# Patient Record
Sex: MALE | Race: WHITE | Employment: UNEMPLOYED | URBAN - METROPOLITAN AREA
[De-identification: names, ages, dates, MRNs, and addresses within clinical notes are randomized per-mention and may not be internally consistent; named-entity substitution may affect disease eponyms.]

---

## 2023-05-31 ENCOUNTER — TELEPHONE (OUTPATIENT)
Dept: INTERNAL MEDICINE CLINIC | Facility: CLINIC | Age: 83
End: 2023-05-31

## 2023-05-31 ENCOUNTER — OFFICE VISIT (OUTPATIENT)
Dept: INTERNAL MEDICINE CLINIC | Facility: CLINIC | Age: 83
End: 2023-05-31

## 2023-05-31 VITALS
DIASTOLIC BLOOD PRESSURE: 78 MMHG | WEIGHT: 203 LBS | HEIGHT: 74 IN | SYSTOLIC BLOOD PRESSURE: 132 MMHG | BODY MASS INDEX: 26.05 KG/M2 | HEART RATE: 80 BPM | OXYGEN SATURATION: 96 %

## 2023-05-31 DIAGNOSIS — J31.0 CHRONIC RHINITIS: ICD-10-CM

## 2023-05-31 DIAGNOSIS — I10 BENIGN ESSENTIAL HTN: ICD-10-CM

## 2023-05-31 DIAGNOSIS — G89.29 CHRONIC LOW BACK PAIN, UNSPECIFIED BACK PAIN LATERALITY, UNSPECIFIED WHETHER SCIATICA PRESENT: ICD-10-CM

## 2023-05-31 DIAGNOSIS — E78.5 HYPERLIPIDEMIA, UNSPECIFIED HYPERLIPIDEMIA TYPE: ICD-10-CM

## 2023-05-31 DIAGNOSIS — C44.90 SKIN CANCER: ICD-10-CM

## 2023-05-31 DIAGNOSIS — I71.40 ABDOMINAL AORTIC ANEURYSM (AAA) WITHOUT RUPTURE, UNSPECIFIED PART (HCC): ICD-10-CM

## 2023-05-31 DIAGNOSIS — H40.9 GLAUCOMA, UNSPECIFIED GLAUCOMA TYPE, UNSPECIFIED LATERALITY: ICD-10-CM

## 2023-05-31 DIAGNOSIS — M81.0 OSTEOPOROSIS, UNSPECIFIED OSTEOPOROSIS TYPE, UNSPECIFIED PATHOLOGICAL FRACTURE PRESENCE: ICD-10-CM

## 2023-05-31 DIAGNOSIS — Z00.00 PHYSICAL EXAM: Primary | ICD-10-CM

## 2023-05-31 DIAGNOSIS — M19.90 OSTEOARTHRITIS, UNSPECIFIED OSTEOARTHRITIS TYPE, UNSPECIFIED SITE: ICD-10-CM

## 2023-05-31 DIAGNOSIS — E11.9 TYPE 2 DIABETES MELLITUS WITHOUT COMPLICATION, WITHOUT LONG-TERM CURRENT USE OF INSULIN (HCC): ICD-10-CM

## 2023-05-31 DIAGNOSIS — M54.50 CHRONIC LOW BACK PAIN, UNSPECIFIED BACK PAIN LATERALITY, UNSPECIFIED WHETHER SCIATICA PRESENT: ICD-10-CM

## 2023-05-31 PROBLEM — I71.9 AORTA ANEURYSM (HCC): Status: ACTIVE | Noted: 2023-05-31

## 2023-05-31 PROBLEM — I71.9 AORTA ANEURYSM: Status: ACTIVE | Noted: 2023-05-31

## 2023-05-31 RX ORDER — ATENOLOL 25 MG/1
25 TABLET ORAL DAILY
COMMUNITY

## 2023-05-31 RX ORDER — AMLODIPINE BESYLATE 10 MG/1
10 TABLET ORAL DAILY
COMMUNITY

## 2023-05-31 RX ORDER — PIOGLITAZONEHYDROCHLORIDE 45 MG/1
45 TABLET ORAL DAILY
COMMUNITY

## 2023-05-31 RX ORDER — POTASSIUM CHLORIDE 750 MG/1
10 TABLET, FILM COATED, EXTENDED RELEASE ORAL DAILY
COMMUNITY

## 2023-05-31 RX ORDER — BRIMONIDINE TARTRATE 1.5 MG/ML
SOLUTION/ DROPS OPHTHALMIC
COMMUNITY

## 2023-05-31 RX ORDER — LOSARTAN POTASSIUM 100 MG/1
100 TABLET ORAL DAILY
COMMUNITY

## 2023-05-31 RX ORDER — NICOTINE POLACRILEX 4 MG/1
20 GUM, CHEWING ORAL DAILY
COMMUNITY

## 2023-05-31 RX ORDER — ALENDRONATE SODIUM 70 MG/1
TABLET ORAL
COMMUNITY

## 2023-05-31 NOTE — TELEPHONE ENCOUNTER
LMTCB - Please encourage patient again (asked him this at todays visit) to please bring pill bottles to next OV. Still missing strength / dose for the following. Upon call back please obtain. Also obtain a good local pharmacy. Metformin  Glipizide  Atorvastatin  Bromonidine   Alendronate  Timolol Maleate Eye Drops   Calcium  Vitamin C  Centrum Silver Multivitamin     Side note, PHI signed and faxed to previous PCP in [place], Dr. Rony Perez at [place], [place] and Shaun Mercado MD, Dr. Austin Garcia.  [facility] for [facility] (Lumbar Pain) in [place], [place]

## 2023-05-31 NOTE — TELEPHONE ENCOUNTER
Patient called  Please update chart with the following medications that he is taking     Omeprazole 20 mg - 1 daily    Potassium chloride ER 10 meq - 1 daily    Piogzitazine (sp?) 45 mg - 1 daily    Atenolol 25 mg - 1 daily    Losartan 100 mg - 1 daily    Apilopine (sp?) 10 mg - 1 daily     Any questions pt can be reached at     [phone]

## 2023-06-01 ENCOUNTER — LAB ENCOUNTER (OUTPATIENT)
Dept: LAB | Age: 83
End: 2023-06-01
Attending: INTERNAL MEDICINE
Payer: MEDICARE

## 2023-06-01 DIAGNOSIS — E11.9 TYPE 2 DIABETES MELLITUS WITHOUT COMPLICATION, WITHOUT LONG-TERM CURRENT USE OF INSULIN (HCC): ICD-10-CM

## 2023-06-01 DIAGNOSIS — Z00.00 PHYSICAL EXAM: ICD-10-CM

## 2023-06-01 LAB
ALBUMIN SERPL-MCNC: 3.7 G/DL (ref 3.4–5)
ALBUMIN/GLOB SERPL: 1.2 {RATIO} (ref 1–2)
ALP LIVER SERPL-CCNC: 72 U/L
ALT SERPL-CCNC: 20 U/L
ANION GAP SERPL CALC-SCNC: 7 MMOL/L (ref 0–18)
AST SERPL-CCNC: 16 U/L (ref 15–37)
BASOPHILS # BLD AUTO: 0.05 X10(3) UL (ref 0–0.2)
BASOPHILS NFR BLD AUTO: 0.7 %
BILIRUB SERPL-MCNC: 0.4 MG/DL (ref 0.1–2)
BUN BLD-MCNC: 30 MG/DL (ref 7–18)
BUN/CREAT SERPL: 24.8 (ref 10–20)
CALCIUM BLD-MCNC: 9.2 MG/DL (ref 8.5–10.1)
CHLORIDE SERPL-SCNC: 110 MMOL/L (ref 98–112)
CHOLEST SERPL-MCNC: 122 MG/DL (ref ?–200)
CO2 SERPL-SCNC: 24 MMOL/L (ref 21–32)
COMPLEXED PSA SERPL-MCNC: 0.69 NG/ML (ref ?–4)
CREAT BLD-MCNC: 1.21 MG/DL
CREAT UR-SCNC: 194 MG/DL
DEPRECATED RDW RBC AUTO: 43.7 FL (ref 35.1–46.3)
EOSINOPHIL # BLD AUTO: 0.32 X10(3) UL (ref 0–0.7)
EOSINOPHIL NFR BLD AUTO: 4.7 %
ERYTHROCYTE [DISTWIDTH] IN BLOOD BY AUTOMATED COUNT: 12.9 % (ref 11–15)
EST. AVERAGE GLUCOSE BLD GHB EST-MCNC: 174 MG/DL (ref 68–126)
FASTING PATIENT LIPID ANSWER: YES
FASTING STATUS PATIENT QL REPORTED: YES
GFR SERPLBLD BASED ON 1.73 SQ M-ARVRAT: 59 ML/MIN/1.73M2 (ref 60–?)
GLOBULIN PLAS-MCNC: 3.1 G/DL (ref 2.8–4.4)
GLUCOSE BLD-MCNC: 150 MG/DL (ref 70–99)
HBA1C MFR BLD: 7.7 % (ref ?–5.7)
HCT VFR BLD AUTO: 42.2 %
HDLC SERPL-MCNC: 41 MG/DL (ref 40–59)
HGB BLD-MCNC: 13.9 G/DL
IMM GRANULOCYTES # BLD AUTO: 0.02 X10(3) UL (ref 0–1)
IMM GRANULOCYTES NFR BLD: 0.3 %
LDLC SERPL CALC-MCNC: 59 MG/DL (ref ?–100)
LYMPHOCYTES # BLD AUTO: 1.69 X10(3) UL (ref 1–4)
LYMPHOCYTES NFR BLD AUTO: 24.7 %
MCH RBC QN AUTO: 30.4 PG (ref 26–34)
MCHC RBC AUTO-ENTMCNC: 32.9 G/DL (ref 31–37)
MCV RBC AUTO: 92.3 FL
MICROALBUMIN UR-MCNC: 8.79 MG/DL
MICROALBUMIN/CREAT 24H UR-RTO: 45.3 UG/MG (ref ?–30)
MONOCYTES # BLD AUTO: 0.51 X10(3) UL (ref 0.1–1)
MONOCYTES NFR BLD AUTO: 7.4 %
NEUTROPHILS # BLD AUTO: 4.26 X10 (3) UL (ref 1.5–7.7)
NEUTROPHILS # BLD AUTO: 4.26 X10(3) UL (ref 1.5–7.7)
NEUTROPHILS NFR BLD AUTO: 62.2 %
NONHDLC SERPL-MCNC: 81 MG/DL (ref ?–130)
OSMOLALITY SERPL CALC.SUM OF ELEC: 301 MOSM/KG (ref 275–295)
PLATELET # BLD AUTO: 204 10(3)UL (ref 150–450)
POTASSIUM SERPL-SCNC: 4.1 MMOL/L (ref 3.5–5.1)
PROT SERPL-MCNC: 6.8 G/DL (ref 6.4–8.2)
RBC # BLD AUTO: 4.57 X10(6)UL
SODIUM SERPL-SCNC: 141 MMOL/L (ref 136–145)
TRIGL SERPL-MCNC: 124 MG/DL (ref 30–149)
TSI SER-ACNC: 2.68 MIU/ML (ref 0.36–3.74)
VLDLC SERPL CALC-MCNC: 18 MG/DL (ref 0–30)
WBC # BLD AUTO: 6.9 X10(3) UL (ref 4–11)

## 2023-06-01 PROCEDURE — 84443 ASSAY THYROID STIM HORMONE: CPT

## 2023-06-01 PROCEDURE — 83036 HEMOGLOBIN GLYCOSYLATED A1C: CPT

## 2023-06-01 PROCEDURE — 82570 ASSAY OF URINE CREATININE: CPT

## 2023-06-01 PROCEDURE — 82043 UR ALBUMIN QUANTITATIVE: CPT

## 2023-06-01 PROCEDURE — 80061 LIPID PANEL: CPT

## 2023-06-01 PROCEDURE — 85025 COMPLETE CBC W/AUTO DIFF WBC: CPT

## 2023-06-01 PROCEDURE — 36415 COLL VENOUS BLD VENIPUNCTURE: CPT

## 2023-06-01 PROCEDURE — 80053 COMPREHEN METABOLIC PANEL: CPT

## 2023-06-14 NOTE — TELEPHONE ENCOUNTER
Called patient who states he does not need refills , has adan 6/16 with DR. CHERI Melo RN instructed luna fitzgerald bring all medication bottles with him   Here he would be using [facility] on [address]

## 2023-06-16 ENCOUNTER — OFFICE VISIT (OUTPATIENT)
Dept: INTERNAL MEDICINE CLINIC | Facility: CLINIC | Age: 83
End: 2023-06-16

## 2023-06-16 VITALS
TEMPERATURE: 98 F | HEART RATE: 70 BPM | BODY MASS INDEX: 26 KG/M2 | OXYGEN SATURATION: 96 % | HEIGHT: 74 IN | SYSTOLIC BLOOD PRESSURE: 130 MMHG | DIASTOLIC BLOOD PRESSURE: 78 MMHG

## 2023-06-16 DIAGNOSIS — E11.9 TYPE 2 DIABETES MELLITUS WITHOUT COMPLICATION, WITHOUT LONG-TERM CURRENT USE OF INSULIN (HCC): ICD-10-CM

## 2023-06-16 DIAGNOSIS — H61.23 BILATERAL IMPACTED CERUMEN: Primary | ICD-10-CM

## 2023-06-16 DIAGNOSIS — I10 BENIGN ESSENTIAL HTN: ICD-10-CM

## 2023-06-16 PROCEDURE — 3078F DIAST BP <80 MM HG: CPT | Performed by: INTERNAL MEDICINE

## 2023-06-16 PROCEDURE — 99213 OFFICE O/P EST LOW 20 MIN: CPT | Performed by: INTERNAL MEDICINE

## 2023-06-16 PROCEDURE — 69210 REMOVE IMPACTED EAR WAX UNI: CPT | Performed by: INTERNAL MEDICINE

## 2023-06-16 PROCEDURE — 3075F SYST BP GE 130 - 139MM HG: CPT | Performed by: INTERNAL MEDICINE

## 2023-06-16 PROCEDURE — 1125F AMNT PAIN NOTED PAIN PRSNT: CPT | Performed by: INTERNAL MEDICINE

## 2023-06-16 PROCEDURE — 1159F MED LIST DOCD IN RCRD: CPT | Performed by: INTERNAL MEDICINE

## 2023-06-16 RX ORDER — ASPIRIN 81 MG/1
81 TABLET ORAL DAILY
COMMUNITY

## 2023-06-21 ENCOUNTER — TELEPHONE (OUTPATIENT)
Dept: PHYSICAL THERAPY | Facility: HOSPITAL | Age: 83
End: 2023-06-21

## 2023-06-22 ENCOUNTER — OFFICE VISIT (OUTPATIENT)
Dept: PHYSICAL THERAPY | Age: 83
End: 2023-06-22
Attending: INTERNAL MEDICINE
Payer: MEDICARE

## 2023-06-22 DIAGNOSIS — G89.29 CHRONIC LOW BACK PAIN, UNSPECIFIED BACK PAIN LATERALITY, UNSPECIFIED WHETHER SCIATICA PRESENT: ICD-10-CM

## 2023-06-22 DIAGNOSIS — M54.50 CHRONIC LOW BACK PAIN, UNSPECIFIED BACK PAIN LATERALITY, UNSPECIFIED WHETHER SCIATICA PRESENT: ICD-10-CM

## 2023-06-22 PROCEDURE — 97162 PT EVAL MOD COMPLEX 30 MIN: CPT | Performed by: PHYSICAL THERAPIST

## 2023-06-22 PROCEDURE — 97110 THERAPEUTIC EXERCISES: CPT | Performed by: PHYSICAL THERAPIST

## 2023-06-28 ENCOUNTER — OFFICE VISIT (OUTPATIENT)
Dept: PHYSICAL THERAPY | Age: 83
End: 2023-06-28
Attending: INTERNAL MEDICINE
Payer: MEDICARE

## 2023-06-28 PROCEDURE — 97110 THERAPEUTIC EXERCISES: CPT | Performed by: PHYSICAL THERAPIST

## 2023-06-30 ENCOUNTER — OFFICE VISIT (OUTPATIENT)
Dept: PHYSICAL THERAPY | Age: 83
End: 2023-06-30
Attending: INTERNAL MEDICINE
Payer: MEDICARE

## 2023-06-30 PROCEDURE — 97110 THERAPEUTIC EXERCISES: CPT

## 2023-06-30 PROCEDURE — 97112 NEUROMUSCULAR REEDUCATION: CPT

## 2023-07-03 ENCOUNTER — OFFICE VISIT (OUTPATIENT)
Dept: PHYSICAL THERAPY | Age: 83
End: 2023-07-03
Attending: INTERNAL MEDICINE
Payer: MEDICARE

## 2023-07-03 PROCEDURE — 97530 THERAPEUTIC ACTIVITIES: CPT | Performed by: PHYSICAL THERAPIST

## 2023-07-03 PROCEDURE — 97110 THERAPEUTIC EXERCISES: CPT | Performed by: PHYSICAL THERAPIST

## 2023-07-06 ENCOUNTER — OFFICE VISIT (OUTPATIENT)
Dept: PHYSICAL THERAPY | Age: 83
End: 2023-07-06
Attending: INTERNAL MEDICINE
Payer: MEDICARE

## 2023-07-06 PROCEDURE — 97110 THERAPEUTIC EXERCISES: CPT | Performed by: PHYSICAL THERAPIST

## 2023-07-06 PROCEDURE — 97112 NEUROMUSCULAR REEDUCATION: CPT | Performed by: PHYSICAL THERAPIST

## 2023-07-06 NOTE — PROGRESS NOTES
Diagnosis:   Chronic low back pain, unspecified back pain laterality, unspecified whether sciatica present (M54.50,G89.29)   Referring Provider: Emili Boyd  Date of Evaluation:    6/22/2023    Precautions:  HTN, DM2, FALL RISK Next MD visit:   none scheduled  Date of Surgery: n/a   Insurance Primary/Secondary: [employer]ICARE / N/A     # Auth Visits: 8 sessions auth           Subjective: LB and balance are better. Limited quad cane use; use as needed for curbs, just in case I lose my balance. Don't get out of breath like I used to. Neck is better today. Plan to use collapseable SPC when go to [facility] in October for family birthday party. Pain: 2-3/10    Objective:   Gait: flat foot contact with ground, fair use of R quad cane, fair dynamic balance, wide based of support  Posture: seated; forward slouched  Palpation: Pt denies c/o lumbar/gluteal musculature  Pt c/o discomfort with lumbar, SIJ P/A. Assessment:   Pt requiring less rest breaks during PT session. Pt with fairly good balance during interventions but does require min - mod UE A at times. Pt continues to have signif restriction B hip ER ROM; continue to address during PT sessions and with HEP. Will assess gait/potentially consider gait training using collapsible SPC, as pt reports plans to use this when travels in October and due to fair use/form using quad cane. Pt reported 1 brief episode of SOB during on exertion during session, recovered quickly with rest, denied further c/o. Goals:   (to be met in 10 visits)   1. Patient to report independence with PT HEP to facilitate self management and to maintain progress made in PT.  2. Patient to have TUG score below 12 sec in order to reduce fall risk. 3. Patient to have L/R hip MMT grossly 4/5 to facilitate transfers, stair negotiation. 4. Patient to walk at least 5 min using appropriate assistive device without fall or loss of balance.    5. Patient to have at least 30 deg active lumbar ext to facilitate walking, overhead ADL. 6. Patient to report knowledge of proper bodymechanics in order to reduce risk for LBP    Plan: Continue PT 2 x weekly for 10 visits. Progress hip ER/abd ROM, hip ext/abd strengthening, lumbar ext/hip flexor length, balance and gait interventions. Assess pt safety with SPC. Date: 6/28/2023  TX#: 2/8-10 (insurance auth 8 sessions) Date: 6/30/23                 TX#: 3/8-10 Date:   7/3/2023              TX#: 4/8-10 (insurance auth 8 sessions) Date:  7/6/2023               TX#: 5/8 Date: Tx#: 6/   There Ex (gait belt donned for WB interventions, PT SBA/contact guard): Hep review/modification; see below. Verbal cuing for UE A as needed, not to overuse UE  Supine BKFO 5 x 10 sec each ( HEP)  Supine LTR 5 x 5 sec each   Supine DKC with LE supported on ball x 5 reps   Bridge with LE on blue - pt c/o LBP; gluteal sets x 10 reps - pt osman well  Sit - stand from high- mid plinth x 15 reps (progressively lower height   Standing hip flexor stretch using staircase 2 x 20 sec each   Lateral steps at bar 3 x ~ 10 feet   4 and 6 inch lateral step up/over/down x 5 reps, UE A  4 and 6 inch step up over down x 5 reps each L/R LE, UE A Ther ex:  LTR 10x  SKTC on SB 10x  Bridging with SB iso 10x  Hamstring stretch belt 6 x 10 cts  R/L SLR 1.5 lbs 10x  Lunge  on second step R/L 10x  Step ups R/L 10x    NM Zane  High knee march on aeromat 20x  Rhomberg on aeromat with rotation 10x  Sit<>stand high mat 10x     Please see daily note for details.   There Ex (gait belt donned for WB interventions, PT SBA/contact guard):  Supine LTR 5 x 5 sec each   Supine L/R hip flexor stretch x 40 sec each  Supine L/R across body piriformis x 20 sec   Supine L/R figure 4 stretch x 20 sec each  Supine DKC (knees wide) with LE supported on ball x 5 reps   Bridge with LE on blue ball x 12 reps   Sit - stand chair height plinth x 15 reps  6 inch lateral step up/over/down x 5 reps, UE A  6 and 8 inch step up over down x 5 reps each L/R LE, UE A    Manual:   Attempted hip abd ROM, pt muscle guarding, stopped due to ineffective       NM Re-Ed (gait belt donned, PT SBA/contact guard)  Gait using R quad cane - verbal/visual cuing for heel strike - toe off  There Activities: (gait belt donned, PT SBA/contact guard): Alternate L/R anterior step onto AIirEx x 5 reps each, marching x 5 reps - at bar NM Re-ed (gait belt donned, PT SBA/contact guard):   Obstacle course: 1/2 foam roller, AirEx, cones, quad cane and bar as needed  Lateral steps over cones at bar 2 x ~ 16 feet  at bar    There Activities:   Advised pt to consider shoes with arch support for R foot overpronation, c/o R ankle pain in WB; replace shoes/consider shoes laces (pt reports has had same shoes for 6 years); pt reports to see podiatrist end of 7/2023      Consideration for neck c/o: B shoulder ER x 10 reps, seated thoracic spine ext/pec stretch 5 x 5 sec each; MHP;posture; pillow support for sleeping     HEP: seated hip ER/Abd ROM; With UE support: marching, hip ext; side steps at countertop or standing hip abd. Pt reports completing supine hip flexor stretch.    Charges: 2TE (32 min); 1 NM Re-ed (8 min) Total Timed Treatment: 40 min  Total Treatment Time: 45 min

## 2023-07-10 ENCOUNTER — OFFICE VISIT (OUTPATIENT)
Dept: PHYSICAL THERAPY | Age: 83
End: 2023-07-10
Attending: INTERNAL MEDICINE
Payer: MEDICARE

## 2023-07-10 PROCEDURE — 97112 NEUROMUSCULAR REEDUCATION: CPT | Performed by: PHYSICAL THERAPIST

## 2023-07-10 PROCEDURE — 97110 THERAPEUTIC EXERCISES: CPT | Performed by: PHYSICAL THERAPIST

## 2023-07-10 NOTE — PROGRESS NOTES
Diagnosis:   Chronic low back pain, unspecified back pain laterality, unspecified whether sciatica present (M54.50,G89.29)   Referring Provider: Regina Delaney  Date of Evaluation:    6/22/2023    Precautions:  HTN, DM2, FALL RISK Next MD visit:   none scheduled  Date of Surgery: n/a   Insurance Primary/Secondary: [employer]ICARE / N/A     # Auth Visits: 8 sessions auth           Subjective: LB and balance are better. Not as short of breath as I used to be. Brought SPC with me today. C/o headaches in the mornings x 3-4 years, deny consulting physician. I take Excedrin, and they go away. Pain: 2-3/10    Objective:   Resting vitals: BP R UE brachial artery: 122/69 mmHg, 100% O2 sat, HR 68 bpm    Gait: flat foot contact with ground, fair use of R wide base SPC, fair dynamic balance, wide based of support  Posture: seated; forward slouched    Assessment:   Advised PCP consultation re: c/o headaches every morning x 3-4 years. Pt reported feeling a little light headed/whoosy following supine interventions. Vitals stable. May be related to BP in transition from supine to seated. Pt reported c/o resolved, was able to proceed through PT session without c/o. Pt challenged with steps and balance exercises today. Gait training using wide based SPC. Pt tolerated well, continued fair use/coordination of assistive device. Goals:   (to be met in 10 visits)   1. Patient to report independence with PT HEP to facilitate self management and to maintain progress made in PT.  2. Patient to have TUG score below 12 sec in order to reduce fall risk. 3. Patient to have L/R hip MMT grossly 4/5 to facilitate transfers, stair negotiation. 4. Patient to walk at least 5 min using appropriate assistive device without fall or loss of balance. 5. Patient to have at least 30 deg active lumbar ext to facilitate walking, overhead ADL.    6. Patient to report knowledge of proper bodymechanics in order to reduce risk for LBP    Plan: Continue PT 2 x weekly for 10 visits. Progress hip ER/abd ROM, hip ext/abd strengthening, lumbar ext/hip flexor length, balance and gait interventions. Pt to consult PCP re: c/o headaches every morning. Date: 6/30/23                 TX#: 3/8-10 Date:   7/3/2023              TX#: 4/8-10 (insurance auth 8 sessions) Date:  7/6/2023               TX#: 5/8 Date: 7/10/2023  Tx#: 6/8   Ther ex:  LTR 10x  SKTC on SB 10x  Bridging with SB iso 10x  Hamstring stretch belt 6 x 10 cts  R/L SLR 1.5 lbs 10x  Lunge  on second step R/L 10x  Step ups R/L 10x    NM Zane  High knee march on aeromat 20x  Rhomberg on aeromat with rotation 10x  Sit<>stand high mat 10x     Please see daily note for details. There Ex (gait belt donned for WB interventions, PT SBA/contact guard):  Supine LTR 5 x 5 sec each   Supine L/R hip flexor stretch x 40 sec each  Supine L/R across body piriformis x 20 sec   Supine L/R figure 4 stretch x 20 sec each  Supine DKC (knees wide) with LE supported on ball x 5 reps   Bridge with LE on blue ball x 12 reps   Sit - stand chair height plinth x 15 reps  6 inch lateral step up/over/down x 5 reps, UE A  6 and 8 inch step up over down x 5 reps each L/R LE, UE A There Ex (gait belt donned for WB interventions, PT SBA/contact guard):  Supine LTR 5 x 5 sec each - pt c/o mild LBP, stopped  Supine L/R across body piriformis 2 x 20 sec   Supine L/R figure 4 stretch 2 x 20 sec each  Supine DKC (knees wide) with LE supported on ball x 5 reps   Bridge with LE on blue ball - pt c/o LBP, stopped  Lumbar TA stabilization x 10 reps - verbal/tactile cuing  Sit - stand chair height plinth x ~15 reps  Standing B heel/toe raises x 15 reps   6 inch lateral step up/over/down x 5 reps, UE A  6 inch step up over down x 5 reps each L/R LE, min UE A - pt challenged with  6 inch step today.   Standing L/R hip flexor/gastroc stretch x 30 sec  L/R SLS with contralateral hip flex/abd/ext x 5 reps each at bar             There Activities: (gait belt donned, PT SBA/contact guard): Alternate L/R anterior step onto AIirEx x 5 reps each, marching x 5 reps - at bar NM Re-ed (gait belt donned, PT SBA/contact guard):   Obstacle course: 1/2 foam roller, AirEx, cones, quad cane and bar as needed  Lateral steps over cones at bar 2 x ~ 16 feet  at bar NM Re-ed (gait belt donned, PT SBA/contact guard):   Gait training using wide based SPC, adjusted to proper height, verbal/visual cuing for [facility] use/coordination with LE. Pt ambulated independently, no PT contact guard. Lateral steps over cones at bar 2 x ~ 16 feet  at bar  AirEx: marching at bar 5 x 5 sec each      Consideration for neck c/o: B shoulder ER x 10 reps, seated thoracic spine ext/pec stretch 5 x 5 sec each; MHP;posture; pillow support for sleeping     HEP: seated hip ER/Abd ROM; With UE support: marching, hip ext; side steps at countertop or standing hip abd. Pt reports completing supine hip flexor stretch.    Charges: 2TE (30 min); 1 NM Re-ed (10 min) Total Timed Treatment: 40 min  Total Treatment Time: 45 min

## 2023-07-20 ENCOUNTER — OFFICE VISIT (OUTPATIENT)
Dept: PHYSICAL THERAPY | Age: 83
End: 2023-07-20
Attending: INTERNAL MEDICINE
Payer: MEDICARE

## 2023-07-20 PROCEDURE — 97110 THERAPEUTIC EXERCISES: CPT | Performed by: PHYSICAL THERAPIST

## 2023-07-20 PROCEDURE — 97530 THERAPEUTIC ACTIVITIES: CPT | Performed by: PHYSICAL THERAPIST

## 2023-07-20 NOTE — PROGRESS NOTES
Diagnosis:   Chronic low back pain, unspecified back pain laterality, unspecified whether sciatica present (M54.50,G89.29)   Referring Provider: Katey Buitrago Date of Evaluation:    6/22/2023    Precautions:  HTN, DM2, FALL RISK Next MD visit:   none scheduled  Date of Surgery: n/a   Insurance Primary/Secondary: [employer] MEDICARE / N/A     # Auth Visits: 8 sessions auth           Subjective:  Balance has been okay. Deny any loss of balance or falls. My LE ache when I go up/downstairs. The toughest one is getting into/out of home - there is a step, and I have to hold open the door. Exercised in the pool the other day. Still have some LBP, but it is not terrible. Increased ease with sit - stand. C/o daily LBP, deny pattern. C/o neck discomfort. Dr. Katey Buitrago is retiring in November. Next session can be my last PT session. Pain: 2-3/10    Objective:   Pt able to negotiate 6 inch step using L/R LE with UE A  Gross restriction cervical spine AROM  Palpation: increased tone/c/o B upper traps/suboccipitals    Assessment:   Patient reporting improved strength with transfers, less concerned re: balance since initiating PT. Pt c/o neck pain today; see There Activities below for considerations. Pt with cervical spine AROM restrictions, muscular c/o; pt to consult PCP if c/o persist.  Continue to work on stairs, LE balance, strengthening for pt's c/o LBP, balance deficits. Pt challenged but tolerated session well. Goals:   (to be met in 10 visits)   1. Patient to report independence with PT HEP to facilitate self management and to maintain progress made in PT.  2. Patient to have TUG score below 12 sec in order to reduce fall risk. 3. Patient to have L/R hip MMT grossly 4/5 to facilitate transfers, stair negotiation. 4. Patient to walk at least 5 min using appropriate assistive device without fall or loss of balance. 5. Patient to have at least 30 deg active lumbar ext to facilitate walking, overhead ADL.    6. Patient to report knowledge of proper bodymechanics in order to reduce risk for LBP    Plan: Continue PT 2 x weekly for 10 visits. Consider discharge next session due to pt preference, independence with HEP. Pt to consult PCP re: c/o headaches every morning, neck pain. Date:  7/6/2023               TX#: 5/8 Date: 7/10/2023  Tx#: 6/8 Date: 7/20/2023  Tx #: 7/8   There Ex (gait belt donned for WB interventions, PT SBA/contact guard):  Supine LTR 5 x 5 sec each   Supine L/R hip flexor stretch x 40 sec each  Supine L/R across body piriformis x 20 sec   Supine L/R figure 4 stretch x 20 sec each  Supine DKC (knees wide) with LE supported on ball x 5 reps   Bridge with LE on blue ball x 12 reps   Sit - stand chair height plinth x 15 reps  6 inch lateral step up/over/down x 5 reps, UE A  6 and 8 inch step up over down x 5 reps each L/R LE, UE A There Ex (gait belt donned for WB interventions, PT SBA/contact guard):  Supine LTR 5 x 5 sec each - pt c/o mild LBP, stopped  Supine L/R across body piriformis 2 x 20 sec   Supine L/R figure 4 stretch 2 x 20 sec each  Supine DKC (knees wide) with LE supported on ball x 5 reps   Bridge with LE on blue ball - pt c/o LBP, stopped  Lumbar TA stabilization x 10 reps - verbal/tactile cuing  Sit - stand chair height plinth x ~15 reps  Standing B heel/toe raises x 15 reps   6 inch lateral step up/over/down x 5 reps, UE A  6 inch step up over down x 5 reps each L/R LE, min UE A - pt challenged with  6 inch step today.   Standing L/R hip flexor/gastroc stretch x 30 sec  L/R SLS with contralateral hip flex/abd/ext x 5 reps each at bar  There Ex (gait belt donned for WB interventions, PT SBA/contact guard):  Seated thoracic spine ext/pec stretch  Supine figure 4 stretch x 30 sec each  Bridge on blue ball x 10 reps  Supine DKC (knees wide) with LE supported on ball 5 x 5 sec each  Lumbar TA stabilization x 10 reps - verbal/tactile cuing  Sit - stand mod height plinth x 5 reps, chair height plinth x 5 reps - pt c/o knee c/o/LB discomfort, stopped  6 inch lateral step up/over/down x 8 reps, UE A  6 inch step up over down x 5 reps each L/R LE, min UE A   Standing L/R hip flexor/gastroc stretch using staircase 2 x 20 sec each   L/R SLS with contralateral hip flex/abd/ext x 5 reps each at bar             NM Re-ed (gait belt donned, PT SBA/contact guard):   Obstacle course: 1/2 foam roller, AirEx, cones, quad cane and bar as needed  Lateral steps over cones at bar 2 x ~ 16 feet  at bar NM Re-ed (gait belt donned, PT SBA/contact guard):   Gait training using wide based SPC, adjusted to proper height, verbal/visual cuing for [facility] use/coordination with LE. Pt ambulated independently, no PT contact guard. Lateral steps over cones at bar 2 x ~ 16 feet  at bar  AirEx: marching at bar 5 x 5 sec each NM Re-ed (gait belt donned, PT SBA/contact guard):   AirEx: marching at bar 5 x 5 sec each     There Activities:   Considerations for neck c/o: MHP 10-15 min; cervical spine AROM flex/ext, rotation, sidebend   STM B upper traps, suboccipitals   HEP: seated hip ER/Abd ROM; With UE support: marching, hip ext; side steps at countertop or standing hip abd. Pt reports completing supine hip flexor stretch.    Charges: 2TE (30 min); 1 TA (10 min)Total Timed Treatment: 40 min  Total Treatment Time: 45 min Bactrim Counseling:  I discussed with the patient the risks of sulfa antibiotics including but not limited to GI upset, allergic reaction, drug rash, diarrhea, dizziness, photosensitivity, and yeast infections.  Rarely, more serious reactions can occur including but not limited to aplastic anemia, agranulocytosis, methemoglobinemia, blood dyscrasias, liver or kidney failure, lung infiltrates or desquamative/blistering drug rashes.

## 2023-07-24 ENCOUNTER — OFFICE VISIT (OUTPATIENT)
Dept: PODIATRY CLINIC | Facility: CLINIC | Age: 83
End: 2023-07-24

## 2023-07-24 DIAGNOSIS — M79.671 BILATERAL FOOT PAIN: ICD-10-CM

## 2023-07-24 DIAGNOSIS — M79.672 BILATERAL FOOT PAIN: ICD-10-CM

## 2023-07-24 DIAGNOSIS — B35.1 ONYCHOMYCOSIS: ICD-10-CM

## 2023-07-24 DIAGNOSIS — I73.9 PERIPHERAL VASCULAR DISEASE (HCC): ICD-10-CM

## 2023-07-24 DIAGNOSIS — E11.42 TYPE 2 DIABETES MELLITUS WITH POLYNEUROPATHY (HCC): Primary | ICD-10-CM

## 2023-07-24 DIAGNOSIS — R26.81 GAIT INSTABILITY: ICD-10-CM

## 2023-07-24 NOTE — PROGRESS NOTES
[facility]  Progress Note    Robbie Drummond is a 78year old male. Patient presents with:  Diabetic Foot Care: [facility] CTR : Bilateral  foot pain- R - 1/10 pain - L worse: pain on top of foot . Rates pain 2/10. HA1C: 7.7 on 06/01         HPI:     This is a pleasant male with type 2 DM, PMH of aorta aneurysm, glaucoma. He does have PMH of tobacco use x 30 years but is no longer smoking. He does use a walker/cane for ambulation. He does have some tingling to his feet. He presents to clinic today for diabetic foot care. He does complains of bilateral foot pain worse at night. Allergies: Patient has no known allergies. Current Outpatient Medications   Medication Sig Dispense Refill    aspirin 81 MG Oral Tab EC Take 1 tablet (81 mg total) by mouth daily. glipiZIDE 10 MG Oral Tab Take 2 tablets (20 mg total) by mouth every evening. Omeprazole 20 MG Oral Tab EC Take 20 mg by mouth daily. Potassium Chloride ER 10 MEQ Oral Tab CR Take 1 tablet (10 mEq total) by mouth daily. pioglitazone 45 MG Oral Tab Take 1 tablet (45 mg total) by mouth daily. atenolol 25 MG Oral Tab Take 1 tablet (25 mg total) by mouth daily. losartan 100 MG Oral Tab Take 1 tablet (100 mg total) by mouth daily. amLODIPine 10 MG Oral Tab Take 1 tablet (10 mg total) by mouth daily. Multiple Vitamins-Minerals (CENTRUM SILVER ADULT 50+ OR) Take by mouth daily. Ascorbic Acid (VITAMIN C OR) Take by mouth. TIMOLOL MALEATE OP Apply to eye.      brimonidine 0.15 % Ophthalmic Solution Apply to eye. ATORVASTATIN CALCIUM OR Take 40 mg by mouth daily. METFORMIN HCL OR Take 1,000 mg by mouth in the morning and 1,000 mg before bedtime. GLIPIZIDE OR Take 10 mg by mouth every morning. alendronate 70 MG Oral Tab Take 1 tablet (70 mg total) by mouth once a week.         Past Medical History:   Diagnosis Date    Aorta aneurysm ([address]     Benign essential HTN     Chronic rhinitis DM (diabetes mellitus), type 2 ([facility] [address]     Hyperlipidemia     Skin cancer       Past Surgical History:   Procedure Laterality Date    OTHER ACCESSORY      T and A    OTHER ACCESSORY      appendectomy    OTHER ACCESSORY      cholycystectomy    OTHER ACCESSORY      bilateral cataract    OTHER ACCESSORY      facial basal cell cancer      No family history on file. Social History    Socioeconomic History      Marital status:     Tobacco Use      Smoking status: Former        Types: Cigarettes      Smokeless tobacco: Never    Vaping Use      Vaping Use: Never used    Substance and Sexual Activity      Alcohol use: Yes        Comment: occasional      Drug use: Not Currently          REVIEW OF SYSTEMS:   Denies nausea, fever, chills  No calf pain  No other muscle or joint aches  Denies chest pain or shortness of breath. EXAM:   There were no vitals taken for this visit. Constitutional:   Patient in no apparent distress. Well kept. Of normal body habitus. Alert and oriented to person, place, and time. Vascular Examination:  DP pulse is NP  PT pulse is NP  Capillary refill is adequate  Integumentary Examination:   The patient's nails appear incurvated, thickened, elongated, dystrophic, discolored with subungual debris 1-5 right, 1-5  left nails. Digital hair growth is absent  Skin is of diminished texture and decreased turgor. Neurological Examination:  Monofilament (10-g) sensation is 4/5 to right and 3/5 to left. Sharp/dull is present to right and is present to left. Parasthesias present  Musculoskeletal Examination:  Muscle Strength is 5/5.       LABS & IMAGING:     Lab Results   Component Value Date     (H) 06/01/2023    BUN 30 (H) 06/01/2023    CREATSERUM 1.21 06/01/2023    BUNCREA 24.8 (H) 06/01/2023    ANIONGAP 7 06/01/2023    CA 9.2 06/01/2023     06/01/2023    K 4.1 06/01/2023     06/01/2023    CO2 24.0 06/01/2023    OSMOCALC 301 (H) 06/01/2023        Lab Results   Component Value Date     (H) 06/01/2023    A1C 7.7 (H) 06/01/2023        No results found. ASSESSMENT AND PLAN:   Diagnoses and all orders for this visit:    Type 2 diabetes mellitus with polyneuropathy (HCC)    Bilateral foot pain    Peripheral vascular disease ([facility] [address]    Onychomycosis    Gait instability        Plan:     Diabetic education and instructions have been provided. We reviewed and discussed the following:    -risk categories related to pts with diabetes and foot or lower extremity complications per ADA. -adherence to medication regimen and close monitoring or blood sugar control.   -daily monitoring/inspection of feet and shoes.   -proper management of diet and weight   -regular follow up with PCP and specialty providers as recommended   -Lower extremity complications related to DM were reviewed and stressed prevention. Evaluated the patient. Discussed treatment options with the patient. Discussed with patient proper care and hygiene for their feet. Patient tolerated procedures well, without incident. Instrumentation used includes nail nippers and electric  where appropriate. Procedure: (77344 Debridement of toenails 6-10) Surgically debrided and mechanically reduced 6 or more toenails. Hemmorhage occurred none. Nails that were debrided appeared dystrophic and caused the patient pain in shoe gear. Nails 5 Left & 5 Right. Due to NP pedal pulses and bilateral foot pain ordered b/l LE arterial US    Prescribed DM shoes with inserts    RTC 10 week for [facility] and will review arterial US results. No follow-ups on file.     Jorge A Narayanan DPM  7/24/2023

## 2023-07-27 ENCOUNTER — OFFICE VISIT (OUTPATIENT)
Dept: PHYSICAL THERAPY | Age: 83
End: 2023-07-27
Attending: INTERNAL MEDICINE
Payer: MEDICARE

## 2023-07-27 PROCEDURE — 97110 THERAPEUTIC EXERCISES: CPT | Performed by: PHYSICAL THERAPIST

## 2023-07-27 NOTE — PROGRESS NOTES
Leann[place]avares  Pt has attended 8 visits in Physical Therapy. Diagnosis:   Chronic low back pain, unspecified back pain laterality, unspecified whether sciatica present (M54.50,G89.29)   Referring Provider: Miguel Russell Date of Evaluation:    2023    Precautions:  HTN, DM2, FALL RISK Next MD visit:   none scheduled  Date of Surgery: n/a   Insurance Primary/Secondary: [facility] MEDICARE / N/A     # Auth Visits: 8 sessions auth           Subjective:  Since started PT, have gotten stronger, increased ease/less LBP with sit - stand, less shortness of breath. LBP better but still have back pain. Using SPC less, use walker on the street/sidewalk. Able to walk ~ 1 block using my walker which takes ~ 3-4 min. Denies any loss of balance or falls but have tripped. Today can be my last PT session. Had DPM: concern re: blood flow in LE - to have a test, ordered diabetic shoes, cut toenails. Pain: 1-2/10    Objective:     Palpation: + c/o central sacrum    Lumbar Spine AROM: (* denotes performed with pain)  Flexion: WFL  Extension: 20 deg   Sidebending: R WFL ; L hand to mid thigh*mild LBP  Rotation: R/L 70 deg* mild LBPL    Strength: (* denotes performed with pain)  LE   Hip flexion (L2): R 4/5; L 4/5  Hip abduction: R 4/5; L 4-/5  Hip Extension: R/L each ~ 2-3/5 - tested in standing   Hip ER: R 4/5; L 4/5  Knee Flexion: R 5/5; L 5/5   Knee extension (L3): R 5/5; L 5/5   DF (L4): R 5/5; L 5/5     Flexibility:   LE   Hip Flexor: R/L restriction       Gait: pt ambulates on level ground with intermittent R quad cane use; fair dynamic stability, fair pelvis/knee control, reduced ankle DF at heel strike  Sit - stand: pt able to complete without UE A, without c/o  TU.27 sec - pt carries quad cane; 14.42 sec without quad cane      Assessment:   Patient with both subjective and objective progress since initiating PT.  However, c/o LBP, balance deficits, LE weakness persist.  Pt has declined attending PT after today's session. HEP has been reviewed with patient. Goals:   (to be met in 10 visits)   1. Patient to report independence with PT HEP to facilitate self management and to maintain progress made in PT. - met  2. Patient to have TUG score below 12 sec in order to reduce fall risk. - not met  3. Patient to have L/R hip MMT grossly 4/5 to facilitate transfers, stair negotiation. - progress  4. Patient to walk at least 5 min using appropriate assistive device without fall or loss of balance. - progress  5. Patient to have at least 30 deg active lumbar ext to facilitate walking, overhead ADL. - not met  6. Patient to report knowledge of proper bodymechanics in order to reduce risk for LBP- met    Plan: Discharge patient from PT to HEP due to pt preference, independence with PT HEP. Pt to f/u with physician re: any unresolved LBP, balance concerns.    Date:  7/6/2023               TX#: 5/8 Date: 7/10/2023  Tx#: 6/8 Date: 7/20/2023  Tx #: 7/8 Date: 7/27/2023  Tx #: 8/8   There Ex (gait belt donned for WB interventions, PT SBA/contact guard):  Supine LTR 5 x 5 sec each   Supine L/R hip flexor stretch x 40 sec each  Supine L/R across body piriformis x 20 sec   Supine L/R figure 4 stretch x 20 sec each  Supine DKC (knees wide) with LE supported on ball x 5 reps   Bridge with LE on blue ball x 12 reps   Sit - stand chair height plinth x 15 reps  6 inch lateral step up/over/down x 5 reps, UE A  6 and 8 inch step up over down x 5 reps each L/R LE, UE A There Ex (gait belt donned for WB interventions, PT SBA/contact guard):  Supine LTR 5 x 5 sec each - pt c/o mild LBP, stopped  Supine L/R across body piriformis 2 x 20 sec   Supine L/R figure 4 stretch 2 x 20 sec each  Supine DKC (knees wide) with LE supported on ball x 5 reps   Bridge with LE on blue ball - pt c/o LBP, stopped  Lumbar TA stabilization x 10 reps - verbal/tactile cuing  Sit - stand chair height plinth x ~15 reps  Standing B heel/toe raises x 15 reps   6 inch lateral step up/over/down x 5 reps, UE A  6 inch step up over down x 5 reps each L/R LE, min UE A - pt challenged with  6 inch step today. Standing L/R hip flexor/gastroc stretch x 30 sec  L/R SLS with contralateral hip flex/abd/ext x 5 reps each at bar  There Ex (gait belt donned for WB interventions, PT SBA/contact guard):  Seated thoracic spine ext/pec stretch  Supine figure 4 stretch x 30 sec each  Bridge on blue ball x 10 reps  Supine DKC (knees wide) with LE supported on ball 5 x 5 sec each  Lumbar TA stabilization x 10 reps - verbal/tactile cuing  Sit - stand mod height plinth x 5 reps, chair height plinth x 5 reps - pt c/o knee c/o/LB discomfort, stopped  6 inch lateral step up/over/down x 8 reps, UE A  6 inch step up over down x 5 reps each L/R LE, min UE A   Standing L/R hip flexor/gastroc stretch using staircase 2 x 20 sec each   L/R SLS with contralateral hip flex/abd/ext x 5 reps each at bar      There Ex (gait belt donned for WB interventions, PT SBA/contact guard):  HEP review/instruction/practice/handout; see below. Standing L/R hip flexor/gastoc stretch 2 x 20 sec each  Sit - stand x 5 reps  Mini squats with UE support x 8 reps    Standing heel toe raises x 10 reps  PT demonstration of supine LTR 3 x 5 sec each         NM Re-ed (gait belt donned, PT SBA/contact guard):   Obstacle course: 1/2 foam roller, AirEx, cones, quad cane and bar as needed  Lateral steps over cones at bar 2 x ~ 16 feet  at bar NM Re-ed (gait belt donned, PT SBA/contact guard):   Gait training using wide based SPC, adjusted to proper height, verbal/visual cuing for [facility] use/coordination with LE. Pt ambulated independently, no PT contact guard.   Lateral steps over cones at bar 2 x ~ 16 feet  at bar  AirEx: marching at bar 5 x 5 sec each NM Re-ed (gait belt donned, PT SBA/contact guard):   AirEx: marching at bar 5 x 5 sec each      There Activities:   Considerations for neck c/o: MHP 10-15 min; cervical spine AROM flex/ext, rotation, sidebend   STM B upper traps, suboccipitals There Activities:   Reviewed considerations for neck c/o: MHP 10-15 min; cervical spine AROM flex/ext, rotation, sidebend    HEP: supine BKFO; With UE support: marching, hip ext; side steps at countertop or standing hip abd, standing heel/toe raises. Standing L/R hip flexor stretch, LTR, mini squat  Charges: 3 TE (38 min),  0 TA (2 min) Total Timed Treatment: 40 min  Total Treatment Time: 45 min  FOTO: -  DANIELLE not completed    Plan: Discharge patient from PT to HEP due to pt preference, independence with PT HEP. Pt to f/u with physician re: any unresolved LBP, balance concerns. Patient/Family/Caregiver was advised of these findings, precautions, and treatment options and has agreed to actively participate in planning and for this course of care. Thank you for your referral. If you have any questions, please contact me at Dept: [phone]. Sincerely,  Electronically signed by therapist: Luly Steinberg PT     Physician's certification required:  Yes  Please co-sign or sign and return this letter via fax as soon as possible to [phone]. I certify the need for these services furnished under this plan of treatment and while under my care.     X___________________________________________________ Date____________________    Certification From: 9/99/4760  To:10/25/2023

## 2023-07-31 ENCOUNTER — HOSPITAL ENCOUNTER (OUTPATIENT)
Dept: ULTRASOUND IMAGING | Facility: HOSPITAL | Age: 83
Discharge: HOME | End: 2023-07-31
Attending: PODIATRIST
Payer: MEDICARE

## 2023-07-31 ENCOUNTER — TELEPHONE (OUTPATIENT)
Dept: INTERNAL MEDICINE CLINIC | Facility: CLINIC | Age: 83
End: 2023-07-31

## 2023-07-31 DIAGNOSIS — M79.672 BILATERAL FOOT PAIN: ICD-10-CM

## 2023-07-31 DIAGNOSIS — M79.671 BILATERAL FOOT PAIN: ICD-10-CM

## 2023-07-31 DIAGNOSIS — E11.42 TYPE 2 DIABETES MELLITUS WITH POLYNEUROPATHY (HCC): ICD-10-CM

## 2023-07-31 DIAGNOSIS — I73.9 PERIPHERAL VASCULAR DISEASE (HCC): ICD-10-CM

## 2023-07-31 PROCEDURE — 93923 UPR/LXTR ART STDY 3+ LVLS: CPT | Performed by: PODIATRIST

## 2023-07-31 NOTE — TELEPHONE ENCOUNTER
Patient called to get refill of Atenolol 25mg. He is using [address] on [employer] Patient only has 1 pill left.

## 2023-08-02 ENCOUNTER — TELEPHONE (OUTPATIENT)
Dept: INTERNAL MEDICINE CLINIC | Facility: CLINIC | Age: 83
End: 2023-08-02

## 2023-08-02 NOTE — TELEPHONE ENCOUNTER
[facility] faxed over a request for information for the diabetic footwear    Paperwork placed in [employer]

## 2023-08-08 ENCOUNTER — TELEPHONE (OUTPATIENT)
Dept: PODIATRY CLINIC | Facility: CLINIC | Age: 83
End: 2023-08-08

## 2023-08-08 ENCOUNTER — TELEPHONE (OUTPATIENT)
Dept: INTERNAL MEDICINE CLINIC | Facility: CLINIC | Age: 83
End: 2023-08-08

## 2023-08-08 NOTE — TELEPHONE ENCOUNTER
Please call patient and inform him that his arterial US results did show peripheral arterial disease. Please make him f/u appt in 3-4 weeks to further discuss the US results and treatment options. OK to double book.

## 2023-08-08 NOTE — TELEPHONE ENCOUNTER
Called pt and verified name and . Informed him of results and Dr Ying Guzman recommendations.  Appt made on  at [facility] at 915am

## 2023-08-08 NOTE — TELEPHONE ENCOUNTER
Patient is calling to request a refill Pioglitazone    Patient is asking if should still be taking the medication, he doesn't know what the medication is for.     Please let patient know if he should continue taking it  Phone [phone]

## 2023-08-09 RX ORDER — PIOGLITAZONEHYDROCHLORIDE 45 MG/1
45 TABLET ORAL DAILY
Qty: 90 TABLET | Refills: 3 | Status: SHIPPED | OUTPATIENT
Start: 2023-08-09

## 2023-08-09 NOTE — TELEPHONE ENCOUNTER
To Dr. Judith Vega---    Pt calling for refill. RN did note that pt did provide proper dosage and strength for office records. Pended.

## 2023-08-16 NOTE — TELEPHONE ENCOUNTER
Deni Cain calling back  She never received the office notes from his most current visit with the diagnosis of Diabetes, and it needs to include an electronic or hand written physician signature. Please fax info to [phone].

## 2023-08-23 ENCOUNTER — TELEPHONE (OUTPATIENT)
Dept: INTERNAL MEDICINE CLINIC | Facility: CLINIC | Age: 83
End: 2023-08-23

## 2023-08-24 RX ORDER — POTASSIUM CHLORIDE 750 MG/1
10 TABLET, FILM COATED, EXTENDED RELEASE ORAL DAILY
Qty: 90 TABLET | Refills: 3 | Status: SHIPPED | OUTPATIENT
Start: 2023-08-24

## 2023-08-24 NOTE — TELEPHONE ENCOUNTER
To Dr Porfirio Weintsein to please advise on first refill from our office for potassium----new patient-established care 5/31/23

## 2023-08-28 ENCOUNTER — OFFICE VISIT (OUTPATIENT)
Dept: PODIATRY CLINIC | Facility: CLINIC | Age: 83
End: 2023-08-28

## 2023-08-28 DIAGNOSIS — E11.42 TYPE 2 DIABETES MELLITUS WITH POLYNEUROPATHY (HCC): Primary | ICD-10-CM

## 2023-08-28 DIAGNOSIS — M79.672 BILATERAL FOOT PAIN: ICD-10-CM

## 2023-08-28 DIAGNOSIS — R26.81 GAIT INSTABILITY: ICD-10-CM

## 2023-08-28 DIAGNOSIS — M79.671 BILATERAL FOOT PAIN: ICD-10-CM

## 2023-08-28 DIAGNOSIS — I73.9 PERIPHERAL VASCULAR DISEASE (HCC): ICD-10-CM

## 2023-08-28 NOTE — PROGRESS NOTES
[address] Podiatry  Progress Note    Julianna Danielson is a 78year old male. Patient presents with: Follow - Up: Patient states he is here for test results- rates pain 8/10- painful when sleeping. HPI:     This is a pleasant male with type 2 DM, PMH of aorta aneurysm, glaucoma. He does have PMH of tobacco use x 30 years but is no longer smoking. He does use a walker/cane for ambulation. He does have some tingling to his feet. He presents to clinic today to discuss his arterial US results. Allergies: Patient has no known allergies. Current Outpatient Medications   Medication Sig Dispense Refill    Potassium Chloride ER 10 MEQ Oral Tab CR Take 1 tablet (10 mEq total) by mouth daily. 90 tablet 3    pioglitazone 45 MG Oral Tab Take 1 tablet (45 mg total) by mouth daily. 90 tablet 3    atenolol 25 MG Oral Tab Take 1 tablet (25 mg total) by mouth daily. 90 tablet 0    aspirin 81 MG Oral Tab EC Take 1 tablet (81 mg total) by mouth daily. glipiZIDE 10 MG Oral Tab Take 2 tablets (20 mg total) by mouth every evening. Omeprazole 20 MG Oral Tab EC Take 20 mg by mouth daily. losartan 100 MG Oral Tab Take 1 tablet (100 mg total) by mouth daily. amLODIPine 10 MG Oral Tab Take 1 tablet (10 mg total) by mouth daily. Multiple Vitamins-Minerals (CENTRUM SILVER ADULT 50+ OR) Take by mouth daily. Ascorbic Acid (VITAMIN C OR) Take by mouth. TIMOLOL MALEATE OP Apply to eye.      brimonidine 0.15 % Ophthalmic Solution Apply to eye. ATORVASTATIN CALCIUM OR Take 40 mg by mouth daily. METFORMIN HCL OR Take 1,000 mg by mouth in the morning and 1,000 mg before bedtime. GLIPIZIDE OR Take 10 mg by mouth every morning. alendronate 70 MG Oral Tab Take 1 tablet (70 mg total) by mouth once a week.         Past Medical History:   Diagnosis Date    Aorta aneurysm ([facility] [address]     Benign essential HTN     Chronic rhinitis     DM (diabetes mellitus), type 2 ([facility] [address] Hyperlipidemia     Skin cancer       Past Surgical History:   Procedure Laterality Date    OTHER ACCESSORY      T and A    OTHER ACCESSORY      appendectomy    OTHER ACCESSORY      cholycystectomy    OTHER ACCESSORY      bilateral cataract    OTHER ACCESSORY      facial basal cell cancer      History reviewed. No pertinent family history. Social History    Socioeconomic History      Marital status:     Tobacco Use      Smoking status: Former        Types: Cigarettes      Smokeless tobacco: Never    Vaping Use      Vaping Use: Never used    Substance and Sexual Activity      Alcohol use: Yes        Comment: occasional      Drug use: Not Currently          REVIEW OF SYSTEMS:   Denies nausea, fever, chills  No calf pain  No other muscle or joint aches  Denies chest pain or shortness of breath. EXAM:   There were no vitals taken for this visit. Constitutional:   Patient in no apparent distress. Well kept. Of normal body habitus. Alert and oriented to person, place, and time. Vascular Examination:  DP pulse is NP  PT pulse is NP  Capillary refill is adequate  Integumentary Examination:   The patient's nails appear incurvated, thickened, elongated, dystrophic, discolored with subungual debris 1-5 right, 1-5  left nails. Digital hair growth is absent  Skin is of diminished texture and decreased turgor. Neurological Examination:  Monofilament (10-g) sensation is 4/5 to right and 3/5 to left. Sharp/dull is present to right and is present to left. Parasthesias present  Musculoskeletal Examination:  Muscle Strength is 5/5.       LABS & IMAGING:     Lab Results   Component Value Date     (H) 06/01/2023    BUN 30 (H) 06/01/2023    CREATSERUM 1.21 06/01/2023    BUNCREA 24.8 (H) 06/01/2023    ANIONGAP 7 06/01/2023    CA 9.2 06/01/2023     06/01/2023    K 4.1 06/01/2023     06/01/2023    CO2 24.0 06/01/2023    OSMOCALC 301 (H) 06/01/2023        Lab Results   Component Value Date     (H) 06/01/2023    A1C 7.7 (H) 06/01/2023        No results found. 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 We reviewed and discussed the following:    -risk categories related to pts with diabetes and foot or lower extremity complications per ADA. -adherence to medication regimen and close monitoring or blood sugar control.   -daily monitoring/inspection of feet and shoes.   -proper management of diet and weight   -regular follow up with PCP and specialty providers as recommended   -Lower extremity complications related to DM were reviewed and stressed prevention. B/L LE arterial US: 7/31/23  CONCLUSION:   1. Markedly limited resting arterial Doppler study, due to diffusely elevated pressures, likely artifactual due to calcified and noncompressible vessels in this elderly diabetic patient. 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 Referred pt to Dr. Poncho Saxena for further arterial disease work up    Prescribed DM shoes with inserts he has appt on October 11    RTC October 2nd for [facility] and will see if he has seen Dr. Poncho Saxena    No follow-ups on file.     Baudilio Fuentes DPM  8/28/23

## 2023-08-29 ENCOUNTER — TELEPHONE (OUTPATIENT)
Dept: INTERNAL MEDICINE CLINIC | Facility: CLINIC | Age: 83
End: 2023-08-29

## 2023-08-29 RX ORDER — AMLODIPINE BESYLATE 10 MG/1
10 TABLET ORAL DAILY
Qty: 90 TABLET | Refills: 3 | Status: SHIPPED | OUTPATIENT
Start: 2023-08-29

## 2023-09-05 ENCOUNTER — TELEPHONE (OUTPATIENT)
Dept: INTERNAL MEDICINE CLINIC | Facility: CLINIC | Age: 83
End: 2023-09-05

## 2023-09-06 RX ORDER — NICOTINE POLACRILEX 4 MG/1
20 GUM, CHEWING ORAL DAILY
Qty: 90 TABLET | Refills: 3 | Status: SHIPPED | OUTPATIENT
Start: 2023-09-06

## 2023-09-06 NOTE — TELEPHONE ENCOUNTER
Called and spoke with patient. Relayed MD's message. Patient verbalized understanding. Patient states he received a letter from [employer], the medical group he was going to when he lived in [place], that he is due for his annual screening. Patient states he gets CT scans for his abdominal aortic aneurysm. He is going to bring the letter to his appointment with MD on 9/19.     FYI to Dr. Robert Dave--

## 2023-09-06 NOTE — TELEPHONE ENCOUNTER
To Dr. Ilene Ernandez---    Pended as new pt from May. Not prescribed by you prior. No  info available. Pt inquiring if he should still be taking or not?

## 2023-09-06 NOTE — TELEPHONE ENCOUNTER
Pt. Called following up on refill request for Omeprazole. He is out of meds. He is questioning if he still needs to continue taking it.

## 2023-09-19 ENCOUNTER — OFFICE VISIT (OUTPATIENT)
Dept: INTERNAL MEDICINE CLINIC | Facility: CLINIC | Age: 83
End: 2023-09-19

## 2023-09-19 VITALS
HEART RATE: 68 BPM | BODY MASS INDEX: 26.52 KG/M2 | WEIGHT: 206.63 LBS | DIASTOLIC BLOOD PRESSURE: 46 MMHG | TEMPERATURE: 98 F | HEIGHT: 74 IN | SYSTOLIC BLOOD PRESSURE: 90 MMHG

## 2023-09-19 DIAGNOSIS — E11.9 TYPE 2 DIABETES MELLITUS WITHOUT COMPLICATION, WITHOUT LONG-TERM CURRENT USE OF INSULIN (HCC): ICD-10-CM

## 2023-09-19 DIAGNOSIS — E78.5 HYPERLIPIDEMIA, UNSPECIFIED HYPERLIPIDEMIA TYPE: ICD-10-CM

## 2023-09-19 DIAGNOSIS — M81.0 OSTEOPOROSIS, UNSPECIFIED OSTEOPOROSIS TYPE, UNSPECIFIED PATHOLOGICAL FRACTURE PRESENCE: ICD-10-CM

## 2023-09-19 DIAGNOSIS — I73.9 PERIPHERAL VASCULAR DISEASE (HCC): ICD-10-CM

## 2023-09-19 DIAGNOSIS — I71.40 ABDOMINAL AORTIC ANEURYSM (AAA) WITHOUT RUPTURE, UNSPECIFIED PART (HCC): ICD-10-CM

## 2023-09-19 DIAGNOSIS — J31.0 CHRONIC RHINITIS: ICD-10-CM

## 2023-09-19 DIAGNOSIS — I10 BENIGN ESSENTIAL HTN: Primary | ICD-10-CM

## 2023-09-19 LAB
CARTRIDGE LOT#: ABNORMAL NUMERIC
HEMOGLOBIN A1C: 7.6 % (ref 4.3–5.6)

## 2023-09-19 PROCEDURE — 3078F DIAST BP <80 MM HG: CPT | Performed by: INTERNAL MEDICINE

## 2023-09-19 PROCEDURE — 99213 OFFICE O/P EST LOW 20 MIN: CPT | Performed by: INTERNAL MEDICINE

## 2023-09-19 PROCEDURE — 3074F SYST BP LT 130 MM HG: CPT | Performed by: INTERNAL MEDICINE

## 2023-09-19 PROCEDURE — 1159F MED LIST DOCD IN RCRD: CPT | Performed by: INTERNAL MEDICINE

## 2023-09-19 PROCEDURE — 83036 HEMOGLOBIN GLYCOSYLATED A1C: CPT | Performed by: INTERNAL MEDICINE

## 2023-09-19 PROCEDURE — 1125F AMNT PAIN NOTED PAIN PRSNT: CPT | Performed by: INTERNAL MEDICINE

## 2023-09-19 PROCEDURE — 3008F BODY MASS INDEX DOCD: CPT | Performed by: INTERNAL MEDICINE

## 2023-09-21 ENCOUNTER — HOSPITAL ENCOUNTER (OUTPATIENT)
Dept: CT IMAGING | Facility: HOSPITAL | Age: 83
Discharge: HOME | End: 2023-09-21
Attending: RADIOLOGY
Payer: MEDICARE

## 2023-09-21 DIAGNOSIS — I73.9 PAD (PERIPHERAL ARTERY DISEASE) (HCC): ICD-10-CM

## 2023-09-21 LAB
CREAT BLD-MCNC: 1.6 MG/DL
EGFRCR SERPLBLD CKD-EPI 2021: 42 ML/MIN/1.73M2 (ref 60–?)

## 2023-09-21 PROCEDURE — 75635 CT ANGIO ABDOMINAL ARTERIES: CPT | Performed by: RADIOLOGY

## 2023-09-21 PROCEDURE — 82565 ASSAY OF CREATININE: CPT

## 2023-09-26 ENCOUNTER — TELEPHONE (OUTPATIENT)
Dept: INTERNAL MEDICINE CLINIC | Facility: CLINIC | Age: 83
End: 2023-09-26

## 2023-09-26 NOTE — TELEPHONE ENCOUNTER
Pt called, needs refill for Metformin 500 mg  Pt takes 2 tablets - 2 X day   Running low on medication   Please send to [facility] in [address] CKD (chronic kidney disease), stage IV

## 2023-09-26 NOTE — TELEPHONE ENCOUNTER
Please advise on refills - in your lab note you wanted to know how much patient is taking thanks - to [facility]

## 2023-09-27 NOTE — TELEPHONE ENCOUNTER
Patient is calling to check on the status of the refill    Patient takes Metformin 500 mg   2 tab before breakfast and 2 tab before dinner

## 2023-10-02 ENCOUNTER — OFFICE VISIT (OUTPATIENT)
Dept: PODIATRY CLINIC | Facility: CLINIC | Age: 83
End: 2023-10-02

## 2023-10-02 DIAGNOSIS — M79.672 BILATERAL FOOT PAIN: ICD-10-CM

## 2023-10-02 DIAGNOSIS — M79.671 BILATERAL FOOT PAIN: ICD-10-CM

## 2023-10-02 DIAGNOSIS — E11.42 TYPE 2 DIABETES MELLITUS WITH POLYNEUROPATHY (HCC): Primary | ICD-10-CM

## 2023-10-02 DIAGNOSIS — R26.81 GAIT INSTABILITY: ICD-10-CM

## 2023-10-02 DIAGNOSIS — I73.9 PERIPHERAL VASCULAR DISEASE (HCC): ICD-10-CM

## 2023-10-02 DIAGNOSIS — B35.1 ONYCHOMYCOSIS: ICD-10-CM

## 2023-10-02 PROCEDURE — 1159F MED LIST DOCD IN RCRD: CPT | Performed by: PODIATRIST

## 2023-10-02 PROCEDURE — 99213 OFFICE O/P EST LOW 20 MIN: CPT | Performed by: PODIATRIST

## 2023-10-02 NOTE — PROGRESS NOTES
[facility] Podiatry  Progress Note    Irene Martino is a 78year old male. Patient presents with:  Diabetic Foot Care: 2 mo [facility] - Pt states some pain in both feet. Pt states he went to see     FBS: not taken this AM        HPI:     This is a pleasant male with type 2 DM, PMH of aorta aneurysm, glaucoma. He does have PMH of tobacco use x 30 years but is no longer smoking. He does use a walker/cane for ambulation. He does have some tingling to his feet. He presents to clinic today for diabetic foot care. Allergies: Patient has no known allergies. Current Outpatient Medications   Medication Sig Dispense Refill    metFORMIN HCl 1000 MG Oral Tab Take 1 tablet (1,000 mg total) by mouth in the morning and 1 tablet (1,000 mg total) before bedtime. 180 tablet 3    Omeprazole 20 MG Oral Tab EC Take 20 mg by mouth daily. 90 tablet 3    amLODIPine 10 MG Oral Tab Take 1 tablet (10 mg total) by mouth daily. 90 tablet 3    Potassium Chloride ER 10 MEQ Oral Tab CR Take 1 tablet (10 mEq total) by mouth daily. 90 tablet 3    pioglitazone 45 MG Oral Tab Take 1 tablet (45 mg total) by mouth daily. 90 tablet 3    atenolol 25 MG Oral Tab Take 1 tablet (25 mg total) by mouth daily. 90 tablet 0    aspirin 81 MG Oral Tab EC Take 1 tablet (81 mg total) by mouth daily. glipiZIDE 10 MG Oral Tab Take 2 tablets (20 mg total) by mouth every evening. losartan 100 MG Oral Tab Take 1 tablet (100 mg total) by mouth daily. Multiple Vitamins-Minerals (CENTRUM SILVER ADULT 50+ OR) Take by mouth daily. Ascorbic Acid (VITAMIN C OR) Take by mouth. TIMOLOL MALEATE OP Apply to eye.      brimonidine 0.15 % Ophthalmic Solution Apply to eye. ATORVASTATIN CALCIUM OR Take 40 mg by mouth daily. GLIPIZIDE OR Take 5 mg by mouth. 10mg in AM and 20mg in PM      alendronate 70 MG Oral Tab Take 1 tablet (70 mg total) by mouth once a week.         Past Medical History:   Diagnosis Date    Aorta aneurysm ([facility] [address]     Benign essential HTN     Chronic rhinitis     DM (diabetes mellitus), type 2 (HCC)     Hyperlipidemia     PVD (peripheral vascular disease) ([facility] [address]     Skin cancer       Past Surgical History:   Procedure Laterality Date    OTHER ACCESSORY      T and A    OTHER ACCESSORY      appendectomy    OTHER ACCESSORY      cholycystectomy    OTHER ACCESSORY      bilateral cataract    OTHER ACCESSORY      facial basal cell cancer      No family history on file. Social History    Socioeconomic History      Marital status:     Tobacco Use      Smoking status: Former        Types: Cigarettes      Smokeless tobacco: Never    Vaping Use      Vaping Use: Never used    Substance and Sexual Activity      Alcohol use: Yes        Comment: occasional      Drug use: Not Currently          REVIEW OF SYSTEMS:   Denies nausea, fever, chills  No calf pain  No other muscle or joint aches  Denies chest pain or shortness of breath. EXAM:   There were no vitals taken for this visit. Constitutional:   Patient in no apparent distress. Well kept. Of normal body habitus. Alert and oriented to person, place, and time. Vascular Examination:  DP pulse is NP  PT pulse is NP  Capillary refill is adequate  Integumentary Examination:   The patient's nails appear incurvated, thickened, elongated, dystrophic, discolored with subungual debris 1-5 right, 1-5  left nails. Digital hair growth is absent  Skin is of diminished texture and decreased turgor. Neurological Examination:  Monofilament (10-g) sensation is 4/5 to right and 3/5 to left. Sharp/dull is present to right and is present to left. Parasthesias present  Musculoskeletal Examination:  Muscle Strength is 5/5.       LABS & IMAGING:     Lab Results   Component Value Date     (H) 06/01/2023    BUN 30 (H) 06/01/2023    CREATSERUM 1.21 06/01/2023    BUNCREA 24.8 (H) 06/01/2023    ANIONGAP 7 06/01/2023    CA 9.2 06/01/2023     06/01/2023    K 4.1 06/01/2023     06/01/2023    CO2 24.0 06/01/2023    OSMOCALC 301 (H) 06/01/2023        Lab Results   Component Value Date     (H) 06/01/2023    A1C 7.6 (A) 09/19/2023        No results found. 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 We reviewed and discussed the following:    -risk categories related to pts with diabetes and foot or lower extremity complications per ADA. -adherence to medication regimen and close monitoring or blood sugar control.   -daily monitoring/inspection of feet and shoes.   -proper management of diet and weight   -regular follow up with PCP and specialty providers as recommended   -Lower extremity complications related to DM were reviewed and stressed prevention. Evaluated the patient. Discussed treatment options with the patient. Discussed with patient proper care and hygiene for their feet. Patient tolerated procedures well, without incident. Instrumentation used includes nail nippers and electric  where appropriate. Procedure: (81677 Debridement of toenails 6-10) Surgically debrided and mechanically reduced 6 or more toenails. Hemmorhage occurred none. Nails that were debrided appeared dystrophic and caused the patient pain in shoe gear. Nails 5 Left & 5 Right. B/L LE arterial US: 7/31/23  CONCLUSION:   1. Markedly limited resting arterial Doppler study, due to diffusely elevated pressures, likely artifactual due to calcified and noncompressible vessels in this elderly diabetic patient. 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        Referred pt to Dr. Cosme Mccann who referred pt to vascular surgery Dr. Taylor Mediate due to PAD and AAA with Duly and will be seeing him on 11/3. Dr. Reilly Douglas is anticipating a hybrid surgical/endovascular repair. Prescribed DM shoes with inserts he has appt on October 11    RTC 4 months for [facility]    No follow-ups on file.     Angela Cowan DPM  10/2/23

## 2023-10-17 ENCOUNTER — OFFICE VISIT (OUTPATIENT)
Dept: OPHTHALMOLOGY | Facility: CLINIC | Age: 83
End: 2023-10-17

## 2023-10-17 DIAGNOSIS — E11.9 TYPE 2 DIABETES MELLITUS WITHOUT RETINOPATHY (HCC): ICD-10-CM

## 2023-10-17 DIAGNOSIS — H35.369 DRUSEN: ICD-10-CM

## 2023-10-17 DIAGNOSIS — H43.393 VITREOUS FLOATERS OF BOTH EYES: ICD-10-CM

## 2023-10-17 DIAGNOSIS — H40.1131 PRIMARY OPEN ANGLE GLAUCOMA OF BOTH EYES, MILD STAGE: Primary | ICD-10-CM

## 2023-10-17 DIAGNOSIS — Z96.1 PSEUDOPHAKIA OF BOTH EYES: ICD-10-CM

## 2023-10-17 PROCEDURE — 1159F MED LIST DOCD IN RCRD: CPT | Performed by: OPHTHALMOLOGY

## 2023-10-17 PROCEDURE — 92015 DETERMINE REFRACTIVE STATE: CPT | Performed by: OPHTHALMOLOGY

## 2023-10-17 PROCEDURE — 92004 COMPRE OPH EXAM NEW PT 1/>: CPT | Performed by: OPHTHALMOLOGY

## 2023-10-17 PROCEDURE — 1126F AMNT PAIN NOTED NONE PRSNT: CPT | Performed by: OPHTHALMOLOGY

## 2023-10-17 PROCEDURE — 1160F RVW MEDS BY RX/DR IN RCRD: CPT | Performed by: OPHTHALMOLOGY

## 2023-10-17 PROCEDURE — 92250 FUNDUS PHOTOGRAPHY W/I&R: CPT | Performed by: OPHTHALMOLOGY

## 2023-10-17 RX ORDER — ATENOLOL 25 MG/1
25 TABLET ORAL DAILY
Qty: 90 TABLET | Refills: 3 | Status: SHIPPED | OUTPATIENT
Start: 2023-10-17

## 2023-10-17 RX ORDER — BRIMONIDINE TARTRATE 1.5 MG/ML
1 SOLUTION/ DROPS OPHTHALMIC 2 TIMES DAILY
Qty: 3 EACH | Refills: 3 | Status: SHIPPED | OUTPATIENT
Start: 2023-10-17

## 2023-10-17 RX ORDER — TIMOLOL MALEATE 5 MG/ML
1 SOLUTION/ DROPS OPHTHALMIC 2 TIMES DAILY
Qty: 15 ML | Refills: 3 | Status: SHIPPED | OUTPATIENT
Start: 2023-10-17

## 2023-10-17 NOTE — PROGRESS NOTES
Leatha Cardoza is a 78year old male. HPI:     HPI    NP. Pt in today for a diabetic eye exam and glaucoma evaluation. Pt's last eye exam was about 7 months ago in [place]. Pt was under the care of Dr. Lauren Ramos in [place] and is taking Brimonidine and Timolol BID in both eyes and states he was taking a drop at bedtime but doesn't recall the name of it. Pt states he has been taking the drops for almost 10 years and mentioned he had cataract surgery in both eyes by Dr. Filemon Dove in 2014. Pt denies any family history of glaucoma, trauma to the head or eyes or use steroids. Pt has been a diabetic for 20 years       Pt's diabetes is currently controlled by pills   Pt checks BS once in a while    Pt's last blood sugar was 157 about a week ago   Last HA1C was 7.6 on 9/19/23  Endocrinologist: none     Consult: per Dr. Brissa Dempsey   Last edited by Tomás Nicolas OT on 10/17/2023  3:40 PM.        Patient History:  Past Medical History:   Diagnosis Date    Aorta aneurysm ([address]     Benign essential HTN     Chronic rhinitis     Diabetes ([address]     DM (diabetes mellitus), type 2 ([address]     Hyperlipidemia     PVD (peripheral vascular disease) ([address]     Skin cancer        Surgical History: Leatha Cardoza has a past surgical history that includes other accessory (T and A); other accessory (appendectomy); other accessory (cholycystectomy); other accessory (bilateral cataract); other accessory (facial basal cell cancer); Cataract extraction w/  intraocular lens implant (Left, 04/07/2014) (Done by Dr. Lauren Ramos in [place]); and Cataract extraction w/  intraocular lens implant (Right, 2014) (Done by Dr. Lauren Ramos in [place]). Family History   Problem Relation Age of Onset    Macular degeneration Brother     Glaucoma Neg     Diabetes Neg        Social History:   Social History     Socioeconomic History    Marital status:     Tobacco Use    Smoking status: Former     Types: Cigarettes    Smokeless tobacco: Never   Vaping Use    Vaping Use: Never used   Substance and Sexual Activity    Alcohol use: Yes     Comment: occasional    Drug use: Not Currently       Medications:  Current Outpatient Medications   Medication Sig Dispense Refill    timolol 0.5 % Ophthalmic Solution Place 1 drop into both eyes 2 (two) times daily. 15 mL 3    brimonidine 0.15 % Ophthalmic Solution Place 1 drop into both eyes in the morning and 1 drop before bedtime. 3 each 3    atenolol 25 MG Oral Tab Take 1 tablet (25 mg total) by mouth daily. 90 tablet 3    metFORMIN HCl 1000 MG Oral Tab Take 1 tablet (1,000 mg total) by mouth in the morning and 1 tablet (1,000 mg total) before bedtime. 180 tablet 3    Omeprazole 20 MG Oral Tab EC Take 20 mg by mouth daily. 90 tablet 3    amLODIPine 10 MG Oral Tab Take 1 tablet (10 mg total) by mouth daily. 90 tablet 3    Potassium Chloride ER 10 MEQ Oral Tab CR Take 1 tablet (10 mEq total) by mouth daily. 90 tablet 3    pioglitazone 45 MG Oral Tab Take 1 tablet (45 mg total) by mouth daily. 90 tablet 3    aspirin 81 MG Oral Tab EC Take 1 tablet (81 mg total) by mouth daily. glipiZIDE 10 MG Oral Tab Take 2 tablets (20 mg total) by mouth every evening. losartan 100 MG Oral Tab Take 1 tablet (100 mg total) by mouth daily. Multiple Vitamins-Minerals (CENTRUM SILVER ADULT 50+ OR) Take by mouth daily. Ascorbic Acid (VITAMIN C OR) Take by mouth. ATORVASTATIN CALCIUM OR Take 40 mg by mouth daily. alendronate 70 MG Oral Tab Take 1 tablet (70 mg total) by mouth once a week.          Allergies:    Lisinopril              ANGIOEDEMA, OTHER (SEE COMMENTS)    Comment:Lip swelling    ROS:     ROS    Positive for: Endocrine, Eyes  Negative for: Constitutional, Gastrointestinal, Neurological, Skin, Genitourinary, Musculoskeletal, HENT, Cardiovascular, Respiratory, Psychiatric, Allergic/Imm, Heme/Lymph  Last edited by Vivi Armando OT on 10/17/2023  3:16 PM. PHYSICAL EXAM:     Base Eye Exam       Visual Acuity (Snellen - Linear)         Right Left    Dist cc 20/50 -2 20/40 +2    Dist ph cc 20/40 -1 NI    Near cc 20/30 20/25      Correction: Glasses              Tonometry (Applanation, 3:44 PM)         Right Left    Pressure 17 16              Pupils         Pupils    Right PERRL    Left PERRL              Visual Fields         Left Right     Full Full              Extraocular Movement         Right Left     Full, Ortho Full, Ortho              Dilation       Both eyes: 1.0% Mydriacyl and 2.5% Kermit Synephrine @ 3:45 PM                  Slit Lamp and Fundus Exam       Slit Lamp Exam         Right Left    Lids/Lashes Dermatochalasis, Meibomian gland dysfunction Dermatochalasis, Meibomian gland dysfunction    Conjunctiva/Sclera Nasal pinguecula Nasal pinguecula    Cornea no krukenberg's spindle no krukenberg's spindle    Anterior Chamber Deep and quiet Deep and quiet    Iris No transillumination defects No transillumination defects    Lens PC IOL with YAG PC IOL with YAG    Vitreous Vitreous floaters Vitreous floaters              Fundus Exam         Right Left    Disc Sloping margin, Temporal crescent Sloping margin, Temporal crescent    C/D Ratio 0.9 0.9    Macula few soft drusen-no BDR few soft drusen-no BDR    Vessels Normal Normal    Periphery Normal Normal                  Refraction       Wearing Rx         Sphere Cylinder Axis Add    Right -1.75 +2.25 005 +2.75    Left -2.00 +2.50 165 +2.75      Age: 6m    Type: Progressive bifocal              Manifest Refraction (Auto)         Sphere Cylinder [place] Dist [place] Add [place]    Right -1.00 +1.00 175       Left -1.50 +2.75 155                 Manifest Refraction #2         Sphere Cylinder [place] Dist [place] Add [place]    Right -1.75 +2.00 005 20/40- +3.00 20/20-    Left -1.75 +2.50 165 20/40+ +3.00 20/20              Final Rx         Sphere Cylinder [place] Dist [place] Add [place]    Right -1.75 +2.00 005 20/40- +3.00 20/20- Left -1.75 +2.50 165 20/40+ +3.00 20/20      Type: Progressive bifocal                     ASSESSMENT/PLAN:     Diagnoses and Plan:     Primary open angle glaucoma of both eyes, mild stage  IOP is stable  Continue Alphagan OU BID and Timoptic 0.5% OU BID as directed  Fundus photos taken today  Will see patient for next available visual field, OCT and pachymetry with no MD, then 4 month pressure check    Type 2 diabetes mellitus without retinopathy ([address]  Diabetes type II: no background of retinopathy, no signs of neovascularization noted. Discussed ocular and systemic benefits of blood sugar control. Diagnosis and treatment discussed in detail with patient. Pseudophakia of both eyes  No treatment    Drusen  No treatment    Vitreous floaters of both eyes   There is no evidence of retinal pathology. All signs and symptoms of retinal detachment/tears explained in detail. Patient instructed to call the office if they experience increase in floaters, increase in flashes of light, loss of vision or curtain or veil effect. Orders Placed This Encounter      Fundus Photos - OU - Both Eyes      Freeman Visual Field - OU - Both Eyes      OCT, Optic Nerve - OU - Both Eyes      Meds This Visit:  Requested Prescriptions     Signed Prescriptions Disp Refills    timolol 0.5 % Ophthalmic Solution 15 mL 3     Sig: Place 1 drop into both eyes 2 (two) times daily. brimonidine 0.15 % Ophthalmic Solution 3 each 3     Sig: Place 1 drop into both eyes in the morning and 1 drop before bedtime. Follow up instructions:  No follow-ups on file.     10/17/2023  Scribed by: Tasia Gardner MD

## 2023-10-17 NOTE — ASSESSMENT & PLAN NOTE
IOP is stable  Continue Alphagan OU BID and Timoptic 0.5% OU BID as directed  Fundus photos taken today  Will see patient for next available visual field, OCT and pachymetry with no MD, then 4 month pressure check

## 2023-10-17 NOTE — PATIENT INSTRUCTIONS
Primary open angle glaucoma of both eyes, mild stage  IOP is stable  Continue Alphagan OU BID and Timoptic 0.5% OU BID as directed  Fundus photos taken today  Will see patient for next available visual field, OCT and pachymetry with no MD, then 4 month pressure check    Type 2 diabetes mellitus without retinopathy ([facility]  Diabetes type II: no background of retinopathy, no signs of neovascularization noted. Discussed ocular and systemic benefits of blood sugar control. Diagnosis and treatment discussed in detail with patient. Pseudophakia of both eyes  No treatment    Drusen  No treatment    Vitreous floaters of both eyes   There is no evidence of retinal pathology. All signs and symptoms of retinal detachment/tears explained in detail. Patient instructed to call the office if they experience increase in floaters, increase in flashes of light, loss of vision or curtain or veil effect.

## 2023-10-28 ENCOUNTER — NURSE ONLY (OUTPATIENT)
Dept: OPHTHALMOLOGY | Facility: CLINIC | Age: 83
End: 2023-10-28

## 2023-10-28 DIAGNOSIS — H40.1131 PRIMARY OPEN ANGLE GLAUCOMA OF BOTH EYES, MILD STAGE: ICD-10-CM

## 2023-10-28 PROCEDURE — 1159F MED LIST DOCD IN RCRD: CPT | Performed by: OPHTHALMOLOGY

## 2023-10-28 PROCEDURE — 92133 CPTRZD OPH DX IMG PST SGM ON: CPT | Performed by: OPHTHALMOLOGY

## 2023-10-28 PROCEDURE — 92083 EXTENDED VISUAL FIELD XM: CPT | Performed by: OPHTHALMOLOGY

## 2023-10-28 PROCEDURE — 76514 ECHO EXAM OF EYE THICKNESS: CPT | Performed by: OPHTHALMOLOGY

## 2023-10-28 PROCEDURE — 1160F RVW MEDS BY RX/DR IN RCRD: CPT | Performed by: OPHTHALMOLOGY

## 2023-10-29 NOTE — ASSESSMENT & PLAN NOTE
Normal visual field, right eye. Abnormal visual field, left eye. Abnormal OCT, both eyes. Continue Brimonidine 0.15%, 1 drop, both eyes, twice a day. Continue Timolol 0.5%, 1 drop, both eyes, twice a day.

## 2023-10-29 NOTE — PROGRESS NOTES
Julianna Danielson is a 78year old male. HPI:     HPI    Patient is here for a glaucoma work up with HVF, OCT and dwight Schneider M.D. Last edited by Eh Hansen on 10/28/2023 11:29 AM.        Patient History:  Past Medical History:   Diagnosis Date    Aorta aneurysm ([facility]     Benign essential HTN     Chronic rhinitis     Diabetes ([facility]     DM (diabetes mellitus), type 2 ([facility]     Hyperlipidemia     PVD (peripheral vascular disease) ([facility]     Skin cancer        Surgical History: Julianna Danielson has a past surgical history that includes other accessory (T and A); other accessory (appendectomy); other accessory (cholycystectomy); other accessory (bilateral cataract); other accessory (facial basal cell cancer); Cataract extraction w/  intraocular lens implant (Left, 04/07/2014) (Done by Dr. Sada Barfield in [place]); and Cataract extraction w/  intraocular lens implant (Right, 2014) (Done by Dr. Sada Barfield in [place]). Family History   Problem Relation Age of Onset    Macular degeneration Brother     Glaucoma Neg     Diabetes Neg        Social History:   Social History     Socioeconomic History    Marital status:    Tobacco Use    Smoking status: Former     Types: Cigarettes    Smokeless tobacco: Never   Vaping Use    Vaping Use: Never used   Substance and Sexual Activity    Alcohol use: Yes     Comment: occasional    Drug use: Not Currently       Medications:  Current Outpatient Medications   Medication Sig Dispense Refill    atenolol 25 MG Oral Tab Take 1 tablet (25 mg total) by mouth daily. 90 tablet 3    timolol 0.5 % Ophthalmic Solution Place 1 drop into both eyes 2 (two) times daily. 15 mL 3    brimonidine 0.15 % Ophthalmic Solution Place 1 drop into both eyes in the morning and 1 drop before bedtime. 3 each 3    metFORMIN HCl 1000 MG Oral Tab Take 1 tablet (1,000 mg total) by mouth in the morning and 1 tablet (1,000 mg total) before bedtime.  180 tablet 3    Omeprazole 20 MG Oral Tab EC Take 20 mg by mouth daily. 90 tablet 3    amLODIPine 10 MG Oral Tab Take 1 tablet (10 mg total) by mouth daily. 90 tablet 3    Potassium Chloride ER 10 MEQ Oral Tab CR Take 1 tablet (10 mEq total) by mouth daily. 90 tablet 3    pioglitazone 45 MG Oral Tab Take 1 tablet (45 mg total) by mouth daily. 90 tablet 3    aspirin 81 MG Oral Tab EC Take 1 tablet (81 mg total) by mouth daily. glipiZIDE 10 MG Oral Tab Take 2 tablets (20 mg total) by mouth every evening. losartan 100 MG Oral Tab Take 1 tablet (100 mg total) by mouth daily. Multiple Vitamins-Minerals (CENTRUM SILVER ADULT 50+ OR) Take by mouth daily. Ascorbic Acid (VITAMIN C OR) Take by mouth. ATORVASTATIN CALCIUM OR Take 40 mg by mouth daily. alendronate 70 MG Oral Tab Take 1 tablet (70 mg total) by mouth once a week. Allergies:    Lisinopril              ANGIOEDEMA, OTHER (SEE COMMENTS)    Comment:Lip swelling    ROS:       PHYSICAL EXAM:     Base Eye Exam       Pachymetry (10/28/2023)         Right Left    Thickness 554/-1 542/0                     ASSESSMENT/PLAN:     Diagnoses and Plan:     Primary open angle glaucoma of both eyes, mild stage  Normal visual field, right eye. Abnormal visual field, left eye. Abnormal OCT, both eyes. Continue Brimonidine 0.15%, 1 drop, both eyes, twice a day. Continue Timolol 0.5%, 1 drop, both eyes, twice a day. No orders of the defined types were placed in this encounter. Meds This Visit:  Requested Prescriptions      No prescriptions requested or ordered in this encounter        Follow up instructions:  Return 3/7/2024 at 1:00 PM for IOP check.     10/28/2023  Scribed by: Ly Abarca MD

## 2023-10-30 ENCOUNTER — TELEPHONE (OUTPATIENT)
Dept: INTERNAL MEDICINE CLINIC | Facility: CLINIC | Age: 83
End: 2023-10-30

## 2023-10-30 ENCOUNTER — TELEPHONE (OUTPATIENT)
Dept: OPHTHALMOLOGY | Facility: CLINIC | Age: 83
End: 2023-10-30

## 2023-10-30 RX ORDER — PIOGLITAZONEHYDROCHLORIDE 45 MG/1
45 TABLET ORAL DAILY
Qty: 90 TABLET | Refills: 3 | Status: CANCELLED | OUTPATIENT
Start: 2023-10-30

## 2023-10-31 NOTE — TELEPHONE ENCOUNTER
Spoke to patient gave him results from his VF and OCT. Advised patient to continue  Brimonidine 0.15% in both eyes 2 times a day and timolol 0.5% in both eyes 2 times a day.

## 2023-11-01 ENCOUNTER — TELEPHONE (OUTPATIENT)
Dept: INTERNAL MEDICINE CLINIC | Facility: CLINIC | Age: 83
End: 2023-11-01

## 2023-11-01 NOTE — TELEPHONE ENCOUNTER
Spoke to Fadi (ok per HIPAA) and relayed MD message and instructions. He verbalized understanding and agrees with plan.

## 2023-11-01 NOTE — TELEPHONE ENCOUNTER
Patient brother China Velazco calling for appointment today with Dr. Katey Buitrago. Patient was told by audiologist that he may use Q-tip with mineral oil in his ears to help with hearing aids. Patient this morning poured mineral oil in both ears and is now unable to hear.      Best call back number China Velazco [phone]

## 2023-11-01 NOTE — TELEPHONE ENCOUNTER
To  - losartan is not an active med and has not been prescribed by you      Last reading 9/19/23  10:19 AM 6/16/23  7:42 AM 5/31/23  2:24 PM   BP 90/46

## 2023-11-01 NOTE — TELEPHONE ENCOUNTER
Patient should never use Q-tips in the ears. Would wait 1 to 2 days to allow mineral oil to dissolve and drain--no specific treatment needed.

## 2023-11-02 NOTE — TELEPHONE ENCOUNTER
Patient calling stating ears are still clogged. He has tried putting water in ears to help drain the mineral oil. Put a few drops of mineral oil in both ears today. I have instructed patient not to use any mineral oil until he hears back from our office.     Best call back number [phone]

## 2023-11-02 NOTE — TELEPHONE ENCOUNTER
Spoke to patient on phone who reports his hearing has improved. RN advised multiple times to NOT put anything in his ears: q tips, water, mineral oil, ect.  RN repeated and patient verbalized understanding

## 2023-11-02 NOTE — TELEPHONE ENCOUNTER
Patient calling checking on status of refill. Losartan was prescribed from a physician in [place]. Patient is also stating if Dr. Glen Araya doesn't feel he needs the Losartan, he is fine with that. He does not know why he was prescribed this medication. Patient also asking for refill Pioglitazone.    Patient is completely out of medication

## 2023-11-03 ENCOUNTER — TELEPHONE (OUTPATIENT)
Dept: INTERNAL MEDICINE CLINIC | Facility: CLINIC | Age: 83
End: 2023-11-03

## 2023-11-03 RX ORDER — PIOGLITAZONEHYDROCHLORIDE 45 MG/1
45 TABLET ORAL DAILY
Qty: 90 TABLET | Refills: 3 | Status: SHIPPED | OUTPATIENT
Start: 2023-11-03

## 2023-11-03 RX ORDER — LOSARTAN POTASSIUM 100 MG/1
100 TABLET ORAL DAILY
Qty: 90 TABLET | Refills: 3 | Status: SHIPPED | OUTPATIENT
Start: 2023-11-03

## 2023-11-06 RX ORDER — ALENDRONATE SODIUM 70 MG/1
70 TABLET ORAL WEEKLY
Qty: 13 TABLET | Refills: 3 | Status: SHIPPED | OUTPATIENT
Start: 2023-11-06

## 2023-11-06 RX ORDER — ATORVASTATIN CALCIUM 40 MG/1
40 TABLET, FILM COATED ORAL DAILY
Qty: 90 TABLET | Refills: 3 | Status: SHIPPED | OUTPATIENT
Start: 2023-11-06

## 2023-11-20 ENCOUNTER — LAB ENCOUNTER (OUTPATIENT)
Dept: LAB | Age: 83
End: 2023-11-20
Attending: INTERNAL MEDICINE
Payer: MEDICARE

## 2023-11-20 ENCOUNTER — OFFICE VISIT (OUTPATIENT)
Dept: INTERNAL MEDICINE CLINIC | Facility: CLINIC | Age: 83
End: 2023-11-20

## 2023-11-20 VITALS
HEIGHT: 74 IN | HEART RATE: 78 BPM | DIASTOLIC BLOOD PRESSURE: 78 MMHG | OXYGEN SATURATION: 97 % | RESPIRATION RATE: 97 BRPM | SYSTOLIC BLOOD PRESSURE: 122 MMHG | TEMPERATURE: 99 F | BODY MASS INDEX: 25.54 KG/M2 | WEIGHT: 199 LBS

## 2023-11-20 DIAGNOSIS — I73.9 PERIPHERAL VASCULAR DISEASE (HCC): Primary | ICD-10-CM

## 2023-11-20 DIAGNOSIS — H61.23 BILATERAL IMPACTED CERUMEN: ICD-10-CM

## 2023-11-20 DIAGNOSIS — E11.9 TYPE 2 DIABETES MELLITUS WITHOUT COMPLICATION, WITHOUT LONG-TERM CURRENT USE OF INSULIN (HCC): ICD-10-CM

## 2023-11-20 DIAGNOSIS — N18.30 STAGE 3 CHRONIC KIDNEY DISEASE, UNSPECIFIED WHETHER STAGE 3A OR 3B CKD (HCC): Chronic | ICD-10-CM

## 2023-11-20 DIAGNOSIS — E78.5 HYPERLIPIDEMIA, UNSPECIFIED HYPERLIPIDEMIA TYPE: ICD-10-CM

## 2023-11-20 DIAGNOSIS — I10 BENIGN ESSENTIAL HTN: ICD-10-CM

## 2023-11-20 LAB
ALBUMIN SERPL-MCNC: 4.6 G/DL (ref 3.2–4.8)
ALBUMIN/GLOB SERPL: 1.8 {RATIO} (ref 1–2)
ALP LIVER SERPL-CCNC: 98 U/L
ALT SERPL-CCNC: 12 U/L
ANION GAP SERPL CALC-SCNC: 8 MMOL/L (ref 0–18)
AST SERPL-CCNC: 14 U/L (ref ?–34)
BASOPHILS # BLD AUTO: 0.04 X10(3) UL (ref 0–0.2)
BASOPHILS NFR BLD AUTO: 0.5 %
BILIRUB SERPL-MCNC: 0.5 MG/DL (ref 0.2–1.1)
BUN BLD-MCNC: 26 MG/DL (ref 9–23)
BUN/CREAT SERPL: 19.7 (ref 10–20)
CALCIUM BLD-MCNC: 9.7 MG/DL (ref 8.7–10.4)
CHLORIDE SERPL-SCNC: 109 MMOL/L (ref 98–112)
CHOLEST SERPL-MCNC: 138 MG/DL (ref ?–200)
CO2 SERPL-SCNC: 26 MMOL/L (ref 21–32)
CREAT BLD-MCNC: 1.32 MG/DL
DEPRECATED RDW RBC AUTO: 43.6 FL (ref 35.1–46.3)
EGFRCR SERPLBLD CKD-EPI 2021: 53 ML/MIN/1.73M2 (ref 60–?)
EOSINOPHIL # BLD AUTO: 0.21 X10(3) UL (ref 0–0.7)
EOSINOPHIL NFR BLD AUTO: 2.5 %
ERYTHROCYTE [DISTWIDTH] IN BLOOD BY AUTOMATED COUNT: 12.8 % (ref 11–15)
EST. AVERAGE GLUCOSE BLD GHB EST-MCNC: 186 MG/DL (ref 68–126)
FASTING PATIENT LIPID ANSWER: NO
FASTING STATUS PATIENT QL REPORTED: NO
GLOBULIN PLAS-MCNC: 2.6 G/DL (ref 2.8–4.4)
GLUCOSE BLD-MCNC: 182 MG/DL (ref 70–99)
HBA1C MFR BLD: 8.1 % (ref ?–5.7)
HCT VFR BLD AUTO: 42.7 %
HDLC SERPL-MCNC: 41 MG/DL (ref 40–59)
HGB BLD-MCNC: 14.2 G/DL
IMM GRANULOCYTES # BLD AUTO: 0.02 X10(3) UL (ref 0–1)
IMM GRANULOCYTES NFR BLD: 0.2 %
LDLC SERPL CALC-MCNC: 76 MG/DL (ref ?–100)
LYMPHOCYTES # BLD AUTO: 1.7 X10(3) UL (ref 1–4)
LYMPHOCYTES NFR BLD AUTO: 20.4 %
MCH RBC QN AUTO: 31 PG (ref 26–34)
MCHC RBC AUTO-ENTMCNC: 33.3 G/DL (ref 31–37)
MCV RBC AUTO: 93.2 FL
MONOCYTES # BLD AUTO: 0.64 X10(3) UL (ref 0.1–1)
MONOCYTES NFR BLD AUTO: 7.7 %
NEUTROPHILS # BLD AUTO: 5.71 X10 (3) UL (ref 1.5–7.7)
NEUTROPHILS # BLD AUTO: 5.71 X10(3) UL (ref 1.5–7.7)
NEUTROPHILS NFR BLD AUTO: 68.7 %
NONHDLC SERPL-MCNC: 97 MG/DL (ref ?–130)
OSMOLALITY SERPL CALC.SUM OF ELEC: 305 MOSM/KG (ref 275–295)
PLATELET # BLD AUTO: 234 10(3)UL (ref 150–450)
POTASSIUM SERPL-SCNC: 4.2 MMOL/L (ref 3.5–5.1)
PROT SERPL-MCNC: 7.2 G/DL (ref 5.7–8.2)
RBC # BLD AUTO: 4.58 X10(6)UL
SODIUM SERPL-SCNC: 143 MMOL/L (ref 136–145)
TRIGL SERPL-MCNC: 115 MG/DL (ref 30–149)
VLDLC SERPL CALC-MCNC: 18 MG/DL (ref 0–30)
WBC # BLD AUTO: 8.3 X10(3) UL (ref 4–11)

## 2023-11-20 PROCEDURE — 1159F MED LIST DOCD IN RCRD: CPT | Performed by: INTERNAL MEDICINE

## 2023-11-20 PROCEDURE — 3074F SYST BP LT 130 MM HG: CPT | Performed by: INTERNAL MEDICINE

## 2023-11-20 PROCEDURE — 80053 COMPREHEN METABOLIC PANEL: CPT

## 2023-11-20 PROCEDURE — 85025 COMPLETE CBC W/AUTO DIFF WBC: CPT

## 2023-11-20 PROCEDURE — 3008F BODY MASS INDEX DOCD: CPT | Performed by: INTERNAL MEDICINE

## 2023-11-20 PROCEDURE — 80061 LIPID PANEL: CPT

## 2023-11-20 PROCEDURE — 1126F AMNT PAIN NOTED NONE PRSNT: CPT | Performed by: INTERNAL MEDICINE

## 2023-11-20 PROCEDURE — 3078F DIAST BP <80 MM HG: CPT | Performed by: INTERNAL MEDICINE

## 2023-11-20 PROCEDURE — 99214 OFFICE O/P EST MOD 30 MIN: CPT | Performed by: INTERNAL MEDICINE

## 2023-11-20 PROCEDURE — 36415 COLL VENOUS BLD VENIPUNCTURE: CPT

## 2023-11-20 PROCEDURE — 83036 HEMOGLOBIN GLYCOSYLATED A1C: CPT

## 2023-11-20 RX ORDER — GLIPIZIDE 10 MG/1
20 TABLET ORAL EVERY EVENING
Qty: 90 TABLET | Refills: 3 | Status: SHIPPED | OUTPATIENT
Start: 2023-11-20

## 2023-11-20 RX ORDER — GLIPIZIDE 10 MG/1
20 TABLET ORAL EVERY EVENING
Qty: 90 TABLET | Refills: 3 | Status: SHIPPED | OUTPATIENT
Start: 2023-11-20 | End: 2023-11-20

## 2023-11-22 ENCOUNTER — TELEPHONE (OUTPATIENT)
Dept: INTERNAL MEDICINE CLINIC | Facility: CLINIC | Age: 83
End: 2023-11-22

## 2023-11-22 DIAGNOSIS — E11.65 UNCONTROLLED TYPE 2 DIABETES MELLITUS WITH HYPERGLYCEMIA (HCC): Primary | ICD-10-CM

## 2023-11-22 RX ORDER — GLIPIZIDE 10 MG/1
10 TABLET ORAL DAILY
Qty: 90 TABLET | Refills: 3 | Status: SHIPPED | OUTPATIENT
Start: 2023-11-22

## 2023-11-22 NOTE — TELEPHONE ENCOUNTER
Please clarify - called patient and relayed DR. ALONZO message - number for Diabetic center given to patient - med module shows Glipizede 10 mg take 2 tablets 20 mg every evening   Patient has 5mg tablets - he is taking 2 tablets 10 mg in am and 4 tablets 20 mg before dinner - sometimes he forgets pm medications - to DR. ALONZO

## 2023-11-22 NOTE — TELEPHONE ENCOUNTER
Glipizide can be a dangerous drug and sometimes can cause hypoglycemia. Normally patient should not be taking this medication in the evening. I would suggest that patient take glipizide 10 mg in the morning and eliminate evening dose. This can be reevaluated after patient meets with diabetic nurse specialist  Hopefully, the addition of Jon Coffman will improve blood sugar control so that we can potentially eliminate all of glipizide in the future.

## 2023-11-22 NOTE — TELEPHONE ENCOUNTER
----- Message from Sushant Regalado MD sent at 11/22/2023  9:35 AM CST -----  Lab results reviewed. Hemoglobin A1c has increased up to 8.1--therefore diabetes is not under optimal control as goal A1c is less than 7. Patient is already on metformin 1000 mg twice daily, pioglitazone 45 mg daily and glipizide 10 mg daily. Patient does not check Accu-Cheks and there is no way we can properly manage his diabetes without monitoring Accu-Cheks. We will need to add additional agent--I have sent off prescription for Jardiance. However I also recommend that we have patient see diabetic nurse specialist so that he can be properly instructed with dietary measures and also be reinstructed in how to manage his Accu-Cheks. Mariam Leung Referral has been placed. Kidney function shows increase in creatinine which may be secondary to the diabetes and will progressively worsen if diabetes is not under better control. Cholesterol is 138 with LDL 76--this is at goal LDL of less than 100. CBC shows normal hemoglobin and normal white cell count.

## 2023-11-22 NOTE — TELEPHONE ENCOUNTER
Called patient and relayed DR. ALONZO message - verbalized understanding - RX for glipizide 10 mg once in the morning sent to pharmacy

## 2023-11-27 ENCOUNTER — TELEPHONE (OUTPATIENT)
Dept: INTERNAL MEDICINE CLINIC | Facility: CLINIC | Age: 83
End: 2023-11-27

## 2023-11-27 RX ORDER — BLOOD-GLUCOSE METER
EACH MISCELLANEOUS
Qty: 100 STRIP | Refills: 3 | Status: SHIPPED | OUTPATIENT
Start: 2023-11-27

## 2023-11-27 RX ORDER — LANCETS 33 GAUGE
EACH MISCELLANEOUS
Qty: 100 EACH | Refills: 3 | Status: SHIPPED | OUTPATIENT
Start: 2023-11-27

## 2023-11-27 NOTE — TELEPHONE ENCOUNTER
Please call patient  He would like to review diabetic medications and dosage information and timing  Specifically Jardiance, Glipizide and Metformin  Tasked to [employer]

## 2023-11-27 NOTE — TELEPHONE ENCOUNTER
Spoke to pt -- confirmed meds verbally with pt as on profile;  pt is monitoring his BS now;  does request strips /lancets;  orders sent

## 2023-11-29 ENCOUNTER — HOSPITAL ENCOUNTER (OUTPATIENT)
Dept: ENDOCRINOLOGY | Facility: HOSPITAL | Age: 83
Discharge: HOME | End: 2023-11-29
Attending: INTERNAL MEDICINE
Payer: MEDICARE

## 2023-11-29 ENCOUNTER — TELEPHONE (OUTPATIENT)
Dept: ENDOCRINOLOGY | Facility: HOSPITAL | Age: 83
End: 2023-11-29

## 2023-11-29 VITALS — WEIGHT: 205.88 LBS | BODY MASS INDEX: 26 KG/M2

## 2023-11-29 DIAGNOSIS — E11.9 TYPE 2 DIABETES MELLITUS WITHOUT COMPLICATION, WITHOUT LONG-TERM CURRENT USE OF INSULIN (HCC): Primary | ICD-10-CM

## 2023-11-29 NOTE — PATIENT INSTRUCTIONS
Goals          Patient Stated    1. EDC - Healthy (pt-stated)       Note edited  11/29/2023 12:39 PM by Gilda Howard RD      Aim for more water during the day. Record your blood sugars on sheet provided. Aim for the myplate at meals -- aim for now more than a fist of carbs at each meal. If this is difficult to control portion size, consider snack on the snack list.       2.  EDC - Risk Avoidance (pt-stated)       Note edited  11/29/2023 12:38 PM by Gilda Howard RD      Make appt with new PCP to have someone available in case of low sugar/high sugars   Keep glucose tabs with you all of the time, take 4 glucose tabs for sugars less than 70 and test in 15 min again to ensure they have gone back.

## 2023-12-19 ENCOUNTER — LAB ENCOUNTER (OUTPATIENT)
Dept: LAB | Facility: HOSPITAL | Age: 83
End: 2023-12-19
Attending: SURGERY
Payer: MEDICARE

## 2023-12-19 ENCOUNTER — NURSE ONLY (OUTPATIENT)
Dept: LAB | Facility: HOSPITAL | Age: 83
End: 2023-12-19
Attending: SURGERY
Payer: MEDICARE

## 2023-12-19 ENCOUNTER — NURSE ONLY (OUTPATIENT)
Dept: LAB | Facility: HOSPITAL | Age: 83
DRG: 269 | End: 2023-12-19
Attending: SURGERY
Payer: MEDICARE

## 2023-12-19 DIAGNOSIS — I73.9 PERIPHERAL VASCULAR DISEASE (HCC): ICD-10-CM

## 2023-12-19 DIAGNOSIS — Z01.818 PRE-OP TESTING: ICD-10-CM

## 2023-12-19 DIAGNOSIS — E11.9 TYPE 2 DIABETES MELLITUS WITHOUT RETINOPATHY (HCC): ICD-10-CM

## 2023-12-19 LAB
ANION GAP SERPL CALC-SCNC: 3 MMOL/L (ref 0–18)
ANTIBODY SCREEN: NEGATIVE
APTT PPP: 24.7 SECONDS (ref 23.3–35.6)
BASOPHILS # BLD AUTO: 0.03 X10(3) UL (ref 0–0.2)
BASOPHILS NFR BLD AUTO: 0.4 %
BUN BLD-MCNC: 18 MG/DL (ref 9–23)
BUN/CREAT SERPL: 12.6 (ref 10–20)
CALCIUM BLD-MCNC: 8.9 MG/DL (ref 8.7–10.4)
CHLORIDE SERPL-SCNC: 113 MMOL/L (ref 98–112)
CO2 SERPL-SCNC: 24 MMOL/L (ref 21–32)
CREAT BLD-MCNC: 1.43 MG/DL
DEPRECATED RDW RBC AUTO: 45.5 FL (ref 35.1–46.3)
EGFRCR SERPLBLD CKD-EPI 2021: 48 ML/MIN/1.73M2 (ref 60–?)
EOSINOPHIL # BLD AUTO: 0.11 X10(3) UL (ref 0–0.7)
EOSINOPHIL NFR BLD AUTO: 1.6 %
ERYTHROCYTE [DISTWIDTH] IN BLOOD BY AUTOMATED COUNT: 13.2 % (ref 11–15)
FASTING STATUS PATIENT QL REPORTED: NO
GLUCOSE BLD-MCNC: 194 MG/DL (ref 70–99)
HCT VFR BLD AUTO: 43.1 %
HGB BLD-MCNC: 13.9 G/DL
IMM GRANULOCYTES # BLD AUTO: 0.03 X10(3) UL (ref 0–1)
IMM GRANULOCYTES NFR BLD: 0.4 %
INR BLD: 0.86 (ref 0.8–1.2)
LYMPHOCYTES # BLD AUTO: 1.43 X10(3) UL (ref 1–4)
LYMPHOCYTES NFR BLD AUTO: 21.1 %
MCH RBC QN AUTO: 30.7 PG (ref 26–34)
MCHC RBC AUTO-ENTMCNC: 32.3 G/DL (ref 31–37)
MCV RBC AUTO: 95.1 FL
MONOCYTES # BLD AUTO: 0.48 X10(3) UL (ref 0.1–1)
MONOCYTES NFR BLD AUTO: 7.1 %
MRSA DNA SPEC QL NAA+PROBE: NEGATIVE
NEUTROPHILS # BLD AUTO: 4.71 X10 (3) UL (ref 1.5–7.7)
NEUTROPHILS # BLD AUTO: 4.71 X10(3) UL (ref 1.5–7.7)
NEUTROPHILS NFR BLD AUTO: 69.4 %
OSMOLALITY SERPL CALC.SUM OF ELEC: 297 MOSM/KG (ref 275–295)
PLATELET # BLD AUTO: 247 10(3)UL (ref 150–450)
POTASSIUM SERPL-SCNC: 4.2 MMOL/L (ref 3.5–5.1)
PROTHROMBIN TIME: 12.3 SECONDS (ref 11.6–14.8)
RBC # BLD AUTO: 4.53 X10(6)UL
RH BLOOD TYPE: POSITIVE
RH BLOOD TYPE: POSITIVE
SODIUM SERPL-SCNC: 140 MMOL/L (ref 136–145)
WBC # BLD AUTO: 6.8 X10(3) UL (ref 4–11)

## 2023-12-19 PROCEDURE — 85610 PROTHROMBIN TIME: CPT

## 2023-12-19 PROCEDURE — 86901 BLOOD TYPING SEROLOGIC RH(D): CPT

## 2023-12-19 PROCEDURE — 36415 COLL VENOUS BLD VENIPUNCTURE: CPT

## 2023-12-19 PROCEDURE — 80048 BASIC METABOLIC PNL TOTAL CA: CPT

## 2023-12-19 PROCEDURE — 87635 SARS-COV-2 COVID-19 AMP PRB: CPT

## 2023-12-19 PROCEDURE — 86850 RBC ANTIBODY SCREEN: CPT

## 2023-12-19 PROCEDURE — 87641 MR-STAPH DNA AMP PROBE: CPT

## 2023-12-19 PROCEDURE — 85730 THROMBOPLASTIN TIME PARTIAL: CPT

## 2023-12-19 PROCEDURE — 86900 BLOOD TYPING SEROLOGIC ABO: CPT

## 2023-12-19 PROCEDURE — 85025 COMPLETE CBC W/AUTO DIFF WBC: CPT

## 2023-12-20 ENCOUNTER — APPOINTMENT (OUTPATIENT)
Dept: ENDOCRINOLOGY | Facility: HOSPITAL | Age: 83
End: 2023-12-20
Attending: INTERNAL MEDICINE
Payer: MEDICARE

## 2023-12-20 LAB — SARS-COV-2 RNA RESP QL NAA+PROBE: NOT DETECTED

## 2023-12-22 ENCOUNTER — HOSPITAL ENCOUNTER (INPATIENT)
Dept: INTERVENTIONAL RADIOLOGY/VASCULAR | Facility: HOSPITAL | Age: 83
LOS: 1 days | Discharge: HOME | DRG: 269 | End: 2023-12-23
Attending: SURGERY | Admitting: SURGERY
Payer: MEDICARE

## 2023-12-22 ENCOUNTER — HOSPITAL ENCOUNTER (OUTPATIENT)
Dept: GENERAL RADIOLOGY | Facility: HOSPITAL | Age: 83
Discharge: HOME | End: 2023-12-22
Attending: SURGERY
Payer: MEDICARE

## 2023-12-22 ENCOUNTER — ANESTHESIA EVENT (OUTPATIENT)
Dept: INTERVENTIONAL RADIOLOGY/VASCULAR | Facility: HOSPITAL | Age: 83
End: 2023-12-22
Payer: MEDICARE

## 2023-12-22 ENCOUNTER — HOSPITAL ENCOUNTER (INPATIENT)
Dept: INTERVENTIONAL RADIOLOGY/VASCULAR | Facility: HOSPITAL | Age: 83
LOS: 1 days | Discharge: HOME | End: 2023-12-23
Attending: SURGERY | Admitting: SURGERY
Payer: MEDICARE

## 2023-12-22 ENCOUNTER — HOSPITAL ENCOUNTER (OUTPATIENT)
Dept: GENERAL RADIOLOGY | Facility: HOSPITAL | Age: 83
Discharge: HOME | DRG: 269 | End: 2023-12-22
Attending: SURGERY
Payer: MEDICARE

## 2023-12-22 DIAGNOSIS — Z01.818 PRE-OP TESTING: Primary | ICD-10-CM

## 2023-12-22 DIAGNOSIS — I73.9 PERIPHERAL VASCULAR DISEASE (HCC): ICD-10-CM

## 2023-12-22 DIAGNOSIS — I71.40 ABDOMINAL AORTIC ANEURYSM (AAA) (HCC): ICD-10-CM

## 2023-12-22 DIAGNOSIS — E11.9 TYPE 2 DIABETES MELLITUS WITHOUT RETINOPATHY (HCC): ICD-10-CM

## 2023-12-22 DIAGNOSIS — I70.229 ATHEROSCLEROSIS OF ARTERY OF EXTREMITY WITH REST PAIN (HCC): ICD-10-CM

## 2023-12-22 LAB
BILIRUB UR QL: NEGATIVE
BLOOD TYPE BARCODE: 5100
CLARITY UR: CLEAR
COLOR UR: YELLOW
GLUCOSE BLDC GLUCOMTR-MCNC: 179 MG/DL (ref 70–99)
GLUCOSE BLDC GLUCOMTR-MCNC: 189 MG/DL (ref 70–99)
GLUCOSE UR-MCNC: 300 MG/DL
HGB UR QL STRIP.AUTO: NEGATIVE
HYALINE CASTS #/AREA URNS AUTO: PRESENT /LPF
ISTAT ACTIVATED CLOTTING TIME: 201 SECONDS (ref 125–137)
ISTAT ACTIVATED CLOTTING TIME: 217 SECONDS (ref 125–137)
ISTAT ACTIVATED CLOTTING TIME: 223 SECONDS (ref 125–137)
ISTAT ACTIVATED CLOTTING TIME: 244 SECONDS (ref 125–137)
ISTAT ACTIVATED CLOTTING TIME: 244 SECONDS (ref 125–137)
KETONES UR-MCNC: NEGATIVE MG/DL
LEUKOCYTE ESTERASE UR QL STRIP.AUTO: 250
NITRITE UR QL STRIP.AUTO: NEGATIVE
PH UR: 5.5 [PH] (ref 5–8)
PROT UR-MCNC: 50 MG/DL
SP GR UR STRIP: 1.02 (ref 1–1.03)
UNIT VOLUME: 350 ML
UROBILINOGEN UR STRIP-ACNC: NORMAL

## 2023-12-22 PROCEDURE — 04V03DZ RESTRICTION OF ABDOMINAL AORTA WITH INTRALUMINAL DEVICE, PERCUTANEOUS APPROACH: ICD-10-PCS | Performed by: SURGERY

## 2023-12-22 PROCEDURE — 04UL3KZ SUPPLEMENT LEFT FEMORAL ARTERY WITH NONAUTOLOGOUS TISSUE SUBSTITUTE, PERCUTANEOUS APPROACH: ICD-10-PCS | Performed by: SURGERY

## 2023-12-22 PROCEDURE — 04CL3ZZ EXTIRPATION OF MATTER FROM LEFT FEMORAL ARTERY, PERCUTANEOUS APPROACH: ICD-10-PCS | Performed by: SURGERY

## 2023-12-22 PROCEDURE — B4101ZZ FLUOROSCOPY OF ABDOMINAL AORTA USING LOW OSMOLAR CONTRAST: ICD-10-PCS | Performed by: SURGERY

## 2023-12-22 PROCEDURE — 71046 X-RAY EXAM CHEST 2 VIEWS: CPT | Performed by: SURGERY

## 2023-12-22 RX ORDER — HEPARIN SODIUM 1000 [USP'U]/ML
INJECTION, SOLUTION INTRAVENOUS; SUBCUTANEOUS
Status: COMPLETED
Start: 2023-12-22 | End: 2023-12-22

## 2023-12-22 RX ORDER — HEPARIN SODIUM 1000 [USP'U]/ML
INJECTION, SOLUTION INTRAVENOUS; SUBCUTANEOUS AS NEEDED
Status: DISCONTINUED | OUTPATIENT
Start: 2023-12-22 | End: 2023-12-22 | Stop reason: SURG

## 2023-12-22 RX ORDER — NICOTINE POLACRILEX 4 MG
15 LOZENGE BUCCAL
Status: DISCONTINUED | OUTPATIENT
Start: 2023-12-22 | End: 2023-12-22 | Stop reason: HOSPADM

## 2023-12-22 RX ORDER — HYDROMORPHONE HYDROCHLORIDE 1 MG/ML
0.6 INJECTION, SOLUTION INTRAMUSCULAR; INTRAVENOUS; SUBCUTANEOUS EVERY 5 MIN PRN
Status: DISCONTINUED | OUTPATIENT
Start: 2023-12-22 | End: 2023-12-22 | Stop reason: HOSPADM

## 2023-12-22 RX ORDER — HEPARIN SODIUM 5000 [USP'U]/ML
5000 INJECTION, SOLUTION INTRAVENOUS; SUBCUTANEOUS EVERY 8 HOURS SCHEDULED
Status: DISCONTINUED | OUTPATIENT
Start: 2023-12-22 | End: 2023-12-23

## 2023-12-22 RX ORDER — SODIUM CHLORIDE, SODIUM LACTATE, POTASSIUM CHLORIDE, CALCIUM CHLORIDE 600; 310; 30; 20 MG/100ML; MG/100ML; MG/100ML; MG/100ML
INJECTION, SOLUTION INTRAVENOUS CONTINUOUS
Status: DISCONTINUED | OUTPATIENT
Start: 2023-12-22 | End: 2023-12-23

## 2023-12-22 RX ORDER — HYDROCODONE BITARTRATE AND ACETAMINOPHEN 5; 325 MG/1; MG/1
2 TABLET ORAL ONCE AS NEEDED
Status: DISCONTINUED | OUTPATIENT
Start: 2023-12-22 | End: 2023-12-22 | Stop reason: HOSPADM

## 2023-12-22 RX ORDER — PHENYLEPHRINE HCL 10 MG/ML
VIAL (ML) INJECTION AS NEEDED
Status: DISCONTINUED | OUTPATIENT
Start: 2023-12-22 | End: 2023-12-22 | Stop reason: SURG

## 2023-12-22 RX ORDER — HYDROMORPHONE HYDROCHLORIDE 1 MG/ML
0.4 INJECTION, SOLUTION INTRAMUSCULAR; INTRAVENOUS; SUBCUTANEOUS EVERY 2 HOUR PRN
Status: DISCONTINUED | OUTPATIENT
Start: 2023-12-22 | End: 2023-12-23

## 2023-12-22 RX ORDER — EPHEDRINE SULFATE 50 MG/ML
INJECTION, SOLUTION INTRAVENOUS AS NEEDED
Status: DISCONTINUED | OUTPATIENT
Start: 2023-12-22 | End: 2023-12-22 | Stop reason: SURG

## 2023-12-22 RX ORDER — ACETAMINOPHEN 500 MG
1000 TABLET ORAL ONCE AS NEEDED
Status: DISCONTINUED | OUTPATIENT
Start: 2023-12-22 | End: 2023-12-22 | Stop reason: HOSPADM

## 2023-12-22 RX ORDER — CEFAZOLIN SODIUM/WATER 2 G/20 ML
SYRINGE (ML) INTRAVENOUS
Status: COMPLETED
Start: 2023-12-22 | End: 2023-12-22

## 2023-12-22 RX ORDER — METOCLOPRAMIDE HYDROCHLORIDE 5 MG/ML
10 INJECTION INTRAMUSCULAR; INTRAVENOUS EVERY 8 HOURS PRN
Status: DISCONTINUED | OUTPATIENT
Start: 2023-12-22 | End: 2023-12-22 | Stop reason: HOSPADM

## 2023-12-22 RX ORDER — GLIPIZIDE 10 MG/1
10 TABLET ORAL DAILY
Status: DISCONTINUED | OUTPATIENT
Start: 2023-12-23 | End: 2023-12-23

## 2023-12-22 RX ORDER — PIOGLITAZONEHYDROCHLORIDE 15 MG/1
45 TABLET ORAL DAILY
Status: DISCONTINUED | OUTPATIENT
Start: 2023-12-23 | End: 2023-12-23

## 2023-12-22 RX ORDER — ONDANSETRON 2 MG/ML
4 INJECTION INTRAMUSCULAR; INTRAVENOUS EVERY 6 HOURS PRN
Status: DISCONTINUED | OUTPATIENT
Start: 2023-12-22 | End: 2023-12-22 | Stop reason: HOSPADM

## 2023-12-22 RX ORDER — SODIUM CHLORIDE, SODIUM LACTATE, POTASSIUM CHLORIDE, CALCIUM CHLORIDE 600; 310; 30; 20 MG/100ML; MG/100ML; MG/100ML; MG/100ML
INJECTION, SOLUTION INTRAVENOUS CONTINUOUS
Status: DISCONTINUED | OUTPATIENT
Start: 2023-12-22 | End: 2023-12-22 | Stop reason: HOSPADM

## 2023-12-22 RX ORDER — PROTAMINE SULFATE 10 MG/ML
INJECTION, SOLUTION INTRAVENOUS
Status: COMPLETED
Start: 2023-12-22 | End: 2023-12-22

## 2023-12-22 RX ORDER — ROCURONIUM BROMIDE 10 MG/ML
INJECTION, SOLUTION INTRAVENOUS AS NEEDED
Status: DISCONTINUED | OUTPATIENT
Start: 2023-12-22 | End: 2023-12-22 | Stop reason: SURG

## 2023-12-22 RX ORDER — HYDROMORPHONE HYDROCHLORIDE 1 MG/ML
0.2 INJECTION, SOLUTION INTRAMUSCULAR; INTRAVENOUS; SUBCUTANEOUS EVERY 2 HOUR PRN
Status: DISCONTINUED | OUTPATIENT
Start: 2023-12-22 | End: 2023-12-23

## 2023-12-22 RX ORDER — AMLODIPINE BESYLATE 10 MG/1
10 TABLET ORAL DAILY
Status: DISCONTINUED | OUTPATIENT
Start: 2023-12-22 | End: 2023-12-23

## 2023-12-22 RX ORDER — ASPIRIN 81 MG/1
81 TABLET ORAL DAILY
Status: DISCONTINUED | OUTPATIENT
Start: 2023-12-23 | End: 2023-12-23

## 2023-12-22 RX ORDER — ACETAMINOPHEN 325 MG/1
650 TABLET ORAL EVERY 4 HOURS PRN
Status: DISCONTINUED | OUTPATIENT
Start: 2023-12-22 | End: 2023-12-23

## 2023-12-22 RX ORDER — HYDROMORPHONE HYDROCHLORIDE 1 MG/ML
0.8 INJECTION, SOLUTION INTRAMUSCULAR; INTRAVENOUS; SUBCUTANEOUS EVERY 2 HOUR PRN
Status: DISCONTINUED | OUTPATIENT
Start: 2023-12-22 | End: 2023-12-23

## 2023-12-22 RX ORDER — METOCLOPRAMIDE HYDROCHLORIDE 5 MG/ML
10 INJECTION INTRAMUSCULAR; INTRAVENOUS EVERY 8 HOURS PRN
Status: DISCONTINUED | OUTPATIENT
Start: 2023-12-22 | End: 2023-12-23

## 2023-12-22 RX ORDER — MORPHINE SULFATE 4 MG/ML
4 INJECTION, SOLUTION INTRAMUSCULAR; INTRAVENOUS EVERY 10 MIN PRN
Status: DISCONTINUED | OUTPATIENT
Start: 2023-12-22 | End: 2023-12-22 | Stop reason: HOSPADM

## 2023-12-22 RX ORDER — NICOTINE POLACRILEX 4 MG/1
20 GUM, CHEWING ORAL DAILY
Status: DISCONTINUED | OUTPATIENT
Start: 2023-12-22 | End: 2023-12-22

## 2023-12-22 RX ORDER — METOPROLOL TARTRATE 1 MG/ML
2.5 INJECTION, SOLUTION INTRAVENOUS ONCE
Status: DISCONTINUED | OUTPATIENT
Start: 2023-12-22 | End: 2023-12-22 | Stop reason: HOSPADM

## 2023-12-22 RX ORDER — MORPHINE SULFATE 10 MG/ML
6 INJECTION, SOLUTION INTRAMUSCULAR; INTRAVENOUS EVERY 10 MIN PRN
Status: DISCONTINUED | OUTPATIENT
Start: 2023-12-22 | End: 2023-12-22 | Stop reason: HOSPADM

## 2023-12-22 RX ORDER — LIDOCAINE HYDROCHLORIDE 40 MG/ML
SOLUTION TOPICAL AS NEEDED
Status: DISCONTINUED | OUTPATIENT
Start: 2023-12-22 | End: 2023-12-22 | Stop reason: SURG

## 2023-12-22 RX ORDER — CEFAZOLIN SODIUM/WATER 2 G/20 ML
SYRINGE (ML) INTRAVENOUS AS NEEDED
Status: DISCONTINUED | OUTPATIENT
Start: 2023-12-22 | End: 2023-12-22 | Stop reason: SURG

## 2023-12-22 RX ORDER — ONDANSETRON 2 MG/ML
INJECTION INTRAMUSCULAR; INTRAVENOUS AS NEEDED
Status: DISCONTINUED | OUTPATIENT
Start: 2023-12-22 | End: 2023-12-22 | Stop reason: SURG

## 2023-12-22 RX ORDER — LIDOCAINE HYDROCHLORIDE 20 MG/ML
INJECTION, SOLUTION INFILTRATION; PERINEURAL
Status: COMPLETED
Start: 2023-12-22 | End: 2023-12-22

## 2023-12-22 RX ORDER — HYDROCODONE BITARTRATE AND ACETAMINOPHEN 5; 325 MG/1; MG/1
2 TABLET ORAL EVERY 4 HOURS PRN
Status: DISCONTINUED | OUTPATIENT
Start: 2023-12-22 | End: 2023-12-23

## 2023-12-22 RX ORDER — LIDOCAINE HYDROCHLORIDE 10 MG/ML
INJECTION, SOLUTION EPIDURAL; INFILTRATION; INTRACAUDAL; PERINEURAL AS NEEDED
Status: DISCONTINUED | OUTPATIENT
Start: 2023-12-22 | End: 2023-12-22 | Stop reason: SURG

## 2023-12-22 RX ORDER — LOSARTAN POTASSIUM 100 MG/1
100 TABLET ORAL DAILY
Status: DISCONTINUED | OUTPATIENT
Start: 2023-12-23 | End: 2023-12-23

## 2023-12-22 RX ORDER — HYDROMORPHONE HYDROCHLORIDE 1 MG/ML
0.2 INJECTION, SOLUTION INTRAMUSCULAR; INTRAVENOUS; SUBCUTANEOUS EVERY 5 MIN PRN
Status: DISCONTINUED | OUTPATIENT
Start: 2023-12-22 | End: 2023-12-22 | Stop reason: HOSPADM

## 2023-12-22 RX ORDER — CEFAZOLIN SODIUM/WATER 2 G/20 ML
2 SYRINGE (ML) INTRAVENOUS EVERY 8 HOURS
Qty: 40 ML | Refills: 0 | Status: COMPLETED | OUTPATIENT
Start: 2023-12-22 | End: 2023-12-23

## 2023-12-22 RX ORDER — PROTAMINE SULFATE 10 MG/ML
INJECTION, SOLUTION INTRAVENOUS AS NEEDED
Status: DISCONTINUED | OUTPATIENT
Start: 2023-12-22 | End: 2023-12-22 | Stop reason: SURG

## 2023-12-22 RX ORDER — SODIUM CHLORIDE 9 MG/ML
1 INJECTION, SOLUTION INTRAVENOUS CONTINUOUS
Status: DISCONTINUED | OUTPATIENT
Start: 2023-12-22 | End: 2023-12-23

## 2023-12-22 RX ORDER — HYDROMORPHONE HYDROCHLORIDE 1 MG/ML
0.4 INJECTION, SOLUTION INTRAMUSCULAR; INTRAVENOUS; SUBCUTANEOUS EVERY 5 MIN PRN
Status: DISCONTINUED | OUTPATIENT
Start: 2023-12-22 | End: 2023-12-22 | Stop reason: HOSPADM

## 2023-12-22 RX ORDER — DEXAMETHASONE SODIUM PHOSPHATE 4 MG/ML
VIAL (ML) INJECTION AS NEEDED
Status: DISCONTINUED | OUTPATIENT
Start: 2023-12-22 | End: 2023-12-22 | Stop reason: SURG

## 2023-12-22 RX ORDER — HYDROCODONE BITARTRATE AND ACETAMINOPHEN 5; 325 MG/1; MG/1
1 TABLET ORAL ONCE AS NEEDED
Status: DISCONTINUED | OUTPATIENT
Start: 2023-12-22 | End: 2023-12-22 | Stop reason: HOSPADM

## 2023-12-22 RX ORDER — ONDANSETRON 2 MG/ML
4 INJECTION INTRAMUSCULAR; INTRAVENOUS EVERY 6 HOURS PRN
Status: DISCONTINUED | OUTPATIENT
Start: 2023-12-22 | End: 2023-12-23

## 2023-12-22 RX ORDER — NICOTINE POLACRILEX 4 MG
30 LOZENGE BUCCAL
Status: DISCONTINUED | OUTPATIENT
Start: 2023-12-22 | End: 2023-12-22 | Stop reason: HOSPADM

## 2023-12-22 RX ORDER — ATORVASTATIN CALCIUM 40 MG/1
40 TABLET, FILM COATED ORAL DAILY
Status: DISCONTINUED | OUTPATIENT
Start: 2023-12-22 | End: 2023-12-23

## 2023-12-22 RX ORDER — ACETAMINOPHEN 500 MG
TABLET ORAL
Status: COMPLETED
Start: 2023-12-22 | End: 2023-12-22

## 2023-12-22 RX ORDER — ACETAMINOPHEN 500 MG
1000 TABLET ORAL ONCE
Status: COMPLETED | OUTPATIENT
Start: 2023-12-22 | End: 2023-12-22

## 2023-12-22 RX ORDER — PANTOPRAZOLE SODIUM 20 MG/1
20 TABLET, DELAYED RELEASE ORAL
Status: DISCONTINUED | OUTPATIENT
Start: 2023-12-23 | End: 2023-12-23

## 2023-12-22 RX ORDER — MORPHINE SULFATE 2 MG/ML
2 INJECTION, SOLUTION INTRAMUSCULAR; INTRAVENOUS EVERY 10 MIN PRN
Status: DISCONTINUED | OUTPATIENT
Start: 2023-12-22 | End: 2023-12-22 | Stop reason: HOSPADM

## 2023-12-22 RX ORDER — DEXTROSE MONOHYDRATE 25 G/50ML
50 INJECTION, SOLUTION INTRAVENOUS
Status: DISCONTINUED | OUTPATIENT
Start: 2023-12-22 | End: 2023-12-22 | Stop reason: HOSPADM

## 2023-12-22 RX ORDER — NALOXONE HYDROCHLORIDE 0.4 MG/ML
80 INJECTION, SOLUTION INTRAMUSCULAR; INTRAVENOUS; SUBCUTANEOUS AS NEEDED
Status: DISCONTINUED | OUTPATIENT
Start: 2023-12-22 | End: 2023-12-22 | Stop reason: HOSPADM

## 2023-12-22 RX ORDER — ATENOLOL 25 MG/1
25 TABLET ORAL DAILY
Status: DISCONTINUED | OUTPATIENT
Start: 2023-12-23 | End: 2023-12-23

## 2023-12-22 RX ORDER — HYDROCODONE BITARTRATE AND ACETAMINOPHEN 5; 325 MG/1; MG/1
1 TABLET ORAL EVERY 4 HOURS PRN
Status: DISCONTINUED | OUTPATIENT
Start: 2023-12-22 | End: 2023-12-23

## 2023-12-22 RX ADMIN — PROTAMINE SULFATE 50 MG: 10 INJECTION, SOLUTION INTRAVENOUS at 14:06:00

## 2023-12-22 RX ADMIN — HEPARIN SODIUM 2000 UNITS: 1000 INJECTION, SOLUTION INTRAVENOUS; SUBCUTANEOUS at 12:46:00

## 2023-12-22 RX ADMIN — SODIUM CHLORIDE: 9 INJECTION, SOLUTION INTRAVENOUS at 14:02:00

## 2023-12-22 RX ADMIN — SODIUM CHLORIDE: 9 INJECTION, SOLUTION INTRAVENOUS at 12:07:00

## 2023-12-22 RX ADMIN — CEFAZOLIN SODIUM/WATER 2 G: 2 G/20 ML SYRINGE (ML) INTRAVENOUS at 19:53:00

## 2023-12-22 RX ADMIN — PHENYLEPHRINE HCL 100 MCG: 10 MG/ML VIAL (ML) INJECTION at 12:36:00

## 2023-12-22 RX ADMIN — HEPARIN SODIUM 2000 UNITS: 1000 INJECTION, SOLUTION INTRAVENOUS; SUBCUTANEOUS at 13:28:00

## 2023-12-22 RX ADMIN — SODIUM CHLORIDE, SODIUM LACTATE, POTASSIUM CHLORIDE, CALCIUM CHLORIDE: 600; 310; 30; 20 INJECTION, SOLUTION INTRAVENOUS at 14:08:00

## 2023-12-22 RX ADMIN — HEPARIN SODIUM 5000 UNITS: 5000 INJECTION, SOLUTION INTRAVENOUS; SUBCUTANEOUS at 23:00:00

## 2023-12-22 RX ADMIN — SODIUM CHLORIDE, SODIUM LACTATE, POTASSIUM CHLORIDE, CALCIUM CHLORIDE: 600; 310; 30; 20 INJECTION, SOLUTION INTRAVENOUS at 22:00:00

## 2023-12-22 RX ADMIN — ACETAMINOPHEN 650 MG: 325 TABLET ORAL at 19:59:00

## 2023-12-22 RX ADMIN — PHENYLEPHRINE HCL 100 MCG: 10 MG/ML VIAL (ML) INJECTION at 11:52:00

## 2023-12-22 RX ADMIN — PHENYLEPHRINE HCL 200 MCG: 10 MG/ML VIAL (ML) INJECTION at 11:55:00

## 2023-12-22 RX ADMIN — HEPARIN SODIUM 2000 UNITS: 1000 INJECTION, SOLUTION INTRAVENOUS; SUBCUTANEOUS at 12:57:00

## 2023-12-22 RX ADMIN — LIDOCAINE HYDROCHLORIDE 4 ML: 40 SOLUTION TOPICAL at 11:50:00

## 2023-12-22 RX ADMIN — SODIUM CHLORIDE 1 ML/KG/HR: 9 INJECTION, SOLUTION INTRAVENOUS at 09:15:00

## 2023-12-22 RX ADMIN — PHENYLEPHRINE HCL 100 MCG: 10 MG/ML VIAL (ML) INJECTION at 14:19:00

## 2023-12-22 RX ADMIN — ONDANSETRON 4 MG: 2 INJECTION INTRAMUSCULAR; INTRAVENOUS at 14:06:00

## 2023-12-22 RX ADMIN — LIDOCAINE HYDROCHLORIDE 50 MG: 10 INJECTION, SOLUTION EPIDURAL; INFILTRATION; INTRACAUDAL; PERINEURAL at 11:48:00

## 2023-12-22 RX ADMIN — EPHEDRINE SULFATE 10 MG: 50 INJECTION, SOLUTION INTRAVENOUS at 11:58:00

## 2023-12-22 RX ADMIN — PHENYLEPHRINE HCL 100 MCG: 10 MG/ML VIAL (ML) INJECTION at 12:40:00

## 2023-12-22 RX ADMIN — HEPARIN SODIUM 10000 UNITS: 1000 INJECTION, SOLUTION INTRAVENOUS; SUBCUTANEOUS at 12:37:00

## 2023-12-22 RX ADMIN — DEXAMETHASONE SODIUM PHOSPHATE 4 MG: 4 MG/ML VIAL (ML) INJECTION at 12:07:00

## 2023-12-22 RX ADMIN — HEPARIN SODIUM 1000 UNITS: 1000 INJECTION, SOLUTION INTRAVENOUS; SUBCUTANEOUS at 13:57:00

## 2023-12-22 RX ADMIN — PROTAMINE SULFATE 50 MG: 10 INJECTION, SOLUTION INTRAVENOUS at 14:05:00

## 2023-12-22 RX ADMIN — ROCURONIUM BROMIDE 50 MG: 10 INJECTION, SOLUTION INTRAVENOUS at 11:48:00

## 2023-12-22 RX ADMIN — AMLODIPINE BESYLATE 10 MG: 10 TABLET ORAL at 19:59:00

## 2023-12-22 RX ADMIN — SODIUM CHLORIDE, SODIUM LACTATE, POTASSIUM CHLORIDE, CALCIUM CHLORIDE: 600; 310; 30; 20 INJECTION, SOLUTION INTRAVENOUS at 11:41:00

## 2023-12-22 RX ADMIN — CEFAZOLIN SODIUM/WATER 2 G: 2 G/20 ML SYRINGE (ML) INTRAVENOUS at 12:07:00

## 2023-12-22 RX ADMIN — ATORVASTATIN CALCIUM 40 MG: 40 TABLET, FILM COATED ORAL at 19:59:00

## 2023-12-22 RX ADMIN — ACETAMINOPHEN 1000 MG: 500 MG TABLET ORAL at 09:15:00

## 2023-12-22 RX ADMIN — PHENYLEPHRINE HCL 200 MCG: 10 MG/ML VIAL (ML) INJECTION at 11:58:00

## 2023-12-22 RX ADMIN — PHENYLEPHRINE HCL 100 MCG: 10 MG/ML VIAL (ML) INJECTION at 12:46:00

## 2023-12-22 NOTE — OPERATIVE REPORT
Vascular Surgery Op Note  Patients Name:    Lauren Carcamo    Operating Physician: Grace Stevens MD  CSN:    [id number]                                                           Location:  OR  MRN:     [id number]                                            YOB: 1940    Operation Date:     12/22/2023         Pre-Operative Diagnosis:   1. Abdominal Aortic aneurysm  2. Atherosclerosis with claudication and questionable early rest pain in the left lower extremity     Post-Operative Diagnosis:   1. Abdominal Aortic aneurysm  2. Atherosclerosis with claudication and questionable early rest pain in the left lower extremity        Procedure Performed:   Ultrasound-guided access of the right common femoral artery  Left femoral cutdown  Introduction of catheter to aorta  Endovascular aortic repair with placement of [place] excluder endoprosthesis  28 mm left ipsilateral, 16 mm extension left  Right contralateral 20 mm extension right  5. Closure of right groin with Pro-glide Perclose suture (12 Fr). 6.  Left femoral endarterectomy with bovine pericardium patch angioplasty       Surgeon:  MD Dulce Powers MD    To the complexity of the operation I was asked to participate as a co-surgeon. Assistant for open portion: Luther Hatch    Anesthesia:   General       EBL: [id number]     Complications: None    Drains: None     Findings: Aneurysm excluded with no endoleak. Femoral endarterectomy performed without issue. Strong Doppler signal at completion. Indications for procedure: 73-year-old male who was seen in the vascular clinic and was referred with significant peripheral vascular disease as well as abdominal aortic aneurysm. He had questionable early rest pain as well as claudication. He also had an abdominal aortic aneurysm that measured 5.3 cm. As such he was recommended for endovascular repair with simultaneous left femoral endarterectomy.   I discussed the risk benefits of the procedure. Informed consent was direct obtained. On the morning of December 22 the patient was brought to the operating room and placed in supine position. General anesthesia was induced without any issues. Antibiotics were administered. An A-line and Segal catheter were placed. The patient was subsequently prepped and draped in the usual sterile fashion. Timeout was performed. Dr. Manjula Blanca utilized the ultrasound to access the right common femoral with advancement of microwire and exchanged for microsheath. He then placed a J-wire and then predilated with a 6 [place] sheath and then placed 2 Pro-glide Perclose sutures in the 10 and 2 o'clock position with placement of an 8 [place] More sheath. I made a 5 cm incision over the left groin. I carried the dissection down with electrocautery through the subcutaneous tissues. Retractors were placed. The common femoral was identified and the tissues were transected anterior to this with electrocautery. I then circumferentially dissected the common femoral down to the SFA and profunda and encircled this with Vesseloops. I then accessed the common femoral with a micropuncture and eventually upsized to a 16 Western More sheath. I then advanced the [place] excluder endoprosthesis through the left groin. Dr. Manjula Blanca performed an aortogram to identify the location of the renal arteries. I then deployed the stent with exposure of the contralateral gate. He then utilized a Kumpe catheter and Glidewire to select the gate and confirmed this with rotation. A retrograde angiogram identified the location of the distal right common iliac and then he deployed a 20 mm iliac extension in the distal right common iliac without any issue. I then deployed the remainder of the graft on the left and then extended with a 16 mm iliac extension in the distal left common iliac without any issues.   A molding and occluding an aortic balloon was then used to profile the proximal distal and overlapping segments. An aortogram revealed wide patency of the renals with excellent flow through the limbs and no endoleak. At this point we opted to terminate the endovascular portion. He then since the Pro-glide Perclose sutures and removed the sheath in the right groin which was then cinched locked and cut thereby closing the arteriotomy site. I then removed the sheath and placed clamps proximally and distally. I then extended the arteriotomy with Cano scissors proximally distally. I then utilized a freer elevator to her remove the plaque until a suitable lumen was identified. Plaque grasping forceps were used to remove the debris. Once a suitable lumen was identified a bovine pericardial patch was brought to the field and then with 5-0 Prolene suture a patch angioplasty was performed in running fashion. Prior to cinching the sutures the artery was forward and backbled and flushed with heparinized saline and then the sutures were then cinched and tied thereby completing the reconstruction. Upon completion of this there is evidence of excellent pulsatility. Strong Doppler signal was confirmed. Protamine was administered for reversal of heparinization. Local anesthetic was used to infiltrate into the field. Electrocautery was used to obtain hemostasis of the wound bed. Topical hemostatic agents were placed. Upon completion there was evidence of excellent hemostasis and opted to proceed with closure. The deep subcutaneous tissues were reapproximated with the use of 2-0 Vicryl suture in interrupted fashion. The deep dermis was closed with 3-0 Vicryl suture in interrupted fashion. The skin was closed with staples followed by placement of bacitracin Telfa and Tegaderm dressings. The patient awoke from general anesthesia was extubated and taken to the recovery in stable condition. All counts were correct at the end of the case. See Dr. Isaias Gonzalez note for his portion of the procedure.      Tila Hernandez MD

## 2023-12-22 NOTE — H&P
History & Physical Examination    Patient Name: Maria Guadalupe Valiente  MRN: [id number]  [facility]: [id number]  YOB: 1940    Diagnosis: AAA/PAD    Present Illness: 39-year-old, history of diabetes mellitus, peripheral arterial disease and known abdominal arctic aneurysm, with development of pain and paresthesias in the feet. Diagnostic imaging is demonstrated severe diffuse femoral-popliteal disease, including severe calcified left common femoral occlusion. Abdominal aortic aneurysm now 5.2 cm, increased from previous imaging studies in [place], now with significant risk of rupture, and hybrid surgical/endovascular procedure is planned, with endovascular AAA repair, left common femoral endarterectomy. Medications Prior to Admission   Medication Sig Dispense Refill Last Dose    aspirin-acetaminophen-caffeine 250-250-65 MG Oral Tab Take 1 tablet by mouth every 6 (six) hours as needed for Pain.       empagliflozin (JARDIANCE) 10 MG Oral Tab Take 1 tablet (10 mg total) by mouth daily. 30 tablet 5 12/18/2023    glipiZIDE 10 MG Oral Tab Take 1 tablet (10 mg total) by mouth daily. Daily in the morning 90 tablet 3 12/21/2023    alendronate 70 MG Oral Tab Take 1 tablet (70 mg total) by mouth once a week. 13 tablet 3 12/15/2023    atorvastatin 40 MG Oral Tab Take 1 tablet (40 mg total) by mouth daily. 90 tablet 3 12/21/2023    losartan 100 MG Oral Tab Take 1 tablet (100 mg total) by mouth daily. 90 tablet 3 12/21/2023    pioglitazone 45 MG Oral Tab Take 1 tablet (45 mg total) by mouth daily. 90 tablet 3 12/21/2023    atenolol 25 MG Oral Tab Take 1 tablet (25 mg total) by mouth daily. 90 tablet 3 12/22/2023    timolol 0.5 % Ophthalmic Solution Place 1 drop into both eyes 2 (two) times daily. 15 mL 3 12/21/2023    brimonidine 0.15 % Ophthalmic Solution Place 1 drop into both eyes in the morning and 1 drop before bedtime.  3 each 3 12/21/2023    metFORMIN HCl 1000 MG Oral Tab Take 1 tablet (1,000 mg total) by mouth in the morning and 1 tablet (1,000 mg total) before bedtime. 180 tablet 3 12/21/2023    Omeprazole 20 MG Oral Tab EC Take 20 mg by mouth daily. 90 tablet 3 12/21/2023    amLODIPine 10 MG Oral Tab Take 1 tablet (10 mg total) by mouth daily. 90 tablet 3 12/21/2023    Potassium Chloride ER 10 MEQ Oral Tab CR Take 1 tablet (10 mEq total) by mouth daily. 90 tablet 3 12/21/2023    aspirin 81 MG Oral Tab EC Take 1 tablet (81 mg total) by mouth daily. 12/22/2023    Ascorbic Acid (VITAMIN C OR) Take by mouth.   12/21/2023    Glucose Blood (ONETOUCH VERIO) In Vitro Strip Use daily 100 strip 3     OneTouch Delica Lancets 85C Does not apply Misc Use daily 100 each 3     Multiple Vitamins-Minerals (CENTRUM SILVER ADULT 50+ OR) Take by mouth daily. Unknown     Current Facility-Administered Medications   Medication Dose Route Frequency    lactated ringers infusion   Intravenous Continuous    metoprolol tartrate (Lopressor) tab 25 mg  25 mg Oral Once PRN    sodium chloride 0.9% infusion  1 mL/kg/hr Intravenous Continuous       Allergies: Allergies   Allergen Reactions    Lisinopril ANGIOEDEMA and OTHER (SEE COMMENTS)     Lip swelling       Past Medical History:   Diagnosis Date    Aorta aneurysm (HCC)     Back problem     Benign essential HTN     Cataract     Chronic rhinitis     Diabetes (HCC)     DM (diabetes mellitus), type 2 (HCC)     Hearing impairment     High blood pressure     High cholesterol     Hyperlipidemia     POAG (primary open-angle glaucoma) 10/17/2023    first visit with Dr. Anahi Lowery patient has been taking Brimonidine 0.15% OU BID and Timolol 0.5% OU BID for 10 yrs per MD in [place].   10/28/23 patient to continue taking gtts per [facility] due to abnormal VF OS and abnormal OCT OU    PVD (peripheral vascular disease) ([facility] [address]     Skin cancer     Visual impairment      Past Surgical History:   Procedure Laterality Date    APPENDECTOMY      CATARACT EXTRACTION W/  INTRAOCULAR LENS IMPLANT Left 04/07/2014 Done [address] Dr. Shahid Alan in [address] Right 2014    Done by Dr. Shahid Alan in [address]      T and A    OTHER ACCESSORY      appendectomy    OTHER ACCESSORY      cholycystectomy    OTHER ACCESSORY      bilateral cataract    OTHER ACCESSORY      facial basal cell cancer    TONSILLECTOMY       Family History   Problem Relation Age of Onset    Macular degeneration Brother     Glaucoma Neg     Diabetes Neg      Social History     Tobacco Use    Smoking status: Former     Types: Cigarettes    Smokeless tobacco: Never   Substance Use Topics    Alcohol use: Yes     Alcohol/week: 2.0 standard drinks of alcohol     Types: 2 Cans of beer per week     Comment: 1 - 2 beers occasional       SYSTEM Check if Review is Normal Check if Physical Exam is Normal If not normal, please explain:               HEART [x ] [x ]    LUNGS [x ] [ x]    ABDOMEN [x ] [ x]          EXTREMITIES [x ] [ x] Fem 2+, distal pulses nonpalp, Doppler only   OTHER        [ x ] I have discussed the risks and benefits and alternatives with the patient/family, both previously in office consultation and now immediately prior to the procedure. They understand and agree to proceed with plan of care. [ x ] I have reviewed the History and Physical done within the last 30 days. Any changes noted above.     Candace Bocanegra MD  12/22/2023  11:19 AM

## 2023-12-22 NOTE — ANESTHESIA PROCEDURE NOTES
Peripheral IV  Date/Time: 12/22/2023 12:00 PM  Inserted by: Wendi Florez MD    Placement  Needle size: 16 G  Laterality: right  Location: forearm  Site prep: alcohol  Technique: ultrasound guided  Attempts: 1

## 2023-12-22 NOTE — OR NURSING
Intra op medications:    20 ml Marcaine 0.5% mixed with 20 ml lidocaine 1% with epinephrine 1:100,000ml 20 ml given to left groin by Dr. Rojelio Guzman  50,000 units heparin mixed with 500 ml NaCl, PRN irrigation  1g vancomycin mixed with 1L NaCl, prn irrigation

## 2023-12-22 NOTE — ANESTHESIA PROCEDURE NOTES
Arterial Line    Date/Time: 12/22/2023 11:50 AM    Performed by: Jocelyne Landeros MD  Authorized by: Jocelyne Landeros MD    General Information and Staff    Procedure Start:  12/22/2023 11:50 AM  Procedure End:  12/22/2023 11:55 AM  Anesthesiologist:  Jocelyne Landeros MD  Performed By:  Anesthesiologist  Patient Location:  OR  Indication: continuous blood pressure monitoring and blood sampling needed    Site Identification: real time ultrasound guided and surface landmarks    Preanesthetic Checklist: 2 patient identifiers, IV checked, risks and benefits discussed, monitors and equipment checked, pre-op evaluation, timeout performed, anesthesia consent and sterile technique used    Procedure Details    Catheter Size:  20 G  Catheter Length:  1 and 3/4 inch  Catheter Type:  Arrow  Seldinger Technique?: Yes    Laterality:  Right  Site:  Radial artery  Site Prep: chlorhexidine    Line Secured:  Wrist Brace, tape and Tegaderm    Assessment    Events: patient tolerated procedure well with no complications      Medications  12/22/2023 11:50 AM      Additional Comments

## 2023-12-22 NOTE — ANESTHESIA PROCEDURE NOTES
Airway  Date/Time: 12/22/2023 11:50 AM  Urgency: Elective    Airway not difficult    General Information and Staff    Patient location during procedure: OR  Anesthesiologist: Cleopatra Joseph MD  Performed: anesthesiologist   Performed by: Cleopatra Joseph MD  Authorized by: Cleopatra Joseph MD      Indications and Patient Condition  Indications for airway management: anesthesia  Sedation level: deep  Preoxygenated: yes  Patient position: sniffing  Mask difficulty assessment: 1 - vent by mask    Final Airway Details  Final airway type: endotracheal airway      Successful airway: ETT  Cuffed: yes   Successful intubation technique: direct laryngoscopy  Facilitating devices/methods: cricoid pressure  Endotracheal tube insertion site: oral  Blade: Aurora  Blade size: #4  ETT size (mm): 7.0    Cormack-Lehane Classification: grade IIA - partial view of glottis  Placement verified by: capnometry   Measured from: teeth  ETT to teeth (cm): 22  Number of attempts at approach: 1

## 2023-12-23 ENCOUNTER — TELEPHONE (OUTPATIENT)
Dept: INTERNAL MEDICINE CLINIC | Facility: CLINIC | Age: 83
End: 2023-12-23

## 2023-12-23 VITALS
HEART RATE: 73 BPM | RESPIRATION RATE: 14 BRPM | HEIGHT: 72 IN | DIASTOLIC BLOOD PRESSURE: 61 MMHG | WEIGHT: 203.06 LBS | SYSTOLIC BLOOD PRESSURE: 131 MMHG | TEMPERATURE: 98 F | BODY MASS INDEX: 27.5 KG/M2 | OXYGEN SATURATION: 97 %

## 2023-12-23 LAB
ANION GAP SERPL CALC-SCNC: 9 MMOL/L (ref 0–18)
BUN BLD-MCNC: 24 MG/DL (ref 9–23)
BUN/CREAT SERPL: 20.3 (ref 10–20)
CALCIUM BLD-MCNC: 8.3 MG/DL (ref 8.7–10.4)
CHLORIDE SERPL-SCNC: 112 MMOL/L (ref 98–112)
CO2 SERPL-SCNC: 19 MMOL/L (ref 21–32)
CREAT BLD-MCNC: 1.18 MG/DL
DEPRECATED RDW RBC AUTO: 43.8 FL (ref 35.1–46.3)
EGFRCR SERPLBLD CKD-EPI 2021: 60 ML/MIN/1.73M2 (ref 60–?)
ERYTHROCYTE [DISTWIDTH] IN BLOOD BY AUTOMATED COUNT: 13.1 % (ref 11–15)
GLUCOSE BLD-MCNC: 208 MG/DL (ref 70–99)
HCT VFR BLD AUTO: 33.1 %
HGB BLD-MCNC: 11.2 G/DL
MCH RBC QN AUTO: 31.1 PG (ref 26–34)
MCHC RBC AUTO-ENTMCNC: 33.8 G/DL (ref 31–37)
MCV RBC AUTO: 91.9 FL
OSMOLALITY SERPL CALC.SUM OF ELEC: 300 MOSM/KG (ref 275–295)
PLATELET # BLD AUTO: 165 10(3)UL (ref 150–450)
POTASSIUM SERPL-SCNC: 4.4 MMOL/L (ref 3.5–5.1)
RBC # BLD AUTO: 3.6 X10(6)UL
SODIUM SERPL-SCNC: 140 MMOL/L (ref 136–145)
WBC # BLD AUTO: 12.6 X10(3) UL (ref 4–11)

## 2023-12-23 PROCEDURE — 99236 HOSP IP/OBS SAME DATE HI 85: CPT | Performed by: INTERNAL MEDICINE

## 2023-12-23 RX ORDER — HYDROCODONE BITARTRATE AND ACETAMINOPHEN 5; 325 MG/1; MG/1
1 TABLET ORAL EVERY 6 HOURS PRN
Qty: 20 TABLET | Refills: 0 | Status: SHIPPED | OUTPATIENT
Start: 2023-12-23

## 2023-12-23 RX ORDER — NITROFURANTOIN 25; 75 MG/1; MG/1
100 CAPSULE ORAL 2 TIMES DAILY
Qty: 14 CAPSULE | Refills: 0 | Status: SHIPPED | OUTPATIENT
Start: 2023-12-23 | End: 2023-12-30

## 2023-12-23 RX ADMIN — ATENOLOL 25 MG: 25 TABLET ORAL at 08:34:00

## 2023-12-23 RX ADMIN — ASPIRIN 81 MG: 81 TABLET ORAL at 08:34:00

## 2023-12-23 RX ADMIN — LOSARTAN POTASSIUM 100 MG: 100 TABLET ORAL at 08:34:00

## 2023-12-23 RX ADMIN — GLIPIZIDE 10 MG: 10 TABLET ORAL at 08:37:00

## 2023-12-23 RX ADMIN — HEPARIN SODIUM 5000 UNITS: 5000 INJECTION, SOLUTION INTRAVENOUS; SUBCUTANEOUS at 06:00:00

## 2023-12-23 RX ADMIN — CEFAZOLIN SODIUM/WATER 2 G: 2 G/20 ML SYRINGE (ML) INTRAVENOUS at 04:00:00

## 2023-12-23 RX ADMIN — PIOGLITAZONEHYDROCHLORIDE 45 MG: 15 TABLET ORAL at 08:37:00

## 2023-12-23 RX ADMIN — ATORVASTATIN CALCIUM 40 MG: 40 TABLET, FILM COATED ORAL at 08:34:00

## 2023-12-23 RX ADMIN — AMLODIPINE BESYLATE 10 MG: 10 TABLET ORAL at 08:34:00

## 2023-12-23 NOTE — PLAN OF CARE
Pt A&O x4. Physical assessment implement. Frequent ambulation implemented. Pt appropriate for discharge per Surgical MD, discharge orders placed. Pt education given r/t discharge summary. Pt's brother updated on POC.    Problem: HEMATOLOGIC - ADULT  Goal: Free from bleeding injury  Description: (Example usage: patient with low platelets)  INTERVENTIONS:  - Avoid intramuscular injections, enemas and rectal medication administration  - Ensure safe mobilization of patient  - Hold pressure on venipuncture sites to achieve adequate hemostasis  - Assess for signs and symptoms of internal bleeding  - Monitor lab trends  - Patient is to report abnormal signs of bleeding to staff  - Avoid use of toothpicks and dental floss  - Use electric shaver for shaving  - Use soft bristle tooth brush  - Limit straining and forceful nose blowing  Outcome: Progressing

## 2023-12-23 NOTE — PLAN OF CARE
Pt A&OX4 on RA. Post vascular site on R groin clean dry and intact. L groin site with small hematoma and drainage, marked outline with no changes overnight. Bilateral pedal pulses with doppler. [place] and stahl removed. Bed locked in lowest position, call light within reach.      Problem: Patient Centered Care  Goal: Patient preferences are identified and integrated in the patient's plan of care  Description: Interventions:  - What would you like us to know as we care for you?   - Provide timely, complete, and accurate information to patient/family  - Incorporate patient and family knowledge, values, beliefs, and cultural backgrounds into the planning and delivery of care  - Encourage patient/family to participate in care and decision-making at the level they choose  - Honor patient and family perspectives and choices  Outcome: Progressing     Problem: Patient/Family Goals  Goal: Patient/Family Long Term Goal  Description: Patient's Long Term Goal:     Interventions:  -   - See additional Care Plan goals for specific interventions  Outcome: Progressing  Goal: Patient/Family Short Term Goal  Description: Patient's Short Term Goal:     Interventions:   -   - See additional Care Plan goals for specific interventions  Outcome: Progressing

## 2023-12-23 NOTE — DISCHARGE INSTRUCTIONS
You may shower with mild soap and water starting tomorrow. Wash the incision gently and pat dry; do not scrub. No soaking in bath or pool for 2 weeks. You should resume all home medications as previously taking unless otherwise instructed. Take Norco as needed for pain. No heavy lifting or straining for 2 weeks. You may then resume to normal activity. Drink plenty of fluids to stay well hydrated. You will need to followup with Dr. Anand Loaiza in the vascular clinic in 2 weeks for staple removal     If you have any fevers, chills, chest pain, shortness of breath, drainage, swelling or bleeding, please call the office in order to return to clinic sooner for evaluation.

## 2023-12-23 NOTE — TELEPHONE ENCOUNTER
Urine culture came back positive for Enterococcus faecalis  - Recommended antibiotics nitrofurantoin 1 capsule twice a day for 7 days

## 2023-12-23 NOTE — PLAN OF CARE
Received patient from PACU alert and oriented. Able to move all extremities and complained of some mild back pain. Bilateral access sites assessed, right soft and no hematoma. Left now has some bruising and firmness, accessed with oncoming night shift nurse. Pulses by doppler. Segal to gravity draining clear yellow urine. Right radial A-line in place. Pain well managed. Doing well.   Problem: HEMATOLOGIC - ADULT  Goal: Free from bleeding injury  Description: (Example usage: patient with low platelets)  INTERVENTIONS:  - Avoid intramuscular injections, enemas and rectal medication administration  - Ensure safe mobilization of patient  - Hold pressure on venipuncture sites to achieve adequate hemostasis  - Assess for signs and symptoms of internal bleeding  - Monitor lab trends  - Patient is to report abnormal signs of bleeding to staff  - Avoid use of toothpicks and dental floss  - Use electric shaver for shaving  - Use soft bristle tooth brush  - Limit straining and forceful nose blowing  Outcome: Progressing

## 2023-12-23 NOTE — PHYSICAL THERAPY NOTE
PHYSICAL THERAPY EVALUATION - INPATIENT     Room Number: 228/228-A  Evaluation Date: 12/23/2023  Type of Evaluation: Initial   Physician Order: PT Eval and Treat    Presenting Problem: s/p AAA repair  Reason for Therapy: Mobility Dysfunction and Discharge Planning    PHYSICAL THERAPY ASSESSMENT   Orders received and chart reviewed. RN approved participation in physical therapy. Patient is a 78year old male admitted 12/22/2023 for Presenting Problem: s/p AAA repair. Patient presented in bed side chair with 5/10 pain in left groin. Education provided on Physical therapy plan of care and physiological benefits of out of bed mobility. Patient with fair carryover. Pt plans to go home today. His vitals respond normally to activity. He ambulates slowly. Bed mobility: Supervision  Transfers: Supervision  Gait Assistance: Supervision  Distance (ft):  (130)  Assistive Device: Rolling walker    Patient was left in bed at end of session with all needs in reach. Patient's current functional deficits include ambulation. The patient's Approx Degree of Impairment: 0% has been calculated based on documentation in the [facility] '6 clicks' Inpatient Basic Mobility Short Form. Research supports that patients with this level of impairment may benefit from Home with no services. RN aware of patient status post session. Patient will benefit from continued IP PT services to address these deficits in preparation for discharge.     DISCHARGE RECOMMENDATIONS  PT Discharge Recommendations: Home    PLAN  PT Treatment Plan: Gait training;Stair training  Rehab Potential : Good  Frequency (Obs): 5x/week       PHYSICAL THERAPY MEDICAL/SOCIAL HISTORY     History related to current admission:      Problem List  Active Problems:    Benign essential HTN    DM (diabetes mellitus), type 2 ([address]    Hyperlipidemia    Aorta aneurysm ([facility] [address]    Pre-op testing      Past Medical History  Past Medical History:   Diagnosis Date    Aorta aneurysm ([facility] [address]     Back problem     Benign essential HTN     Cataract     Chronic rhinitis     Diabetes ([facility] [address]     DM (diabetes mellitus), type 2 (HCC)     Hearing impairment     High blood pressure     High cholesterol     Hyperlipidemia     POAG (primary open-angle glaucoma) 10/17/2023    first visit with Dr. Ra Toure patient has been taking Brimonidine 0.15% OU BID and Timolol 0.5% OU BID for 10 yrs per MD in [place].   10/28/23 patient to continue taking gtts per [facility] due to abnormal VF OS and abnormal OCT OU    PVD (peripheral vascular disease) ([facility] [address]     Skin cancer     Visual impairment        Past Surgical History  Past Surgical History:   Procedure Laterality Date    APPENDECTOMY      CATARACT EXTRACTION W/  INTRAOCULAR LENS IMPLANT Left 04/07/2014    Done [address] Dr. Melissa Cabello in [address] Right 2014    Done by Dr. Melissa Cabello in [address] [facility].      T and A    OTHER ACCESSORY      appendectomy    OTHER ACCESSORY      cholycystectomy    OTHER ACCESSORY      bilateral cataract    OTHER ACCESSORY      facial basal cell cancer    TONSILLECTOMY         HOME SITUATION  Type of Home: House      Stairs to Enter : 6  Railing: Yes        Lives With: Spouse  Drives: Yes          Prior Level of [place]: ind    SUBJECTIVE  \"I want to go home\"    PHYSICAL THERAPY EXAMINATION     OBJECTIVE          WEIGHT BEARING RESTRICTION                   PAIN ASSESSMENT  Rating: Unable to rate          COGNITION  Overall Cognitive Status:  WFL - within functional limits    RANGE OF MOTION AND STRENGTH ASSESSMENT  Upper extremity ROM and strength are within functional limits     Lower extremity ROM is within functional limits     Lower extremity strength is within functional limits     BALANCE  Static Sitting: Good  Dynamic Sitting: Good  Static Standing: Fair -  Dynamic Standing: Fair -    AM-PAC '6-Clicks' INPATIENT SHORT FORM - BASIC MOBILITY  How much difficulty does the patient currently have. .. Patient Difficulty: Turning over in bed (including adjusting bedclothes, sheets and blankets)?: None   Patient Difficulty: Sitting down on and standing up from a chair with arms (e.g., wheelchair, bedside commode, etc.): None   Patient Difficulty: Moving from lying on back to sitting on the side of the bed?: None   How much help from another person does the patient currently need. .. Help from Another: Moving to and from a bed to a chair (including a wheelchair)?: None   Help from Another: Need to walk in hospital room?: None   Help from Another: Climbing 3-5 steps with a railing?: None     AM-PAC Score:  Raw Score: 24   Approx Degree of Impairment: 0%   Standardized Score (AM-PAC Scale): 61.14   CMS Modifier (G-Code): [facility]      Exercise/Education Provided:  Bed mobility  Gait training  Transfer training    Patient End of Session: Up in chair    CURRENT GOALS    Goals to be met by: 12/30  Patient Goal Patient's self-stated goal is: home   Goal #1 Patient is able to demonstrate supine - sit EOB @ level: independent     Goal #1   Current Status    Goal #2 Patient is able to demonstrate transfers Sit to/from Stand at assistance level: independent with cane - quad     Goal #2  Current Status    Goal #3 Patient is able to ambulate 300 feet with assist device: cane - quad at assistance level: modified independent   Goal #3   Current Status    Goal #4 Patient will negotiate 6 stairs/one curb w/ assistive device and supervision   Goal #4   Current Status    Goal #5 Patient to demonstrate independence with home activity/exercise instructions provided to patient in preparation for discharge.    Goal #5   Current Status    Goal #6    Goal #6  Current Status      Patient Evaluation Complexity Level:  History Low - no personal factors and/or co-morbidities   Examination of body systems Low - addressing 1-2 elements   Clinical Presentation Low - Stable   Clinical Decision Making Low Complexity       Gait Trainin minutes

## 2023-12-26 ENCOUNTER — PATIENT OUTREACH (OUTPATIENT)
Dept: INTERNAL MEDICINE CLINIC | Facility: CLINIC | Age: 83
End: 2023-12-26

## 2023-12-26 ENCOUNTER — PATIENT OUTREACH (OUTPATIENT)
Dept: CASE MANAGEMENT | Age: 83
End: 2023-12-26

## 2023-12-26 DIAGNOSIS — Z02.9 ENCOUNTERS FOR ADMINISTRATIVE PURPOSE: Primary | ICD-10-CM

## 2023-12-26 DIAGNOSIS — I73.9 PERIPHERAL VASCULAR DISEASE (HCC): ICD-10-CM

## 2023-12-26 PROCEDURE — 1111F DSCHRG MED/CURRENT MED MERGE: CPT

## 2023-12-26 PROCEDURE — 1159F MED LIST DOCD IN RCRD: CPT

## 2023-12-26 NOTE — PROGRESS NOTES
Initial Post Discharge Follow Up   Discharge Date: 12/23/23  Contact Date: 12/26/2023    Consent Verification:  Assessment Completed With: Patient  HIPAA Verified? Yes    Discharge Dx: Benign essential HTN    DM (diabetes mellitus), type 2 (HCC)    Hyperlipidemia    Aorta aneurysm (HCC)    Pre-op testing      Status post endovascular aortic repair, left femoral endarterectomy Dr. Curtis Astorga of  and Dr. Jose George cardiovascular surgery 12/22   General:   How have you been since your discharge from the hospital?   The patient reports feeling good. The patient reports minimal incisional pain in the groin area- he rates this pain a 3/10. He states he feels he does not need the Norco as much. He reports ambulating with his cane. The patient states he is unable himself to look at the incision- he states he will have his brother take a look later today. The patient denies chest pain, SOB, fever, chills, n, v, diarrhea. No concern for constipation. The patient denies bilateral LLE. Patient reports he is working on going up and down the stairs. Do you have any pain since discharge? Yes  Where: around the incisions    Rating on pain scale 1-10, 10 being the worst pain you have ever experienced, what is current pain: 3  Alleviating factors: n/a   Aggravating factors: moving around too much, certain position changes   Is the pain manageable at home? Yes  How well was your pain managed while in the hospital?   On a scale of 1-5   1- Very Poor and 5- Very well   Very Well  When you were leaving the hospital were your discharge instructions reviewed with you? Yes  How well were your discharge instructions explained to you? On a scale of 1-5   1- Very Poor and 5- Very well   Very Well  Do you have any questions about your discharge instructions? No  Before leaving the hospital was your diagnoses explained to you? Yes  Do you have any questions about your diagnoses?  No  Are you able to perform normal daily activities of living as you have prior to your hospital stay (dressing, bathing, ambulating to the bathroom, etc)? yes  (NCM) Was patient given a different diet per AVS? no      Medications:   Current Outpatient Medications   Medication Sig Dispense Refill    HYDROcodone-acetaminophen 5-325 MG Oral Tab Take 1 tablet by mouth every 6 (six) hours as needed. 20 tablet 0    nitrofurantoin monohydrate macro 100 MG Oral Cap Take 1 capsule (100 mg total) by mouth 2 (two) times daily for 7 days. 14 capsule 0    aspirin-acetaminophen-caffeine 250-250-65 MG Oral Tab Take 1 tablet by mouth every 6 (six) hours as needed for Pain. Glucose Blood (ONETOUCH VERIO) In Vitro Strip Use daily 100 strip 3    OneTouch Delica Lancets 61Q Does not apply Misc Use daily 100 each 3    empagliflozin (JARDIANCE) 10 MG Oral Tab Take 1 tablet (10 mg total) by mouth daily. 30 tablet 5    glipiZIDE 10 MG Oral Tab Take 1 tablet (10 mg total) by mouth daily. Daily in the morning 90 tablet 3    alendronate 70 MG Oral Tab Take 1 tablet (70 mg total) by mouth once a week. 13 tablet 3    atorvastatin 40 MG Oral Tab Take 1 tablet (40 mg total) by mouth daily. 90 tablet 3    losartan 100 MG Oral Tab Take 1 tablet (100 mg total) by mouth daily. 90 tablet 3    pioglitazone 45 MG Oral Tab Take 1 tablet (45 mg total) by mouth daily. 90 tablet 3    atenolol 25 MG Oral Tab Take 1 tablet (25 mg total) by mouth daily. 90 tablet 3    timolol 0.5 % Ophthalmic Solution Place 1 drop into both eyes 2 (two) times daily. 15 mL 3    brimonidine 0.15 % Ophthalmic Solution Place 1 drop into both eyes in the morning and 1 drop before bedtime. 3 each 3    metFORMIN HCl 1000 MG Oral Tab Take 1 tablet (1,000 mg total) by mouth in the morning and 1 tablet (1,000 mg total) before bedtime. 180 tablet 3    Omeprazole 20 MG Oral Tab EC Take 20 mg by mouth daily. 90 tablet 3    amLODIPine 10 MG Oral Tab Take 1 tablet (10 mg total) by mouth daily.  90 tablet 3    Potassium Chloride ER 10 MEQ Oral Tab CR Take 1 tablet (10 mEq total) by mouth daily. 90 tablet 3    aspirin 81 MG Oral Tab EC Take 1 tablet (81 mg total) by mouth daily. Multiple Vitamins-Minerals (CENTRUM SILVER ADULT 50+ OR) Take by mouth daily. Ascorbic Acid (VITAMIN C OR) Take by mouth. Were there any changes to your current medication(s) noted on the AVS? Yes NCM confirmed the following changes with the patient   START taking:  HYDROcodone-acetaminophen (Norco)  If so, were these medication changes discussed with you prior to leaving the hospital? Yes  If a new medication was prescribed:    Was the new medication's purpose & side effects reviewed? Yes  Do you have any questions about your new medication? No  Did you  your discharge medications when you left the hospital? Yes  Let's go over your medications together to make sure we are not missing anything. Medications Reviewed  Are there any reasons that keep you from taking your medication as prescribed? No  Are you having any concerns with constipation? No  Did patient receive their flu shot (Sept-March)? Yes- NCM confirmed immunization record UTD     Discharge medications reviewed/discussed/and reconciled against outpatient medications with patient. Any changes or updates to medications sent to PCP. Patient Acknowledged     Referrals/orders at D/C:  Referrals/orders placed at D/C? no    DME ordered at D/C? No      Discharge orders, AVS reviewed and discussed with patient. Any changes or updates to orders sent to PCP. Patient Acknowledged      SDOH:   NCM did not address at this encounter     Diagnosis specifics:     You may shower with mild soap and water starting tomorrow. Wash the  incision gently and pat dry; do not scrub. No soaking in bath or pool for 2 weeks. You should resume all home medications as previously taking unless  otherwise instructed. Take Norco as needed for pain. No heavy lifting or straining for 2 weeks.  You may then resume to  normal activity. Drink plenty of fluids to stay well hydrated. Discharge Instructions-Managing Your Health at Home (continued)  You will need to followup with Dr. Barnet Leyden in the vascular clinic in 2 weeks for staple removal  If you have any fevers, chills, chest pain, shortness of breath, drainage, swelling or bleeding, please call the office in  order to return to clinic sooner for evaluation. Follow up appointments:      Your appointments       Date & Time Appointment Department [facility])    Feb 09, 2024 11:00 AM CST MA Supervisit with Chris Camacho MD [facility] ([address])        Feb 09, 2024 12:30 PM CST Follow Up Visit with Raz Lewis DPM [facility], [address], [place] ([facility])        Mar 07, 2024  1:00 PM CST Exam - Established with MD Jorgito Tejeda, [address], [facility])              [address] [facility], [address] [facility] [address] [phone]  [phone] Jogrito Parrish, [address], [address] [facility] [address] [phone]  [address] [address] [phone]            TCC  Was TCC ordered: No      PCP (If no TCC appointment)  Does patient already have a PCP appointment scheduled? No  NCM Scheduled PCP office TCM appointment with patient      Specialist    Does the patient have any other follow up appointment(s) needing to be scheduled?  Yes  If yes: NCM reviewed upcoming specialist appointment with patient: Yes- NCM confirmed with patient he has the following contact information and will call to schedule his appointment     Follow up with Yarelis Schultz in 2 week(s)  Specialty: Registered Nurse  [address]  [facility] [employer]  [facility]  [phone]  Does the patient need assistance scheduling appointment(s): No    Is there any reason as to why you cannot make your appointment(s)? No     Needs post D/C:   Now that you are home, are there any needs or concerns you need addressed before your next visit with your PCP?  (DME, meds, questions, etc.): No    Interventions by NCM:   NCM reviewed postop discharge instructions with patient. NCM provided education on s.s of infection and blood clots. NCM advised patient to take 10 deep breaths per hour while awake. NCM encouraged importance of adequate nutrition and hydration. NCM scheduled patient for a TCM/HFU with his PCP on 01/05/2024. All d/c instructions reviewed with pt. Reviewed when to call MD vs when to go to ER/call 911. Educated pt on the importance of taking all meds as prescribed as well as close f/u with PCP/specialists. Pt verbalized understanding and will contact office with any further questions or concerns. Overall Rating: How would you rate the care you received while in the hospital? excellent    CCM referral placed:     No

## 2023-12-26 NOTE — PROGRESS NOTES
Returned call to pt to schedule a hospital follow-up appt :    Noemy Malone MD. Schedule an appointment as soon as possible for a visit in 2 week(s). Specialty: Internal Medicine  Why: Post-hospital follow-up  Contact information:  Shilpa Zhao [phone]  MD office closed in observance of the Christmas holiday. Spk w/ pt who is requesting that I follow-up with the MD office when they become available. \"NCM spoke with patient today for TCM. NCM scheduled the patient for a TCM/HFU with his PCP on 01/05/2024. \"    Closing encounter

## 2023-12-26 NOTE — PROGRESS NOTES
NCM spoke with patient today for TCM. NCM scheduled the patient for a TCM/HFU with his PCP on 01/05/2024.    Thank you     Future Appointments   Date Time Provider Staci Leavitt   1/5/2024  2:00 PM MD NEDRA Delvalle   2/9/2024 11:00 AM MD NEDRA Delvalle   2/9/2024 12:30 PM Loren Diaz DPM [facility]   3/7/2024  1:00 PM Amirah Crawford MD [address] [place] [address]

## 2023-12-27 NOTE — CDS QUERY
How to Answer this Query    1.) Click \"Edit Button\" on the toolbar  2.) Type an \"X\" in the bracket for the diagnosis that applies. (You may also add additional clinical details as you feel necessary to substantiate your response). 3.) Finally click \"Sign\" to complete response. Thank you     CLINICAL DOCUMENTATION CLARIFICATION FORM    . Dear Doctor Angela James:    Documentation of positive urine culture done 12-.  ( X )  UTI  (  )   Incidental finding  (  )   Other (please specify)________    ___________________________________________________________  Clinical findings:  Urine  Cx:     12-22 >100,000 CFU/ML Enterococcus faecalis NOT VRE Abnormal  ( in process at time of d/c per d/c summary)  Meds include Ancef IV postop surgery    If you have any questions, please contact Clinical Documentation  Specialist:  Nya Kimbrough RN CCDS at [phone]     Thank You!     THIS FORM IS A PERMANENT PART OF THE MEDICAL RECORD

## 2024-01-05 ENCOUNTER — OFFICE VISIT (OUTPATIENT)
Dept: INTERNAL MEDICINE CLINIC | Facility: CLINIC | Age: 84
End: 2024-01-05

## 2024-01-05 VITALS
TEMPERATURE: 99 F | HEIGHT: 72 IN | BODY MASS INDEX: 26.82 KG/M2 | OXYGEN SATURATION: 96 % | DIASTOLIC BLOOD PRESSURE: 56 MMHG | WEIGHT: 198 LBS | HEART RATE: 92 BPM | SYSTOLIC BLOOD PRESSURE: 100 MMHG

## 2024-01-05 DIAGNOSIS — M81.0 OSTEOPOROSIS, UNSPECIFIED OSTEOPOROSIS TYPE, UNSPECIFIED PATHOLOGICAL FRACTURE PRESENCE: ICD-10-CM

## 2024-01-05 DIAGNOSIS — Z13.89 SCREENING FOR NEPHROPATHY: ICD-10-CM

## 2024-01-05 DIAGNOSIS — E55.9 VITAMIN D DEFICIENCY: ICD-10-CM

## 2024-01-05 DIAGNOSIS — Z13.0 SCREENING FOR DEFICIENCY ANEMIA: ICD-10-CM

## 2024-01-05 DIAGNOSIS — N18.30 STAGE 3 CHRONIC KIDNEY DISEASE, UNSPECIFIED WHETHER STAGE 3A OR 3B CKD (HCC): ICD-10-CM

## 2024-01-05 DIAGNOSIS — M19.90 OSTEOARTHRITIS, UNSPECIFIED OSTEOARTHRITIS TYPE, UNSPECIFIED SITE: ICD-10-CM

## 2024-01-05 DIAGNOSIS — Z13.29 SCREENING FOR THYROID DISORDER: ICD-10-CM

## 2024-01-05 DIAGNOSIS — I71.40 ABDOMINAL AORTIC ANEURYSM (AAA) WITHOUT RUPTURE, UNSPECIFIED PART (HCC): ICD-10-CM

## 2024-01-05 DIAGNOSIS — E78.5 HYPERLIPIDEMIA, UNSPECIFIED HYPERLIPIDEMIA TYPE: ICD-10-CM

## 2024-01-05 DIAGNOSIS — I73.9 PERIPHERAL VASCULAR DISEASE (HCC): Primary | ICD-10-CM

## 2024-01-05 DIAGNOSIS — E11.9 TYPE 2 DIABETES MELLITUS WITHOUT COMPLICATION, WITHOUT LONG-TERM CURRENT USE OF INSULIN (HCC): ICD-10-CM

## 2024-01-05 DIAGNOSIS — I10 BENIGN ESSENTIAL HTN: ICD-10-CM

## 2024-01-05 NOTE — PATIENT INSTRUCTIONS
You were seen in clinic today for posthospitalization follow-up.  We are happy to hear that you are recovering since your procedure.  We did review your hospitalization which also included urinary tract infection requiring antibiotics  - Please follow-up with Dr. Chu's group of vascular surgery -Staples should be able to be removed on 1/9/2024  - Periodically check your blood pressures at home.  These were slightly low, and this can cause episodes of shortness of breath, dizziness.    We do recommend checking her blood pressures at home.  If they remain low namely /40-50, you may need to hold your blood pressure medications losartan and amlodipine temporarily until the blood pressure comes up.    -Monitor for any development of respiratory infection such as the flu, cold, sinus infection.    - Periodically check your blood sugars at home    We will repeat fasting blood work prior to your scheduled Medicare physical examination 2/9/2024, please fast for 10 hours prior to your blood draw and we will go over them together in clinic.

## 2024-01-05 NOTE — PROGRESS NOTES
Subjective:   Darin Hernandez is a 83 year old male who presents for hospital follow up.   He was discharged from [facility] to Home or Self Care  Admission Date: 12/22/23   Discharge Date: 12/23/23  Hospital Discharge Diagnosis: Peripheral arterial disease    Interactive contact within 2 business days post discharge first initiated on Date: 12/26/2023    During the visit, the following was completed:  Obtained and reviewed discharge summary, continuity of care documents, and Hospitalist notes  Reviewed Labs (CBC, CMP)    HPI:   Per my discharge summary 12/23/2023:     Mr. HERNANDEZ is a 83 year old male PMHX peripheral vascular disease, type 2 diabetes, CKD stage III, hypertension, hyperlipidemia presented to the hospital for scheduled endovascular aortic repair, left femoral endarterectomy with IR and cardiovascular surgery.     Last seen by Dr. Lindsey 11/20/2023 and noted to have severe lower extremity peripheral arterial disease.  Ongoing claudication.  Noted to have an increase in size of AAA 5.2 cm on CT.  Decision was made to pursue intervention.       He is now postop day 1, minimal pain with movement.  Tolerating diet, adequate pain control.  Deemed stable for discharge on 12/23/2023.     He feeling pretty good. A little woozy, shortness of breath. This has been ongoing intermittently. No chest pain, no fevers chils.    A little pain in the L groin and LLE. Legs overall are feeling better. His feet still have pain. L foot pain at times. Headache from time to times.     No pain with urination. Eating and drinking water OK. His BMs are not bad . Sleeping can be better.    Has appointment with vascular surgery 1/9 - staple removal set at that time.     History/Other:   Current Medications:  Medication Reconciliation:  I am aware of an inpatient discharge within the last 30 days.  The discharge medication list has been reconciled with the patient's current medication list and reviewed by me.  See medication  list for additions of new medication, and changes to current doses of medications and discontinued medications.  No outpatient medications have been marked as taking for the 1/5/24 encounter (Office Visit) with Dick George MD.       Review of Systems:  GENERAL: weight stable, energy stable, no sweating  SKIN: denies any unusual skin lesions  EYES: denies blurred vision or double vision  HEENT: denies nasal congestion, sinus pain or ST  LUNGS: denies shortness of breath with exertion  CARDIOVASCULAR: denies chest pain on exertion or palpitations  GI: denies abdominal pain, denies heartburn, denies diarrhea  MUSCULOSKELETAL: denies pain, normal range of motion of extremities  NEURO: denies headaches, denies dizziness, denies weakness  PSYCHE: denies depression or anxiety  HEMATOLOGIC: denies hx of anemia, or bruising, denies bleeding  ENDOCRINE: denies thyroid history  ALL/ASTHMA: denies hx of allergy or asthma    Objective:   No LMP for male patient.  Estimated body mass index is 26.85 kg/m² as calculated from the following:    Height as of this encounter: 6' (1.829 m).    Weight as of this encounter: 198 lb (89.8 kg).   /56 (BP Location: Right arm, Patient Position: Sitting, Cuff Size: large)   Pulse 92   Temp 98.7 °F (37.1 °C) (Tympanic)   Ht 6' (1.829 m)   Wt 198 lb (89.8 kg)   SpO2 96%   BMI 26.85 kg/m²    GENERAL: well developed, well nourished, in no apparent distress  SKIN: no rashes, no suspicious lesions  HEENT: atraumatic, normocephalic, ears and throat are clear  EYES: PERRLA, EOMI, conjunctiva are clear  NECK: supple, no adenopathy, no bruits  CHEST: no chest tenderness  LUNGS: clear to auscultation  CARDIO: RRR without murmur  GI: good BS's, no masses, HSM or tenderness  MUSCULOSKELETAL: back is not tender, FROM of the extremities  EXTREMITIES: no cyanosis, clubbing or edema  NEURO: Oriented times three, cranial nerves are intact, motor and sensory are grossly intact    Recent Results  (from the past 8760 hour(s))   CBC, Platelet; No Differential    Collection Time: 12/23/23  4:42 AM   Result Value Ref Range    WBC 12.6 (H) 4.0 - 11.0 x10(3) uL    RBC 3.60 (L) 3.80 - 5.80 x10(6)uL    HGB 11.2 (L) 13.0 - 17.5 g/dL    HCT 33.1 (L) 39.0 - 53.0 %    MCV 91.9 80.0 - 100.0 fL    MCH 31.1 26.0 - 34.0 pg    MCHC 33.8 31.0 - 37.0 g/dL    RDW 13.1 11.0 - 15.0 %    RDW-SD 43.8 35.1 - 46.3 fL    .0 150.0 - 450.0 10(3)uL     *Note: Due to a large number of results and/or encounters for the requested time period, some results have not been displayed. A complete set of results can be found in Results Review.       Recent Results (from the past 8760 hour(s))   Basic Metabolic Panel (8)    Collection Time: 12/23/23  4:42 AM   Result Value Ref Range    Glucose 208 (H) 70 - 99 mg/dL    Sodium 140 136 - 145 mmol/L    Potassium 4.4 3.5 - 5.1 mmol/L    Chloride 112 98 - 112 mmol/L    CO2 19.0 (L) 21.0 - 32.0 mmol/L    Anion Gap 9 0 - 18 mmol/L    BUN 24 (H) 9 - 23 mg/dL    Creatinine 1.18 0.70 - 1.30 mg/dL    BUN/CREA Ratio 20.3 (H) 10.0 - 20.0    Calcium, Total 8.3 (L) 8.7 - 10.4 mg/dL    Calculated Osmolality 300 (H) 275 - 295 mOsm/kg    eGFR-Cr 60 >=60 mL/min/1.73m2     *Note: Due to a large number of results and/or encounters for the requested time period, some results have not been displayed. A complete set of results can be found in Results Review.       CTA abdomen pelvis 9/21/2023  Impression   CONCLUSION:     Infrarenal abdominal aortic aneurysm measuring 50 x 52 millimeter     Right lower extremity:  Diffuse severe stenosis throughout the mid to distal SFA due to calcified plaque.  Severe three-vessel runoff disease with only the peroneal artery showing opacification throughout     Left lower extremity:  Multifocal severe stenosis in the CFA .  Severe stenosis at the origin of the SFA due to calcified plaque.  Multifocal areas of moderate to severe stenosis throughout the rest of the SFA.  Severe  three-vessel runoff disease.    Opacification throughout the peroneal artery.  The posterior tibial artery is likely opacified near the ankle.        Chest x-ray 12/22/2023    Impression   CONCLUSION:  1. No acute airspace disease.  Asymmetric mild biapical pleural thickening left more than right.  If any old films are available they should be submitted for comparison.  If not, then follow-up studies are advised.  2. Suggestion of mild hyperinflation which may suggest some degree of COPD.            Urine culture 12/22/2023  URINE CULTURE >100,000 CFU/ML Enterococcus faecalis NOT VRE Abnormal    Amoxicillin or Nitrofurantoin remain the drugs of choice for uncomplicated urinary tract infections.          Assessment & Plan:   1. Peripheral vascular disease (HCC) (Primary)  2. Benign essential HTN  -     Comp Metabolic Panel (14); Future; Expected date: 01/05/2024  3. Hyperlipidemia, unspecified hyperlipidemia type  -     Lipid Panel; Future; Expected date: 01/05/2024  4. Type 2 diabetes mellitus without complication, without long-term current use of insulin (HCC)  -     Comp Metabolic Panel (14); Future; Expected date: 01/05/2024  -     Hemoglobin A1C; Future; Expected date: 01/05/2024  5. Stage 3 chronic kidney disease, unspecified whether stage 3a or 3b CKD (HCC)  -     Comp Metabolic Panel (14); Future; Expected date: 01/05/2024  6. Abdominal aortic aneurysm (AAA) without rupture, unspecified part ([facility])  7. Osteoarthritis, unspecified osteoarthritis type, unspecified site  8. Screening for thyroid disorder  -     TSH W Reflex To Free T4; Future; Expected date: 01/05/2024  9. Screening for deficiency anemia  -     CBC With Differential With Platelet; Future; Expected date: 01/05/2024  10. Vitamin D deficiency  -     Vitamin D; Future; Expected date: 01/05/2024  11. Osteoporosis, unspecified osteoporosis type, unspecified pathological fracture presence  12. Screening for nephropathy  -     Microalb/Creat Ratio,  Random Urine; Future; Expected date: 01/05/2024  -     Urinalysis with Culture Reflex; Future; Expected date: 01/05/2024    Dizziness  Some wooziness, fatigue.  Blood pressure on the softer side, but seems to be recovered from his procedure  - Should check blood pressures at home, hold blood pressure readings for SBP /diastolic 40-50  - Continue drinking plenty of fluids  - Slight increase in salt intake    Peripheral arterial disease  Abdominal aortic aneurysm  Noted ongoing claudication.  Outpatient CT scan with AAA 50 x 52 mm as an outpatient.  Status post endovascular aortic repair, left femoral endarterectomy Dr. Shabazz of IR and Dr. Chu cardiovascular surgery 12/22  - Seems to be recovering well, tolerating diet  - Pain well-controlled  - Resumed on aspirin 81 mg  -He will follow-up with vascular surgery clinic in about 2 weeks for staple removal.  Discharge home today with close follow-up.     Acute cystitis  - Urine culture came back Enterococcus faecalis not VRE  - Was treated with amoxicillin, complete resolution of symptoms     CKD stage III  - Baseline seems to be 1.2-1.4  - Creatinine discharge 1.18 and stable     Type 2 diabetes  - Last A1c 8.1%, does not check Accu-Cheks at home  - C/w home glipizide, Jardiance, pioglitazone, metformin     Hypertension  -Blood pressure today at goal  - Check blood pressures at home  - Continue with home atenolol 25 mg daily, Cozaar 100 mg daily     Hyperlipidemia  - Continue with atorvastatin 40 mg daily, aspirin         Return in about 5 weeks (around 2/9/2024) for As scheduled.       Dick George MD, 01/06/24, 6:54 AM

## 2024-01-05 NOTE — H&P
Per my discharge summary 12/23/2023:    Mr. BROOKE is a 83 year old male PMHX peripheral vascular disease, type 2 diabetes, CKD stage III, hypertension, hyperlipidemia presented to the hospital for scheduled endovascular aortic repair, left femoral endarterectomy with IR and cardiovascular surgery.     Last seen by Dr. Lindsey 11/20/2023 and noted to have severe lower extremity peripheral arterial disease.  Ongoing claudication.  Noted to have an increase in size of AAA 5.2 cm on CT.  Decision was made to pursue intervention.       He is now postop day 1, minimal pain with movement.  Tolerating diet, adequate pain control.  Deemed stable for discharge on 12/23/2023.    ***    CTA abdomen pelvis 9/21/2023  Impression   CONCLUSION:     Infrarenal abdominal aortic aneurysm measuring 50 x 52 millimeter     Right lower extremity:  Diffuse severe stenosis throughout the mid to distal SFA due to calcified plaque.  Severe three-vessel runoff disease with only the peroneal artery showing opacification throughout     Left lower extremity:  Multifocal severe stenosis in the CFA .  Severe stenosis at the origin of the SFA due to calcified plaque.  Multifocal areas of moderate to severe stenosis throughout the rest of the SFA.  Severe three-vessel runoff disease.    Opacification throughout the peroneal artery.  The posterior tibial artery is likely opacified near the ankle.       Chest x-ray 12/22/2023    Impression   CONCLUSION:  1. No acute airspace disease.  Asymmetric mild biapical pleural thickening left more than right.  If any old films are available they should be submitted for comparison.  If not, then follow-up studies are advised.  2. Suggestion of mild hyperinflation which may suggest some degree of COPD.          Urine culture 12/22/2023  URINE CULTURE >100,000 CFU/ML Enterococcus faecalis NOT VRE Abnormal    Amoxicillin or Nitrofurantoin remain the drugs of choice for uncomplicated urinary tract infections.           Peripheral arterial disease  Abdominal aortic aneurysm  Noted ongoing claudication.  Outpatient CT scan with AAA 50 x 52 mm as an outpatient.  Status post endovascular aortic repair, left femoral endarterectomy Dr. Shabazz of  and Dr. Chu cardiovascular surgery 12/22  - Seems to be recovering well, tolerating diet  - Pain well-controlled  - Resumed on aspirin 81 mg  -He will follow-up with vascular surgery clinic in about 2 weeks for staple removal.  Discharge home today with close follow-up.     Acute cystitis  - Urine culture came back Enterococcus faecalis not VRE  - Was treated with amoxicillin    CKD stage III  - Baseline seems to be 1.2-1.4  - Creatinine discharge 1.18 and stable     Type 2 diabetes  - Last A1c 8.1%, does not check Accu-Cheks at home  - Hold home glipizide, Jardiance, pioglitazone, metformin  - Accu-Cheks  - Insulin sliding scale     Hypertension  -Blood pressure today at goal  - Check blood pressures at home  - Continue with home atenolol 25 mg daily, Cozaar 100 mg daily     Hyperlipidemia  - Continue with atorvastatin 40 mg daily, aspirin    ***

## 2024-01-23 ENCOUNTER — TELEPHONE (OUTPATIENT)
Dept: INTERNAL MEDICINE CLINIC | Facility: CLINIC | Age: 84
End: 2024-01-23

## 2024-01-23 RX ORDER — ALENDRONATE SODIUM 70 MG/1
70 TABLET ORAL WEEKLY
Qty: 13 TABLET | Refills: 3 | Status: CANCELLED | OUTPATIENT
Start: 2024-01-23

## 2024-01-23 RX ORDER — ATORVASTATIN CALCIUM 40 MG/1
40 TABLET, FILM COATED ORAL DAILY
Qty: 90 TABLET | Refills: 3 | Status: CANCELLED | OUTPATIENT
Start: 2024-01-23

## 2024-01-23 RX ORDER — NICOTINE POLACRILEX 4 MG/1
20 GUM, CHEWING ORAL DAILY
Qty: 90 TABLET | Refills: 3 | Status: CANCELLED | OUTPATIENT
Start: 2024-01-23

## 2024-01-23 RX ORDER — GLIPIZIDE 10 MG/1
10 TABLET ORAL DAILY
Qty: 90 TABLET | Refills: 3 | Status: CANCELLED | OUTPATIENT
Start: 2024-01-23

## 2024-01-23 RX ORDER — AMLODIPINE BESYLATE 10 MG/1
10 TABLET ORAL DAILY
Qty: 90 TABLET | Refills: 3 | Status: CANCELLED | OUTPATIENT
Start: 2024-01-23

## 2024-01-23 RX ORDER — LOSARTAN POTASSIUM 100 MG/1
100 TABLET ORAL DAILY
Qty: 90 TABLET | Refills: 3 | Status: CANCELLED | OUTPATIENT
Start: 2024-01-23

## 2024-01-23 RX ORDER — PIOGLITAZONEHYDROCHLORIDE 45 MG/1
45 TABLET ORAL DAILY
Qty: 90 TABLET | Refills: 3 | Status: CANCELLED | OUTPATIENT
Start: 2024-01-23

## 2024-01-23 RX ORDER — POTASSIUM CHLORIDE 750 MG/1
10 TABLET, FILM COATED, EXTENDED RELEASE ORAL DAILY
Qty: 90 TABLET | Refills: 3 | Status: CANCELLED | OUTPATIENT
Start: 2024-01-23

## 2024-01-23 RX ORDER — ATENOLOL 25 MG/1
25 TABLET ORAL DAILY
Qty: 90 TABLET | Refills: 3 | Status: CANCELLED | OUTPATIENT
Start: 2024-01-23

## 2024-01-23 NOTE — TELEPHONE ENCOUNTER
[facility] faxed New refill requests for:    Jardiance  Glipizide  Potassium CL  Atenolol  Alendronate  Omeprazole  Metformin  Losartan  Pioglitazone  Atorvastatin  Amlodipine

## 2024-01-26 NOTE — TELEPHONE ENCOUNTER
Noted    Current refill request refused due to refill is either a duplicate request or has active refills at the pharmacy.  Check previous templates.    Requested Prescriptions     Pending Prescriptions Disp Refills    alendronate 70 MG Oral Tab 13 tablet 3     Sig: Take 1 tablet (70 mg total) by mouth once a week.    glipiZIDE 10 MG Oral Tab 90 tablet 3     Sig: Take 1 tablet (10 mg total) by mouth daily. Daily in the morning    Potassium Chloride ER 10 MEQ Oral Tab CR 90 tablet 3     Sig: Take 1 tablet (10 mEq total) by mouth daily.    atenolol 25 MG Oral Tab 90 tablet 3     Sig: Take 1 tablet (25 mg total) by mouth daily.    Omeprazole 20 MG Oral Tab EC 90 tablet 3     Sig: Take 20 mg by mouth daily.    metFORMIN HCl 1000 MG Oral Tab 180 tablet 3     Sig: Take 1 tablet (1,000 mg total) by mouth in the morning and 1 tablet (1,000 mg total) before bedtime.    losartan 100 MG Oral Tab 90 tablet 3     Sig: Take 1 tablet (100 mg total) by mouth daily.    pioglitazone 45 MG Oral Tab 90 tablet 3     Sig: Take 1 tablet (45 mg total) by mouth daily.    atorvastatin 40 MG Oral Tab 90 tablet 3     Sig: Take 1 tablet (40 mg total) by mouth daily.    amLODIPine 10 MG Oral Tab 90 tablet 3     Sig: Take 1 tablet (10 mg total) by mouth daily.

## 2024-01-26 NOTE — TELEPHONE ENCOUNTER
Patient is calling to let the office know that all of  his prescription will be sent to [facility] now.    Patient states he does not need any refills at this time.  Patient will call when ready.

## 2024-01-29 RX ORDER — PIOGLITAZONEHYDROCHLORIDE 45 MG/1
45 TABLET ORAL DAILY
Qty: 90 TABLET | Refills: 3 | Status: SHIPPED | OUTPATIENT
Start: 2024-01-29

## 2024-01-29 RX ORDER — ALENDRONATE SODIUM 70 MG/1
70 TABLET ORAL WEEKLY
Qty: 13 TABLET | Refills: 3 | Status: SHIPPED | OUTPATIENT
Start: 2024-01-29

## 2024-01-29 NOTE — TELEPHONE ENCOUNTER
Patient is calling back he is due for a refill    Alendronate 40 mg   Pioglitazone 45 mg    Patient is asking they go to [facility]  He is low on both medications

## 2024-01-29 NOTE — TELEPHONE ENCOUNTER
Refill request is for a maintenance medication and has met the criteria specified in the Ambulatory Medication Refill Standing Order for eligibility, visits, laboratory, alerts and was sent to the requested pharmacy.    Requested Prescriptions     Signed Prescriptions Disp Refills    alendronate 70 MG Oral Tab 13 tablet 3     Sig: Take 1 tablet (70 mg total) by mouth once a week.     Authorizing Provider: AMBER YORK     Ordering User: FELI FIELD    pioglitazone 45 MG Oral Tab 90 tablet 3     Sig: Take 1 tablet (45 mg total) by mouth daily.     Authorizing Provider: AMBER YORK     Ordering User: FELI FIELD

## 2024-02-01 ENCOUNTER — TELEPHONE (OUTPATIENT)
Dept: INTERNAL MEDICINE CLINIC | Facility: CLINIC | Age: 84
End: 2024-02-01

## 2024-02-01 RX ORDER — LOSARTAN POTASSIUM 100 MG/1
100 TABLET ORAL DAILY
Qty: 90 TABLET | Refills: 3 | Status: SHIPPED | OUTPATIENT
Start: 2024-02-01

## 2024-02-01 NOTE — TELEPHONE ENCOUNTER
Resent under Dr. WELLS    Refbilly request is for a maintenance medication and has met the criteria specified in the Ambulatory Medication Refill Standing Order for eligibility, visits, laboratory, alerts and was sent to the requested pharmacy.    Requested Prescriptions     Signed Prescriptions Disp Refills    losartan 100 MG Oral Tab 90 tablet 3     Sig: Take 1 tablet (100 mg total) by mouth daily.     Authorizing Provider: AMBER YORK     Ordering User: ENRIQUE KC

## 2024-02-01 NOTE — TELEPHONE ENCOUNTER
[facility] faxed clarification for Losartan  In order to bill pt's insurance, please provide the NPI Number for prescriber Hernán Lindsey    Fax placed in green folder

## 2024-02-05 ENCOUNTER — LAB ENCOUNTER (OUTPATIENT)
Dept: LAB | Age: 84
End: 2024-02-05
Attending: INTERNAL MEDICINE
Payer: MEDICARE

## 2024-02-05 DIAGNOSIS — Z13.89 SCREENING FOR NEPHROPATHY: ICD-10-CM

## 2024-02-05 DIAGNOSIS — E78.5 HYPERLIPIDEMIA, UNSPECIFIED HYPERLIPIDEMIA TYPE: ICD-10-CM

## 2024-02-05 DIAGNOSIS — I10 BENIGN ESSENTIAL HTN: ICD-10-CM

## 2024-02-05 DIAGNOSIS — E55.9 VITAMIN D DEFICIENCY: ICD-10-CM

## 2024-02-05 DIAGNOSIS — N18.30 STAGE 3 CHRONIC KIDNEY DISEASE, UNSPECIFIED WHETHER STAGE 3A OR 3B CKD (HCC): ICD-10-CM

## 2024-02-05 DIAGNOSIS — Z13.0 SCREENING FOR DEFICIENCY ANEMIA: ICD-10-CM

## 2024-02-05 DIAGNOSIS — Z13.29 SCREENING FOR THYROID DISORDER: ICD-10-CM

## 2024-02-05 DIAGNOSIS — E11.9 TYPE 2 DIABETES MELLITUS WITHOUT COMPLICATION, WITHOUT LONG-TERM CURRENT USE OF INSULIN (HCC): ICD-10-CM

## 2024-02-05 LAB
ALBUMIN SERPL-MCNC: 4.5 G/DL (ref 3.2–4.8)
ALBUMIN/GLOB SERPL: 1.9 {RATIO} (ref 1–2)
ALP LIVER SERPL-CCNC: 86 U/L
ALT SERPL-CCNC: 8 U/L
ANION GAP SERPL CALC-SCNC: 8 MMOL/L (ref 0–18)
AST SERPL-CCNC: 14 U/L (ref ?–34)
BASOPHILS # BLD AUTO: 0.04 X10(3) UL (ref 0–0.2)
BASOPHILS NFR BLD AUTO: 0.7 %
BILIRUB SERPL-MCNC: 0.5 MG/DL (ref 0.2–1.1)
BILIRUB UR QL: NEGATIVE
BUN BLD-MCNC: 24 MG/DL (ref 9–23)
BUN/CREAT SERPL: 16.6 (ref 10–20)
CALCIUM BLD-MCNC: 9.7 MG/DL (ref 8.7–10.4)
CHLORIDE SERPL-SCNC: 110 MMOL/L (ref 98–112)
CHOLEST SERPL-MCNC: 115 MG/DL (ref ?–200)
CLARITY UR: CLEAR
CO2 SERPL-SCNC: 26 MMOL/L (ref 21–32)
CREAT BLD-MCNC: 1.45 MG/DL
CREAT UR-SCNC: 102.7 MG/DL
DEPRECATED RDW RBC AUTO: 44.6 FL (ref 35.1–46.3)
EGFRCR SERPLBLD CKD-EPI 2021: 47 ML/MIN/1.73M2 (ref 60–?)
EOSINOPHIL # BLD AUTO: 0.13 X10(3) UL (ref 0–0.7)
EOSINOPHIL NFR BLD AUTO: 2.2 %
ERYTHROCYTE [DISTWIDTH] IN BLOOD BY AUTOMATED COUNT: 12.8 % (ref 11–15)
EST. AVERAGE GLUCOSE BLD GHB EST-MCNC: 151 MG/DL (ref 68–126)
FASTING PATIENT LIPID ANSWER: YES
FASTING STATUS PATIENT QL REPORTED: YES
GLOBULIN PLAS-MCNC: 2.4 G/DL (ref 2.8–4.4)
GLUCOSE BLD-MCNC: 202 MG/DL (ref 70–99)
GLUCOSE UR-MCNC: 1000 MG/DL
HBA1C MFR BLD: 6.9 % (ref ?–5.7)
HCT VFR BLD AUTO: 43.2 %
HDLC SERPL-MCNC: 37 MG/DL (ref 40–59)
HGB BLD-MCNC: 14.2 G/DL
HGB UR QL STRIP.AUTO: NEGATIVE
HYALINE CASTS #/AREA URNS AUTO: PRESENT /LPF
IMM GRANULOCYTES # BLD AUTO: 0.01 X10(3) UL (ref 0–1)
IMM GRANULOCYTES NFR BLD: 0.2 %
KETONES UR-MCNC: NEGATIVE MG/DL
LDLC SERPL CALC-MCNC: 56 MG/DL (ref ?–100)
LEUKOCYTE ESTERASE UR QL STRIP.AUTO: 75
LYMPHOCYTES # BLD AUTO: 1.2 X10(3) UL (ref 1–4)
LYMPHOCYTES NFR BLD AUTO: 20.7 %
MCH RBC QN AUTO: 31.3 PG (ref 26–34)
MCHC RBC AUTO-ENTMCNC: 32.9 G/DL (ref 31–37)
MCV RBC AUTO: 95.2 FL
MICROALBUMIN UR-MCNC: 5 MG/DL
MICROALBUMIN/CREAT 24H UR-RTO: 48.7 UG/MG (ref ?–30)
MONOCYTES # BLD AUTO: 0.47 X10(3) UL (ref 0.1–1)
MONOCYTES NFR BLD AUTO: 8.1 %
NEUTROPHILS # BLD AUTO: 3.94 X10 (3) UL (ref 1.5–7.7)
NEUTROPHILS # BLD AUTO: 3.94 X10(3) UL (ref 1.5–7.7)
NEUTROPHILS NFR BLD AUTO: 68.1 %
NITRITE UR QL STRIP.AUTO: NEGATIVE
NONHDLC SERPL-MCNC: 78 MG/DL (ref ?–130)
OSMOLALITY SERPL CALC.SUM OF ELEC: 308 MOSM/KG (ref 275–295)
PH UR: 5.5 [PH] (ref 5–8)
PLATELET # BLD AUTO: 254 10(3)UL (ref 150–450)
POTASSIUM SERPL-SCNC: 4.5 MMOL/L (ref 3.5–5.1)
PROT SERPL-MCNC: 6.9 G/DL (ref 5.7–8.2)
PROT UR-MCNC: 20 MG/DL
RBC # BLD AUTO: 4.54 X10(6)UL
SODIUM SERPL-SCNC: 144 MMOL/L (ref 136–145)
SP GR UR STRIP: 1.02 (ref 1–1.03)
TRIGL SERPL-MCNC: 124 MG/DL (ref 30–149)
TSI SER-ACNC: 1.71 MIU/ML (ref 0.55–4.78)
UROBILINOGEN UR STRIP-ACNC: NORMAL
VIT D+METAB SERPL-MCNC: 35.5 NG/ML (ref 30–100)
VLDLC SERPL CALC-MCNC: 18 MG/DL (ref 0–30)
WBC # BLD AUTO: 5.8 X10(3) UL (ref 4–11)

## 2024-02-05 PROCEDURE — 85025 COMPLETE CBC W/AUTO DIFF WBC: CPT

## 2024-02-05 PROCEDURE — 36415 COLL VENOUS BLD VENIPUNCTURE: CPT

## 2024-02-05 PROCEDURE — 81001 URINALYSIS AUTO W/SCOPE: CPT

## 2024-02-05 PROCEDURE — 84443 ASSAY THYROID STIM HORMONE: CPT

## 2024-02-05 PROCEDURE — 83036 HEMOGLOBIN GLYCOSYLATED A1C: CPT

## 2024-02-05 PROCEDURE — 87077 CULTURE AEROBIC IDENTIFY: CPT

## 2024-02-05 PROCEDURE — 87086 URINE CULTURE/COLONY COUNT: CPT

## 2024-02-05 PROCEDURE — 82043 UR ALBUMIN QUANTITATIVE: CPT

## 2024-02-05 PROCEDURE — 82570 ASSAY OF URINE CREATININE: CPT

## 2024-02-05 PROCEDURE — 82306 VITAMIN D 25 HYDROXY: CPT

## 2024-02-05 PROCEDURE — 80053 COMPREHEN METABOLIC PANEL: CPT

## 2024-02-05 PROCEDURE — 87186 SC STD MICRODIL/AGAR DIL: CPT

## 2024-02-05 PROCEDURE — 80061 LIPID PANEL: CPT

## 2024-02-06 ENCOUNTER — TELEPHONE (OUTPATIENT)
Dept: INTERNAL MEDICINE CLINIC | Facility: CLINIC | Age: 84
End: 2024-02-06

## 2024-02-06 RX ORDER — AMOXICILLIN 500 MG/1
500 TABLET, FILM COATED ORAL 3 TIMES DAILY
Qty: 15 TABLET | Refills: 0 | Status: SHIPPED | OUTPATIENT
Start: 2024-02-06 | End: 2024-02-11

## 2024-02-06 NOTE — TELEPHONE ENCOUNTER
PLease notify the patient that his lab work overall looks good, will discuss more 2/9 when I see him in clinic    He does have a UTI however and I recommend amoxillcin 1 tab TID for 5 days - sent to obey

## 2024-02-09 ENCOUNTER — OFFICE VISIT (OUTPATIENT)
Dept: INTERNAL MEDICINE CLINIC | Facility: CLINIC | Age: 84
End: 2024-02-09

## 2024-02-09 ENCOUNTER — OFFICE VISIT (OUTPATIENT)
Dept: PODIATRY CLINIC | Facility: CLINIC | Age: 84
End: 2024-02-09

## 2024-02-09 VITALS
WEIGHT: 195 LBS | SYSTOLIC BLOOD PRESSURE: 118 MMHG | OXYGEN SATURATION: 97 % | HEIGHT: 72 IN | HEART RATE: 71 BPM | TEMPERATURE: 98 F | BODY MASS INDEX: 26.41 KG/M2 | DIASTOLIC BLOOD PRESSURE: 60 MMHG

## 2024-02-09 DIAGNOSIS — Z13.89 SCREENING FOR NEPHROPATHY: ICD-10-CM

## 2024-02-09 DIAGNOSIS — E78.5 HYPERLIPIDEMIA, UNSPECIFIED HYPERLIPIDEMIA TYPE: ICD-10-CM

## 2024-02-09 DIAGNOSIS — N18.30 STAGE 3 CHRONIC KIDNEY DISEASE, UNSPECIFIED WHETHER STAGE 3A OR 3B CKD (HCC): ICD-10-CM

## 2024-02-09 DIAGNOSIS — I10 BENIGN ESSENTIAL HTN: ICD-10-CM

## 2024-02-09 DIAGNOSIS — E11.9 TYPE 2 DIABETES MELLITUS WITHOUT COMPLICATION, WITHOUT LONG-TERM CURRENT USE OF INSULIN (HCC): ICD-10-CM

## 2024-02-09 DIAGNOSIS — I73.9 PERIPHERAL VASCULAR DISEASE (HCC): ICD-10-CM

## 2024-02-09 DIAGNOSIS — I73.9 PERIPHERAL VASCULAR DISEASE (HCC): Primary | ICD-10-CM

## 2024-02-09 DIAGNOSIS — E11.42 TYPE 2 DIABETES MELLITUS WITH POLYNEUROPATHY (HCC): Primary | ICD-10-CM

## 2024-02-09 DIAGNOSIS — B35.1 ONYCHOMYCOSIS: ICD-10-CM

## 2024-02-09 DIAGNOSIS — Z13.29 SCREENING FOR THYROID DISORDER: ICD-10-CM

## 2024-02-09 DIAGNOSIS — Z00.00 ENCOUNTER FOR ANNUAL HEALTH EXAMINATION: ICD-10-CM

## 2024-02-09 DIAGNOSIS — R26.81 GAIT INSTABILITY: ICD-10-CM

## 2024-02-09 DIAGNOSIS — Z13.0 SCREENING FOR DEFICIENCY ANEMIA: ICD-10-CM

## 2024-02-09 DIAGNOSIS — M19.90 OSTEOARTHRITIS, UNSPECIFIED OSTEOARTHRITIS TYPE, UNSPECIFIED SITE: ICD-10-CM

## 2024-02-09 PROCEDURE — 99213 OFFICE O/P EST LOW 20 MIN: CPT | Performed by: PODIATRIST

## 2024-02-09 PROCEDURE — 1159F MED LIST DOCD IN RCRD: CPT | Performed by: PODIATRIST

## 2024-02-09 PROCEDURE — 1126F AMNT PAIN NOTED NONE PRSNT: CPT | Performed by: PODIATRIST

## 2024-02-09 NOTE — PROGRESS NOTES
[facility]  Progress Note    Darin Hernandez is a 84 year old male.   Chief Complaint   Patient presents with    Diabetic Foot Care     RNCOY, KARSTEN this AM was 160, A1C 2/5 was 6.9, denies pain          HPI:     This is a pleasant male with type 2 DM, PMH of PAD and AAA, glaucoma.  He does have PMH of tobacco use x 30 years but is no longer smoking.  He does use a walker/cane for ambulation.  He does have some tingling to his feet.    He presents to clinic today for diabetic foot care.        Allergies: Lisinopril   Current Outpatient Medications   Medication Sig Dispense Refill    losartan 100 MG Oral Tab Take 1 tablet (100 mg total) by mouth daily. 90 tablet 3    alendronate 70 MG Oral Tab Take 1 tablet (70 mg total) by mouth once a week. 13 tablet 3    pioglitazone 45 MG Oral Tab Take 1 tablet (45 mg total) by mouth daily. 90 tablet 3    HYDROcodone-acetaminophen 5-325 MG Oral Tab Take 1 tablet by mouth every 6 (six) hours as needed. 20 tablet 0    aspirin-acetaminophen-caffeine 250-250-65 MG Oral Tab Take 1 tablet by mouth every 6 (six) hours as needed for Pain.      Glucose Blood (ONETOUCH VERIO) In Vitro Strip Use daily 100 strip 3    OneTouch Delica Lancets 33G Does not apply Misc Use daily 100 each 3    empagliflozin (JARDIANCE) 10 MG Oral Tab Take 1 tablet (10 mg total) by mouth daily. 30 tablet 5    glipiZIDE 10 MG Oral Tab Take 1 tablet (10 mg total) by mouth daily. Daily in the morning 90 tablet 3    atorvastatin 40 MG Oral Tab Take 1 tablet (40 mg total) by mouth daily. 90 tablet 3    atenolol 25 MG Oral Tab Take 1 tablet (25 mg total) by mouth daily. 90 tablet 3    timolol 0.5 % Ophthalmic Solution Place 1 drop into both eyes 2 (two) times daily. 15 mL 3    brimonidine 0.15 % Ophthalmic Solution Place 1 drop into both eyes in the morning and 1 drop before bedtime. 3 each 3    metFORMIN HCl 1000 MG Oral Tab Take 1 tablet (1,000 mg total) by mouth in the morning and 1 tablet (1,000 mg  total) before bedtime. 180 tablet 3    Omeprazole 20 MG Oral Tab EC Take 20 mg by mouth daily. 90 tablet 3    amLODIPine 10 MG Oral Tab Take 1 tablet (10 mg total) by mouth daily. 90 tablet 3    Potassium Chloride ER 10 MEQ Oral Tab CR Take 1 tablet (10 mEq total) by mouth daily. 90 tablet 3    aspirin 81 MG Oral Tab EC Take 1 tablet (81 mg total) by mouth daily.      Multiple Vitamins-Minerals (CENTRUM SILVER ADULT 50+ OR) Take by mouth daily.      Ascorbic Acid (VITAMIN C OR) Take by mouth.      amoxicillin 500 MG Oral Tab Take 1 tablet (500 mg total) by mouth in the morning, at noon, and at bedtime for 5 days. (Patient not taking: Reported on 2/9/2024) 15 tablet 0      Past Medical History:   Diagnosis Date    Aorta aneurysm (HCC)     Back problem     Benign essential HTN     Cataract     Chronic rhinitis     Diabetes (HCC)     DM (diabetes mellitus), type 2 (HCC)     Hearing impairment     High blood pressure     High cholesterol     Hyperlipidemia     POAG (primary open-angle glaucoma) 10/17/2023    first visit with Dr. Ang patient has been taking Brimonidine 0.15% OU BID and Timolol 0.5% OU BID for 10 yrs per MD in [place].  10/28/23 patient to continue taking gtts per [facility] due to abnormal VF OS and abnormal OCT OU    PVD (peripheral vascular disease) ([facility])     Skin cancer     Visual impairment       Past Surgical History:   Procedure Laterality Date    APPENDECTOMY      CATARACT EXTRACTION W/  INTRAOCULAR LENS IMPLANT Left 04/07/2014    Done by Dr. [place] in [place]    CATARACT EXTRACTION W/  INTRAOCULAR LENS IMPLANT Right 2014    Done by Dr. [place] in [place]    OTHER ACCESSORY      T and A    OTHER ACCESSORY      appendectomy    OTHER ACCESSORY      cholycystectomy    OTHER ACCESSORY      bilateral cataract    OTHER ACCESSORY      facial basal cell cancer    TONSILLECTOMY        Family History   Problem Relation Age of Onset    Macular degeneration Brother     Glaucoma  Neg     Diabetes Neg       Social History     Socioeconomic History    Marital status:    Tobacco Use    Smoking status: Former     Types: Cigarettes     Passive exposure: Past    Smokeless tobacco: Never   Vaping Use    Vaping Use: Never used   Substance and Sexual Activity    Alcohol use: Yes     Alcohol/week: 2.0 standard drinks of alcohol     Types: 2 Cans of beer per week     Comment: 1 - 2 beers occasional    Drug use: Never           REVIEW OF SYSTEMS:   Denies nausea, fever, chills  No calf pain  No other muscle or joint aches  Denies chest pain or shortness of breath.      EXAM:   There were no vitals taken for this visit.    Constitutional:   Patient in no apparent distress. Well kept. Of normal body habitus. Alert and oriented to person, place, and time.  Vascular Examination:  DP pulse is NP  PT pulse is NP  Capillary refill is adequate  Integumentary Examination:   The patient's nails appear incurvated, thickened, elongated, dystrophic, discolored with subungual debris 1-5 right, 1-5  left nails.  Digital hair growth is absent  Skin is of diminished texture and decreased turgor.  Neurological Examination:  Monofilament (10-g) sensation is 4/5 to right and 3/5 to left.  Sharp/dull is present to right and is present to left.  Parasthesias present  Musculoskeletal Examination:  Muscle Strength is 5/5.      LABS & IMAGING:     Lab Results   Component Value Date     (H) 02/05/2024    BUN 24 (H) 02/05/2024    CREATSERUM 1.45 (H) 02/05/2024    BUNCREA 16.6 02/05/2024    ANIONGAP 8 02/05/2024    CA 9.7 02/05/2024     02/05/2024    K 4.5 02/05/2024     02/05/2024    CO2 26.0 02/05/2024    OSMOCALC 308 (H) 02/05/2024        Lab Results   Component Value Date     (H) 02/05/2024    A1C 6.9 (H) 02/05/2024        No results found.     ASSESSMENT AND PLAN:   Diagnoses and all orders for this visit:    Type 2 diabetes mellitus with polyneuropathy (HCC)    Peripheral vascular disease  (HCC)    Gait instability    Onychomycosis              Plan:     Diabetic education and instructions have been provided. We reviewed and discussed the following:    -risk categories related to pts with diabetes and foot or lower extremity complications per ADA.    -adherence to medication regimen and close monitoring or blood sugar control.   -daily monitoring/inspection of feet and shoes.   -proper management of diet and weight   -regular follow up with PCP and specialty providers as recommended   -Lower extremity complications related to DM were reviewed and stressed prevention.      Evaluated the patient. Discussed treatment options with the patient.  Discussed with patient proper care and hygiene for their feet.  Patient tolerated procedures well, without incident.    Instrumentation used includes nail nippers and electric  where appropriate.  Procedure: (02652 Debridement of toenails 6-10) Surgically debrided and mechanically reduced 6 or more toenails.Hemmorhage occurred none.Nails that were debrided appeared dystrophic and caused the patient pain in shoe gear.Nails 5 Left & 5 Right.           Dr. Chu did perform left femoral endarterectomy and endovascular aortic repair due to AAA.    Cont wearing DM shoes with inserts    RTC 4 months for DFC    No follow-ups on file.    Archie Diaz DPM  2/9/24

## 2024-02-09 NOTE — PATIENT INSTRUCTIONS
- You were seen in clinic for regular annual check-up.  Review your most recent set of blood work which did show urine infection.  We treated you with antibiotics with overall improvement of your symptoms.    Kidney function remains stable at this time    Lets focus on managing the back pain as well as the headaches and leg pain.    -Acetaminophen 500-650 mg every 4-6 hours as needed for pain relief  - Lets try some home stretches and exercises  - You may use ice packs, heat packs  - Avoid the use of NSAIDs such as ibuprofen/Advil, naproxen/Aleve  - Notify us if no improvement of your symptoms we could consider alternative medication.    He may be experiencing allergies with a clear mucus production  - You may start Zyrtec/Claritin/Allegra at the lowest available dose once a day  - You can also try Flonase nasal spray 1 spray each nostril until symptoms improve    Lets stop the Jardiance, plan to repeat your sugar levels in 3 months for diabetic follow-up.    -Continue following vascular surgery, you may be due for repeat CT scan and repeat ultrasound in 6 months  -Please continue checking your blood pressures at home and notify us if they are increasing   -Vaccines may be due for: Flu shot, Prevnar 20/pneumonia shot, We recommended checking with your insurance for coverage of Shingrix, a 2-dose shingles vaccine that can be obtained at the pharmacy if there is adequate coverage through your insurance plan.  - Please continue to eat a varied diet including recommended servings of vegetables, fruits, and low fat dairy. Minimize high saturated fats (such as fast foods) and high sugar intake (such as soda)  - We recommend 150 minutes of moderate intensity exercise (brisk walking, swimming) weekly to maintain your current weight.  Targeted weight loss will require more vigorous exercise or more than 150 minutes/week.    Return to clinic in 6 months for follow-up    Darin Hernandez's SCREENING SCHEDULE   Tests on this  list are recommended by your physician but may not be covered, or covered at this frequency, by your insurer.   Please check with your insurance carrier before scheduling to verify coverage.   PREVENTATIVE SERVICES FREQUENCY &  COVERAGE DETAILS LAST COMPLETION DATE   Diabetes Screening    Fasting Blood Sugar / Glucose    One screening every 12 months if never tested or if previously tested but not diagnosed with pre-diabetes   One screening every 6 months if diagnosed with pre-diabetes Lab Results   Component Value Date     (H) 02/05/2024        Cardiovascular Disease Screening    Lipid Panel  Cholesterol  Lipoprotein (HDL)  Triglycerides Covered every 5 years for all Medicare beneficiaries without apparent signs or symptoms of cardiovascular disease Lab Results   Component Value Date    CHOLEST 115 02/05/2024    HDL 37 (L) 02/05/2024    LDL 56 02/05/2024    TRIG 124 02/05/2024         Electrocardiogram (EKG)   Covered if needed at Welcome to Medicare, and non-screening if indicated for medical reasons -      Ultrasound Screening for Abdominal Aortic Aneurysm (AAA) Covered once in a lifetime for one of the following risk factors    Men who are 65-75 years old and have ever smoked    Anyone with a family history -     Colorectal Cancer Screening  Covered for ages 50-85; only need ONE of the following:    Colonoscopy   Covered every 10 years    Covered every 2 years if patient is at high risk or previous colonoscopy was abnormal -    No recommendations at this time    Flexible Sigmoidoscopy   Covered every 4 years -    Fecal Occult Blood Test Covered annually -   Prostate Cancer Screening    Prostate-Specific Antigen (PSA) Annually No results found for: \"PSA\"  There are no preventive care reminders to display for this patient.   Immunizations    Influenza Covered once per flu season  Please get every year 09/25/2023  No recommendations at this time    Pneumococcal Each vaccine ([id number] & [id number]) covered  once after 65 Prevnar 13: -    [id number]: -     No recommendations at this time    Hepatitis B One screening covered for patients with certain risk factors   -  No recommendations at this time    Tetanus Toxoid Not covered by Medicare Part B unless medically necessary (cut with metal); may be covered with your pharmacy prescription benefits -    Tetanus, Diptheria and Pertusis TD and TDaP Not covered by Medicare Part B -  No recommendations at this time    Zoster Not covered by Medicare Part B; may be covered with your pharmacy  prescription benefits -  Zoster Vaccines(1 of 2) Never done     Diabetes      Hemoglobin A1C Annually; if last result is elevated, may be asked to retest more frequently.    Medicare covers every 3 months Lab Results   Component Value Date     (H) 02/05/2024    A1C 6.9 (H) 02/05/2024       No recommendations at this time    Creat/alb ratio Annually Lab Results   Component Value Date    MICROALBCREA 48.7 (H) 02/05/2024       LDL Annually Lab Results   Component Value Date    LDL 56 02/05/2024       Dilated Eye Exam Annually Last Diabetic Eye Exam:  Last Dilated Eye Exam: 10/17/23  Eye Exam shows Diabetic Retinopathy?: No       Annual Monitoring of Persistent Medications (ACE/ARB, digoxin diuretics, anticonvulsants)    Potassium Annually Lab Results   Component Value Date    K 4.5 02/05/2024         Creatinine   Annually Lab Results   Component Value Date    CREATSERUM 1.45 (H) 02/05/2024         BUN Annually Lab Results   Component Value Date    BUN 24 (H) 02/05/2024       Drug Serum Conc Annually No results found for: \"DIGOXIN\", \"DIG\", \"VALP\"

## 2024-02-09 NOTE — H&P
HPI:   Darin Hernandez is a 84 year old male who presents for a MA (Medicare Advantage) Supervisit (Once per calendar year).    Legs are still sore, going up and down the stairs. He is hoping to walk a little more. Some shortness of breath.     Has been taking jardiance. Feels it is very expensive. He has been taking his blood sugar 150-160. He has been trying to eat a little better.    I reviewed and updated the PMHx, FamHx, medications, allergies, and SocHx as below with the patient.       SocHX  - Home: Feels safe at home - live with brother and his wife  - Work: Retired - Worked at [employer], s  - Hobbies: puzzle, reading. Used to golf.   - Nutrition:meat, potatoes, veggies. Candy. Not as much water.  - Physical Activity: walking exercises, not as much walking     No topic due editable text        Fall Risk Assessment:   He has been screened for Falls and is High Risk. Fall Prevention information provided to patient in After Visit Summary.    Do you feel unsteady when standing or walking?: Yes  Do you worry about falling?: Yes  Have you fallen in the past year?: No   Cognitive Assessment:   He had a completely normal cognitive assessment - see flowsheet entries     Functional Ability/Status:   Darin Hernandez has some abnormal functions as listed below:  He has Driving difficulties based on screening of functional status. He has Meal Preparation difficulties based on screening of functional status.He has difficulties Managing Money/Bills based on screening of functional status.He has difficulties Shopping for Groceries based on screening of functional status. He has Hearing problems based on screening of functional status.He has Walking problems based on screening of functional status. He has problems with Daily Activities based on screening of functional status. He has problems with Memory based on screening of functional status.       Depression Screening (PHQ-2/PHQ-9): Over the LAST 2 WEEKS   Little  interest or pleasure in doing things: Not at all  Feeling down, depressed, or hopeless: Not at all  PHQ-2 SCORE: 0      Advanced Directive:  He does NOT have a Living Will. [Do you have a living will?: Yes]  He does NOT have a Power of  for Health Care. [Do you have a healthcare power of ?: Yes]    Tobacco:  He smoked tobacco in the past but quit greater than 12 months ago.  Social History    Tobacco Use      Smoking status: Former        Types: Cigarettes        Passive exposure: Past      Smokeless tobacco: Never         CAGE Alcohol Screen:   CAGE screening score of 0 on 2/9/2024, showing low risk of alcohol abuse.       Patient Care Team: Patient Care Team:  Dick George MD as PCP - General (Internal Medicine)  Ele Torres RN as [facility] TCM Care Manager (TCM Management)    Patient Active Problem List   Diagnosis    Benign essential HTN    DM (diabetes mellitus), type 2 (HCC)    Hyperlipidemia    Aorta aneurysm (HCC)    Glaucoma    Osteoporosis    Chronic rhinitis    Skin cancer    Peripheral vascular disease (HCC)    Primary open angle glaucoma of both eyes, mild stage    Type 2 diabetes mellitus without retinopathy (HCC)    Pseudophakia of both eyes    Drusen    Vitreous floaters of both eyes    CKD (chronic kidney disease) stage 3, GFR 30-59 ml/min ([facility])    Pre-op testing     Wt Readings from Last 3 Encounters:   02/09/24 195 lb (88.5 kg)   01/05/24 198 lb (89.8 kg)   12/23/23 203 lb 0.7 oz (92.1 kg)      Last Cholesterol Labs:   Lab Results   Component Value Date    CHOLEST 115 02/05/2024    HDL 37 (L) 02/05/2024    LDL 56 02/05/2024    TRIG 124 02/05/2024          Last Chemistry Labs:   Lab Results   Component Value Date    AST 14 02/05/2024    ALT 8 (L) 02/05/2024    CA 9.7 02/05/2024    ALB 4.5 02/05/2024    TSH 1.706 02/05/2024    CREATSERUM 1.45 (H) 02/05/2024     (H) 02/05/2024        CBC  (most recent labs)   Lab Results   Component Value Date    WBC 5.8 02/05/2024    HGB 14.2  02/05/2024    .0 02/05/2024        ALLERGIES:   He is allergic to lisinopril.    CURRENT MEDICATIONS:   Outpatient Medications Marked as Taking for the 2/9/24 encounter (Office Visit) with Dick George MD   Medication Sig    amoxicillin 500 MG Oral Tab Take 1 tablet (500 mg total) by mouth in the morning, at noon, and at bedtime for 5 days.    losartan 100 MG Oral Tab Take 1 tablet (100 mg total) by mouth daily.    alendronate 70 MG Oral Tab Take 1 tablet (70 mg total) by mouth once a week.    pioglitazone 45 MG Oral Tab Take 1 tablet (45 mg total) by mouth daily.    HYDROcodone-acetaminophen 5-325 MG Oral Tab Take 1 tablet by mouth every 6 (six) hours as needed.    aspirin-acetaminophen-caffeine 250-250-65 MG Oral Tab Take 1 tablet by mouth every 6 (six) hours as needed for Pain.    Glucose Blood (ONETOUCH VERIO) In Vitro Strip Use daily    OneTouch Delica Lancets 33G Does not apply Misc Use daily    empagliflozin (JARDIANCE) 10 MG Oral Tab Take 1 tablet (10 mg total) by mouth daily.    glipiZIDE 10 MG Oral Tab Take 1 tablet (10 mg total) by mouth daily. Daily in the morning    atorvastatin 40 MG Oral Tab Take 1 tablet (40 mg total) by mouth daily.    atenolol 25 MG Oral Tab Take 1 tablet (25 mg total) by mouth daily.    timolol 0.5 % Ophthalmic Solution Place 1 drop into both eyes 2 (two) times daily.    brimonidine 0.15 % Ophthalmic Solution Place 1 drop into both eyes in the morning and 1 drop before bedtime.    metFORMIN HCl 1000 MG Oral Tab Take 1 tablet (1,000 mg total) by mouth in the morning and 1 tablet (1,000 mg total) before bedtime.    Omeprazole 20 MG Oral Tab EC Take 20 mg by mouth daily.    amLODIPine 10 MG Oral Tab Take 1 tablet (10 mg total) by mouth daily.    Potassium Chloride ER 10 MEQ Oral Tab CR Take 1 tablet (10 mEq total) by mouth daily.    aspirin 81 MG Oral Tab EC Take 1 tablet (81 mg total) by mouth daily.    Multiple Vitamins-Minerals (CENTRUM SILVER ADULT 50+ OR) Take by mouth  daily.    Ascorbic Acid (VITAMIN C OR) Take by mouth.      MEDICAL INFORMATION:   He  has a past medical history of Aorta aneurysm ([facility]), Back problem, Benign essential HTN, Cataract, Chronic rhinitis, Diabetes (HCC), DM (diabetes mellitus), type 2 (HCC), Hearing impairment, High blood pressure, High cholesterol, Hyperlipidemia, POAG (primary open-angle glaucoma) (10/17/2023), PVD (peripheral vascular disease) ([facility]), Skin cancer, and Visual impairment.    He  has a past surgical history that includes other accessory; other accessory; other accessory; other accessory; other accessory; Cataract extraction w/  intraocular lens implant (Left, 04/07/2014); Cataract extraction w/  intraocular lens implant (Right, 2014); appendectomy; and tonsillectomy.    His family history includes Macular degeneration in his brother.   SOCIAL HISTORY:   He  reports that he has quit smoking. His smoking use included cigarettes. He has been exposed to tobacco smoke. He has never used smokeless tobacco. He reports current alcohol use of about 2.0 standard drinks of alcohol per week. He reports that he does not use drugs.     REVIEW OF SYSTEMS:   GENERAL: feels well otherwise  SKIN: denies any unusual skin lesions  EYES: denies blurred vision or double vision  HEENT: denies nasal congestion, sinus pain or ST  LUNGS: denies shortness of breath with exertion  CARDIOVASCULAR: denies chest pain on exertion  GI: denies abdominal pain, denies heartburn  : 1-3 per night nocturia, no complaint of urinary incontinence  MUSCULOSKELETAL: denies back pain  NEURO: denies headaches  PSYCHE: denies depression or anxiety  HEMATOLOGIC: denies hx of anemia  ENDOCRINE: denies thyroid history  ALL/ASTHMA: denies hx of allergy or asthma    EXAM:   /60   Pulse 71   Temp 98.2 °F (36.8 °C)   Ht 6' (1.829 m)   Wt 195 lb (88.5 kg)   SpO2 97%   BMI 26.45 kg/m² Estimated body mass index is 26.45 kg/m² as calculated from the following:    Height as of  this encounter: 6' (1.829 m).    Weight as of this encounter: 195 lb (88.5 kg).    Medicare Hearing Assessment  (Required for AWV/SWV)    Hearing Screening    Screening Method: Whisper Test  Whisper Test Result: Pass                      Visual Acuity                           General Appearance:  Alert, cooperative, no distress, appears stated age   Head:  Normocephalic, without obvious abnormality, atraumatic   Eyes:  PERRL, conjunctiva/corneas clear, EOM's intact, both eyes   Ears:  Normal TM's and external ear canals, both ears   Nose: Nares normal, septum midline, mucosa normal, no drainage or sinus tenderness   Throat: Lips, mucosa, and tongue normal; teeth and gums normal   Neck: Supple, symmetrical, trachea midline, no adenopathy, thyroid: not enlarged, symmetric, no tenderness/mass/nodules, no carotid bruit or JVD   Back:   Symmetric, no curvature, ROM normal, no CVA tenderness   Lungs:   Clear to auscultation bilaterally, respirations unlabored   Chest Wall:  No tenderness or deformity   Heart:  Regular rate and rhythm, S1, S2 normal, no murmur, rub or gallop   Abdomen:   Soft, non-tender, bowel sounds active all four quadrants,  no masses, no organomegaly   Genitalia: Deferred   Rectal: Deferred   Extremities: Extremities normal, atraumatic, no cyanosis or edema   Pulses: 2+ and symmetric   Skin: Skin color, texture, turgor normal, no rashes or lesions   Lymph nodes: Cervical, supraclavicular, and axillary nodes normal   Neurologic: Normal, CN II through XII intact, 5 out of 5 muscle strength throughout, 2+ DTRs patellar tendons, normal gait          Vaccination History     Immunization History   Administered Date(s) Administered    FLU VAC High Dose 65 YRS & Older PRSV Free (91551) 09/25/2023    Pneumococcal Conjugate PCV20 02/09/2024    RSV, bivalent, diluent reconstituted PF (Abrysvo) 09/25/2023        ASSESSMENT AND OTHER RELEVANT CHRONIC CONDITIONS:   Darin Hernandez is a 84 year old male who  presents for a Medicare Assessment.     CTA abdomen pelvis 9/21/2023  Impression   CONCLUSION:     Infrarenal abdominal aortic aneurysm measuring 50 x 52 millimeter     Right lower extremity:  Diffuse severe stenosis throughout the mid to distal SFA due to calcified plaque.  Severe three-vessel runoff disease with only the peroneal artery showing opacification throughout     Left lower extremity:  Multifocal severe stenosis in the CFA .  Severe stenosis at the origin of the SFA due to calcified plaque.  Multifocal areas of moderate to severe stenosis throughout the rest of the SFA.  Severe three-vessel runoff disease.    Opacification throughout the peroneal artery.  The posterior tibial artery is likely opacified near the ankle.        Chest x-ray 12/22/2023    Impression   CONCLUSION:  1. No acute airspace disease.  Asymmetric mild biapical pleural thickening left more than right.  If any old films are available they should be submitted for comparison.  If not, then follow-up studies are advised.  2. Suggestion of mild hyperinflation which may suggest some degree of COPD.            Urine culture 2/5/2024  URINE CULTURE >100,000 CFU/ML Enterococcus faecalis NOT VRE Abnormal    Amoxicillin or Nitrofurantoin remain the drugs of choice for uncomplicated urinary tract infections.           PLAN SUMMARY:   Diagnoses and all orders for this visit:    Peripheral vascular disease (HCC)    Benign essential HTN    Hyperlipidemia, unspecified hyperlipidemia type    Type 2 diabetes mellitus without complication, without long-term current use of insulin (HCC)    Stage 3 chronic kidney disease, unspecified whether stage 3a or 3b CKD (HCC)    Osteoarthritis, unspecified osteoarthritis type, unspecified site    Screening for thyroid disorder    Screening for nephropathy    Screening for deficiency anemia    Encounter for annual health examination    Other orders  -     Prevnar 20 (PCV20) [22576]       Dizziness  Some wooziness,  fatigue.  Blood pressure on the softer side, but seems to be recovered from his procedure  - Should check blood pressures at home, hold blood pressure readings for SBP /diastolic 40-50  - Continue drinking plenty of fluids  - Slight increase in salt intake  - This has improved overall.      Peripheral arterial disease  Abdominal aortic aneurysm  Noted ongoing claudication.  Outpatient CT scan with AAA 50 x 52 mm as an outpatient.  Status post endovascular aortic repair, left femoral endarterectomy Dr. Shabazz of  and Dr. Chu cardiovascular surgery 12/22  - Seems to be recovering well, tolerating diet  - Pain well-controlled  - Resumed on aspirin 81 mg  -Seen in vascular surgery clinic 1/9/2024, due for repeat CTA of the abdomen pelvis for positioning and endoleak.  Repeat ultrasound in 6 months     Acute cystitis  - Urine culture came back Enterococcus faecalis not VRE  - Was treated with amoxicillin, complete resolution of symptoms     CKD stage III  - Baseline seems to be 1.2-1.4  - Creatinine most recently 1.45, EGFR 47 and stable     Type 2 diabetes  Recent A1c:   HEMOGLOBIN A1C (%)   Date Value   09/19/2023 7.6 (A)     HgbA1C (%)   Date Value   02/05/2024 6.9 (H)     Recent urine microalbumin: No components found for: \"URINEMICROALBUMIN\"  Current medications:  glipizide 10 mg daily, Jardiance 10 mg daily, pioglitazone 45 mg daily, metformin 1000 mg twice a day  Eye exam: Continue following with ophthalmology  Foot exam: Check feet daily  - His preference is to monitor sugars off jardiance given cost.  - plan for diabetic exam with repeat A1c.     Hypertension  -Blood pressure today at goal  - Check blood pressures at home  - Continue with home atenolol 25 mg daily, Cozaar 100 mg daily     Hyperlipidemia  -Cholesterol overall well-controlled LDL 56, total cholesterol 150  - Continue with atorvastatin 40 mg daily, aspirin    Allergic rhinitis  - Trial of flonase 1 spray each nostril until symptoms  improve    HTN Screen: BP at goal  DM Screen: As above  HLD Screen: As above  HCV Screen: Considered low risk  HIV Screen: considered low risk  G/C/Syphilis: Considered low risk    Colon Cancer Screening (45-70): Reasonable to discontinue  Prostate Cancer Screening: (50-70):-Continue based on age  Lung Cancer Screening (55-79 with 30 p/year and active < 15 years): Not indicated  AAA Screening (65-75 Hx of smoking): Not indicated    Influenza: Annually   Td/Tdap: Last Tdap - unknown  Zoster (50+): Recommended 2 doses Shringrix  HPV (19-26): Not indicated  Pneumococcal: Due for Prevnar 20    Immunization History   Administered Date(s) Administered    FLU VAC High Dose 65 YRS & Older PRSV Free ([id number]) 09/25/2023    Pneumococcal Conjugate PCV20 02/09/2024    RSV, bivalent, diluent reconstituted PF (Abrysvo) 09/25/2023         Diet assessment: good     PLAN:  The patient indicates understanding of these issues and agrees to the plan.  Reinforced healthy diet, lifestyle, and exercise.    Return in about 3 months (around 5/9/2024) for Follow-up of diabetes.     Dick George MD, 2/8/2024     General Health     In the past six months, have you lost more than 10 pounds without trying?: 2 - No  Has your appetite been poor?: No  How does the patient maintain a good energy level?: Other  How would you describe your daily physical activity?: Light  How would you describe your current health state?: Poor  How do you maintain positive mental well-being?: Puzzles     Supplementary Documentation:   Darin Hernandez's SCREENING SCHEDULE   Tests on this list are recommended by your physician but may not be covered, or covered at this frequency, by your insurer.   Please check with your insurance carrier before scheduling to verify coverage.   PREVENTATIVE SERVICES FREQUENCY &  COVERAGE DETAILS LAST COMPLETION DATE   Diabetes Screening    Fasting Blood Sugar / Glucose    One screening every 12 months if never tested or if previously  tested but not diagnosed with pre-diabetes   One screening every 6 months if diagnosed with pre-diabetes Lab Results   Component Value Date     (H) 02/05/2024        Cardiovascular Disease Screening    Lipid Panel  Cholesterol  Lipoprotein (HDL)  Triglycerides Covered every 5 years for all Medicare beneficiaries without apparent signs or symptoms of cardiovascular disease Lab Results   Component Value Date    CHOLEST 115 02/05/2024    HDL 37 (L) 02/05/2024    LDL 56 02/05/2024    TRIG 124 02/05/2024         Electrocardiogram (EKG)   Covered if needed at Welcome to Medicare, and non-screening if indicated for medical reasons -      Ultrasound Screening for Abdominal Aortic Aneurysm (AAA) Covered once in a lifetime for one of the following risk factors    Men who are 65-75 years old and have ever smoked    Anyone with a family history -     Colorectal Cancer Screening  Covered for ages 50-85; only need ONE of the following:    Colonoscopy   Covered every 10 years    Covered every 2 years if patient is at high risk or previous colonoscopy was abnormal -    No recommendations at this time    Flexible Sigmoidoscopy   Covered every 4 years -    Fecal Occult Blood Test Covered annually -   Prostate Cancer Screening    Prostate-Specific Antigen (PSA) Annually No results found for: \"PSA\"  There are no preventive care reminders to display for this patient.   Immunizations    Influenza Covered once per flu season  Please get every year 09/25/2023  No recommendations at this time    Pneumococcal Each vaccine ([id number] & [id number]) covered once after 65 Prevnar 13: -    Atmaxpohg26: -     No recommendations at this time    Hepatitis B One screening covered for patients with certain risk factors   -  No recommendations at this time    Tetanus Toxoid Not covered by Medicare Part B unless medically necessary (cut with metal); may be covered with your pharmacy prescription benefits -    Tetanus, Diptheria and Pertusis TD  and TDaP Not covered by Medicare Part B -  No recommendations at this time    Zoster Not covered by Medicare Part B; may be covered with your pharmacy  prescription benefits -  Zoster Vaccines(1 of 2) Never done     Diabetes      Hemoglobin A1C Annually; if last result is elevated, may be asked to retest more frequently.    Medicare covers every 3 months Lab Results   Component Value Date     (H) 02/05/2024    A1C 6.9 (H) 02/05/2024       No recommendations at this time    Creat/alb ratio Annually Lab Results   Component Value Date    MICROALBCREA 48.7 (H) 02/05/2024       LDL Annually Lab Results   Component Value Date    LDL 56 02/05/2024       Dilated Eye Exam Annually Last Diabetic Eye Exam:  Last Dilated Eye Exam: 10/17/23  Eye Exam shows Diabetic Retinopathy?: No       Annual Monitoring of Persistent Medications (ACE/ARB, digoxin diuretics, anticonvulsants)    Potassium Annually Lab Results   Component Value Date    K 4.5 02/05/2024         Creatinine   Annually Lab Results   Component Value Date    CREATSERUM 1.45 (H) 02/05/2024         BUN Annually Lab Results   Component Value Date    BUN 24 (H) 02/05/2024       Drug Serum Conc Annually No results found for: \"DIGOXIN\", \"DIG\", \"VALP\"

## 2024-03-01 ENCOUNTER — TELEPHONE (OUTPATIENT)
Dept: INTERNAL MEDICINE CLINIC | Facility: CLINIC | Age: 84
End: 2024-03-01

## 2024-03-01 ENCOUNTER — HOSPITAL ENCOUNTER (OUTPATIENT)
Age: 84
Discharge: HOME | End: 2024-03-01
Payer: MEDICARE

## 2024-03-01 VITALS
RESPIRATION RATE: 18 BRPM | DIASTOLIC BLOOD PRESSURE: 68 MMHG | SYSTOLIC BLOOD PRESSURE: 140 MMHG | TEMPERATURE: 97 F | HEART RATE: 78 BPM | OXYGEN SATURATION: 96 %

## 2024-03-01 DIAGNOSIS — H61.23 BILATERAL IMPACTED CERUMEN: Primary | ICD-10-CM

## 2024-03-01 NOTE — TELEPHONE ENCOUNTER
As FYI to  -patient was seen in UC for clogged ears- impacted cerumen bilateral - removed via irrigation and curette   
Called patient who used eardrops last night for wax , now he can barely hear - RN instructed patient to be evaluated at  - will go to [facility] 3/4  
Noted, thanks for the follow-up  
Patient calling for appointment with Dr. George.  Started this morning with clogged ears. No other symptoms.  Used drops last night for wax. Woke this morning with both ears clogged.    Best call back number [phone]       
Never smoker

## 2024-03-01 NOTE — ED PROVIDER NOTES
Patient Seen in: [facility]    History     Chief Complaint   Patient presents with    Ear Problem     Stated Complaint: ear clogged    HPI    Darin Hernandez is a 84 year old male presents with chief complaint of clogged sensation of bilateral ears.  Onset this morning.  Patient denies current pain.  Patient states the pain is intermittent.  Patient denies fever, chills, otorrhea, nasal drainage, sore throat, abdominal pain, nausea, vomiting, diarrhea, constipation, rash, neck pain, neck swelling, restricted neck movement, cough, dyspnea.      Past Medical History:   Diagnosis Date    Aorta aneurysm (HCC)     Back problem     Benign essential HTN     Cataract     Chronic rhinitis     Diabetes (HCC)     DM (diabetes mellitus), type 2 (HCC)     Hearing impairment     High blood pressure     High cholesterol     Hyperlipidemia     POAG (primary open-angle glaucoma) 10/17/2023    first visit with Dr. Ang patient has been taking Brimonidine 0.15% OU BID and Timolol 0.5% OU BID for 10 yrs per MD in [place].  10/28/23 patient to continue taking gtts per [facility] due to abnormal VF OS and abnormal OCT OU    PVD (peripheral vascular disease) ([facility])     Skin cancer     Visual impairment        Past Surgical History:   Procedure Laterality Date    APPENDECTOMY      CATARACT EXTRACTION W/  INTRAOCULAR LENS IMPLANT Left 04/07/2014    Done by Dr. Ch[place] Park in [place]    CATARACT EXTRACTION W/  INTRAOCULAR LENS IMPLANT Right 2014    Done by Dr. Ch[place] Park in [place]    OTHER ACCESSORY      T and A    OTHER ACCESSORY      appendectomy    OTHER ACCESSORY      cholycystectomy    OTHER ACCESSORY      bilateral cataract    OTHER ACCESSORY      facial basal cell cancer    TONSILLECTOMY              Family History   Problem Relation Age of Onset    Macular degeneration Brother     Glaucoma Neg     Diabetes Neg        Social History     Socioeconomic History    Marital status:    Tobacco  Use    Smoking status: Former     Types: Cigarettes     Passive exposure: Past    Smokeless tobacco: Never   Vaping Use    Vaping Use: Never used   Substance and Sexual Activity    Alcohol use: Yes     Alcohol/week: 2.0 standard drinks of alcohol     Types: 2 Cans of beer per week     Comment: 1 - 2 beers occasional    Drug use: Never       Review of Systems    Positive for stated complaint: ear clogged  Other systems are as noted in HPI.  Constitutional and vital signs reviewed.      All other systems reviewed and negative except as noted above.    PSFH elements reviewed from today and agreed except as otherwise stated in HPI.    Physical Exam     ED Triage Vitals [03/01/24 1236]   /68   Pulse 78   Resp 18   Temp 96.9 °F (36.1 °C)   Temp src Temporal   SpO2 96 %   O2 Device None (Room air)       Current:/68   Pulse 78   Temp 96.9 °F (36.1 °C) (Temporal)   Resp 18   SpO2 96%     PULSE OX within normal limits on room air as interpreted by this provider.     Constitutional: The patient is cooperative. Appears well-developed and well-nourished.  No acute distress.  Psychological: Alert, No abnormalities of mood, affect.  Head: Normocephalic/atraumatic.    Eyes: Pupils are equal round reactive to light.  Conjunctiva are within normal limits.  ENT: Positive cerumen impaction of bilateral auditory canals.  Upon cerumen removal- TMs and auditory canals within normal limits bilaterally.  No post auricular tenderness, adenopathy or erythema.  No otorrhea.  Pharynx nonjected.  Tonsils within normal size limits bilaterally.  No tonsillar exudates.  Mucous membranes moist.  Neck: The neck is supple.  Nontender.  No meningeal signs.  Respiratory: Respiratory effort was normal.  There is no stridor.  Air entry is equal.  Cardiovascular: Regular rate and rhythm.  Capillary refill is brisk.    Genitourinary: Not examined.  Lymphatic: No gross lymphadenopathy noted.  Musculoskeletal: Musculoskeletal system is grossly  intact.  There is no obvious deformity.  Neurological: Gross motor movement is intact in all 4 extremities.  Patient exhibits normal speech.  Skin: Skin is normal to inspection.  Warm and dry.  No obvious bruising.  No obvious rash.        ED Course   Labs Reviewed - No data to display    PROCEDURE: Portion of bilateral cerumen impaction removed via irrigation.  Remainder of bilateral cerumen impaction removed with ear curette by this provider.  Patient tolerated procedure well without complication.    MDM     Differential diagnosis including but not limited to cerumen impaction, otitis media, otitis externa    Physical exam remained stable over serial reexaminations as previously documented.  Physical exam findings discussed with patient.    I have given the patient instructions regarding their diagnoses, expectations, follow up, and ER precautions. I explained to the patient that emergent conditions may arise and to go to the ER for new, worsening or any persistent conditions. I've explained the importance of following up with their doctor as instructed. The patient verbalized understanding of the discharge instructions and plan.      Disposition and Plan     Clinical Impression:  1. Bilateral impacted cerumen        Disposition:  Discharge    Follow-up:  No follow-up provider specified.    Medications Prescribed:  Current Discharge Medication List

## 2024-03-07 ENCOUNTER — OFFICE VISIT (OUTPATIENT)
Dept: OPHTHALMOLOGY | Facility: CLINIC | Age: 84
End: 2024-03-07

## 2024-03-07 DIAGNOSIS — H40.1131 PRIMARY OPEN ANGLE GLAUCOMA OF BOTH EYES, MILD STAGE: Primary | ICD-10-CM

## 2024-03-07 PROCEDURE — 99213 OFFICE O/P EST LOW 20 MIN: CPT | Performed by: OPHTHALMOLOGY

## 2024-03-07 PROCEDURE — 1159F MED LIST DOCD IN RCRD: CPT | Performed by: OPHTHALMOLOGY

## 2024-03-07 PROCEDURE — 1160F RVW MEDS BY RX/DR IN RCRD: CPT | Performed by: OPHTHALMOLOGY

## 2024-03-07 NOTE — ASSESSMENT & PLAN NOTE
IOP is stable  Continue Alphagan OU BID and Timoptic 0.5% OU BID as directed    Will see patient in 4 months for a  pressure check

## 2024-03-07 NOTE — PROGRESS NOTES
Darin Hernandez is a 84 year old male.    HPI:     HPI    Patient is here for an IOP check.  He is taking Alphagan OU BID and Timoptic 0.5% OU BID as directed.  Patient states his vision is stable.    Last edited by Zakiya Perry OT on 3/7/2024 12:56 PM.        Patient History:  Past Medical History:   Diagnosis Date    Aorta aneurysm (HCC)     Back problem     Benign essential HTN     Cataract     Chronic rhinitis     Diabetes (HCC)     DM (diabetes mellitus), type 2 (HCC)     Hearing impairment     High blood pressure     High cholesterol     Hyperlipidemia     POAG (primary open-angle glaucoma) 10/17/2023    first visit with Dr. Ang patient has been taking Brimonidine 0.15% OU BID and Timolol 0.5% OU BID for 10 yrs per MD in [place].  10/28/23 patient to continue taking gtts per [facility] due to abnormal VF OS and abnormal OCT OU    PVD (peripheral vascular disease) ([facility])     Skin cancer     Visual impairment        Surgical History: Darin Hernandez has a past surgical history that includes other accessory (T and A); other accessory (appendectomy); other accessory (cholycystectomy); other accessory (bilateral cataract); other accessory (facial basal cell cancer); Cataract extraction w/  intraocular lens implant (Left, 04/07/2014) (Done by Dr. Chico Park in [place]); Cataract extraction w/  intraocular lens implant (Right, 2014) (Done by Dr. Chico Park in [place]); appendectomy; and tonsillectomy.    Family History   Problem Relation Age of Onset    Macular degeneration Brother     Glaucoma Neg     Diabetes Neg        Social History:   Social History     Socioeconomic History    Marital status:    Tobacco Use    Smoking status: Former     Types: Cigarettes     Passive exposure: Past    Smokeless tobacco: Never   Vaping Use    Vaping Use: Never used   Substance and Sexual Activity    Alcohol use: Yes     Alcohol/week: 2.0 standard drinks of alcohol     Types: 2 Cans of beer  per week     Comment: 1 - 2 ethan occasional    Drug use: Never       Medications:  Current Outpatient Medications   Medication Sig Dispense Refill    losartan 100 MG Oral Tab Take 1 tablet (100 mg total) by mouth daily. 90 tablet 3    alendronate 70 MG Oral Tab Take 1 tablet (70 mg total) by mouth once a week. 13 tablet 3    pioglitazone 45 MG Oral Tab Take 1 tablet (45 mg total) by mouth daily. 90 tablet 3    HYDROcodone-acetaminophen 5-325 MG Oral Tab Take 1 tablet by mouth every 6 (six) hours as needed. 20 tablet 0    aspirin-acetaminophen-caffeine 250-250-65 MG Oral Tab Take 1 tablet by mouth every 6 (six) hours as needed for Pain.      Glucose Blood (ONETOUCH VERIO) In Vitro Strip Use daily 100 strip 3    OneTouch Delica Lancets 33G Does not apply Misc Use daily 100 each 3    empagliflozin (JARDIANCE) 10 MG Oral Tab Take 1 tablet (10 mg total) by mouth daily. 30 tablet 5    glipiZIDE 10 MG Oral Tab Take 1 tablet (10 mg total) by mouth daily. Daily in the morning 90 tablet 3    atorvastatin 40 MG Oral Tab Take 1 tablet (40 mg total) by mouth daily. 90 tablet 3    atenolol 25 MG Oral Tab Take 1 tablet (25 mg total) by mouth daily. 90 tablet 3    timolol 0.5 % Ophthalmic Solution Place 1 drop into both eyes 2 (two) times daily. 15 mL 3    brimonidine 0.15 % Ophthalmic Solution Place 1 drop into both eyes in the morning and 1 drop before bedtime. 3 each 3    metFORMIN HCl 1000 MG Oral Tab Take 1 tablet (1,000 mg total) by mouth in the morning and 1 tablet (1,000 mg total) before bedtime. 180 tablet 3    Omeprazole 20 MG Oral Tab EC Take 20 mg by mouth daily. 90 tablet 3    amLODIPine 10 MG Oral Tab Take 1 tablet (10 mg total) by mouth daily. 90 tablet 3    Potassium Chloride ER 10 MEQ Oral Tab CR Take 1 tablet (10 mEq total) by mouth daily. 90 tablet 3    aspirin 81 MG Oral Tab EC Take 1 tablet (81 mg total) by mouth daily.      Multiple Vitamins-Minerals (CENTRUM SILVER ADULT 50+ OR) Take by mouth daily.       Ascorbic Acid (VITAMIN C OR) Take by mouth.         Allergies:  Allergies   Allergen Reactions    Lisinopril ANGIOEDEMA and OTHER (SEE COMMENTS)     Lip swelling       ROS:       PHYSICAL EXAM:     Base Eye Exam       Visual Acuity (Snellen - Linear)         Right Left    Dist cc 20/50 20/40 +1    Dist ph cc 20/40 NI      Correction: Glasses              Tonometry (Applanation, 1:02 PM)         Right Left    Pressure 16 16              Pachymetry (10/28/2023)         Right Left    Thickness 554/-1 542/0              Pupils         Pupils    Right PERRL    Left PERRL                  Slit Lamp and Fundus Exam       Slit Lamp Exam         Right Left    Lids/Lashes Dermatochalasis, Meibomian gland dysfunction Dermatochalasis, Meibomian gland dysfunction    Conjunctiva/Sclera Nasal pinguecula Nasal pinguecula    Cornea no krukenberg's spindle, sub epi haze no krukenberg's spindle, sub epi haze    Anterior Chamber Deep and quiet Deep and quiet    Iris No transillumination defects No transillumination defects    Lens PC IOL with YAG PC IOL with YAG              Fundus Exam         Right Left    Disc Sloping margin, Temporal crescent Sloping margin, Temporal crescent    C/D Ratio 0.9 0.9                  Refraction       Wearing Rx         Sphere Cylinder Axis Add    Right -1.75 +2.25 005 +2.75    Left -2.00 +2.50 165 +2.75      Type: Progressive bifocal              Manifest Refraction    Patient would like a copy of last Rx             Final Rx         Sphere Cylinder Axis Add    Right -1.75 +2.00 005 +3.00    Left -1.75 +2.50 165 +3.00      Type: Progressive bifocal                     ASSESSMENT/PLAN:     Diagnoses and Plan:     Primary open angle glaucoma of both eyes, mild stage  IOP is stable  Continue Alphagan OU BID and Timoptic 0.5% OU BID as directed    Will see patient in 4 months for a  pressure check    No orders of the defined types were placed in this encounter.      Meds This Visit:  Requested  Prescriptions      No prescriptions requested or ordered in this encounter        Follow up instructions:  Return in about 4 months (around 7/7/2024) for IOP check.    3/7/2024  Scribed by: Balbir Ang MD

## 2024-03-07 NOTE — PATIENT INSTRUCTIONS
Primary open angle glaucoma of both eyes, mild stage  IOP is stable  Continue Alphagan OU BID and Timoptic 0.5% OU BID as directed    Will see patient in 4 months for a  pressure check

## 2024-03-25 RX ORDER — TIMOLOL MALEATE 5 MG/ML
1 SOLUTION/ DROPS OPHTHALMIC 2 TIMES DAILY
Qty: 15 ML | Refills: 3 | Status: SHIPPED | OUTPATIENT
Start: 2024-03-25

## 2024-04-09 RX ORDER — BRIMONIDINE TARTRATE 1.5 MG/ML
1 SOLUTION/ DROPS OPHTHALMIC 2 TIMES DAILY
Qty: 30 ML | Refills: 3 | OUTPATIENT
Start: 2024-04-09

## 2024-04-09 NOTE — TELEPHONE ENCOUNTER
Patient did not need refill for brimonidine.  He mistakenly called and asked for refill.  Cancelled request

## 2024-04-20 NOTE — TELEPHONE ENCOUNTER
Refill request is for a maintenance medication and has met the criteria specified in the Ambulatory Medication Refill Standing Order for eligibility, visits, laboratory, alerts and was sent to the requested pharmacy. Requested Prescriptions     Signed Prescriptions Disp Refills    atenolol 25 MG Oral Tab 90 tablet 3     Sig: Take 1 tablet (25 mg total) by mouth daily.      Authorizing Provider: Rajiv Tsang     Ordering User: Steven Abraham
PHYSICAL EXAM:  GENERAL: NAD, well-groomed, well-developed  HEAD:  Atraumatic, Normocephalic  EYES: EOMI, PERRLA, conjunctiva and sclera clear  ENMT: No tonsillar erythema, exudates, or enlargement  NECK: Supple, No JVD  NERVOUS SYSTEM:  Alert & Oriented X3, moving all extremities spontaneously. non focal exam   CHEST/LUNG: Clear to percussion bilaterally; No rales, rhonchi, wheezing, or rubs  HEART: Regular rate and rhythm; No murmurs, rubs, or gallops  ABDOMEN: Soft, Nontender, Nondistended; Bowel sounds present  EXTREMITIES:  No clubbing, cyanosis, or edema  SKIN: No rashes or lesions

## 2024-05-01 ENCOUNTER — TELEPHONE (OUTPATIENT)
Dept: INTERNAL MEDICINE CLINIC | Facility: CLINIC | Age: 84
End: 2024-05-01

## 2024-05-01 DIAGNOSIS — N18.30 STAGE 3 CHRONIC KIDNEY DISEASE, UNSPECIFIED WHETHER STAGE 3A OR 3B CKD (HCC): Primary | ICD-10-CM

## 2024-05-01 DIAGNOSIS — E11.9 TYPE 2 DIABETES MELLITUS WITHOUT COMPLICATION, WITHOUT LONG-TERM CURRENT USE OF INSULIN (HCC): ICD-10-CM

## 2024-05-01 NOTE — TELEPHONE ENCOUNTER
Labs overall look good.  However we should check a nonfasting set of labs: Chemistry panel, A1c.  Can get done anytime before our visit and we can review together on 5/13    Please kindly notify the patient

## 2024-05-01 NOTE — TELEPHONE ENCOUNTER
Patient had labs done 2/5/2024.  did you want patient to have any other labs prior to seeing you?

## 2024-05-01 NOTE — TELEPHONE ENCOUNTER
Patient called, he is scheduled to see Dr. George 5/13/2024 and would like to have any labs drawn before his appointment.      Please call patient when orders have been written

## 2024-05-08 ENCOUNTER — LAB ENCOUNTER (OUTPATIENT)
Dept: LAB | Age: 84
End: 2024-05-08
Attending: INTERNAL MEDICINE
Payer: MEDICARE

## 2024-05-08 DIAGNOSIS — N18.30 STAGE 3 CHRONIC KIDNEY DISEASE, UNSPECIFIED WHETHER STAGE 3A OR 3B CKD (HCC): ICD-10-CM

## 2024-05-08 DIAGNOSIS — E11.9 TYPE 2 DIABETES MELLITUS WITHOUT COMPLICATION, WITHOUT LONG-TERM CURRENT USE OF INSULIN (HCC): ICD-10-CM

## 2024-05-08 LAB
ANION GAP SERPL CALC-SCNC: 7 MMOL/L (ref 0–18)
BUN BLD-MCNC: 20 MG/DL (ref 9–23)
BUN/CREAT SERPL: 15.7 (ref 10–20)
CALCIUM BLD-MCNC: 8.9 MG/DL (ref 8.7–10.4)
CHLORIDE SERPL-SCNC: 107 MMOL/L (ref 98–112)
CO2 SERPL-SCNC: 26 MMOL/L (ref 21–32)
CREAT BLD-MCNC: 1.27 MG/DL
EGFRCR SERPLBLD CKD-EPI 2021: 55 ML/MIN/1.73M2 (ref 60–?)
EST. AVERAGE GLUCOSE BLD GHB EST-MCNC: 183 MG/DL (ref 68–126)
FASTING STATUS PATIENT QL REPORTED: NO
GLUCOSE BLD-MCNC: 225 MG/DL (ref 70–99)
HBA1C MFR BLD: 8 % (ref ?–5.7)
OSMOLALITY SERPL CALC.SUM OF ELEC: 300 MOSM/KG (ref 275–295)
POTASSIUM SERPL-SCNC: 4.5 MMOL/L (ref 3.5–5.1)
SODIUM SERPL-SCNC: 140 MMOL/L (ref 136–145)

## 2024-05-08 PROCEDURE — 36415 COLL VENOUS BLD VENIPUNCTURE: CPT

## 2024-05-08 PROCEDURE — 80048 BASIC METABOLIC PNL TOTAL CA: CPT

## 2024-05-08 PROCEDURE — 83036 HEMOGLOBIN GLYCOSYLATED A1C: CPT

## 2024-05-11 NOTE — PROGRESS NOTES
Chief Complaint:   Chief Complaint   Patient presents with    Follow - Up     3 month f/u          HPI:     Mr. BROOKE is a 84 year old male PMHX peripheral arterial disease, AAA, CKD stage III, type 2 diabetes, hypertension, hyperlipidemia coming in for follow-up.    Legs are still sore. The pain is about 5/10. No falls at home. He is very careful with going up and down the stairs. He is trying to do some exercise. He has been using tylenol, and needs to use it every day. He does have some back soreness at times,    He has been eating more sweets recently - jelly beans for example. Pork, beef, chicken. He sometimes PB&J, grilled cheese for breakfast. Not a lot of bread, rice. He does have some chinese    Past Medical History:    Aorta aneurysm (HCC)    Back problem    Benign essential HTN    Cataract    Chronic rhinitis    Diabetes (HCC)    DM (diabetes mellitus), type 2 (HCC)    Hearing impairment    High blood pressure    High cholesterol    Hyperlipidemia    POAG (primary open-angle glaucoma)    first visit with Dr. Ang patient has been taking Brimonidine 0.15% OU BID and Timolol 0.5% OU BID for 10 yrs per MD in [place].  10/28/23 patient to continue taking gtts per [facility] due to abnormal VF OS and abnormal OCT OU    PVD (peripheral vascular disease) (HCC)    Skin cancer    Visual impairment     Past Surgical History:   Procedure Laterality Date    Appendectomy      Cataract extraction w/  intraocular lens implant Left 04/07/2014    Done by Dr. [place] Park in [place]    Cataract extraction w/  intraocular lens implant Right 2014    Done by Dr. [place] Park in [place]    Other accessory      T and A    Other accessory      appendectomy    Other accessory      cholycystectomy    Other accessory      bilateral cataract    Other accessory      facial basal cell cancer    Tonsillectomy       Social History:  Social History     Socioeconomic History    Marital status:    Tobacco Use     Smoking status: Former     Types: Cigarettes     Passive exposure: Past    Smokeless tobacco: Never   Vaping Use    Vaping status: Never Used   Substance and Sexual Activity    Alcohol use: Yes     Alcohol/week: 2.0 standard drinks of alcohol     Types: 2 Cans of beer per week     Comment: 1 - 2 beers occasional    Drug use: Never     Family History:  Family History   Problem Relation Age of Onset    Macular degeneration Brother     Glaucoma Neg     Diabetes Neg      Allergies:  Allergies   Allergen Reactions    Lisinopril ANGIOEDEMA and OTHER (SEE COMMENTS)     Lip swelling     Current Meds:  Current Outpatient Medications   Medication Sig Dispense Refill    timolol 0.5 % Ophthalmic Solution INSTILL 1 DROP IN BOTH EYES TWICE DAILY 15 mL 3    losartan 100 MG Oral Tab Take 1 tablet (100 mg total) by mouth daily. 90 tablet 3    alendronate 70 MG Oral Tab Take 1 tablet (70 mg total) by mouth once a week. 13 tablet 3    pioglitazone 45 MG Oral Tab Take 1 tablet (45 mg total) by mouth daily. 90 tablet 3    HYDROcodone-acetaminophen 5-325 MG Oral Tab Take 1 tablet by mouth every 6 (six) hours as needed. 20 tablet 0    aspirin-acetaminophen-caffeine 250-250-65 MG Oral Tab Take 1 tablet by mouth every 6 (six) hours as needed for Pain.      Glucose Blood (ONETOUCH VERIO) In Vitro Strip Use daily 100 strip 3    OneTouch Delica Lancets 33G Does not apply Misc Use daily 100 each 3    empagliflozin (JARDIANCE) 10 MG Oral Tab Take 1 tablet (10 mg total) by mouth daily. 30 tablet 5    glipiZIDE 10 MG Oral Tab Take 1 tablet (10 mg total) by mouth daily. Daily in the morning 90 tablet 3    atorvastatin 40 MG Oral Tab Take 1 tablet (40 mg total) by mouth daily. 90 tablet 3    atenolol 25 MG Oral Tab Take 1 tablet (25 mg total) by mouth daily. 90 tablet 3    brimonidine 0.15 % Ophthalmic Solution Place 1 drop into both eyes in the morning and 1 drop before bedtime. 3 each 3    metFORMIN HCl 1000 MG Oral Tab Take 1 tablet (1,000 mg  total) by mouth in the morning and 1 tablet (1,000 mg total) before bedtime. 180 tablet 3    Omeprazole 20 MG Oral Tab EC Take 20 mg by mouth daily. 90 tablet 3    amLODIPine 10 MG Oral Tab Take 1 tablet (10 mg total) by mouth daily. 90 tablet 3    Potassium Chloride ER 10 MEQ Oral Tab CR Take 1 tablet (10 mEq total) by mouth daily. 90 tablet 3    aspirin 81 MG Oral Tab EC Take 1 tablet (81 mg total) by mouth daily.      Multiple Vitamins-Minerals (CENTRUM SILVER ADULT 50+ OR) Take by mouth daily.      Ascorbic Acid (VITAMIN C OR) Take by mouth.        Counseling given: Not Answered       REVIEW OF SYSTEMS:   Positive Findings indicated in BOLD    Constitutional: Fever, Chills, Weight Gain, Weight Loss, Night Sweats, Fatigue, Malaise  ENT/Mouth:  Hearing Changes, Ear Pain, Nasal Congestion, Sinus Pain, Hoarseness, Sore throat, Rhinorrhea, Swallowing Difficulty  Eyes: Eye Pain, Swelling, Redness, Foreign Body, Discharge, Vision Changes  Cardiovascular: Chest Pain, SOB, PND, Dyspnea on Exertion, Orthopnea, Claudication, Edema, Palpitations  Respiratory: Cough, Sputum, Wheezing, Shortness of breath  Gastrointestinal: Nausea, Vomiting, Diarrhea, Constipation, Pain, Heartburn, Dysphagia, Bloody stools, Tarry stools  Genitourinary: Dysmenorrhea, Dysuria, Urinary Frequency, Hematuria, Urinary Incontinence, Urgency,  Flank Pain  Musculoskeletal: Arthralgias, Myalgias, Joint Swelling, Joint Stiffness, Back Pain, Neck Pain  Integumentary: Skin Lesions, Pruritis, Hair Changes, Jaundice, Nail changes  Neuro: Weakness, Numbness, Paresthesias, Loss of Consciousness, Syncope, Dizziness, Headache, Falls  Psych: Anxiety, Depression, Insomnia, Suicidal Ideation, Homicidal ideation, Memory Changes  Heme/Lymph: Bruising, Bleeding, Lymphadenopathy  Endocrine: Polyuria, Polydipsia, Temperature Intolerance    EXAM:   Vital Signs:  Blood pressure 110/56, pulse 73, temperature 97 °F (36.1 °C), height 6' (1.829 m), weight 199 lb (90.3 kg),  SpO2 97%.     Constitutional: No acute distress. Alert and oriented x 3.  Eyes: EOMI, PERRLA, clear sclera b/l  HENT: NCAT, Moist mucous membranes, Oropharynx without erythema or exudates  Neck: No JVD, no thyromegaly  Cardiovascular: S1, S2, no S3, no S4, Regular rate and rhythm, No murmurs/gallops/rubs.   Vascular: Equal pulses 2+ carotids no bruits or thrills/radial/DP/PT bilaterally  Respiratory: Clear to auscultation bilaterally.  No wheezes/rales/rhonchi  Gastrointestinal: Soft, nontender, nondistended. Positive bowel sounds x 4. No rebound tenderness. No hepatomegaly, No splenomegaly  Genitourinary: No CVA tenderness bilaterally  Neurologic: No focal neurological deficits, CN II-XII intact, light touch intact, MSK Strength 5/5 and symmetric in all extremities, normal gait, 2+ patellar tendon  Musculoskeletal: Full range of motion of all extremities, no clubbing/swelling/edema  Skin: No lesions, No erythema, no jaundice, Cap Refill < 2s  Psychiatric: Appropriate mood and affect  Heme/Lymph/Immune: No cervical LAD          DATA REVIEWED   Labs:  Recent Results (from the past 8760 hour(s))   Basic Metabolic Panel (8)    Collection Time: 05/08/24 11:55 AM   Result Value Ref Range    Glucose 225 (H) 70 - 99 mg/dL    Sodium 140 136 - 145 mmol/L    Potassium 4.5 3.5 - 5.1 mmol/L    Chloride 107 98 - 112 mmol/L    CO2 26.0 21.0 - 32.0 mmol/L    Anion Gap 7 0 - 18 mmol/L    BUN 20 9 - 23 mg/dL    Creatinine 1.27 0.70 - 1.30 mg/dL    BUN/CREA Ratio 15.7 10.0 - 20.0    Calcium, Total 8.9 8.7 - 10.4 mg/dL    Calculated Osmolality 300 (H) 275 - 295 mOsm/kg    eGFR-Cr 55 (L) >=60 mL/min/1.73m2    Patient Fasting for BMP? No      *Note: Due to a large number of results and/or encounters for the requested time period, some results have not been displayed. A complete set of results can be found in Results Review.       Recent Results (from the past 8760 hour(s))   CBC W/ DIFFERENTIAL    Collection Time: 02/05/24  8:49 AM    Result Value Ref Range    WBC 5.8 4.0 - 11.0 x10(3) uL    RBC 4.54 3.80 - 5.80 x10(6)uL    HGB 14.2 13.0 - 17.5 g/dL    HCT 43.2 39.0 - 53.0 %    MCV 95.2 80.0 - 100.0 fL    MCH 31.3 26.0 - 34.0 pg    MCHC 32.9 31.0 - 37.0 g/dL    RDW-SD 44.6 35.1 - 46.3 fL    RDW 12.8 11.0 - 15.0 %    .0 150.0 - 450.0 10(3)uL    Neutrophil Absolute Prelim 3.94 1.50 - 7.70 x10 (3) uL    Neutrophil Absolute 3.94 1.50 - 7.70 x10(3) uL    Lymphocyte Absolute 1.20 1.00 - 4.00 x10(3) uL    Monocyte Absolute 0.47 0.10 - 1.00 x10(3) uL    Eosinophil Absolute 0.13 0.00 - 0.70 x10(3) uL    Basophil Absolute 0.04 0.00 - 0.20 x10(3) uL    Immature Granulocyte Absolute 0.01 0.00 - 1.00 x10(3) uL    Neutrophil % 68.1 %    Lymphocyte % 20.7 %    Monocyte % 8.1 %    Eosinophil % 2.2 %    Basophil % 0.7 %    Immature Granulocyte % 0.2 %     *Note: Due to a large number of results and/or encounters for the requested time period, some results have not been displayed. A complete set of results can be found in Results Review.       CTA abdomen pelvis 9/21/2023  Impression   CONCLUSION:     Infrarenal abdominal aortic aneurysm measuring 50 x 52 millimeter     Right lower extremity:  Diffuse severe stenosis throughout the mid to distal SFA due to calcified plaque.  Severe three-vessel runoff disease with only the peroneal artery showing opacification throughout     Left lower extremity:  Multifocal severe stenosis in the CFA .  Severe stenosis at the origin of the SFA due to calcified plaque.  Multifocal areas of moderate to severe stenosis throughout the rest of the SFA.  Severe three-vessel runoff disease.    Opacification throughout the peroneal artery.  The posterior tibial artery is likely opacified near the ankle.        Chest x-ray 12/22/2023    Impression   CONCLUSION:  1. No acute airspace disease.  Asymmetric mild biapical pleural thickening left more than right.  If any old films are available they should be submitted for comparison.   If not, then follow-up studies are advised.  2. Suggestion of mild hyperinflation which may suggest some degree of COPD.       ASSESSMENT AND PLAN:       Dyspnea on exertion  Likely multifactorial in setting of allergic rhinitis, there does seem to be some degree of COPD/bibasilar scarring right greater than left on chest x-ray 12/2023  - Did have a normal Lexiscan stress test 11/2023  - Start Symbicort 2 puffs twice a day  - Continue with management of allergic rhinitis as below    Low back pain  Chronic, recurring issue.  Has not worsened.  However constant 5/10 pain.  May be limiting his usual physical activities  -Would recommend initial trial of conservative therapy:    -Acetaminophen 500-650 mg every 4-6 hours as needed for pain relief  - Did complete physical therapy, continue with home stretches and exercises  - Referral provided for Dr. Garcia in the event he is a candidate for epidural steroid injection.       Peripheral arterial disease  Abdominal aortic aneurysm  Noted ongoing claudication.  Outpatient CT scan with AAA 50 x 52 mm as an outpatient.  Status post endovascular aortic repair, left femoral endarterectomy Dr. Shabazz of  and Dr. Chu cardiovascular surgery 12/22  - Seems to be recovering well, tolerating diet  - Pain well-controlled  - Resumed on aspirin 81 mg  -Seen in vascular surgery clinic 1/9/2024, due for repeat CTA of the abdomen pelvis for positioning and endoleak.  Repeat ultrasound in 6 months     CKD stage III  - Baseline seems to be 1.2-1.4  - Creatinine most recently 1.45, EGFR 47 and stable     Type 2 diabetes  Recent A1c:   HEMOGLOBIN A1C (%)   Date Value   09/19/2023 7.6 (A)     HgbA1C (%)   Date Value   05/08/2024 8.0 (H)     Recent urine microalbumin: No components found for: \"MICROALB/CREAT RATIO\"  Current medications:  glipizide 10 mg daily, Jardiance 10 mg daily, pioglitazone 45 mg daily, metformin 1000 mg twice a day  Eye exam: Continue following with ophthalmology -  UTD with Dr. Ang  Foot exam: Check feet daily  - His preference is to monitor sugars off jardiance given cost.  -Increase in A1c, needs to decrease carbohydrate intake.     Hypertension  -Blood pressure today at goal  - Check blood pressures at home  - Continue with home atenolol 25 mg daily, Cozaar 100 mg daily     Hyperlipidemia  -Cholesterol overall well-controlled LDL 56, total cholesterol 150  - Continue with atorvastatin 40 mg daily, aspirin     Allergic rhinitis  - Trial of flonase 1 spray each nostril until symptoms improve           Orders This Visit:  No orders of the defined types were placed in this encounter.      Meds This Visit:  Requested Prescriptions      No prescriptions requested or ordered in this encounter       Imaging & Referrals:  None     Health Maintenance  Due for shingles vaccine series, COVID series    Spent 30 minutes obtaining history, evaluating patient, discussing treatment options, diet, exercise, review of available labs and radiology reports, and completing documentation.     Return to clinic in 4-6 months for follow-up    Dick George MD, 05/13/24, 11:35 AM

## 2024-05-11 NOTE — PATIENT INSTRUCTIONS
You are seen in clinic today for follow-up of diabetes.  There has been increase of your sugar levels for which we should try to cut down on carbohydrates not just in sweets but also grain/rice/bread  - Periodically check your blood sugars at home  - Periodically check your blood pressures at home  - Lets plan to repeat your numbers in 3-4 months  - Your eye exams up-to-date for the year with Dr. Ang - discuss with Ophthalmology if AREDS eye vitamins are appropriate form your last exam    We did review your shortness of breath, you have had a recent stress test that came back normal  - There may be some scarring in the chest x-ray from December  - We recommend Symbicort 2 puffs twice a day to improve the shortness of breath  - This will hopefully improve the exercise capabilities throughout the daytime    Continue with home stretches and exercises of the low back and the legs.  - Lets have you see Dr. Garcia of physiatry.  In some situations, steroid back injections may help long-term to improve pain symptoms  -Would recommend initial trial of conservative therapy:    -Acetaminophen 500-650 mg every 4-6 hours as needed for pain relief  - Did complete physical therapy, continue with home stretches and exercises    Continue following with vascular surgery for monitoring of the aortic aneurysm    Return to clinic in 4 months for diabetic follow-up.

## 2024-05-13 ENCOUNTER — OFFICE VISIT (OUTPATIENT)
Dept: INTERNAL MEDICINE CLINIC | Facility: CLINIC | Age: 84
End: 2024-05-13
Payer: COMMERCIAL

## 2024-05-13 VITALS
OXYGEN SATURATION: 97 % | WEIGHT: 199 LBS | SYSTOLIC BLOOD PRESSURE: 110 MMHG | HEART RATE: 73 BPM | BODY MASS INDEX: 26.95 KG/M2 | DIASTOLIC BLOOD PRESSURE: 56 MMHG | TEMPERATURE: 97 F | HEIGHT: 72 IN

## 2024-05-13 DIAGNOSIS — E11.9 TYPE 2 DIABETES MELLITUS WITHOUT COMPLICATION, WITHOUT LONG-TERM CURRENT USE OF INSULIN (HCC): Primary | ICD-10-CM

## 2024-05-13 DIAGNOSIS — I73.9 PERIPHERAL VASCULAR DISEASE (HCC): ICD-10-CM

## 2024-05-13 DIAGNOSIS — I71.40 ABDOMINAL AORTIC ANEURYSM (AAA) WITHOUT RUPTURE, UNSPECIFIED PART (HCC): ICD-10-CM

## 2024-05-13 DIAGNOSIS — G89.29 CHRONIC MIDLINE LOW BACK PAIN WITHOUT SCIATICA: ICD-10-CM

## 2024-05-13 DIAGNOSIS — I10 BENIGN ESSENTIAL HTN: ICD-10-CM

## 2024-05-13 DIAGNOSIS — E78.5 HYPERLIPIDEMIA, UNSPECIFIED HYPERLIPIDEMIA TYPE: ICD-10-CM

## 2024-05-13 DIAGNOSIS — N18.30 STAGE 3 CHRONIC KIDNEY DISEASE, UNSPECIFIED WHETHER STAGE 3A OR 3B CKD (HCC): ICD-10-CM

## 2024-05-13 DIAGNOSIS — J31.0 CHRONIC RHINITIS: ICD-10-CM

## 2024-05-13 DIAGNOSIS — M54.50 CHRONIC MIDLINE LOW BACK PAIN WITHOUT SCIATICA: ICD-10-CM

## 2024-05-13 PROCEDURE — 3074F SYST BP LT 130 MM HG: CPT | Performed by: INTERNAL MEDICINE

## 2024-05-13 PROCEDURE — 3078F DIAST BP <80 MM HG: CPT | Performed by: INTERNAL MEDICINE

## 2024-05-13 PROCEDURE — 1125F AMNT PAIN NOTED PAIN PRSNT: CPT | Performed by: INTERNAL MEDICINE

## 2024-05-13 PROCEDURE — 1159F MED LIST DOCD IN RCRD: CPT | Performed by: INTERNAL MEDICINE

## 2024-05-13 PROCEDURE — 99214 OFFICE O/P EST MOD 30 MIN: CPT | Performed by: INTERNAL MEDICINE

## 2024-05-13 PROCEDURE — 1160F RVW MEDS BY RX/DR IN RCRD: CPT | Performed by: INTERNAL MEDICINE

## 2024-05-13 PROCEDURE — 3008F BODY MASS INDEX DOCD: CPT | Performed by: INTERNAL MEDICINE

## 2024-05-13 RX ORDER — BUDESONIDE AND FORMOTEROL FUMARATE DIHYDRATE 80; 4.5 UG/1; UG/1
2 AEROSOL RESPIRATORY (INHALATION) 2 TIMES DAILY
Qty: 1 EACH | Refills: 3 | Status: SHIPPED | OUTPATIENT
Start: 2024-05-13

## 2024-05-31 RX ORDER — TIMOLOL MALEATE 5 MG/ML
1 SOLUTION/ DROPS OPHTHALMIC 2 TIMES DAILY
Qty: 15 ML | Refills: 3 | Status: SHIPPED | OUTPATIENT
Start: 2024-05-31

## 2024-05-31 RX ORDER — BRIMONIDINE TARTRATE 1.5 MG/ML
1 SOLUTION/ DROPS OPHTHALMIC 2 TIMES DAILY
Qty: 3 EACH | Refills: 3 | Status: SHIPPED | OUTPATIENT
Start: 2024-05-31

## 2024-06-05 RX ORDER — ATENOLOL 25 MG/1
25 TABLET ORAL DAILY
Qty: 90 TABLET | Refills: 3 | OUTPATIENT
Start: 2024-06-05

## 2024-06-05 NOTE — TELEPHONE ENCOUNTER
Current refill request refused due to refill is either a duplicate request or has active refills at the pharmacy.  Check previous templates.    Requested Prescriptions     Refused Prescriptions Disp Refills    atenolol 25 MG Oral Tab 90 tablet 3     Sig: Take 1 tablet (25 mg total) by mouth daily.     Refused By: OMAYRA PHILLIPS     Reason for Refusal: Patient has requested refill too soon      Patient has active refills.  Last refill 10/17/23 90 tabs with 3 refills

## 2024-06-06 NOTE — TELEPHONE ENCOUNTER
Patient called and said [facility] is call saying they do not have order  for any refill for Atenolol? Patient is confused because order is for 1 year supply??Please call patient and advise

## 2024-06-07 RX ORDER — ATENOLOL 25 MG/1
25 TABLET ORAL DAILY
Qty: 90 TABLET | Refills: 3 | Status: SHIPPED | OUTPATIENT
Start: 2024-06-07

## 2024-06-14 ENCOUNTER — OFFICE VISIT (OUTPATIENT)
Dept: PODIATRY CLINIC | Facility: CLINIC | Age: 84
End: 2024-06-14
Payer: COMMERCIAL

## 2024-06-14 DIAGNOSIS — R26.81 GAIT INSTABILITY: ICD-10-CM

## 2024-06-14 DIAGNOSIS — I73.9 PERIPHERAL VASCULAR DISEASE (HCC): ICD-10-CM

## 2024-06-14 DIAGNOSIS — E11.42 TYPE 2 DIABETES MELLITUS WITH POLYNEUROPATHY (HCC): Primary | ICD-10-CM

## 2024-06-14 DIAGNOSIS — B35.1 ONYCHOMYCOSIS: ICD-10-CM

## 2024-06-14 PROCEDURE — 99213 OFFICE O/P EST LOW 20 MIN: CPT | Performed by: PODIATRIST

## 2024-06-14 PROCEDURE — 1159F MED LIST DOCD IN RCRD: CPT | Performed by: PODIATRIST

## 2024-06-14 NOTE — PROGRESS NOTES
[facility]  Progress Note    Darin Hernandez is a 84 year old male.   Chief Complaint   Patient presents with    Diabetic Foot Care     Nail care and foot check - - A1C 8.0 on 5/8         HPI:     This is a pleasant male with type 2 DM, PMH of PAD and AAA, glaucoma.  He does have PMH of tobacco use x 30 years but is no longer smoking.  He does use a walker/cane for ambulation.  He does have some tingling to his feet.    He presents to clinic today for diabetic foot care.        Allergies: Lisinopril   Current Outpatient Medications   Medication Sig Dispense Refill    atenolol 25 MG Oral Tab Take 1 tablet (25 mg total) by mouth daily. 90 tablet 3    BRIMONIDINE 0.15 % Ophthalmic Solution INSTILL 1 DROP INTO BOTH EYES IN THE MORNING AND 1 DROP BEFORE  BEDTIME 3 each 3    TIMOLOL 0.5 % Ophthalmic Solution INSTILL 1 DROP INTO BOTH EYES  TWICE DAILY 15 mL 3    Budesonide-Formoterol Fumarate 80-4.5 MCG/ACT Inhalation Aerosol Inhale 2 puffs into the lungs 2 (two) times daily. 1 each 3    losartan 100 MG Oral Tab Take 1 tablet (100 mg total) by mouth daily. 90 tablet 3    alendronate 70 MG Oral Tab Take 1 tablet (70 mg total) by mouth once a week. 13 tablet 3    pioglitazone 45 MG Oral Tab Take 1 tablet (45 mg total) by mouth daily. 90 tablet 3    HYDROcodone-acetaminophen 5-325 MG Oral Tab Take 1 tablet by mouth every 6 (six) hours as needed. 20 tablet 0    aspirin-acetaminophen-caffeine 250-250-65 MG Oral Tab Take 1 tablet by mouth every 6 (six) hours as needed for Pain.      Glucose Blood (ONETOUCH VERIO) In Vitro Strip Use daily 100 strip 3    OneTouch Delica Lancets 33G Does not apply Misc Use daily 100 each 3    empagliflozin (JARDIANCE) 10 MG Oral Tab Take 1 tablet (10 mg total) by mouth daily. 30 tablet 5    glipiZIDE 10 MG Oral Tab Take 1 tablet (10 mg total) by mouth daily. Daily in the morning 90 tablet 3    atorvastatin 40 MG Oral Tab Take 1 tablet (40 mg total) by mouth daily. 90 tablet  3    metFORMIN HCl 1000 MG Oral Tab Take 1 tablet (1,000 mg total) by mouth in the morning and 1 tablet (1,000 mg total) before bedtime. 180 tablet 3    Omeprazole 20 MG Oral Tab EC Take 20 mg by mouth daily. 90 tablet 3    amLODIPine 10 MG Oral Tab Take 1 tablet (10 mg total) by mouth daily. 90 tablet 3    Potassium Chloride ER 10 MEQ Oral Tab CR Take 1 tablet (10 mEq total) by mouth daily. 90 tablet 3    aspirin 81 MG Oral Tab EC Take 1 tablet (81 mg total) by mouth daily.      Multiple Vitamins-Minerals (CENTRUM SILVER ADULT 50+ OR) Take by mouth daily.      Ascorbic Acid (VITAMIN C OR) Take by mouth.        Past Medical History:    Aorta aneurysm (HCC)    Back problem    Benign essential HTN    Cataract    Chronic rhinitis    Diabetes (HCC)    DM (diabetes mellitus), type 2 ([facility])    Hearing impairment    High blood pressure    High cholesterol    Hyperlipidemia    POAG (primary open-angle glaucoma)    first visit with Dr. Ang patient has been taking Brimonidine 0.15% OU BID and Timolol 0.5% OU BID for 10 yrs per MD in [place].  10/28/23 patient to continue taking gtts per [facility] due to abnormal VF OS and abnormal OCT OU    PVD (peripheral vascular disease) ([facility])    Skin cancer    Visual impairment      Past Surgical History:   Procedure Laterality Date    Appendectomy      Cataract extraction w/  intraocular lens implant Left 04/07/2014    Done by Dr. [place] Park in [place]    Cataract extraction w/  intraocular lens implant Right 2014    Done by Dr. [place] in [place]    Other accessory      T and A    Other accessory      appendectomy    Other accessory      cholycystectomy    Other accessory      bilateral cataract    Other accessory      facial basal cell cancer    Tonsillectomy        Family History   Problem Relation Age of Onset    Macular degeneration Brother     Glaucoma Neg     Diabetes Neg       Social History     Socioeconomic History    Marital status:    Tobacco  Use    Smoking status: Former     Types: Cigarettes     Passive exposure: Past    Smokeless tobacco: Never   Vaping Use    Vaping status: Never Used   Substance and Sexual Activity    Alcohol use: Yes     Alcohol/week: 2.0 standard drinks of alcohol     Types: 2 Cans of beer per week     Comment: 1 - 2 beers occasional    Drug use: Never           REVIEW OF SYSTEMS:   Denies nausea, fever, chills  No calf pain  No other muscle or joint aches  Denies chest pain or shortness of breath.      EXAM:   There were no vitals taken for this visit.    Constitutional:   Patient in no apparent distress. Well kept. Of normal body habitus. Alert and oriented to person, place, and time.  Vascular Examination:  DP pulse is NP  PT pulse is NP  Capillary refill is adequate  Integumentary Examination:   The patient's nails appear incurvated, thickened, elongated, dystrophic, discolored with subungual debris 1-5 right, 1-5  left nails.  Digital hair growth is absent  Skin is of diminished texture and decreased turgor.  Neurological Examination:  Monofilament (10-g) sensation is 4/5 to right and 3/5 to left.  Sharp/dull is present to right and is present to left.  Parasthesias present  Musculoskeletal Examination:  Muscle Strength is 5/5.      LABS & IMAGING:     Lab Results   Component Value Date     (H) 05/08/2024    BUN 20 05/08/2024    CREATSERUM 1.27 05/08/2024    BUNCREA 15.7 05/08/2024    ANIONGAP 7 05/08/2024    CA 8.9 05/08/2024     05/08/2024    K 4.5 05/08/2024     05/08/2024    CO2 26.0 05/08/2024    OSMOCALC 300 (H) 05/08/2024        Lab Results   Component Value Date     (H) 05/08/2024    A1C 8.0 (H) 05/08/2024        No results found.     ASSESSMENT AND PLAN:   Diagnoses and all orders for this visit:    Type 2 diabetes mellitus with polyneuropathy (HCC)    Peripheral vascular disease (HCC)    Gait instability    Onychomycosis                Plan:     Diabetic education and instructions have been  provided. We reviewed and discussed the following:    -risk categories related to pts with diabetes and foot or lower extremity complications per ADA.    -adherence to medication regimen and close monitoring or blood sugar control.   -daily monitoring/inspection of feet and shoes.   -proper management of diet and weight   -regular follow up with PCP and specialty providers as recommended   -Lower extremity complications related to DM were reviewed and stressed prevention.      Evaluated the patient. Discussed treatment options with the patient.  Discussed with patient proper care and hygiene for their feet.  Patient tolerated procedures well, without incident.    Instrumentation used includes nail nippers and electric  where appropriate.  Procedure: (60245 Debridement of toenails 6-10) Surgically debrided and mechanically reduced 6 or more toenails.Hemmorhage occurred none.Nails that were debrided appeared dystrophic and caused the patient pain in shoe gear.Nails 5 Left & 5 Right.           Dr. Chu did perform left femoral endarterectomy and endovascular aortic repair due to AAA.    Cont wearing DM shoes with inserts    RTC 4 months for DFC    No follow-ups on file.    Archie Diaz DPM  6/14/24

## 2024-06-21 RX ORDER — AMLODIPINE BESYLATE 10 MG/1
10 TABLET ORAL DAILY
Qty: 90 TABLET | Refills: 3 | Status: CANCELLED | OUTPATIENT
Start: 2024-06-21

## 2024-06-21 NOTE — TELEPHONE ENCOUNTER
Requested too soon    Current refill request refused due to refill is either a duplicate request or has active refills at the pharmacy.  Check previous templates.    Requested Prescriptions     Pending Prescriptions Disp Refills    amLODIPine 10 MG Oral Tab 90 tablet 3     Sig: Take 1 tablet (10 mg total) by mouth daily.

## 2024-06-25 ENCOUNTER — TELEPHONE (OUTPATIENT)
Dept: INTERNAL MEDICINE CLINIC | Facility: CLINIC | Age: 84
End: 2024-06-25

## 2024-06-25 NOTE — TELEPHONE ENCOUNTER
Patient contacted and relayed 's message below. Patient is not sure if it is really helping because he states that he has not been very active but he will pick-up a refill and continue taking it. Patient states that he has refills remaining at [facility].

## 2024-06-25 NOTE — TELEPHONE ENCOUNTER
To Dr George  Please Advise      Patient is calling and states he takes the prescription for   Symbicort.  Patient states he really does not know why he is taking it and what it is for.  Patient states he is do for a refill but before he refills it he wants to know if he really needs to take it.    History of CKD, DM, and PAD

## 2024-06-25 NOTE — TELEPHONE ENCOUNTER
We are trying the Symbicort for his shortness of breath on exertion.  If he feels like it is helping him, we should continue and we can refill it for him.  If no improvement, then we can consider coming off of the medication.

## 2024-06-25 NOTE — TELEPHONE ENCOUNTER
Patient is calling and states he takes the prescription for   Symbicort.  Patient states he really does not know why he is taking it and what it is for.  Patient states he is do for a refill but before he refills it he wants to know if he really needs to take it.    Please call and advise

## 2024-07-12 ENCOUNTER — HOSPITAL ENCOUNTER (INPATIENT)
Facility: HOSPITAL | Age: 84
LOS: 2 days | Discharge: HOME | End: 2024-07-14
Attending: EMERGENCY MEDICINE | Admitting: INTERNAL MEDICINE
Payer: MEDICARE

## 2024-07-12 ENCOUNTER — HOSPITAL ENCOUNTER (OUTPATIENT)
Age: 84
Discharge: ANOTHER HOSPITAL | End: 2024-07-12
Payer: MEDICARE

## 2024-07-12 ENCOUNTER — OFFICE VISIT (OUTPATIENT)
Dept: INTERNAL MEDICINE CLINIC | Facility: CLINIC | Age: 84
End: 2024-07-12

## 2024-07-12 VITALS
SYSTOLIC BLOOD PRESSURE: 88 MMHG | DIASTOLIC BLOOD PRESSURE: 40 MMHG | HEART RATE: 74 BPM | OXYGEN SATURATION: 98 % | TEMPERATURE: 99 F | HEIGHT: 72 IN | WEIGHT: 195 LBS | BODY MASS INDEX: 26.41 KG/M2

## 2024-07-12 VITALS
HEART RATE: 73 BPM | TEMPERATURE: 95 F | DIASTOLIC BLOOD PRESSURE: 59 MMHG | RESPIRATION RATE: 20 BRPM | OXYGEN SATURATION: 98 % | SYSTOLIC BLOOD PRESSURE: 124 MMHG | HEIGHT: 74 IN | WEIGHT: 195 LBS | BODY MASS INDEX: 25.03 KG/M2

## 2024-07-12 DIAGNOSIS — A41.9 SEPSIS DUE TO URINARY TRACT INFECTION (HCC): ICD-10-CM

## 2024-07-12 DIAGNOSIS — I95.9 HYPOTENSION, UNSPECIFIED HYPOTENSION TYPE: ICD-10-CM

## 2024-07-12 DIAGNOSIS — H61.21 IMPACTED CERUMEN OF RIGHT EAR: Primary | ICD-10-CM

## 2024-07-12 DIAGNOSIS — N39.0 SEPSIS DUE TO URINARY TRACT INFECTION (HCC): ICD-10-CM

## 2024-07-12 DIAGNOSIS — N17.9 ACUTE KIDNEY INJURY (HCC): Primary | ICD-10-CM

## 2024-07-12 DIAGNOSIS — E87.20 LACTIC ACIDOSIS: ICD-10-CM

## 2024-07-12 DIAGNOSIS — T68.XXXA HYPOTHERMIA, INITIAL ENCOUNTER: Primary | ICD-10-CM

## 2024-07-12 DIAGNOSIS — R30.0 DYSURIA: ICD-10-CM

## 2024-07-12 DIAGNOSIS — R53.83 OTHER FATIGUE: ICD-10-CM

## 2024-07-12 LAB
ANION GAP SERPL CALC-SCNC: 8 MMOL/L (ref 0–18)
BASOPHILS # BLD AUTO: 0.03 X10(3) UL (ref 0–0.2)
BASOPHILS NFR BLD AUTO: 0.4 %
BILIRUB UR QL: NEGATIVE
BUN BLD-MCNC: 31 MG/DL (ref 9–23)
BUN/CREAT SERPL: 18.7 (ref 10–20)
CALCIUM BLD-MCNC: 9.1 MG/DL (ref 8.7–10.4)
CHLORIDE SERPL-SCNC: 110 MMOL/L (ref 98–112)
CO2 SERPL-SCNC: 25 MMOL/L (ref 21–32)
COLOR UR: YELLOW
CREAT BLD-MCNC: 1.66 MG/DL
DEPRECATED RDW RBC AUTO: 50.4 FL (ref 35.1–46.3)
EGFRCR SERPLBLD CKD-EPI 2021: 40 ML/MIN/1.73M2 (ref 60–?)
EOSINOPHIL # BLD AUTO: 0.16 X10(3) UL (ref 0–0.7)
EOSINOPHIL NFR BLD AUTO: 1.9 %
ERYTHROCYTE [DISTWIDTH] IN BLOOD BY AUTOMATED COUNT: 13.8 % (ref 11–15)
GLUCOSE BLD-MCNC: 227 MG/DL (ref 70–99)
GLUCOSE BLDC GLUCOMTR-MCNC: 175 MG/DL (ref 70–99)
GLUCOSE BLDC GLUCOMTR-MCNC: 91 MG/DL (ref 70–99)
GLUCOSE UR-MCNC: NORMAL MG/DL
HCT VFR BLD AUTO: 40.6 %
HGB BLD-MCNC: 13.2 G/DL
HGB UR QL STRIP.AUTO: NEGATIVE
HYALINE CASTS #/AREA URNS AUTO: PRESENT /LPF
IMM GRANULOCYTES # BLD AUTO: 0.03 X10(3) UL (ref 0–1)
IMM GRANULOCYTES NFR BLD: 0.4 %
KETONES UR-MCNC: NEGATIVE MG/DL
LACTATE SERPL-SCNC: 1.1 MMOL/L (ref 0.5–2)
LACTATE SERPL-SCNC: 2.2 MMOL/L (ref 0.5–2)
LEUKOCYTE ESTERASE UR QL STRIP.AUTO: 75
LYMPHOCYTES # BLD AUTO: 1.49 X10(3) UL (ref 1–4)
LYMPHOCYTES NFR BLD AUTO: 18.1 %
MCH RBC QN AUTO: 32 PG (ref 26–34)
MCHC RBC AUTO-ENTMCNC: 32.5 G/DL (ref 31–37)
MCV RBC AUTO: 98.5 FL
MONOCYTES # BLD AUTO: 0.58 X10(3) UL (ref 0.1–1)
MONOCYTES NFR BLD AUTO: 7.1 %
NEUTROPHILS # BLD AUTO: 5.93 X10 (3) UL (ref 1.5–7.7)
NEUTROPHILS # BLD AUTO: 5.93 X10(3) UL (ref 1.5–7.7)
NEUTROPHILS NFR BLD AUTO: 72.1 %
NITRITE UR QL STRIP.AUTO: NEGATIVE
OSMOLALITY SERPL CALC.SUM OF ELEC: 310 MOSM/KG (ref 275–295)
PH UR: 5.5 [PH] (ref 5–8)
PLATELET # BLD AUTO: 252 10(3)UL (ref 150–450)
POTASSIUM SERPL-SCNC: 4.3 MMOL/L (ref 3.5–5.1)
PROT UR-MCNC: 20 MG/DL
RBC # BLD AUTO: 4.12 X10(6)UL
SODIUM SERPL-SCNC: 143 MMOL/L (ref 136–145)
SP GR UR STRIP: 1.02 (ref 1–1.03)
UROBILINOGEN UR STRIP-ACNC: 2
WBC # BLD AUTO: 8.2 X10(3) UL (ref 4–11)

## 2024-07-12 PROCEDURE — 3074F SYST BP LT 130 MM HG: CPT | Performed by: INTERNAL MEDICINE

## 2024-07-12 PROCEDURE — 99215 OFFICE O/P EST HI 40 MIN: CPT | Performed by: PHYSICIAN ASSISTANT

## 2024-07-12 PROCEDURE — 1126F AMNT PAIN NOTED NONE PRSNT: CPT | Performed by: INTERNAL MEDICINE

## 2024-07-12 PROCEDURE — 3078F DIAST BP <80 MM HG: CPT | Performed by: INTERNAL MEDICINE

## 2024-07-12 PROCEDURE — 3008F BODY MASS INDEX DOCD: CPT | Performed by: INTERNAL MEDICINE

## 2024-07-12 PROCEDURE — 99214 OFFICE O/P EST MOD 30 MIN: CPT | Performed by: INTERNAL MEDICINE

## 2024-07-12 RX ORDER — BRIMONIDINE TARTRATE 2 MG/ML
1 SOLUTION/ DROPS OPHTHALMIC 2 TIMES DAILY
Status: DISCONTINUED | OUTPATIENT
Start: 2024-07-12 | End: 2024-07-14

## 2024-07-12 RX ORDER — ATENOLOL 25 MG/1
25 TABLET ORAL DAILY
Status: DISCONTINUED | OUTPATIENT
Start: 2024-07-12 | End: 2024-07-14

## 2024-07-12 RX ORDER — LOSARTAN POTASSIUM 100 MG/1
100 TABLET ORAL DAILY
Status: DISCONTINUED | OUTPATIENT
Start: 2024-07-12 | End: 2024-07-14

## 2024-07-12 RX ORDER — PANTOPRAZOLE SODIUM 20 MG/1
20 TABLET, DELAYED RELEASE ORAL DAILY
Status: DISCONTINUED | OUTPATIENT
Start: 2024-07-12 | End: 2024-07-14

## 2024-07-12 RX ORDER — HEPARIN SODIUM 5000 [USP'U]/ML
5000 INJECTION, SOLUTION INTRAVENOUS; SUBCUTANEOUS EVERY 12 HOURS SCHEDULED
Status: DISCONTINUED | OUTPATIENT
Start: 2024-07-12 | End: 2024-07-14

## 2024-07-12 RX ORDER — NICOTINE POLACRILEX 4 MG
30 LOZENGE BUCCAL
Status: DISCONTINUED | OUTPATIENT
Start: 2024-07-12 | End: 2024-07-14

## 2024-07-12 RX ORDER — SODIUM CHLORIDE 9 MG/ML
INJECTION, SOLUTION INTRAVENOUS CONTINUOUS
Status: DISCONTINUED | OUTPATIENT
Start: 2024-07-12 | End: 2024-07-14

## 2024-07-12 RX ORDER — TIMOLOL MALEATE 5 MG/ML
1 SOLUTION/ DROPS OPHTHALMIC 2 TIMES DAILY
Status: DISCONTINUED | OUTPATIENT
Start: 2024-07-12 | End: 2024-07-14

## 2024-07-12 RX ORDER — FLUTICASONE FUROATE AND VILANTEROL 100; 25 UG/1; UG/1
1 POWDER RESPIRATORY (INHALATION) DAILY
Status: DISCONTINUED | OUTPATIENT
Start: 2024-07-12 | End: 2024-07-14

## 2024-07-12 RX ORDER — AMLODIPINE BESYLATE 10 MG/1
10 TABLET ORAL DAILY
Status: DISCONTINUED | OUTPATIENT
Start: 2024-07-12 | End: 2024-07-14

## 2024-07-12 RX ORDER — NICOTINE POLACRILEX 4 MG
15 LOZENGE BUCCAL
Status: DISCONTINUED | OUTPATIENT
Start: 2024-07-12 | End: 2024-07-14

## 2024-07-12 RX ORDER — ATORVASTATIN CALCIUM 40 MG/1
40 TABLET, FILM COATED ORAL DAILY
Status: DISCONTINUED | OUTPATIENT
Start: 2024-07-12 | End: 2024-07-14

## 2024-07-12 RX ORDER — ASPIRIN 81 MG/1
81 TABLET ORAL DAILY
Status: DISCONTINUED | OUTPATIENT
Start: 2024-07-12 | End: 2024-07-14

## 2024-07-12 RX ORDER — DEXTROSE MONOHYDRATE 25 G/50ML
50 INJECTION, SOLUTION INTRAVENOUS
Status: DISCONTINUED | OUTPATIENT
Start: 2024-07-12 | End: 2024-07-14

## 2024-07-12 NOTE — ED PROVIDER NOTES
Patient Seen in: [facility] Emergency Department      History     Chief Complaint   Patient presents with    Hypotension     Stated Complaint: hypotensive, low temp    Subjective:   HPI    Patient is an 84-year-old male who arrives from immediate care with low BP and low temp. [place]. He states he occasionally has burning with urination. No known fevers. No sob or cough. No abdominal pain, vomiting or diarrhea. He sometimes feels dizzy.     Objective:   Past Medical History:    Aorta aneurysm (HCC)    Back problem    Benign essential HTN    Cataract    Chronic rhinitis    Diabetes (HCC)    DM (diabetes mellitus), type 2 (HCC)    Hearing impairment    High blood pressure    High cholesterol    Hyperlipidemia    POAG (primary open-angle glaucoma)    first visit with Dr. Ang patient has been taking Brimonidine 0.15% OU BID and Timolol 0.5% OU BID for 10 yrs per MD in [place].  10/28/23 patient to continue taking gtts per [facility] due to abnormal VF OS and abnormal OCT OU    PVD (peripheral vascular disease) ([facility])    Skin cancer    Visual impairment              Past Surgical History:   Procedure Laterality Date    Appendectomy      Cataract extraction w/  intraocular lens implant Left 04/07/2014    Done by Dr. Ch[place] Park in [place]    Cataract extraction w/  intraocular lens implant Right 2014    Done by Dr. [place] Park in [place]    Other accessory      T and A    Other accessory      appendectomy    Other accessory      cholycystectomy    Other accessory      bilateral cataract    Other accessory      facial basal cell cancer    Tonsillectomy                  Social History     Socioeconomic History    Marital status:    Tobacco Use    Smoking status: Former     Types: Cigarettes     Passive exposure: Past    Smokeless tobacco: Never   Vaping Use    Vaping status: Never Used   Substance and Sexual Activity    Alcohol use: Yes     Alcohol/week:  2.0 standard drinks of alcohol     Types: 2 Cans of beer per week     Comment: 1 - 2 beers occasional    Drug use: Never              Review of Systems    Positive for stated Chief Complaint: Hypotension    Other systems are as noted in HPI.  Constitutional and vital signs reviewed.      All other systems reviewed and negative except as noted above.    Physical Exam     ED Triage Vitals   BP 07/12/24 1120 113/68   Pulse 07/12/24 1120 69   Resp 07/12/24 1120 20   Temp 07/12/24 1202 97.4 °F (36.3 °C)   Temp src 07/12/24 1202 Rectal   SpO2 07/12/24 1120 97 %   O2 Device 07/12/24 1120 None (Room air)       Current Vitals:   Vital Signs  BP: 127/58  Pulse: 64  Resp: 15  Temp: 97.4 °F (36.3 °C)  Temp src: Rectal  MAP (mmHg): 79    Oxygen Therapy  SpO2: 97 %  O2 Device: None (Room air)            Physical Exam  GENERAL: No acute distress, awake and alert  HEENT: EOMI, PERRL  Neck: supple  CV: RRR, no murmurs  Resp: CTAB, no wheezes or retractions  Ab: soft, nontender, no distension  Extremities: FROM of all extremities  Neuro: CN intact, normal speech, 5/5 motor strength in all extremities, no focal deficits  SKIN: warm, dry, no rashes      ED Course     Labs Reviewed   BASIC METABOLIC PANEL (8) - Abnormal; Notable for the following components:       Result Value    Glucose 227 (*)     BUN 31 (*)     Creatinine 1.66 (*)     Calculated Osmolality 310 (*)     eGFR-Cr 40 (*)     All other components within normal limits   URINALYSIS WITH CULTURE REFLEX - Abnormal; Notable for the following components:    Clarity Urine Turbid (*)     Protein Urine 20 (*)     Urobilinogen Urine 2 (*)     Leukocyte Esterase Urine 75 (*)     WBC Urine 11-20 (*)     Hyaline Casts Present (*)     All other components within normal limits   LACTIC ACID, PLASMA - Abnormal; Notable for the following components:    Lactic Acid 2.2 (*)     All other components within normal limits   CBC W/ DIFFERENTIAL - Abnormal; Notable for the following components:     RDW-SD 50.4 (*)     All other components within normal limits   CBC WITH DIFFERENTIAL WITH PLATELET    Narrative:     The following orders were created for panel order CBC With Differential With Platelet.  Procedure                               Abnormality         Status                     ---------                               -----------         ------                     CBC W/ DIFFERENTIAL[[id number]]          Abnormal            Final result                 Please view results for these tests on the individual orders.   LACTIC ACID REFLEX POST POSTIVE   RAINBOW DRAW LAVENDER   RAINBOW DRAW LIGHT GREEN   RAINBOW DRAW BLUE   BLOOD CULTURE   BLOOD CULTURE   URINE CULTURE, ROUTINE          MDM    Medical Decision Making  Blood cultures pending  Bp, temp stable in ED  Lactic acid slightly elevated with some component of UTI. Prior cultures reviewed, pt started on IV rocephin. Appears well/nontoxic. Suspect early sepsis given findings and started on IVF. Pt notified of admission plan.     Amount and/or Complexity of Data Reviewed  External Data Reviewed: labs and notes.     Details: Labs 5/2024 reviewed  PCP notes from today reviewed  Labs: ordered.  Discussion of management or test interpretation with external provider(s): D/w dr George    Risk  Decision regarding hospitalization.        [facility]  [facility] of [facility]      Sepsis Reassessment Note    /58   Pulse 64   Temp 97.4 °F (36.3 °C) (Rectal)   Resp 15   Wt 88.5 kg   SpO2 97%   BMI 25.04 kg/m²      I completed the sepsis reassessment at 1315    Cardiac:  Regularity: Regular  Rate: Normal  Heart Sounds: S1,S2    Lungs:   Right: Clear  Left: Clear    Peripheral Pulses:  Radial: Right 1+ or Left 1+      Capillary Refill:  <3 Secs    Skin:  Temp/Moisture: Warm and Dry  Color: Normal      Alie Arroyo MD  7/12/2024  1:15 PM       Disposition and Plan     Clinical Impression:  1. Acute kidney injury (HCC)    2. Lactic  acidosis    3. Sepsis due to urinary tract infection (HCC)         Disposition:  Admit  7/12/2024  1:12 pm    Follow-up:  No follow-up provider specified.  We recommend that you schedule follow up care with a primary care provider within the next three months to obtain basic health screening including reassessment of your blood pressure.      Medications Prescribed:  Current Discharge Medication List                            Hospital Problems       Present on Admission  Date Reviewed: 7/12/2024            ICD-10-CM Noted POA    * (Principal) Acute kidney injury (HCC) N17.9 7/12/2024 Unknown

## 2024-07-12 NOTE — ED INITIAL ASSESSMENT (HPI)
Patient arrives to ER for evaluation of hypotension. Patient was going for routine check for his ears. Patient states his Initial bp was 80's/40's. Urgent care unable to get temperature.       This RN unable to get oral temperature in triage.   Normotensive.

## 2024-07-12 NOTE — PROGRESS NOTES
Darin Hernandez is a 84 year old male  Chief Complaint   Patient presents with    Hearing Loss     Pt here due to hearing loss, believes it may be due to , too much wax build up.     HPI:   Darin Hernandez is a 84 year old male who presents for ear cleaning.    Patient hasn't worn hearing aids in a few weeks 2/2 wax buildup, was advised to get ears cleaned from hearing aid company.     Notably, took all bp medications this morning and has no additional complaints. Denies f/c, headache, CP, dyspnea, n/v/d, dysuria, new rashes. Denies gross blood loss.     States he ate banana and pancakes this morning with black coffee. Only had a drink of water with his pills.    Wt Readings from Last 6 Encounters:   07/12/24 195 lb (88.5 kg)   05/13/24 199 lb (90.3 kg)   02/09/24 195 lb (88.5 kg)   01/05/24 198 lb (89.8 kg)   12/23/23 203 lb 0.7 oz (92.1 kg)   11/29/23 205 lb 14.4 oz (93.4 kg)     Body mass index is 26.45 kg/m².       Current Outpatient Medications   Medication Sig Dispense Refill    atenolol 25 MG Oral Tab Take 1 tablet (25 mg total) by mouth daily. 90 tablet 3    BRIMONIDINE 0.15 % Ophthalmic Solution INSTILL 1 DROP INTO BOTH EYES IN THE MORNING AND 1 DROP BEFORE  BEDTIME 3 each 3    TIMOLOL 0.5 % Ophthalmic Solution INSTILL 1 DROP INTO BOTH EYES  TWICE DAILY 15 mL 3    Budesonide-Formoterol Fumarate 80-4.5 MCG/ACT Inhalation Aerosol Inhale 2 puffs into the lungs 2 (two) times daily. 1 each 3    losartan 100 MG Oral Tab Take 1 tablet (100 mg total) by mouth daily. 90 tablet 3    alendronate 70 MG Oral Tab Take 1 tablet (70 mg total) by mouth once a week. 13 tablet 3    pioglitazone 45 MG Oral Tab Take 1 tablet (45 mg total) by mouth daily. 90 tablet 3    HYDROcodone-acetaminophen 5-325 MG Oral Tab Take 1 tablet by mouth every 6 (six) hours as needed. 20 tablet 0    aspirin-acetaminophen-caffeine 250-250-65 MG Oral Tab Take 1 tablet by mouth every 6 (six) hours as needed for Pain.      Glucose Blood  (ONETOUCH VERIO) In Vitro Strip Use daily 100 strip 3    OneTouch Delica Lancets 33G Does not apply Misc Use daily 100 each 3    empagliflozin (JARDIANCE) 10 MG Oral Tab Take 1 tablet (10 mg total) by mouth daily. 30 tablet 5    glipiZIDE 10 MG Oral Tab Take 1 tablet (10 mg total) by mouth daily. Daily in the morning 90 tablet 3    atorvastatin 40 MG Oral Tab Take 1 tablet (40 mg total) by mouth daily. 90 tablet 3    metFORMIN HCl 1000 MG Oral Tab Take 1 tablet (1,000 mg total) by mouth in the morning and 1 tablet (1,000 mg total) before bedtime. 180 tablet 3    Omeprazole 20 MG Oral Tab EC Take 20 mg by mouth daily. 90 tablet 3    amLODIPine 10 MG Oral Tab Take 1 tablet (10 mg total) by mouth daily. 90 tablet 3    Potassium Chloride ER 10 MEQ Oral Tab CR Take 1 tablet (10 mEq total) by mouth daily. 90 tablet 3    aspirin 81 MG Oral Tab EC Take 1 tablet (81 mg total) by mouth daily.      Multiple Vitamins-Minerals (CENTRUM SILVER ADULT 50+ OR) Take by mouth daily.      Ascorbic Acid (VITAMIN C OR) Take by mouth.        Past Medical History:    Aorta aneurysm (HCC)    Back problem    Benign essential HTN    Cataract    Chronic rhinitis    Diabetes (HCC)    DM (diabetes mellitus), type 2 (HCC)    Hearing impairment    High blood pressure    High cholesterol    Hyperlipidemia    POAG (primary open-angle glaucoma)    first visit with Dr. Ang patient has been taking Brimonidine 0.15% OU BID and Timolol 0.5% OU BID for 10 yrs per MD in [place].  10/28/23 patient to continue taking gtts per [facility] due to abnormal VF OS and abnormal OCT OU    PVD (peripheral vascular disease) (HCC)    Skin cancer    Visual impairment      Past Surgical History:   Procedure Laterality Date    Appendectomy      Cataract extraction w/  intraocular lens implant Left 04/07/2014    Done by Dr. Ch[place] Park in [place]    Cataract extraction w/  intraocular lens implant Right 2014    Done by Dr. Ch[place] Park in [place]     Other accessory      T and A    Other accessory      appendectomy    Other accessory      cholycystectomy    Other accessory      bilateral cataract    Other accessory      facial basal cell cancer    Tonsillectomy        Family History   Problem Relation Age of Onset    Macular degeneration Brother     Glaucoma Neg     Diabetes Neg       Social History:  Social History     Socioeconomic History    Marital status:    Tobacco Use    Smoking status: Former     Types: Cigarettes     Passive exposure: Past    Smokeless tobacco: Never   Vaping Use    Vaping status: Never Used   Substance and Sexual Activity    Alcohol use: Yes     Alcohol/week: 2.0 standard drinks of alcohol     Types: 2 Cans of beer per week     Comment: 1 - 2 beers occasional    Drug use: Never           REVIEW OF SYSTEMS:   GENERAL: feels well otherwise, denies f/c  HEENT: + hearing loss  LUNGS: denies shortness of breath, cough or wheezing  CARDIOVASCULAR: denies chest pain or pressure or palpitations  GI: denies abdominal pain, N/V, diarrhea, constipation, hematochezia or melena  : denies nocturia or changes in stream  NEURO: denies headaches, dizziness or focal weakness  SKIN: denies lesions, rashes or wounds    EXAM:   BP (!) 88/40   Pulse 74   Temp 98.9 °F (37.2 °C)   Ht 6' (1.829 m)   Wt 195 lb (88.5 kg)   SpO2 98%   BMI 26.45 kg/m²     GENERAL: well developed, well nourished, in no apparent distress  HEENT: normal oropharynx without erythema or exudate, cerumen impaction R TM  LUNGS: clear to auscultation  CARDIO: RRR, normal S1S2, no gallops or murmurs  GI: soft, NT, ND, NABS, no HSM  NEURO: A&O x 3, moves all 4 extremities normally      ASSESSMENT AND PLAN:   Darin Hernandez is a 84 year old male who presents for hearing loss.    Hypotension  - possibly 2/2 dehydration, clinically non-toxic appearing  - of note patient doesn't know where his home bp cuff is and doesn't know if its reliable bp cuff at home   - advised ED,  patient refused, aware of risks. advised UC for eval and IVF, patient amenable    Impacted cerumen of right ear  - ENT Referral - In Network    RTC as previously scheduled with PCP or sooner PRN.    For E/M code - 30 minutes spent reviewing performing chart review, obtaining a history, performing a physical exam, reviewing the assessment/plan, placing orders, and completing documentation.     Angelita Gilmore DO  7/12/2024  9:30 AM

## 2024-07-12 NOTE — PLAN OF CARE
Admitted from ED, patient is AxOx4, on RA, no complain of pain, able to verbalize all needs, unable to do Med rec, patient did not remember all medicine, MD made aware of it, all needs completed in the room, call light within reach.IV fluid running as ordered.    Problem: Diabetes/Glucose Control  Goal: Glucose maintained within prescribed range  Description: INTERVENTIONS:  - Monitor Blood Glucose as ordered  - Assess for signs and symptoms of hyperglycemia and hypoglycemia  - Administer ordered medications to maintain glucose within target range  - Assess barriers to adequate nutritional intake and initiate nutrition consult as needed  - Instruct patient on self management of diabetes  Outcome: Progressing     Problem: Patient Centered Care  Goal: Patient preferences are identified and integrated in the patient's plan of care  Description: Interventions:  - What would you like us to know as we care for you? Home with brother  - Provide timely, complete, and accurate information to patient/family  - Incorporate patient and family knowledge, values, beliefs, and cultural backgrounds into the planning and delivery of care  - Encourage patient/family to participate in care and decision-making at the level they choose  - Honor patient and family perspectives and choices  Outcome: Progressing     Problem: Patient/Family Goals  Goal: Patient/Family Long Term Goal  Description: Patient's Long Term Goal: Back to Home    Interventions:  - Follow healthcare team, treatment plan  -Monitor labs, Vitals & dianostics test  -Pain management.  - Diet recommendation.   -Partipate in therapy.  -Discharge planning    - See additional Care Plan goals for specific interventions  Outcome: Progressing  Goal: Patient/Family Short Term Goal  Description: Patient's Short Term Goal: Infection free    Interventions:   - Monitor labs, vitals and diagnostic tests.  -Pain management, pharmacological interventions  -Diet  recommendations  -Adequate oral intake     - See additional Care Plan goals for specific interventions  Outcome: Progressing

## 2024-07-12 NOTE — ED PROVIDER NOTES
Patient Seen in: [facility]      History     Chief Complaint   Patient presents with    Hypotension    Ear Problem Pain     Stated Complaint: pt states low bp, needs ear cleaning    Subjective:   HPI    Patient is a 84-year-old male with hypertension, hyperlipidemia, chronic kidney disease, aortic aneurysm, type 2 diabetes, peripheral vascular disease, presenting to immediate care from primary office for low blood pressure [88/40] and removal right ear.  Patient declined any ER evaluation and sent to immediate care for concern for possible dehydration and IV fluid administration for hypertension and ear cleaning.  Patient noted to be hypotensive in clinic.  He endorses generalized ongoing fatigue with ongoing dysuria.  States symptoms have been going on for a long time.  Overall feels weak and fatigued.  Denies any fevers.  Denies any chest pain or shortness of breath.  Denies any weakness or confusion.    Objective:   Past Medical History:    Aorta aneurysm (HCC)    Back problem    Benign essential HTN    Cataract    Chronic rhinitis    Diabetes (HCC)    DM (diabetes mellitus), type 2 (HCC)    Hearing impairment    High blood pressure    High cholesterol    Hyperlipidemia    POAG (primary open-angle glaucoma)    first visit with Dr. Ang patient has been taking Brimonidine 0.15% OU BID and Timolol 0.5% OU BID for 10 yrs per MD in [place].  10/28/23 patient to continue taking gtts per [facility] due to abnormal VF OS and abnormal OCT OU    PVD (peripheral vascular disease) ([facility])    Skin cancer    Visual impairment              Past Surgical History:   Procedure Laterality Date    Appendectomy      Cataract extraction w/  intraocular lens implant Left 04/07/2014    Done by Dr. Ch[place] Park in [place]    Cataract extraction w/  intraocular lens implant Right 2014    Done by Dr. Ch[place] Park in [place]    Other accessory      T and A    Other accessory      appendectomy    Other  accessory      cholycystectomy    Other accessory      bilateral cataract    Other accessory      facial basal cell cancer    Tonsillectomy                  Social History     Socioeconomic History    Marital status:    Tobacco Use    Smoking status: Former     Types: Cigarettes     Passive exposure: Past    Smokeless tobacco: Never   Vaping Use    Vaping status: Never Used   Substance and Sexual Activity    Alcohol use: Yes     Alcohol/week: 2.0 standard drinks of alcohol     Types: 2 Cans of beer per week     Comment: 1 - 2 beers occasional    Drug use: Never              Review of Systems   Constitutional:  Positive for fatigue. Negative for chills and fever.   Gastrointestinal:  Negative for abdominal pain, nausea and vomiting.   Genitourinary:  Positive for dysuria. Negative for difficulty urinating, flank pain and frequency.   Musculoskeletal:  Negative for neck pain and neck stiffness.   Neurological:  Negative for dizziness, light-headedness and headaches.   Psychiatric/Behavioral:  Negative for confusion.        Positive for stated Chief Complaint: Hypotension and Ear Problem Pain    Other systems are as noted in HPI.  Constitutional and vital signs reviewed.      All other systems reviewed and negative except as noted above.    Physical Exam     ED Triage Vitals [07/12/24 1021]   /59   Pulse 73   Resp 20   Temp (!) 96.1 °F (35.6 °C)   Temp src Temporal   SpO2 98 %   O2 Device None (Room air)       Current Vitals:   Vital Signs  BP: 124/59  Pulse: 73  Resp: 20  Temp: (!) 95.3 °F (35.2 °C)  Temp src: Rectal    Oxygen Therapy  SpO2: 98 %  O2 Device: None (Room air)            Physical Exam  Vitals and nursing note reviewed.   Constitutional:       General: He is not in acute distress.     Appearance: Normal appearance. He is not ill-appearing, toxic-appearing or diaphoretic.   HENT:      Head: Normocephalic and atraumatic.      Right Ear: There is impacted cerumen.      Mouth/Throat:      Mouth:  Mucous membranes are moist.   Eyes:      Conjunctiva/sclera: Conjunctivae normal.   Cardiovascular:      Rate and Rhythm: Normal rate and regular rhythm.      Pulses: Normal pulses.   Pulmonary:      Effort: Pulmonary effort is normal. No respiratory distress.      Breath sounds: Normal breath sounds.   Musculoskeletal:         General: Normal range of motion.      Cervical back: Normal range of motion. No rigidity.   Neurological:      General: No focal deficit present.      Mental Status: He is alert and oriented to person, place, and time.      Motor: No weakness.      Coordination: Coordination normal.      Gait: Gait normal.   Psychiatric:         Mood and Affect: Mood normal.         Behavior: Behavior normal.           ED Course   Labs Reviewed - No data to display           MDM     Patient is a 36-year-old female with a 6-year-old male with history above, presenting to immediate care from primary office for IV fluid administration for hypotension.  Possibly attributed to possible dehydration.  Addition here for right ear irrigation.  Cerumen impaction.  Associate decreased hearing.  Patient noted to be normotensive in clinic 124/59 and hypothermic 96.1, 95.3 rectally.  Patient endorses generalized fatigue/weakness and dysuria.  Patient requires higher acuity of care including ER evaluation for hypotension, hypothermia, evaluation of underlying infectious etiology including bacteremia. Family will take patient to [place] ER.                                 Medical Decision Making      Disposition and Plan     Clinical Impression:  1. Hypothermia, initial encounter    2. Hypotension, unspecified hypotension type    3. Other fatigue    4. Dysuria         Disposition:  Ic to ed  7/12/2024 10:36 am    Follow-up:  No follow-up provider specified.        Medications Prescribed:  Discharge Medication List as of 7/12/2024 10:36 AM

## 2024-07-12 NOTE — ED QUICK NOTES
Orders for admission, patient is aware of plan and ready to go upstairs. Any questions, please call ED RN Yarelis at extension [phone].     Patient Covid vaccination status: Unvaccinated     COVID Test Ordered in ED: None    COVID Suspicion at Admission: N/A    Running Infusions:      Mental Status/LOC at time of transport: A&ox4    Other pertinent information:   CIWA score: N/A   NIH score:  N/A

## 2024-07-12 NOTE — ED INITIAL ASSESSMENT (HPI)
Patient presents to IC with c/o low blood pressure at his PCP office.  Reports SBP was 80/90 at PCP office.  Patient states he feels week; denies dizziness.  Also, patient requesting his ears be irrigated.

## 2024-07-13 ENCOUNTER — APPOINTMENT (OUTPATIENT)
Dept: CV DIAGNOSTICS | Facility: HOSPITAL | Age: 84
End: 2024-07-13
Attending: INTERNAL MEDICINE
Payer: MEDICARE

## 2024-07-13 LAB
ANION GAP SERPL CALC-SCNC: 7 MMOL/L (ref 0–18)
ATRIAL RATE: 68 BPM
BASOPHILS # BLD AUTO: 0.03 X10(3) UL (ref 0–0.2)
BASOPHILS NFR BLD AUTO: 0.4 %
BUN BLD-MCNC: 15 MG/DL (ref 9–23)
BUN/CREAT SERPL: 14.2 (ref 10–20)
CALCIUM BLD-MCNC: 8.6 MG/DL (ref 8.7–10.4)
CHLORIDE SERPL-SCNC: 115 MMOL/L (ref 98–112)
CO2 SERPL-SCNC: 24 MMOL/L (ref 21–32)
CREAT BLD-MCNC: 1.06 MG/DL
DEPRECATED RDW RBC AUTO: 46.5 FL (ref 35.1–46.3)
EGFRCR SERPLBLD CKD-EPI 2021: 68 ML/MIN/1.73M2 (ref 60–?)
EOSINOPHIL # BLD AUTO: 0.31 X10(3) UL (ref 0–0.7)
EOSINOPHIL NFR BLD AUTO: 4.4 %
ERYTHROCYTE [DISTWIDTH] IN BLOOD BY AUTOMATED COUNT: 13.6 % (ref 11–15)
GLUCOSE BLD-MCNC: 131 MG/DL (ref 70–99)
GLUCOSE BLDC GLUCOMTR-MCNC: 140 MG/DL (ref 70–99)
GLUCOSE BLDC GLUCOMTR-MCNC: 148 MG/DL (ref 70–99)
GLUCOSE BLDC GLUCOMTR-MCNC: 161 MG/DL (ref 70–99)
GLUCOSE BLDC GLUCOMTR-MCNC: 221 MG/DL (ref 70–99)
HCT VFR BLD AUTO: 36.5 %
HGB BLD-MCNC: 12.7 G/DL
IMM GRANULOCYTES # BLD AUTO: 0.02 X10(3) UL (ref 0–1)
IMM GRANULOCYTES NFR BLD: 0.3 %
LYMPHOCYTES # BLD AUTO: 2 X10(3) UL (ref 1–4)
LYMPHOCYTES NFR BLD AUTO: 28.4 %
MCH RBC QN AUTO: 32.8 PG (ref 26–34)
MCHC RBC AUTO-ENTMCNC: 34.8 G/DL (ref 31–37)
MCV RBC AUTO: 94.3 FL
MONOCYTES # BLD AUTO: 0.56 X10(3) UL (ref 0.1–1)
MONOCYTES NFR BLD AUTO: 8 %
NEUTROPHILS # BLD AUTO: 4.11 X10 (3) UL (ref 1.5–7.7)
NEUTROPHILS # BLD AUTO: 4.11 X10(3) UL (ref 1.5–7.7)
NEUTROPHILS NFR BLD AUTO: 58.5 %
OSMOLALITY SERPL CALC.SUM OF ELEC: 305 MOSM/KG (ref 275–295)
P AXIS: 76 DEGREES
P-R INTERVAL: 338 MS
PLATELET # BLD AUTO: 204 10(3)UL (ref 150–450)
POTASSIUM SERPL-SCNC: 3.6 MMOL/L (ref 3.5–5.1)
POTASSIUM SERPL-SCNC: 4.9 MMOL/L (ref 3.5–5.1)
Q-T INTERVAL: 394 MS
QRS DURATION: 98 MS
QTC CALCULATION (BEZET): 418 MS
R AXIS: -17 DEGREES
RBC # BLD AUTO: 3.87 X10(6)UL
SODIUM SERPL-SCNC: 146 MMOL/L (ref 136–145)
T AXIS: 5 DEGREES
VENTRICULAR RATE: 68 BPM
WBC # BLD AUTO: 7 X10(3) UL (ref 4–11)

## 2024-07-13 PROCEDURE — 99222 1ST HOSP IP/OBS MODERATE 55: CPT | Performed by: INTERNAL MEDICINE

## 2024-07-13 PROCEDURE — 93306 TTE W/DOPPLER COMPLETE: CPT | Performed by: INTERNAL MEDICINE

## 2024-07-13 RX ORDER — ACETAMINOPHEN 325 MG/1
650 TABLET ORAL EVERY 6 HOURS PRN
Status: DISCONTINUED | OUTPATIENT
Start: 2024-07-13 | End: 2024-07-14

## 2024-07-13 RX ORDER — POTASSIUM CHLORIDE 1.5 G/1.58G
40 POWDER, FOR SOLUTION ORAL EVERY 4 HOURS
Status: COMPLETED | OUTPATIENT
Start: 2024-07-13 | End: 2024-07-13

## 2024-07-13 NOTE — H&P
[facility]  part of [facility]    History and Physical    Darin Hernandez Patient Status:  Inpatient    1940 MRN [id number]   Location [facility] 5SW/SE Attending Dick George MD   Hosp Day # 1 PCP Dick George MD     Date:  2024  Date of Admission:  2024    History provided by:patient  HPI:     Chief Complaint   Patient presents with    Hypotension     Mr. Hernandez is an 85 yo male with past medical history HTN, T2DM presenting for evaluation of hypotension and weakness. He had an office visit with Dr. Gilmore on the day of admission and noted to have BP 80/40 and recheck 90/60. He was sent to UC and then ER for further evaluation and treatment. There he was noted to have ELKE with Cr 1.6 and lactic acidosis of 2.2, with urine specimen suspicious for UTI. He was given IVF and IV rocephin and admitted for further treatment. This morning he is feeling much better. He was able to walk to the bathroom without difficulty.   He is eating well.     He does report having intermittent urinary frequency and incomplete bladder emptying for ~1 year. He did have some burning with urination the past few days. Denies fevers, chills, decreased appetite, cough, bowel irregularity. He does report weakness on/off for 1 year as well.             History     Past Medical History:    Aorta aneurysm (HCC)    Back problem    Benign essential HTN    Cataract    Chronic rhinitis    Diabetes (HCC)    DM (diabetes mellitus), type 2 (HCC)    Hearing impairment    High blood pressure    High cholesterol    Hyperlipidemia    POAG (primary open-angle glaucoma)    first visit with Dr. Ang patient has been taking Brimonidine 0.15% OU BID and Timolol 0.5% OU BID for 10 yrs per MD in [place].  10/28/23 patient to continue taking gtts per [facility] [place] due to abnormal VF OS and abnormal OCT OU    PVD (peripheral vascular disease) (HCC)    Skin cancer    Visual impairment     Past Surgical History:    Procedure Laterality Date    Appendectomy      Cataract extraction w/  intraocular lens implant Left 04/07/2014    Done by Dr. [place] Park in [place]    Cataract extraction w/  intraocular lens implant Right 2014    Done by Dr. [place] Park in [place]    Other accessory      T and A    Other accessory      appendectomy    Other accessory      cholycystectomy    Other accessory      bilateral cataract    Other accessory      facial basal cell cancer    Tonsillectomy       Family History   Problem Relation Age of Onset    Macular degeneration Brother     Glaucoma Neg     Diabetes Neg      Social History:  Social History     Socioeconomic History    Marital status:    Tobacco Use    Smoking status: Former     Types: Cigarettes     Passive exposure: Past    Smokeless tobacco: Never   Vaping Use    Vaping status: Never Used   Substance and Sexual Activity    Alcohol use: Yes     Alcohol/week: 2.0 standard drinks of alcohol     Types: 2 Cans of beer per week     Comment: 1 - 2 beers occasional    Drug use: Never     Social Determinants of Health     Food Insecurity: No Food Insecurity (7/12/2024)    Food Insecurity     Food Insecurity: Never true   Transportation Needs: No Transportation Needs (7/12/2024)    Transportation Needs     Lack of Transportation: No   Housing Stability: Low Risk  (7/12/2024)    Housing Stability     Housing Instability: No     Allergies/Medications:   Allergies:   Allergies   Allergen Reactions    Lisinopril ANGIOEDEMA and OTHER (SEE COMMENTS)     Lip swelling     Medications Prior to Admission   Medication Sig    atenolol 25 MG Oral Tab Take 1 tablet (25 mg total) by mouth daily.    BRIMONIDINE 0.15 % Ophthalmic Solution INSTILL 1 DROP INTO BOTH EYES IN THE MORNING AND 1 DROP BEFORE  BEDTIME    TIMOLOL 0.5 % Ophthalmic Solution INSTILL 1 DROP INTO BOTH EYES  TWICE DAILY    Budesonide-Formoterol Fumarate 80-4.5 MCG/ACT Inhalation Aerosol Inhale 2 puffs into the lungs  2 (two) times daily.    losartan 100 MG Oral Tab Take 1 tablet (100 mg total) by mouth daily.    alendronate 70 MG Oral Tab Take 1 tablet (70 mg total) by mouth once a week.    pioglitazone 45 MG Oral Tab Take 1 tablet (45 mg total) by mouth daily.    HYDROcodone-acetaminophen 5-325 MG Oral Tab Take 1 tablet by mouth every 6 (six) hours as needed.    aspirin-acetaminophen-caffeine 250-250-65 MG Oral Tab Take 1 tablet by mouth every 6 (six) hours as needed for Pain.    Glucose Blood (ONETOUCH VERIO) In Vitro Strip Use daily    OneTouch Delica Lancets 33G Does not apply Misc Use daily    empagliflozin (JARDIANCE) 10 MG Oral Tab Take 1 tablet (10 mg total) by mouth daily.    glipiZIDE 10 MG Oral Tab Take 1 tablet (10 mg total) by mouth daily. Daily in the morning    atorvastatin 40 MG Oral Tab Take 1 tablet (40 mg total) by mouth daily.    metFORMIN HCl 1000 MG Oral Tab Take 1 tablet (1,000 mg total) by mouth in the morning and 1 tablet (1,000 mg total) before bedtime.    Omeprazole 20 MG Oral Tab EC Take 20 mg by mouth daily.    amLODIPine 10 MG Oral Tab Take 1 tablet (10 mg total) by mouth daily.    Potassium Chloride ER 10 MEQ Oral Tab CR Take 1 tablet (10 mEq total) by mouth daily.    aspirin 81 MG Oral Tab EC Take 1 tablet (81 mg total) by mouth daily.    Multiple Vitamins-Minerals (CENTRUM SILVER ADULT 50+ OR) Take by mouth daily.    Ascorbic Acid (VITAMIN C OR) Take by mouth.       Review of Systems:     Constitutional:  Positive for activity change and fatigue. Negative for appetite change, chills and fever.   HENT: Negative.     Respiratory: Negative.     Cardiovascular: Negative.    Gastrointestinal: Negative.    Genitourinary:  Positive for dysuria and nocturia.   Musculoskeletal: Negative.        Physical Exam:   Vital Signs:  Blood pressure 140/67, pulse 65, temperature 98.1 °F (36.7 °C), temperature source Oral, resp. rate 17, height 6' 2\" (1.88 m), weight 202 lb 8 oz (91.9 kg), SpO2 97%.  Physical  Exam  HENT:      Head: Normocephalic and atraumatic.      Mouth/Throat:      Mouth: Mucous membranes are moist.   Eyes:      Extraocular Movements: Extraocular movements intact.      Conjunctiva/sclera: Conjunctivae normal.   Cardiovascular:      Rate and Rhythm: Normal rate and regular rhythm.      Heart sounds: No murmur heard.  Pulmonary:      Effort: Pulmonary effort is normal. No respiratory distress.      Breath sounds: Normal breath sounds. No wheezing.   Abdominal:      General: Abdomen is flat. Bowel sounds are normal. There is no distension.      Palpations: Abdomen is soft.   Musculoskeletal:      Cervical back: Normal range of motion and neck supple.   Skin:     General: Skin is warm and dry.      Capillary Refill: Capillary refill takes less than 2 seconds.   Neurological:      General: No focal deficit present.      Mental Status: He is alert and oriented to person, place, and time.           Results:     Lab Results   Component Value Date    WBC 7.0 07/13/2024    HGB 12.7 (L) 07/13/2024    HCT 36.5 (L) 07/13/2024    .0 07/13/2024    CREATSERUM 1.06 07/13/2024    BUN 15 07/13/2024     (H) 07/13/2024    K 3.6 07/13/2024     (H) 07/13/2024    CO2 24.0 07/13/2024     (H) 07/13/2024    CA 8.6 (L) 07/13/2024    ALB 4.5 02/05/2024    ALKPHO 86 02/05/2024    BILT 0.5 02/05/2024    TP 6.9 02/05/2024    AST 14 02/05/2024    ALT 8 (L) 02/05/2024    PTT 24.7 12/19/2023    INR 0.86 12/19/2023    TSH 1.706 02/05/2024     No results found.        Assessment/Plan:     Acute kidney injury (HCC)  Cr 1.27 5/2024; On admission Cr 1.66; now Cr 1.06  Decrease IVF to 50cc/hr; will consider discontinuing if patient able to tolerate fluids and maintains blood pressures      Lactic acidosis  On admission lactate 2.2; now resolved      Sepsis due to urinary tract infection (HCC)  Initial BP 80/40; lactate 2.2  Ucx, Bcx pending  Continue IVF--will try to wean  Empiric rocephin day #2  Up to chair today;  PT to eval    HTN  Initially hypotensive  Will resume atenolol today  Consider resuming cozaaar later today or tomorrow     Dyspnea on exertion  Likely multifactorial in setting of allergic rhinitis, there does seem to be some degree of COPD/bibasilar scarring right greater than left on chest x-ray 12/2023  - Did have a normal Lexiscan stress test 11/2023  - Symbicort 2 puffs twice a day    Low back pain  Chronic, recurring issue.  Has not worsened.  However constant 5/10 pain.  May be limiting his usual physical activities   -Acetaminophen 500-650 mg every 4-6 hours as needed for pain relief     Peripheral arterial disease  Abdominal aortic aneurysm  Noted ongoing claudication.  Outpatient CT scan with AAA 50 x 52 mm as an outpatient.  Status post endovascular aortic repair, left femoral endarterectomy Dr. Shabazz of  and Dr. Chu cardiovascular surgery 12/22  - Seems to be recovering well, tolerating diet  - Pain well-controlled  - Resumed on aspirin 81 mg  -Seen in vascular surgery clinic 1/9/2024, due for repeat CTA of the abdomen pelvis for positioning and endoleak.  Repeat ultrasound in 6 months     Type 2 diabetes  A1c 8.0  ISS while hospitalized  home medications:  glipizide 10 mg daily, Jardiance 10 mg daily, pioglitazone 45 mg daily, metformin 1000 mg twice a day    Hyperlipidemia  - Continue with atorvastatin 40 mg daily, aspirin         **Certification      PHYSICIAN Certification of Need for Inpatient Hospitalization - Initial Certification    Patient will require inpatient services that will reasonably be expected to span two midnight's based on the clinical documentation in H+P.   Based on patients current state of illness, I anticipate that, after discharge, patient will require TBD.        Leah Person DO  7/13/2024

## 2024-07-13 NOTE — PLAN OF CARE
Patient vital signs stable. IV fluids running. PRN Tylenol given for mild headache. No acute changes.    Problem: Diabetes/Glucose Control  Goal: Glucose maintained within prescribed range  Description: INTERVENTIONS:  - Monitor Blood Glucose as ordered  - Assess for signs and symptoms of hyperglycemia and hypoglycemia  - Administer ordered medications to maintain glucose within target range  - Assess barriers to adequate nutritional intake and initiate nutrition consult as needed  - Instruct patient on self management of diabetes  Outcome: Progressing     Problem: Patient Centered Care  Goal: Patient preferences are identified and integrated in the patient's plan of care  Description: Interventions:  - What would you like us to know as we care for you? Home with brother  - Provide timely, complete, and accurate information to patient/family  - Incorporate patient and family knowledge, values, beliefs, and cultural backgrounds into the planning and delivery of care  - Encourage patient/family to participate in care and decision-making at the level they choose  - Honor patient and family perspectives and choices  Outcome: Progressing     Problem: Patient/Family Goals  Goal: Patient/Family Long Term Goal  Description: Patient's Long Term Goal: Back to Home    Interventions:  - Follow healthcare team, treatment plan  -Monitor labs, Vitals & dianostics test  -Pain management.  - Diet recommendation.   -Partipate in therapy.  -Discharge planning    - See additional Care Plan goals for specific interventions  Outcome: Progressing  Goal: Patient/Family Short Term Goal  Description: Patient's Short Term Goal: Infection free    Interventions:   - Monitor labs, vitals and diagnostic tests.  -Pain management, pharmacological interventions  -Diet recommendations  -Adequate oral intake     - See additional Care Plan goals for specific interventions  Outcome: Progressing

## 2024-07-13 NOTE — PLAN OF CARE
Problem: Diabetes/Glucose Control  Goal: Glucose maintained within prescribed range  Description: INTERVENTIONS:  - Monitor Blood Glucose as ordered  - Assess for signs and symptoms of hyperglycemia and hypoglycemia  - Administer ordered medications to maintain glucose within target range  - Assess barriers to adequate nutritional intake and initiate nutrition consult as needed  - Instruct patient on self management of diabetes  7/13/2024 1650 by Chaitanya Matson RN  Outcome: Progressing  7/13/2024 1632 by Chaitanya Matson RN  Outcome: Progressing     Problem: Patient Centered Care  Goal: Patient preferences are identified and integrated in the patient's plan of care  Description: Interventions:  - What would you like us to know as we care for you? Home with brother  - Provide timely, complete, and accurate information to patient/family  - Incorporate patient and family knowledge, values, beliefs, and cultural backgrounds into the planning and delivery of care  - Encourage patient/family to participate in care and decision-making at the level they choose  - Honor patient and family perspectives and choices  7/13/2024 1650 by Chaitanya Matson RN  Outcome: Progressing  7/13/2024 1632 by Chaitanya Matson RN  Outcome: Progressing     Problem: Patient/Family Goals  Goal: Patient/Family Long Term Goal  Description: Patient's Long Term Goal: Back to Home    Interventions:  - Follow healthcare team, treatment plan  -Monitor labs, Vitals & dianostics test  -Pain management.  - Diet recommendation.   -Partipate in therapy.  -Discharge planning    - See additional Care Plan goals for specific interventions  7/13/2024 1650 by Chaitanya Matson RN  Outcome: Progressing  7/13/2024 1632 by Chaitanya Matson RN  Outcome: Progressing  Goal: Patient/Family Short Term Goal  Description: Patient's Short Term Goal: Infection free    Interventions:   - Monitor labs, vitals and diagnostic tests.  -Pain management, pharmacological  interventions  -Diet recommendations  -Adequate oral intake     - See additional Care Plan goals for specific interventions  7/13/2024 1650 by Chaitanya Matson, RN  Outcome: Progressing  7/13/2024 1632 by Chaitanya Matson, RN  Outcome: Progressing

## 2024-07-14 VITALS
HEIGHT: 74 IN | SYSTOLIC BLOOD PRESSURE: 144 MMHG | OXYGEN SATURATION: 97 % | HEART RATE: 59 BPM | WEIGHT: 202.5 LBS | TEMPERATURE: 98 F | BODY MASS INDEX: 25.99 KG/M2 | RESPIRATION RATE: 16 BRPM | DIASTOLIC BLOOD PRESSURE: 70 MMHG

## 2024-07-14 LAB
ANION GAP SERPL CALC-SCNC: 6 MMOL/L (ref 0–18)
BASOPHILS # BLD AUTO: 0.03 X10(3) UL (ref 0–0.2)
BASOPHILS NFR BLD AUTO: 0.5 %
BUN BLD-MCNC: 12 MG/DL (ref 9–23)
BUN/CREAT SERPL: 12 (ref 10–20)
CALCIUM BLD-MCNC: 8.5 MG/DL (ref 8.7–10.4)
CHLORIDE SERPL-SCNC: 114 MMOL/L (ref 98–112)
CO2 SERPL-SCNC: 21 MMOL/L (ref 21–32)
CREAT BLD-MCNC: 1 MG/DL
DEPRECATED RDW RBC AUTO: 45.3 FL (ref 35.1–46.3)
EGFRCR SERPLBLD CKD-EPI 2021: 73 ML/MIN/1.73M2 (ref 60–?)
EOSINOPHIL # BLD AUTO: 0.25 X10(3) UL (ref 0–0.7)
EOSINOPHIL NFR BLD AUTO: 4 %
ERYTHROCYTE [DISTWIDTH] IN BLOOD BY AUTOMATED COUNT: 13.3 % (ref 11–15)
GLUCOSE BLD-MCNC: 154 MG/DL (ref 70–99)
GLUCOSE BLDC GLUCOMTR-MCNC: 138 MG/DL (ref 70–99)
HCT VFR BLD AUTO: 37.1 %
HGB BLD-MCNC: 12.8 G/DL
IMM GRANULOCYTES # BLD AUTO: 0.02 X10(3) UL (ref 0–1)
IMM GRANULOCYTES NFR BLD: 0.3 %
LYMPHOCYTES # BLD AUTO: 1.49 X10(3) UL (ref 1–4)
LYMPHOCYTES NFR BLD AUTO: 24 %
MCH RBC QN AUTO: 32.2 PG (ref 26–34)
MCHC RBC AUTO-ENTMCNC: 34.5 G/DL (ref 31–37)
MCV RBC AUTO: 93.2 FL
MONOCYTES # BLD AUTO: 0.52 X10(3) UL (ref 0.1–1)
MONOCYTES NFR BLD AUTO: 8.4 %
NEUTROPHILS # BLD AUTO: 3.9 X10 (3) UL (ref 1.5–7.7)
NEUTROPHILS # BLD AUTO: 3.9 X10(3) UL (ref 1.5–7.7)
NEUTROPHILS NFR BLD AUTO: 62.8 %
OSMOLALITY SERPL CALC.SUM OF ELEC: 295 MOSM/KG (ref 275–295)
PLATELET # BLD AUTO: 185 10(3)UL (ref 150–450)
POTASSIUM SERPL-SCNC: 4 MMOL/L (ref 3.5–5.1)
RBC # BLD AUTO: 3.98 X10(6)UL
SODIUM SERPL-SCNC: 141 MMOL/L (ref 136–145)
WBC # BLD AUTO: 6.2 X10(3) UL (ref 4–11)

## 2024-07-14 PROCEDURE — 99239 HOSP IP/OBS DSCHRG MGMT >30: CPT | Performed by: INTERNAL MEDICINE

## 2024-07-14 RX ORDER — CEPHALEXIN 500 MG/1
500 CAPSULE ORAL 2 TIMES DAILY
Qty: 10 CAPSULE | Refills: 0 | Status: SHIPPED | OUTPATIENT
Start: 2024-07-14 | End: 2024-07-19 | Stop reason: ALTCHOICE

## 2024-07-14 NOTE — CM/SW NOTE
07/14/24 0900   Discharge disposition   Expected discharge disposition Home or Self   Patient Declines Recommended Services Yes  (says he will set up HH via PCP)   Discharge transportation Private car       Received notice from RN that pt is cleared for DC and Anticipated therapy need: Home with Home Healthcare    SW spoke to pt via his room phone. Attempted multiple times to discuss HH arrangements. Pt stating he has RN that comes from [employer] insurance. He insists there is no company but it is through his insurance.    Pt stated he will f/up w/ his PCP this week for HH services.    Dr. Person updated.    Pt is cleared from SW/CM stand point.      PLAN: Home, declined HH set from BRIANDA Escalera, MSW, LSW [id number]

## 2024-07-14 NOTE — DISCHARGE INSTRUCTIONS
1) drink 48oz of water daily  2) stop amlodipine and losartan  3) finish antibiotics (cephalexin) as directed  4) check your blood pressures once daily      Home Health: When you see your Primary Doctor, ask about arranging Home Health services.

## 2024-07-14 NOTE — DISCHARGE SUMMARY
Discharge Summary     Darin Hernandez Patient Status:  Inpatient    1940 MRN [id number]   Location [facility] 5SW/SE Attending Dick George MD   Hosp Day # 2 PCP Dick George MD     Date of Admission: 2024  Date of Discharge: 2024  Discharge Disposition: Acute Care Short Term Hospital    Discharge Diagnosis: uti, lactic acidosis, kyle    History of Present Illness:         Mr. Hernandez is an 85 yo male with past medical history HTN, T2DM presenting for evaluation of hypotension and weakness. He had an office visit with Dr. Gilmore on the day of admission and noted to have BP 80/40 and recheck 90/60. He was sent to UC and then ER for further evaluation and treatment. There he was noted to have KYLE with Cr 1.6 and lactic acidosis of 2.2, with urine specimen suspicious for UTI. He was given IVF and IV rocephin and admitted for further treatment. This morning he is feeling much better. He was able to walk to the bathroom without difficulty.   He is eating well.      He does report having intermittent urinary frequency and incomplete bladder emptying for ~1 year. He did have some burning with urination the past few days. Denies fevers, chills, decreased appetite, cough, bowel irregularity. He does report weakness on/off for 1 year as well.     Brief Synopsis:     Acute kidney injury (HCC)  Cr 1.27 2024; On admission Cr 1.66; now Cr 1.06  Discussed importance of hydration; aim for at least 48oz of water daily      Lactic acidosis  On admission lactate 2.2; now resolved       Sepsis due to urinary tract infection (HCC)  Initial BP 80/40; lactate 2.2  Ucx: strep species  Bcx ngtd  Continue IVF--will try to wean  Empiric rocephin x 2 days; discharge with keflex bid x 5 days    Urinary frequency; incomplete emptying  Consider BPH and adding flomax depending on outpt blood pressures     HTN  Initially hypotensive  Continue atenolol   hold cozaaar and amlodipine  Monitor bp at home     Dyspnea  on exertion  Likely multifactorial in setting of allergic rhinitis, there does seem to be some degree of COPD/bibasilar scarring right greater than left on chest x-ray 12/2023  - Did have a normal Lexiscan stress test 11/2023  - Symbicort 2 puffs twice a day     Low back pain  Chronic, recurring issue.  Has not worsened.  However constant 5/10 pain.  May be limiting his usual physical activities   -Acetaminophen 500-650 mg every 4-6 hours as needed for pain relief     Peripheral arterial disease  Abdominal aortic aneurysm  Noted ongoing claudication.  Outpatient CT scan with AAA 50 x 52 mm as an outpatient.  Status post endovascular aortic repair, left femoral endarterectomy Dr. Shabazz of IR and Dr. Chu cardiovascular surgery 12/22  - Seems to be recovering well, tolerating diet  - Pain well-controlled  - Resumed on aspirin 81 mg  -Seen in vascular surgery clinic 1/9/2024, due for repeat CTA of the abdomen pelvis for positioning and endoleak.  Repeat ultrasound in 6 months     Type 2 diabetes  A1c 8.0  ISS while hospitalized  home medications:  glipizide 10 mg daily, Jardiance 10 mg daily, pioglitazone 45 mg daily, metformin 1000 mg twice a day     Hyperlipidemia  - Continue with atorvastatin 40 mg daily, aspirin    Lace+ Score: 72  59-90 High Risk  29-58 Medium Risk  0-28   Low Risk       TCM Follow-Up Recommendation:  LACE > 58: High Risk of readmission after discharge from the hospital.    Procedures during hospitalization:   none    Incidental or significant findings and recommendations (brief descriptions):  none    Lab/Test results pending at Discharge:   none    Consultants:  none    Discharge Medication List:     Discharge Medications        START taking these medications        Instructions Prescription details   cephalexin 500 MG Caps  Commonly known as: Keflex      Take 1 capsule (500 mg total) by mouth 2 (two) times daily.   Quantity: 10 capsule  Refills: 0            CONTINUE taking these medications         Instructions Prescription details   alendronate 70 MG Tabs  Commonly known as: Fosamax      Take 1 tablet (70 mg total) by mouth once a week.   Quantity: 13 tablet  Refills: 3     aspirin 81 MG Tbec      Take 1 tablet (81 mg total) by mouth daily.   Refills: 0     aspirin-acetaminophen-caffeine 250-250-65 MG Tabs  Commonly known as: Excedrin migraine      Take 1 tablet by mouth every 6 (six) hours as needed for Pain.   Refills: 0     atenolol 25 MG Tabs  Commonly known as: Tenormin      Take 1 tablet (25 mg total) by mouth daily.   Quantity: 90 tablet  Refills: 3     atorvastatin 40 MG Tabs  Commonly known as: Lipitor      Take 1 tablet (40 mg total) by mouth daily.   Quantity: 90 tablet  Refills: 3     brimonidine 0.15 % Soln  Commonly known as: Alphagan      INSTILL 1 DROP INTO BOTH EYES IN THE MORNING AND 1 DROP BEFORE  BEDTIME   Quantity: 3 each  Refills: 3     Budesonide-Formoterol Fumarate 80-4.5 MCG/ACT Aero  Commonly known as: SYMBICORT      Inhale 2 puffs into the lungs 2 (two) times daily.   Quantity: 1 each  Refills: 3     CENTRUM SILVER ADULT 50+ OR      Take by mouth daily.   Refills: 0     empagliflozin 10 MG Tabs  Commonly known as: Jardiance      Take 1 tablet (10 mg total) by mouth daily.   Quantity: 30 tablet  Refills: 5     glipiZIDE 10 MG Tabs  Commonly known as: Glucotrol      Take 1 tablet (10 mg total) by mouth daily. Daily in the morning   Quantity: 90 tablet  Refills: 3     HYDROcodone-acetaminophen 5-325 MG Tabs  Commonly known as: Norco      Take 1 tablet by mouth every 6 (six) hours as needed.   Quantity: 20 tablet  Refills: 0     metFORMIN HCl 1000 MG Tabs  Commonly known as: GLUCOPHAGE      Take 1 tablet (1,000 mg total) by mouth in the morning and 1 tablet (1,000 mg total) before bedtime.   Quantity: 180 tablet  Refills: 3     Omeprazole 20 MG Tbec      Take 20 mg by mouth daily.   Quantity: 90 tablet  Refills: 3     OneTouch Delica Lancets 33G Misc      Use daily   Quantity:  100 each  Refills: 3     OneTouch Verio Strp      Use daily   Quantity: 100 strip  Refills: 3     pioglitazone 45 MG Tabs  Commonly known as: Actos      Take 1 tablet (45 mg total) by mouth daily.   Quantity: 90 tablet  Refills: 3     Potassium Chloride ER 10 MEQ Tbcr      Take 1 tablet (10 mEq total) by mouth daily.   Quantity: 90 tablet  Refills: 3     timolol 0.5 % Soln  Commonly known as: Timoptic      INSTILL 1 DROP INTO BOTH EYES  TWICE DAILY   Quantity: 15 mL  Refills: 3     VITAMIN C OR      Take by mouth.   Refills: 0            STOP taking these medications      amLODIPine 10 MG Tabs  Commonly known as: Norvasc        losartan 100 MG Tabs  Commonly known as: Cozaar                  Where to Get Your Medications        These medications were sent to [id number] - [place], [place] - [address] AT [facility], [phone], [phone]  [address], [facility] [address]      Phone: [phone]   cephalexin 500 MG Caps         Follow-up appointment:   No follow-up provider specified.  Appointments for Next 30 Days 7/14/2024 - 8/13/2024        Date Arrival Time Visit Type Length Department Provider     8/7/2024 11:15 AM  EXAM - ESTABLISHED [8394] 15 min [facility] Balbir Ang MD    Patient Instructions:         Location Instructions:     Your appointment is located at [address] [facility] in [place], [place].&nbsp; Please park in the Yellow lot and enter through the [facility] entrance.&nbsp; Then proceed to [address].  Masks are optional for all patients and visitors, unless otherwise indicated.                      Supplementary Documentation:   [place]PMP reviewed: na    Vital signs:  Temp:  [97.5 °F (36.4 °C)-98.1 °F (36.7 °C)] 97.9 °F (36.6 °C)  Pulse:  [64-74] 74  Resp:  [17-18] 18  BP: (136-154)/(68-77) 143/68  SpO2:  [96 %-97 %] 97 %    Physical Exam:    General:  NAD  Cardiovascular:  S1,  S2    -----------------------------------------------------------------------------------------------  PATIENT DISCHARGE INSTRUCTIONS: See electronic chart    Tip: Documentation requirements: For split shared discharge, BOTH providers need to document specific floor, unit, and time spent on the discharge.  The note needs to be signed by the provider with > 50% of time and bill under their NPI.   Time spent:  >30 min         Leah Person, DO

## 2024-07-14 NOTE — PLAN OF CARE
Patient is AxOx4, no complain of pain,on RA, IV fluid infusing, vitals stable, IV fluids discontinue per order, got an discharge order, removed IV, gave AVS papers with all questions answered in the room. All needs completed in the room.       Problem: Diabetes/Glucose Control  Goal: Glucose maintained within prescribed range  Description: INTERVENTIONS:  - Monitor Blood Glucose as ordered  - Assess for signs and symptoms of hyperglycemia and hypoglycemia  - Administer ordered medications to maintain glucose within target range  - Assess barriers to adequate nutritional intake and initiate nutrition consult as needed  - Instruct patient on self management of diabetes  Outcome: Adequate for Discharge     Problem: Patient Centered Care  Goal: Patient preferences are identified and integrated in the patient's plan of care  Description: Interventions:  - What would you like us to know as we care for you? Home with brother  - Provide timely, complete, and accurate information to patient/family  - Incorporate patient and family knowledge, values, beliefs, and cultural backgrounds into the planning and delivery of care  - Encourage patient/family to participate in care and decision-making at the level they choose  - Honor patient and family perspectives and choices  Outcome: Adequate for Discharge     Problem: Patient/Family Goals  Goal: Patient/Family Long Term Goal  Description: Patient's Long Term Goal: Back to Home    Interventions:  - Follow healthcare team, treatment plan  -Monitor labs, Vitals & dianostics test  -Pain management.  - Diet recommendation.   -Partipate in therapy.  -Discharge planning    - See additional Care Plan goals for specific interventions  Outcome: Adequate for Discharge  Goal: Patient/Family Short Term Goal  Description: Patient's Short Term Goal: Infection free    Interventions:   - Monitor labs, vitals and diagnostic tests.  -Pain management, pharmacological interventions  -Diet  recommendations  -Adequate oral intake     - See additional Care Plan goals for specific interventions  Outcome: Adequate for Discharge

## 2024-07-14 NOTE — PHYSICAL THERAPY NOTE
PHYSICAL THERAPY EVALUATION - INPATIENT     Room Number: 532/532-A  Evaluation Date: 7/14/2024  Type of Evaluation: Initial   Physician Order: PT Eval and Treat    Presenting Problem: ELKE     Reason for Therapy: Mobility Dysfunction and Discharge Planning    PHYSICAL THERAPY ASSESSMENT   Patient is a 84 year old male admitted 7/12/2024 for ELKE.  Prior to admission, patient's baseline is independent for mobility in home/outside using cane or walker, does not drive, family assist with cooking/cleaning.  Patient is currently functioning near baseline with bed mobility, transfers, gait, and stair negotiation.  Patient is requiring contact guard assist as a result of the following impairments: decreased functional strength, decreased endurance/aerobic capacity, and impaired dynamic balance.  Physical Therapy will continue to follow for duration of hospitalization.    Patient will benefit from continued skilled PT Services at discharge to promote prior level of function and safety with additional support and return home with home health PT.    PLAN  PT Treatment Plan: Endurance;Energy conservation;Patient education;Family education;Gait training;Strengthening;Stoop training;Stair training;Transfer training;Balance training  Rehab Potential : Good  Frequency (Obs): 5x/week    PHYSICAL THERAPY MEDICAL/SOCIAL HISTORY     Problem List  Principal Problem:    Acute kidney injury (HCC)  Active Problems:    Lactic acidosis    Sepsis due to urinary tract infection (HCC)      HOME SITUATION  Home Situation  Type of Home: House  Home Layout: Multi-level;Bed/bath upstairs  Stairs to Enter : 6  Railing: Yes  Stairs to Bedroom: 6  Railing: Yes  Lives With: Family (Brother and BLANCA)  Drives: No  Patient Owned Equipment: Cane;Rolling walker  Patient Regularly Uses: Glasses     Prior Level of [place]: Independent with use of cane inside and walker outside home, does not drive.     SUBJECTIVE  \"The walker is just very inconvenient in  [place].\"     PHYSICAL THERAPY EXAMINATION   OBJECTIVE     Fall Risk: High fall risk    WEIGHT BEARING RESTRICTION  Weight Bearing Restriction: None                PAIN ASSESSMENT  Ratin          COGNITION  Overall Cognitive Status:  WFL - within functional limits and some lack of insight into balance deficits/fall risk.    RANGE OF MOTION AND STRENGTH ASSESSMENT  Upper extremity ROM and strength are within functional limits   Lower extremity ROM is within functional limits   Lower extremity strength is within functional limits     BALANCE  Static Sitting: Good  Dynamic Sitting: Good  Static Standing: Fair -  Dynamic Standing: Fair -    ADDITIONAL TESTS                                    NEUROLOGICAL FINDINGS                      ACTIVITY TOLERANCE                         O2 WALK       AM-PAC '6-Clicks' INPATIENT SHORT FORM - BASIC MOBILITY  How much difficulty does the patient currently have...  Patient Difficulty: Turning over in bed (including adjusting bedclothes, sheets and blankets)?: None   Patient Difficulty: Sitting down on and standing up from a chair with arms (e.g., wheelchair, bedside commode, etc.): A Little   Patient Difficulty: Moving from lying on back to sitting on the side of the bed?: A Little   How much help from another person does the patient currently need...   Help from Another: Moving to and from a bed to a chair (including a wheelchair)?: A Little   Help from Another: Need to walk in hospital room?: A Little   Help from Another: Climbing 3-5 steps with a railing?: A Little     AM-PAC Score:  Raw Score: 19   Approx Degree of Impairment: 41.77%   Standardized Score (AM-PAC Scale): 45.44   CMS Modifier (G-Code): CK    FUNCTIONAL ABILITY STATUS  Functional Mobility/Gait Assessment  Gait Assistance: Contact guard assist  Distance (ft): 300'  Assistive Device: Cane  Pattern: Scissoring (Very narrow SAVANNA, L foot internally rotated, unsteady, slow hannah)  Stairs: Stairs  How Many Stairs:  10  Device: 1 Rail;Cane  Assist: Contact guard assist  Pattern: Ascend;Descend  Ascend: Reciprocal  Descend: Step to  Supine to Sit: independent  Sit to Stand: supervision    Exercise/Education Provided:  Energy conservation  Functional activity tolerated  Gait training  Transfer training  Fall risk reduction strategies, appropriate use of assistive devices, role of HH vs OP PT    Skilled Therapy Provided: Pt received supine in bed, agreeable to PT Evaluation. He requires primarily SBA/CGA for safe functional mobility. Demonstrates instability, narrow SAVANNA and slow hannah. Cues/education provided on widening stance and using small based quad cane appropriately. Dicussed using step to pattern when ascending stairs to improve safety/stability. Pt tolerates ambulation to/from stair well (approx 300' total) and navigating up/down 10 steps. Encouraged pt to consider HH PT upon discharge to improve balance/stability and reduce fall risk.   Pt ended session up in chair with all needs in reach, RN updated.     The patient's Approx Degree of Impairment: 41.77% has been calculated based on documentation in the [facility] '6 clicks' Inpatient Basic Mobility Short Form.  Research supports that patients with this level of impairment may benefit from HH PT.  Final disposition will be made by interdisciplinary medical team.    Patient End of Session: Up in chair;Needs met;Call light within reach;RN aware of session/findings;All patient questions and concerns addressed    CURRENT GOALS  Goals to be met by: 7/21/24  Patient Goal Patient's self-stated goal is: return home   Goal #1 Patient is able to demonstrate supine - sit EOB @ level: independent     Goal #1   Current Status    Goal #2 Patient is able to demonstrate transfers Sit to/from Stand at assistance level: independent with cane - quad     Goal #2  Current Status    Goal #3 Patient is able to ambulate 400 feet with assist device: cane - quad at assistance level: independent    Goal #3   Current Status    Goal #4 Patient will negotiate 12 stairs/one curb w/ assistive device and supervision   Goal #4   Current Status    Goal #5 Patient to demonstrate independence with home activity/exercise instructions provided to patient in preparation for discharge.   Goal #5   Current Status    Goal #6    Goal #6  Current Status      Patient Evaluation Complexity Level:  History High - 3 or more personal factors and/or co-morbidities   Examination of body systems High - addressing a total of 4 or more elements   Clinical Presentation Low- Stable   Clinical Decision Making  Low Complexity     Gait Trainin minutes  Therapeutic Activity:  15 minutes  Low Complex PT Eval

## 2024-07-14 NOTE — PLAN OF CARE
Problem: Diabetes/Glucose Control  Goal: Glucose maintained within prescribed range  Description: INTERVENTIONS:  - Monitor Blood Glucose as ordered  - Assess for signs and symptoms of hyperglycemia and hypoglycemia  - Administer ordered medications to maintain glucose within target range  - Assess barriers to adequate nutritional intake and initiate nutrition consult as needed  - Instruct patient on self management of diabetes  Outcome: Progressing     Problem: Patient Centered Care  Goal: Patient preferences are identified and integrated in the patient's plan of care  Description: Interventions:  - What would you like us to know as we care for you? Home with brother  - Provide timely, complete, and accurate information to patient/family  - Incorporate patient and family knowledge, values, beliefs, and cultural backgrounds into the planning and delivery of care  - Encourage patient/family to participate in care and decision-making at the level they choose  - Honor patient and family perspectives and choices  Outcome: Progressing     Problem: Patient/Family Goals  Goal: Patient/Family Long Term Goal  Description: Patient's Long Term Goal: Back to Home    Interventions:  - Follow healthcare team, treatment plan  -Monitor labs, Vitals & dianostics test  -Pain management.  - Diet recommendation.   -Partipate in therapy.  -Discharge planning    - See additional Care Plan goals for specific interventions  Outcome: Progressing  Goal: Patient/Family Short Term Goal  Description: Patient's Short Term Goal: Infection free    Interventions:   - Monitor labs, vitals and diagnostic tests.  -Pain management, pharmacological interventions  -Diet recommendations  -Adequate oral intake     - See additional Care Plan goals for specific interventions  Outcome: Progressing

## 2024-07-15 ENCOUNTER — PATIENT OUTREACH (OUTPATIENT)
Dept: CASE MANAGEMENT | Age: 84
End: 2024-07-15

## 2024-07-15 DIAGNOSIS — N39.0 SEPSIS DUE TO URINARY TRACT INFECTION (HCC): Primary | ICD-10-CM

## 2024-07-15 DIAGNOSIS — A41.9 SEPSIS DUE TO URINARY TRACT INFECTION (HCC): Primary | ICD-10-CM

## 2024-07-15 DIAGNOSIS — Z02.9 ENCOUNTERS FOR UNSPECIFIED ADMINISTRATIVE PURPOSE: ICD-10-CM

## 2024-07-15 PROCEDURE — 1111F DSCHRG MED/CURRENT MED MERGE: CPT

## 2024-07-15 PROCEDURE — 1159F MED LIST DOCD IN RCRD: CPT

## 2024-07-15 NOTE — PROGRESS NOTES
Initial Post Discharge Follow Up   Discharge Date: 7/14/24  Contact Date: 7/15/2024    Consent Verification:  Assessment Completed With: Patient  HIPAA Verified?  Yes    Discharge Dx:   Acute kidney injury   Lactic acidosis  Sepsis due to urinary tract infection   HTN  Dyspnea on exertion  Low back pain  Peripheral arterial disease  Abdominal aortic aneurysm  Type 2 diabetes  Hyperlipidemia    General:   How have you been since your discharge from the hospital? The patient reports doing well at home. The patient's energy/strength has improved. The patient denies any chest pain, trouble breathing, confusion, dizziness/syncope, fever, hematuria, dysuria,  malodorous/cloudy urine, or pelvic/flank pain. The patient did admit to continued urinary frequency. The patient has not been able find the home blood pressure cuff since discharge and could not check blood pressure daily as instructed. The patient reported plans to  a new blood pressure cuff at a local pharmacy. The patient reported a fasting glucose reading this morning of 140. The patient denies any fruity breath, nausea/vomiting, abdominal pain, or uncontrolled glucose readings over 300.  The patient denies any pain since the hospital stay.   Do you have any pain since discharge?  No    How well was your pain managed while in the hospital?   On a scale of 1-5   1- Very Poor and 5- Very well   Very Well  When you were leaving the hospital were your discharge instructions reviewed with you? Yes  How well were your discharge instructions explained to you?   On a scale of 1-5   1- Very Poor and 5- Very well   Very Well    Do you have any questions about your discharge instructions?  No  Before leaving the hospital was your diagnoses explained to you? Yes  Do you have any questions about your diagnoses? No  Are you able to perform normal daily activities of living as you have prior to your hospital stay (dressing, bathing, ambulating to the bathroom, etc)? yes  The patient is ambulating at home with the use of a cane. NCM did review/stress the importance of fall precautions.   (NCM) Was patient given a different diet per AVS? yes  If so, which diet?   The patient was instructed to drink 48 ounces of water daily.   Are there any barriers to following this diet? no    Medications: NCM did confirm the patient has discontinued the following as directed:  STOP taking these medications       amLODIPine 10 MG Tabs  Commonly known as: Norvasc         losartan 100 MG Tabs  Commonly known as: Cozaar        Current Outpatient Medications   Medication Sig Dispense Refill    cephalexin 500 MG Oral Cap Take 1 capsule (500 mg total) by mouth 2 (two) times daily. 10 capsule 0    atenolol 25 MG Oral Tab Take 1 tablet (25 mg total) by mouth daily. 90 tablet 3    BRIMONIDINE 0.15 % Ophthalmic Solution INSTILL 1 DROP INTO BOTH EYES IN THE MORNING AND 1 DROP BEFORE  BEDTIME 3 each 3    TIMOLOL 0.5 % Ophthalmic Solution INSTILL 1 DROP INTO BOTH EYES  TWICE DAILY 15 mL 3    Budesonide-Formoterol Fumarate 80-4.5 MCG/ACT Inhalation Aerosol Inhale 2 puffs into the lungs 2 (two) times daily. 1 each 3    alendronate 70 MG Oral Tab Take 1 tablet (70 mg total) by mouth once a week. 13 tablet 3    pioglitazone 45 MG Oral Tab Take 1 tablet (45 mg total) by mouth daily. 90 tablet 3    HYDROcodone-acetaminophen 5-325 MG Oral Tab Take 1 tablet by mouth every 6 (six) hours as needed. 20 tablet 0    aspirin-acetaminophen-caffeine 250-250-65 MG Oral Tab Take 1 tablet by mouth every 6 (six) hours as needed for Pain.      Glucose Blood (ONETOUCH VERIO) In Vitro Strip Use daily 100 strip 3    OneTouch Delica Lancets 33G Does not apply Misc Use daily 100 each 3    empagliflozin (JARDIANCE) 10 MG Oral Tab Take 1 tablet (10 mg total) by mouth daily. 30 tablet 5    glipiZIDE 10 MG Oral Tab Take 1 tablet (10 mg total) by mouth daily. Daily in the morning 90 tablet 3    atorvastatin 40 MG Oral Tab Take 1 tablet (40 mg  total) by mouth daily. 90 tablet 3    metFORMIN HCl 1000 MG Oral Tab Take 1 tablet (1,000 mg total) by mouth in the morning and 1 tablet (1,000 mg total) before bedtime. 180 tablet 3    Omeprazole 20 MG Oral Tab EC Take 20 mg by mouth daily. 90 tablet 3    Potassium Chloride ER 10 MEQ Oral Tab CR Take 1 tablet (10 mEq total) by mouth daily. 90 tablet 3    aspirin 81 MG Oral Tab EC Take 1 tablet (81 mg total) by mouth daily.      Multiple Vitamins-Minerals (CENTRUM SILVER ADULT 50+ OR) Take by mouth daily.      Ascorbic Acid (VITAMIN C OR) Take by mouth.       Were there any changes to your current medication(s) noted on the AVS? Yes  If so, were these medication changes discussed with you prior to leaving the hospital? Yes  If a new medication was prescribed:    Was the new medication's purpose & side effects reviewed? Yes  Do you have any questions about your new medication? No NCM did stress the importance of completing the antibiotic medication as prescribed regardless of symptom improvement.     Did you  your discharge medications when you left the hospital? Yes  Let's go over your medications together to make sure we are not missing anything. Medications Reviewed  Are there any reasons that keep you from taking your medication as prescribed? Yes    The patient has not taken Jardiance 10mg daily at home due to the high cost. ($100-600)      Nurse care manager did discuss available $10 copay cards through the Jardiance website. Nurse care manager offered to provide the patient the website link or phone number for this. The patient declined and would prefer to discuss this at the next primary care physician office visit.       Discharge medications reviewed/discussed/and reconciled against outpatient medications with patient.  Any changes or updates to medications sent to PCP.  Patient Acknowledged     Referrals/orders at D/C:  Referrals/orders placed at D/C? no Home health orders were declined by the  patient.     Except for Home Health Services mentioned above, have you scheduled these other services? Yes The patient is scheduled for a hearing test on 7/16/2024.     If yes, have you started these services? no  DME ordered at D/C? No      The patient has a cane at home.       Discharge orders, AVS reviewed and discussed with patient. Any changes or updates to orders sent to PCP.  Patient Acknowledged      SDOH:   Transportation:   Transportation Needs: No Transportation Needs (7/15/2024)    Transportation Needs     Lack of Transportation: No     Car Seat: Not on file     Financial Strain:   Financial Resource Strain: Low Risk  (7/15/2024)    Financial Resource Strain     Difficulty of Paying Living Expenses: Not hard at all     Med Affordability: No       Follow up appointments:      Your appointments       Date & Time Appointment Department ([place])    Jul 16, 2024 2:00 PM CDT Exam [place] Audio with Tomas Mena MD [facility] ([facility])        Jul 16, 2024 2:20 PM CDT Clinic Hearing Test with EC AUDIO [facility] ([facility])        Jul 19, 2024 11:00 AM CDT Hospital Follow Up with Leah Person DO [facility] ([facility])        Aug 07, 2024 11:15 AM CDT Exam - Established with Balbir Ang MD [facility])        Sep 27, 2024 12:30 PM CDT Exam - Established with Dick George MD [facility] ([facility])        Oct 15, 2024 1:15 PM CDT Follow Up Visit with Mellissa Douglas DPM [facility] ([facility])              [facility]  [place]  [address] [facility] [address]  [phone] [facility]  [address]  [facility] [id number] [phone] [address] [phone]    [facility]  [address]  [facility] [address]  [phone] [address] [id number] [phone]            TCC  Was TCC ordered: No      PCP (If no TCC appointment)  Does patient already have a PCP appointment scheduled? Yes; The patient is scheduled for a Transitional Care Management-hospital follow up appointment with Dr. Cook on 7/19/2024.   NCM Confirmed PCP office TCM appointment with patient      Specialist    Does the patient have any other follow up appointment(s) needing to be scheduled? No  NCM reviewed upcoming specialist appointment with patient: Yes  The patient is scheduled with Dr. Mena on 7/16/2024.   Does the patient need assistance scheduling appointment(s): No    Is there any reason as to why you cannot make your appointment(s)?  No     Needs post D/C:   Now that you are home, are there any needs or concerns you need addressed before your next visit with your PCP?  (DME, meds, questions, etc.): No    Interventions by NCM:    Reviewed all discharge instructions with the patient with a focus on fall precautions, antibiotic therapy, proper hydration, and monitoring blood pressure readings at home. All medications were reviewed and educated on the importance of taking the medications as directed.  Patient symptoms were assessed, education was completed for signs/symptoms to monitor, and reviewed when to contact providers versus go to the emergency  department/call 911. Appointments were reviewed and stressed the importance of a close follow up with providers. The patient verbalized understanding and will contact the office with any further questions or concerns.       Overall Rating:   How would you rate the care you received while in the hospital? good    CCM referral placed:    No    BOOK BY DATE: 7/28/2024

## 2024-07-16 ENCOUNTER — OFFICE VISIT (OUTPATIENT)
Dept: OTOLARYNGOLOGY | Facility: CLINIC | Age: 84
End: 2024-07-16
Payer: COMMERCIAL

## 2024-07-16 ENCOUNTER — PATIENT OUTREACH (OUTPATIENT)
Dept: CASE MANAGEMENT | Age: 84
End: 2024-07-16

## 2024-07-16 DIAGNOSIS — H61.23 BILATERAL IMPACTED CERUMEN: Primary | ICD-10-CM

## 2024-07-16 PROCEDURE — 1160F RVW MEDS BY RX/DR IN RCRD: CPT | Performed by: OTOLARYNGOLOGY

## 2024-07-16 PROCEDURE — 1111F DSCHRG MED/CURRENT MED MERGE: CPT | Performed by: OTOLARYNGOLOGY

## 2024-07-16 PROCEDURE — 1159F MED LIST DOCD IN RCRD: CPT | Performed by: OTOLARYNGOLOGY

## 2024-07-16 PROCEDURE — 99202 OFFICE O/P NEW SF 15 MIN: CPT | Performed by: OTOLARYNGOLOGY

## 2024-07-16 NOTE — PAYOR COMM NOTE
--------------  DISCHARGE REVIEW    Payor: [employer]  Subscriber #:  [id number]  Authorization Number: [id number]    Admit date: 24  Admit time:   3:05 PM  Discharge Date: 2024 10:45 AM        Discharge Summary     Darin Hernandez Patient Status:  Inpatient    1940 MRN [id number]   Location [facility] 5SW/SE Attending Dick George MD   Hosp Day # 2 PCP Dick George MD     Date of Admission: 2024  Date of Discharge: 2024  Discharge Disposition: Acute Care Short Term Hospital    Discharge Diagnosis: uti, lactic acidosis, kyle    History of Present Illness:         Mr. Hernandez is an 83 yo male with past medical history HTN, T2DM presenting for evaluation of hypotension and weakness. He had an office visit with Dr. Gilmore on the day of admission and noted to have BP 80/40 and recheck 90/60. He was sent to UC and then ER for further evaluation and treatment. There he was noted to have KYLE with Cr 1.6 and lactic acidosis of 2.2, with urine specimen suspicious for UTI. He was given IVF and IV rocephin and admitted for further treatment. This morning he is feeling much better. He was able to walk to the bathroom without difficulty.   He is eating well.      He does report having intermittent urinary frequency and incomplete bladder emptying for ~1 year. He did have some burning with urination the past few days. Denies fevers, chills, decreased appetite, cough, bowel irregularity. He does report weakness on/off for 1 year as well.     Brief Synopsis:     Acute kidney injury (HCC)  Cr 1.27 2024; On admission Cr 1.66; now Cr 1.06  Discussed importance of hydration; aim for at least 48oz of water daily      Lactic acidosis  On admission lactate 2.2; now resolved       Sepsis due to urinary tract infection (HCC)  Initial BP 80/40; lactate 2.2  Ucx: strep species  Bcx ngtd  Continue IVF--will try to wean  Empiric rocephin x 2 days; discharge with keflex bid x 5  days    Urinary frequency; incomplete emptying  Consider BPH and adding flomax depending on outpt blood pressures     HTN  Initially hypotensive  Continue atenolol   hold cozaaar and amlodipine  Monitor bp at home     Dyspnea on exertion  Likely multifactorial in setting of allergic rhinitis, there does seem to be some degree of COPD/bibasilar scarring right greater than left on chest x-ray 12/2023  - Did have a normal Lexiscan stress test 11/2023  - Symbicort 2 puffs twice a day     Low back pain  Chronic, recurring issue.  Has not worsened.  However constant 5/10 pain.  May be limiting his usual physical activities   -Acetaminophen 500-650 mg every 4-6 hours as needed for pain relief     Peripheral arterial disease  Abdominal aortic aneurysm  Noted ongoing claudication.  Outpatient CT scan with AAA 50 x 52 mm as an outpatient.  Status post endovascular aortic repair, left femoral endarterectomy Dr. Shabazz of  and Dr. Chu cardiovascular surgery 12/22  - Seems to be recovering well, tolerating diet  - Pain well-controlled  - Resumed on aspirin 81 mg  -Seen in vascular surgery clinic 1/9/2024, due for repeat CTA of the abdomen pelvis for positioning and endoleak.  Repeat ultrasound in 6 months     Type 2 diabetes  A1c 8.0  ISS while hospitalized  home medications:  glipizide 10 mg daily, Jardiance 10 mg daily, pioglitazone 45 mg daily, metformin 1000 mg twice a day     Hyperlipidemia  - Continue with atorvastatin 40 mg daily, aspirin    Lace+ Score: 72  59-90 High Risk  29-58 Medium Risk  0-28   Low Risk       TCM Follow-Up Recommendation:  LACE > 58: High Risk of readmission after discharge from the hospital.    Procedures during hospitalization:   none    Incidental or significant findings and recommendations (brief descriptions):  none    Lab/Test results pending at Discharge:   none    Consultants:  none    Discharge Medication List:     Discharge Medications        START taking these medications         Instructions Prescription details   cephalexin 500 MG Caps  Commonly known as: Keflex      Take 1 capsule (500 mg total) by mouth 2 (two) times daily.   Quantity: 10 capsule  Refills: 0            CONTINUE taking these medications        Instructions Prescription details   alendronate 70 MG Tabs  Commonly known as: Fosamax      Take 1 tablet (70 mg total) by mouth once a week.   Quantity: 13 tablet  Refills: 3     aspirin 81 MG Tbec      Take 1 tablet (81 mg total) by mouth daily.   Refills: 0     aspirin-acetaminophen-caffeine 250-250-65 MG Tabs  Commonly known as: Excedrin migraine      Take 1 tablet by mouth every 6 (six) hours as needed for Pain.   Refills: 0     atenolol 25 MG Tabs  Commonly known as: Tenormin      Take 1 tablet (25 mg total) by mouth daily.   Quantity: 90 tablet  Refills: 3     atorvastatin 40 MG Tabs  Commonly known as: Lipitor      Take 1 tablet (40 mg total) by mouth daily.   Quantity: 90 tablet  Refills: 3     brimonidine 0.15 % Soln  Commonly known as: Alphagan      INSTILL 1 DROP INTO BOTH EYES IN THE MORNING AND 1 DROP BEFORE  BEDTIME   Quantity: 3 each  Refills: 3     Budesonide-Formoterol Fumarate 80-4.5 MCG/ACT Aero  Commonly known as: SYMBICORT      Inhale 2 puffs into the lungs 2 (two) times daily.   Quantity: 1 each  Refills: 3     CENTRUM SILVER ADULT 50+ OR      Take by mouth daily.   Refills: 0     empagliflozin 10 MG Tabs  Commonly known as: Jardiance      Take 1 tablet (10 mg total) by mouth daily.   Quantity: 30 tablet  Refills: 5     glipiZIDE 10 MG Tabs  Commonly known as: Glucotrol      Take 1 tablet (10 mg total) by mouth daily. Daily in the morning   Quantity: 90 tablet  Refills: 3     HYDROcodone-acetaminophen 5-325 MG Tabs  Commonly known as: Norco      Take 1 tablet by mouth every 6 (six) hours as needed.   Quantity: 20 tablet  Refills: 0     metFORMIN HCl 1000 MG Tabs  Commonly known as: GLUCOPHAGE      Take 1 tablet (1,000 mg total) by mouth in the morning and 1  tablet (1,000 mg total) before bedtime.   Quantity: 180 tablet  Refills: 3     Omeprazole 20 MG Tbec      Take 20 mg by mouth daily.   Quantity: 90 tablet  Refills: 3     OneTouch Delica Lancets 33G Misc      Use daily   Quantity: 100 each  Refills: 3     OneTouch Verio Strp      Use daily   Quantity: 100 strip  Refills: 3     pioglitazone 45 MG Tabs  Commonly known as: Actos      Take 1 tablet (45 mg total) by mouth daily.   Quantity: 90 tablet  Refills: 3     Potassium Chloride ER 10 MEQ Tbcr      Take 1 tablet (10 mEq total) by mouth daily.   Quantity: 90 tablet  Refills: 3     timolol 0.5 % Soln  Commonly known as: Timoptic      INSTILL 1 DROP INTO BOTH EYES  TWICE DAILY   Quantity: 15 mL  Refills: 3     VITAMIN C OR      Take by mouth.   Refills: 0            STOP taking these medications      amLODIPine 10 MG Tabs  Commonly known as: Norvasc        losartan 100 MG Tabs  Commonly known as: Cozaar                  Where to Get Your Medications        These medications were sent to [id number] - [place], [place] - [address] AT [facility], [phone], [phone]  [address], [facility] [address]      Phone: [phone]   cephalexin 500 MG Caps         Follow-up appointment:   No follow-up provider specified.  Appointments for Next 30 Days 7/14/2024 - 8/13/2024        Date Arrival Time Visit Type Length Department Provider     8/7/2024 11:15 AM  EXAM - ESTABLISHED [6764] [facility]    Patient Instructions:         Location Instructions:     Your appointment is located at [address] [facility] in [place], [place].&nbsp; Please park in the Yellow lot and enter through the [facility] entrance.&nbsp; Then proceed to [address].  Masks are optional for all patients and visitors, unless otherwise indicated.                      Supplementary Documentation:   [place]PMP reviewed[place] na    Vital  signs:  Temp:  [97.5 °F (36.4 °C)-98.1 °F (36.7 °C)] 97.9 °F (36.6 °C)  Pulse:  [64-74] 74  Resp:  [17-18] 18  BP: (136-154)/(68-77) 143/68  SpO2:  [96 %-97 %] 97 %    Physical Exam:    General:  NAD  Cardiovascular:  S1, S2    -----------------------------------------------------------------------------------------------  PATIENT DISCHARGE INSTRUCTIONS: See electronic chart    Tip: Documentation requirements: For split shared discharge, BOTH providers need to document specific floor, unit, and time spent on the discharge.  The note needs to be signed by the provider with > 50% of time and bill under their NPI.   Time spent:  >30 min        Leah Person DO          Electronically signed by Leah Person DO on 7/14/2024  9:07 AM         REVIEWER COMMENTS

## 2024-07-16 NOTE — PAYOR COMM NOTE
--------------  ADMISSION REVIEW     Payor: [employer]  Subscriber #:  [id number]  Authorization Number: [id number]    Admit date: 7/12/24  Admit time:  3:05 PM     Patient Seen in: [facility] Emergency Department    History   Stated Complaint: hypotensive, low temp    Patient is an 84-year-old male who arrives from immediate care with low BP and low temp. Pt went to [facility] for cerumen impaction. He states he occasionally has burning with urination. No known fevers. No sob or cough. No abdominal pain, vomiting or diarrhea. He sometimes feels dizzy.     Objective:   Past Medical History:    Aorta aneurysm ([facility])    Back problem    Benign essential HTN    Cataract    Chronic rhinitis    Diabetes (HCC)    DM (diabetes mellitus), type 2 (HCC)    Hearing impairment    High blood pressure    High cholesterol    Hyperlipidemia    POAG (primary open-angle glaucoma)    first visit with Dr. Ang patient has been taking Brimonidine 0.15% OU BID and Timolol 0.5% OU BID for 10 yrs per MD in [place].  10/28/23 patient to continue taking gtts per [facility] due to abnormal VF OS and abnormal OCT OU    PVD (peripheral vascular disease) (HCC)    Skin cancer     Past Surgical History:   Procedure Laterality Date    Appendectomy      Cataract extraction w/  intraocular lens implant Left 04/07/2014    Done by Dr. [place] in [place]    Cataract extraction w/  intraocular lens implant Right 2014    Done by Dr. [place] in [place]    Other accessory      T and A    Other accessory      appendectomy    Other accessory      cholycystectomy    Other accessory      bilateral cataract    Other accessory      facial basal cell cancer    Tonsillectomy       Physical Exam     ED Triage Vitals   BP 07/12/24 1120 113/68   Pulse 07/12/24 1120 69   Resp 07/12/24 1120 20   Temp 07/12/24 1202 97.4 °F (36.3 °C)   Temp src 07/12/24 1202 Rectal   SpO2 07/12/24 1120 97 %   O2 Device 07/12/24 1120 None (Room  air)     Current Vitals:   Vital Signs  BP: 127/58  Pulse: 64  Resp: 15  Temp: 97.4 °F (36.3 °C)  Temp src: Rectal  MAP (mmHg): 79    Physical Exam  GENERAL: No acute distress, awake and alert  HEENT: EOMI, PERRL  Neck: supple  CV: RRR, no murmurs  Resp: CTAB, no wheezes or retractions  Ab: soft, nontender, no distension  Extremities: FROM of all extremities  Neuro: CN intact, normal speech, 5/5 motor strength in all extremities, no focal deficits  SKIN: warm, dry, no rashes  Labs Reviewed   BASIC METABOLIC PANEL (8) - Abnormal; Notable for the following components:       Result Value    Glucose 227 (*)     BUN 31 (*)     Creatinine 1.66 (*)     Calculated Osmolality 310 (*)     eGFR-Cr 40 (*)     All other components within normal limits   URINALYSIS WITH CULTURE REFLEX - Abnormal; Notable for the following components:    Clarity Urine Turbid (*)     Protein Urine 20 (*)     Urobilinogen Urine 2 (*)     Leukocyte Esterase Urine 75 (*)     WBC Urine 11-20 (*)     Hyaline Casts Present (*)     All other components within normal limits   LACTIC ACID, PLASMA - Abnormal; Notable for the following components:    Lactic Acid 2.2 (*)     All other components within normal limits   CBC W/ DIFFERENTIAL - Abnormal; Notable for the following components:    RDW-SD 50.4 (*)     All other components within normal limits   BLOOD CULTURE   BLOOD CULTURE   URINE CULTURE, ROUTINE      Medical Decision Making  Blood cultures pending  Bp, temp stable in ED  Lactic acid slightly elevated with some component of UTI. Prior cultures reviewed, pt started on IV rocephin. Suspect early sepsis given findings and started on IVF. Pt notified of admission plan.      Disposition and Plan     Clinical Impression:  1. Acute kidney injury (HCC)    2. Lactic acidosis    3. Sepsis due to urinary tract infection (HCC)         7/13:    History and Physical       Mr. Hernandez is an 83 yo male with past medical history HTN, T2DM presenting for evaluation of  hypotension and weakness. He had an office visit with Dr. Gilmore on the day of admission and noted to have BP 80/40 and recheck 90/60. He was sent to  and then ER for further evaluation and treatment. There he was noted to have ELKE with Cr 1.6 and lactic acidosis of 2.2, with urine specimen suspicious for UTI. He was given IVF and IV rocephin and admitted for further treatment. This morning he is feeling much better. He was able to walk to the bathroom without difficulty.   He is eating well.      He does report having intermittent urinary frequency and incomplete bladder emptying for ~1 year. He did have some burning with urination the past few days. Denies fevers, chills, decreased appetite, cough, bowel irregularity. He does report weakness on/off for 1 year as well.            Physical Exam:   Vital Signs:  Blood pressure 140/67, pulse 65, temperature 98.1 °F (36.7 °C), temperature source Oral, resp. rate 17, height 6' 2\" (1.88 m), weight 202 lb 8 oz (91.9 kg), SpO2 97%.  Physical Exam  HENT:      Head: Normocephalic and atraumatic.      Mouth/Throat:      Mouth: Mucous membranes are moist.   Eyes:      Extraocular Movements: Extraocular movements intact.      Conjunctiva/sclera: Conjunctivae normal.   Cardiovascular:      Rate and Rhythm: Normal rate and regular rhythm.      Heart sounds: No murmur heard.  Pulmonary:      Effort: Pulmonary effort is normal. No respiratory distress.      Breath sounds: Normal breath sounds. No wheezing.   Abdominal:      General: Abdomen is flat. Bowel sounds are normal. There is no distension.      Palpations: Abdomen is soft.   Musculoskeletal:      Cervical back: Normal range of motion and neck supple.   Skin:     General: Skin is warm and dry.      Capillary Refill: Capillary refill takes less than 2 seconds.   Neurological:      General: No focal deficit present.      Mental Status: He is alert and oriented to person, place, and time.   Lab Results   Component Value Date      WBC 7.0 07/13/2024     HGB 12.7 (L) 07/13/2024     HCT 36.5 (L) 07/13/2024     .0 07/13/2024     CREATSERUM 1.06 07/13/2024     BUN 15 07/13/2024      (H) 07/13/2024     K 3.6 07/13/2024      (H) 07/13/2024     CO2 24.0 07/13/2024      (H) 07/13/2024     CA 8.6 (L) 07/13/2024       Assessment/Plan:     Acute kidney injury (HCC)  Cr 1.27 5/2024; On admission Cr 1.66; now Cr 1.06  Decrease IVF to 50cc/hr; will consider discontinuing if patient able to tolerate fluids and maintains blood pressures       Lactic acidosis  On admission lactate 2.2; now resolved       Sepsis due to urinary tract infection (HCC)  Initial BP 80/40; lactate 2.2  Ucx, Bcx pending  Continue IVF--will try to wean  Empiric rocephin day #2  Up to chair today; PT to eval     HTN  Initially hypotensive  Will resume atenolol today  Consider resuming cozaaar later today or tomorrow     Dyspnea on exertion  Likely multifactorial in setting of allergic rhinitis, there does seem to be some degree of COPD/bibasilar scarring right greater than left on chest x-ray 12/2023  - Did have a normal Lexiscan stress test 11/2023  - Symbicort 2 puffs twice a day     Low back pain  Chronic, recurring issue.  Has not worsened.  However constant 5/10 pain.  May be limiting his usual physical activities   -Acetaminophen 500-650 mg every 4-6 hours as needed for pain relief     Peripheral arterial disease  Abdominal aortic aneurysm  Noted ongoing claudication.  Outpatient CT scan with AAA 50 x 52 mm as an outpatient.  Status post endovascular aortic repair, left femoral endarterectomy Dr. Shabazz of IR and Dr. Chu cardiovascular surgery 12/22  - Seems to be recovering well, tolerating diet  - Pain well-controlled  - Resumed on aspirin 81 mg  -Seen in vascular surgery clinic 1/9/2024, due for repeat CTA of the abdomen pelvis for positioning and endoleak.  Repeat ultrasound in 6 months     Type 2 diabetes  A1c 8.0  ISS while  hospitalized  home medications:  glipizide 10 mg daily, Jardiance 10 mg daily, pioglitazone 45 mg daily, metformin 1000 mg twice a day     Hyperlipidemia  - Continue with atorvastatin 40 mg daily, aspirin        Vitals (last day) before discharge       Date/Time Temp Pulse Resp BP SpO2 Weight O2 Device O2 Flow Rate (L/min) [facility]    07/14/24 1020 97.7 °F (36.5 °C) 59 16 144/70 97 % -- None (Room air) -- ML    07/14/24 0800 97.9 °F (36.6 °C) -- 18 143/68 97 % -- None (Room air) --     07/14/24 0354 97.7 °F (36.5 °C) 74 18 154/76 96 % -- None (Room air) --     07/13/24 2041 98.1 °F (36.7 °C) 67 18 136/70 96 % -- None (Room air) --     07/13/24 2000 -- 64 -- -- -- -- -- -- KD    07/13/24 1600 97.5 °F (36.4 °C) -- 17 150/77 97 % -- None (Room air) --     07/13/24 0800 98.1 °F (36.7 °C) -- 17 140/67 97 % -- None (Room air) --     07/13/24 0521 97.5 °F (36.4 °C) 65 16 128/72 93 % -- None (Room air) --      07/12/24 2016 97.8 °F (36.6 °C) 78 16 129/62 96 % -- None (Room air) -- IB   07/12/24 1905 -- 84 -- -- -- -- -- -- GT   07/12/24 1512 97.8 °F (36.6 °C) 70 16 151/69 96 % 202 lb 8 oz (91.9 kg) None (Room air) --    07/12/24 1452 97.1 °F (36.2 °C) -- -- -- -- -- -- -- KS   07/12/24 1445 -- 69 14 130/65 98 % -- None (Room air) -- KS   07/12/24 1400 -- 71 15 126/58 97 % -- None (Room air) -- KS   07/12/24 1345 -- 66 15 127/63 97 % -- None (Room air) -- KS   07/12/24 1330 -- 65 18 137/63 96 % -- None (Room air) -- KS   07/12/24 1245 -- 64 15 127/58 97 % -- None (Room air) -- KS   07/12/24 1215 -- 62 16 131/59 97 % -- None (Room air) -- KS   07/12/24 1205 -- 71 -- 100/54 -- -- -- -- KS   07/12/24 1202 97.4 °F (36.3 °C) 66 -- 106/50 -- -- -- -- KS   07/12/24 1201 -- -- -- -- -- -- None (Room air) -- KS   07/12/24 1200 -- 69 -- 120/51 -- -- -- -- KS   07/12/24 1120 -- 69 20 113/68 97 % 195 lb (88.5 kg) None (Room air)

## 2024-07-16 NOTE — PROGRESS NOTES
Darin Hernandez is a 84 year old male.    Chief Complaint   Patient presents with    Ear Wax     Ear cleaning        HISTORY OF PRESENT ILLNESS    Patient presents for cerumen removal. No other complaints or concerns at this time    Social History     Socioeconomic History    Marital status:    Tobacco Use    Smoking status: Former     Types: Cigarettes     Passive exposure: Past    Smokeless tobacco: Never   Vaping Use    Vaping status: Never Used   Substance and Sexual Activity    Alcohol use: Yes     Alcohol/week: 2.0 standard drinks of alcohol     Types: 2 Cans of beer per week     Comment: 1 - 2 beers occasional    Drug use: Never       Family History   Problem Relation Age of Onset    Macular degeneration Brother     Glaucoma Neg     Diabetes Neg        Past Medical History:    Aorta aneurysm (HCC)    Back problem    Benign essential HTN    Cataract    Chronic rhinitis    Diabetes (HCC)    DM (diabetes mellitus), type 2 (HCC)    Hearing impairment    High blood pressure    High cholesterol    Hyperlipidemia    POAG (primary open-angle glaucoma)    first visit with Dr. Ang patient has been taking Brimonidine 0.15% OU BID and Timolol 0.5% OU BID for 10 yrs per MD in [place].  10/28/23 patient to continue taking gtts per [facility] due to abnormal VF OS and abnormal OCT OU    PVD (peripheral vascular disease) ([facility])    Skin cancer    Visual impairment       Past Surgical History:   Procedure Laterality Date    Appendectomy      Cataract extraction w/  intraocular lens implant Left 04/07/2014    Done by Dr. [place] Park in [place]    Cataract extraction w/  intraocular lens implant Right 2014    Done by Dr. Ch[place] Park in [place]    Other accessory      T and A    Other accessory      appendectomy    Other accessory      cholycystectomy    Other accessory      bilateral cataract    Other accessory      facial basal cell cancer    Tonsillectomy         REVIEW OF SYSTEMS    System  Neg/Pos Details   Constitutional Negative Fatigue, fever and weight loss.   ENMT Negative Drooling.   Eyes Negative Blurred vision and vision changes.   Respiratory Negative Dyspnea and wheezing.   Cardio Negative Chest pain, irregular heartbeat/palpitations and syncope.   GI Negative Abdominal pain and diarrhea.   Endocrine Negative Cold intolerance and heat intolerance.   Neuro Negative Tremors.   Psych Negative Anxiety and depression.   Integumentary Negative Frequent skin infections, pigment change and rash.   Hema/Lymph Negative Easy bleeding and easy bruising.           PHYSICAL EXAM    There were no vitals taken for this visit.       Constitutional Normal Overall appearance - Normal.        Neck Exam Normal Inspection - Normal. Palpation - Normal. Parotid gland - Normal. Thyroid gland - Normal.             Head/Face Normal Facial features - Normal. Eyebrows - Normal. Skull - Normal.             Ears Normal Inspection - Right: Normal, Left: Normal. Canal - Right: Normal, Left: Normal. TM - Right: Normal, Left: Normal.   Skin Normal Inspection - Normal.                              Canals:  Right: Canal reveals cerumen impaction,   Left: Canal reveals cerumen impaction,     Tympanic Membranes:  Right: Normal tympanic membrane.   Left: Normal tympanic membrane.     TM Visualized Method:   Right TM examined via otomicroscopy.    Left TM examined via otomicroscopy.      PROCEDURE:    Removal of cerumen impaction   The cerumen impaction was completely removed using microscopy.   Removal was completed by using acurette and/or suction.   Comments: Return to clinic as needed.  Avoid q-tips, water precautions and use over the counter wax remedies as needed.      Current Outpatient Medications:     cephalexin 500 MG Oral Cap, Take 1 capsule (500 mg total) by mouth 2 (two) times daily., Disp: 10 capsule, Rfl: 0    atenolol 25 MG Oral Tab, Take 1 tablet (25 mg total) by mouth daily., Disp: 90 tablet, Rfl: 3    BRIMONIDINE  0.15 % Ophthalmic Solution, INSTILL 1 DROP INTO BOTH EYES IN THE MORNING AND 1 DROP BEFORE  BEDTIME, Disp: 3 each, Rfl: 3    TIMOLOL 0.5 % Ophthalmic Solution, INSTILL 1 DROP INTO BOTH EYES  TWICE DAILY, Disp: 15 mL, Rfl: 3    Budesonide-Formoterol Fumarate 80-4.5 MCG/ACT Inhalation Aerosol, Inhale 2 puffs into the lungs 2 (two) times daily., Disp: 1 each, Rfl: 3    alendronate 70 MG Oral Tab, Take 1 tablet (70 mg total) by mouth once a week., Disp: 13 tablet, Rfl: 3    pioglitazone 45 MG Oral Tab, Take 1 tablet (45 mg total) by mouth daily., Disp: 90 tablet, Rfl: 3    HYDROcodone-acetaminophen 5-325 MG Oral Tab, Take 1 tablet by mouth every 6 (six) hours as needed., Disp: 20 tablet, Rfl: 0    aspirin-acetaminophen-caffeine 250-250-65 MG Oral Tab, Take 1 tablet by mouth every 6 (six) hours as needed for Pain., Disp: , Rfl:     Glucose Blood (ONETOUCH VERIO) In Vitro Strip, Use daily, Disp: 100 strip, Rfl: 3    OneTouch Delica Lancets 33G Does not apply Misc, Use daily, Disp: 100 each, Rfl: 3    empagliflozin (JARDIANCE) 10 MG Oral Tab, Take 1 tablet (10 mg total) by mouth daily., Disp: 30 tablet, Rfl: 5    glipiZIDE 10 MG Oral Tab, Take 1 tablet (10 mg total) by mouth daily. Daily in the morning, Disp: 90 tablet, Rfl: 3    atorvastatin 40 MG Oral Tab, Take 1 tablet (40 mg total) by mouth daily., Disp: 90 tablet, Rfl: 3    metFORMIN HCl 1000 MG Oral Tab, Take 1 tablet (1,000 mg total) by mouth in the morning and 1 tablet (1,000 mg total) before bedtime., Disp: 180 tablet, Rfl: 3    Omeprazole 20 MG Oral Tab EC, Take 20 mg by mouth daily., Disp: 90 tablet, Rfl: 3    Potassium Chloride ER 10 MEQ Oral Tab CR, Take 1 tablet (10 mEq total) by mouth daily., Disp: 90 tablet, Rfl: 3    aspirin 81 MG Oral Tab EC, Take 1 tablet (81 mg total) by mouth daily., Disp: , Rfl:     Multiple Vitamins-Minerals (CENTRUM SILVER ADULT 50+ OR), Take by mouth daily., Disp: , Rfl:     Ascorbic Acid (VITAMIN C OR), Take 1 tablet by mouth  daily., Disp: , Rfl:   ASSESSMENT AND PLAN    1. Bilateral impacted cerumen        All cerumen was removed using microscopy. I have asked the patient to return to see me as needed for repeat cerumen removal in the future.      Tomas Mena MD    7/16/2024    3:00 PM

## 2024-07-19 ENCOUNTER — OFFICE VISIT (OUTPATIENT)
Dept: INTERNAL MEDICINE CLINIC | Facility: CLINIC | Age: 84
End: 2024-07-19

## 2024-07-19 VITALS
TEMPERATURE: 98 F | HEIGHT: 74 IN | OXYGEN SATURATION: 98 % | HEART RATE: 62 BPM | WEIGHT: 198 LBS | BODY MASS INDEX: 25.41 KG/M2 | SYSTOLIC BLOOD PRESSURE: 169 MMHG | DIASTOLIC BLOOD PRESSURE: 84 MMHG

## 2024-07-19 DIAGNOSIS — N17.9 ACUTE KIDNEY INJURY (HCC): ICD-10-CM

## 2024-07-19 DIAGNOSIS — R35.0 URINARY FREQUENCY: ICD-10-CM

## 2024-07-19 DIAGNOSIS — I10 BENIGN ESSENTIAL HTN: ICD-10-CM

## 2024-07-19 DIAGNOSIS — L98.9 ABNORMALITY, SKIN: Primary | ICD-10-CM

## 2024-07-19 PROCEDURE — 3079F DIAST BP 80-89 MM HG: CPT | Performed by: INTERNAL MEDICINE

## 2024-07-19 PROCEDURE — 3008F BODY MASS INDEX DOCD: CPT | Performed by: INTERNAL MEDICINE

## 2024-07-19 PROCEDURE — 99495 TRANSJ CARE MGMT MOD F2F 14D: CPT | Performed by: INTERNAL MEDICINE

## 2024-07-19 PROCEDURE — 1159F MED LIST DOCD IN RCRD: CPT | Performed by: INTERNAL MEDICINE

## 2024-07-19 PROCEDURE — 3077F SYST BP >= 140 MM HG: CPT | Performed by: INTERNAL MEDICINE

## 2024-07-19 PROCEDURE — 1111F DSCHRG MED/CURRENT MED MERGE: CPT | Performed by: INTERNAL MEDICINE

## 2024-07-19 PROCEDURE — 1126F AMNT PAIN NOTED NONE PRSNT: CPT | Performed by: INTERNAL MEDICINE

## 2024-07-19 NOTE — PROGRESS NOTES
Subjective:   Darin Hernandez is a 84 year old male who presents for hospital follow up.   He was discharged from [facility] to Home or Self Care  Admission Date: 7/12/24   Discharge Date: 7/14/24  Hospital Discharge Diagnosis: lactic acidosis, uti    Interactive contact within 2 business days post discharge first initiated on Date: 7/15/2024    During the visit, the following was completed:  Obtained and reviewed discharge summary, continuity of care documents, and Hospitalist notes  Reviewed Labs (CBC, CMP)    HPI:     Mr. Hernandez is an 83 yo male with past medical history HTN, T2DM presenting for evaluation of hypotension and weakness. He had an office visit with Dr. Gilmore on the day of admission and noted to have BP 80/40 and recheck 90/60. He was sent to UC and then ER for further evaluation and treatment. There he was noted to have ELKE with Cr 1.6 and lactic acidosis of 2.2, with urine specimen suspicious for UTI. He was given IVF and IV rocephin and admitted for further treatment. This morning he is feeling much better. He was able to walk to the bathroom without difficulty.   He is eating well.      He does report having intermittent urinary frequency and incomplete bladder emptying for ~1 year. He did have some burning with urination the past few days. Denies fevers, chills, decreased appetite, cough, bowel irregularity. He does report weakness on/off for 1 year as well.        History/Other:   Current Medications:  Medication Reconciliation:  I am aware of an inpatient discharge within the last 30 days.  The discharge medication list has been reconciled with the patient's current medication list and reviewed by me.  See medication list for additions of new medication, and changes to current doses of medications and discontinued medications.  Outpatient Medications Marked as Taking for the 7/19/24 encounter (Office Visit) with Leah Person, DO   Medication Sig    atenolol 25 MG Oral Tab Take 1 tablet  (25 mg total) by mouth daily.    BRIMONIDINE 0.15 % Ophthalmic Solution INSTILL 1 DROP INTO BOTH EYES IN THE MORNING AND 1 DROP BEFORE  BEDTIME    TIMOLOL 0.5 % Ophthalmic Solution INSTILL 1 DROP INTO BOTH EYES  TWICE DAILY    Budesonide-Formoterol Fumarate 80-4.5 MCG/ACT Inhalation Aerosol Inhale 2 puffs into the lungs 2 (two) times daily.    alendronate 70 MG Oral Tab Take 1 tablet (70 mg total) by mouth once a week.    pioglitazone 45 MG Oral Tab Take 1 tablet (45 mg total) by mouth daily.    aspirin-acetaminophen-caffeine 250-250-65 MG Oral Tab Take 1 tablet by mouth every 6 (six) hours as needed for Pain.    Glucose Blood (ONETOUCH VERIO) In Vitro Strip Use daily    OneTouch Delica Lancets 33G Does not apply Misc Use daily    glipiZIDE 10 MG Oral Tab Take 1 tablet (10 mg total) by mouth daily. Daily in the morning    atorvastatin 40 MG Oral Tab Take 1 tablet (40 mg total) by mouth daily.    metFORMIN HCl 1000 MG Oral Tab Take 1 tablet (1,000 mg total) by mouth in the morning and 1 tablet (1,000 mg total) before bedtime.    Potassium Chloride ER 10 MEQ Oral Tab CR Take 1 tablet (10 mEq total) by mouth daily.    aspirin 81 MG Oral Tab EC Take 1 tablet (81 mg total) by mouth daily.    Ascorbic Acid (VITAMIN C OR) Take 1 tablet by mouth daily.       Review of Systems:  GENERAL: weight stable, energy stable, no sweating  SKIN: denies any unusual skin lesions  EYES: denies blurred vision or double vision  HEENT: denies nasal congestion, sinus pain or ST  LUNGS: denies shortness of breath with exertion  CARDIOVASCULAR: denies chest pain on exertion or palpitations  GI: denies abdominal pain, denies heartburn, denies diarrhea      Objective:   No LMP for male patient.  Estimated body mass index is 25.42 kg/m² as calculated from the following:    Height as of this encounter: 6' 2\" (1.88 m).    Weight as of this encounter: 198 lb (89.8 kg).   /76   Pulse 62   Temp 97.7 °F (36.5 °C)   Ht 6' 2\" (1.88 m)   Wt 198  lb (89.8 kg)   SpO2 98%   BMI 25.42 kg/m²    GENERAL: well developed, well nourished, in no apparent distress  NECK: supple, no adenopathy, no bruits  CHEST: no chest tenderness  LUNGS: clear to auscultation  CARDIO: RRR without murmur  EXTREMITIES: no cyanosis, clubbing or edema    Assessment & Plan:   There are no diagnoses linked to this encounter.         Acute kidney injury (HCC)  Cr 1.27 5/2024; On admission Cr 1.66; now Cr 1.06  Continue at least 48oz of water daily     Lactic acidosis  Resolved with IV       Sepsis due to urinary tract infection (HCC)  Initial BP 80/40; lactate 2.2  Ucx: strep species  Bcx ngtd  Empiric rocephin x 2 days; discharge with keflex bid x 5 days  Symptoms of burning have resolved     Urinary frequency; incomplete emptying  Add flomax daily     HTN  Continue atenolol   Resume cozaar  Stop amlodipine  Keep monitoring blood pressures     Dyspnea on exertion  Likely multifactorial in setting of allergic rhinitis, there does seem to be some degree of COPD/bibasilar scarring right greater than left on chest x-ray 12/2023  - Did have a normal Lexiscan stress test 11/2023  - Symbicort 2 puffs twice a day     Low back pain  Chronic, recurring issue.  Has not worsened.  However constant 5/10 pain.  May be limiting his usual physical activities   -Acetaminophen 500-650 mg every 4-6 hours as needed for pain relief     Peripheral arterial disease  Abdominal aortic aneurysm  Noted ongoing claudication.  Outpatient CT scan with AAA 50 x 52 mm as an outpatient.  Status post endovascular aortic repair, left femoral endarterectomy Dr. Shabazz of  and Dr. Chu cardiovascular surgery 12/22  - Seems to be recovering well, tolerating diet  - Pain well-controlled  - Resumed on aspirin 81 mg  -Seen in vascular surgery clinic 1/9/2024, due for repeat US done 7/2024 (see care everywhere)    Type 2 diabetes  A1c 8.0  home medications:  glipizide 10 mg daily, Jardiance 10 mg daily, pioglitazone 45 mg  daily, metformin 1000 mg twice a day     Hyperlipidemia  - Continue with atorvastatin 40 mg daily, aspirin      No follow-ups on file.

## 2024-07-22 ENCOUNTER — TELEPHONE (OUTPATIENT)
Dept: INTERNAL MEDICINE CLINIC | Facility: CLINIC | Age: 84
End: 2024-07-22

## 2024-07-22 RX ORDER — TAMSULOSIN HYDROCHLORIDE 0.4 MG/1
0.4 CAPSULE ORAL DAILY
Qty: 90 CAPSULE | Refills: 3 | Status: SHIPPED | OUTPATIENT
Start: 2024-07-22 | End: 2025-07-17

## 2024-07-22 NOTE — TELEPHONE ENCOUNTER
To Dr SAVAGE, ( Pt of Dr WELLS)    I pended prescription but did not fill in the details. Please advise

## 2024-07-22 NOTE — TELEPHONE ENCOUNTER
Pt. Called stating he saw Dr. Person on Friday.  Pt. Stated she was going to send Tamsulosin to his [facility].  [facility] never received the RX.

## 2024-07-26 ENCOUNTER — TELEPHONE (OUTPATIENT)
Dept: INTERNAL MEDICINE CLINIC | Facility: CLINIC | Age: 84
End: 2024-07-26

## 2024-07-26 NOTE — TELEPHONE ENCOUNTER
Ana / [facility] is calling back she is totally fine if the office wants to reach out to patient to discuss B/P

## 2024-07-26 NOTE — TELEPHONE ENCOUNTER
Ana / [facility] is calling she saw patient today  Patient's B/P has been running in the 170's he has been checking it at home.  Ana would like to speak to the doctor on call  discuss B/P management    Phone [phone]

## 2024-07-26 NOTE — TELEPHONE ENCOUNTER
Called patient and relayed  message -verbalized understanding . Leigh scheduled for 11 am Monday with  - instructed patient to bring Blood Pressure log

## 2024-07-26 NOTE — TELEPHONE ENCOUNTER
Noted lots have the patient test his blood pressure at home if possible over these next few days, and set him up with the first available physician next week to address

## 2024-07-29 ENCOUNTER — LAB ENCOUNTER (OUTPATIENT)
Dept: LAB | Age: 84
End: 2024-07-29
Attending: INTERNAL MEDICINE
Payer: MEDICARE

## 2024-07-29 ENCOUNTER — TELEPHONE (OUTPATIENT)
Dept: INTERNAL MEDICINE CLINIC | Facility: CLINIC | Age: 84
End: 2024-07-29

## 2024-07-29 ENCOUNTER — OFFICE VISIT (OUTPATIENT)
Dept: INTERNAL MEDICINE CLINIC | Facility: CLINIC | Age: 84
End: 2024-07-29

## 2024-07-29 VITALS
TEMPERATURE: 97 F | DIASTOLIC BLOOD PRESSURE: 84 MMHG | HEIGHT: 74 IN | WEIGHT: 203.63 LBS | SYSTOLIC BLOOD PRESSURE: 178 MMHG | BODY MASS INDEX: 26.13 KG/M2 | HEART RATE: 79 BPM | OXYGEN SATURATION: 97 %

## 2024-07-29 DIAGNOSIS — N18.30 STAGE 3 CHRONIC KIDNEY DISEASE, UNSPECIFIED WHETHER STAGE 3A OR 3B CKD (HCC): ICD-10-CM

## 2024-07-29 DIAGNOSIS — E11.9 TYPE 2 DIABETES MELLITUS WITHOUT COMPLICATION, WITHOUT LONG-TERM CURRENT USE OF INSULIN (HCC): ICD-10-CM

## 2024-07-29 DIAGNOSIS — I10 PRIMARY HYPERTENSION: Primary | ICD-10-CM

## 2024-07-29 DIAGNOSIS — I73.9 PERIPHERAL VASCULAR DISEASE (HCC): ICD-10-CM

## 2024-07-29 DIAGNOSIS — I10 PRIMARY HYPERTENSION: ICD-10-CM

## 2024-07-29 LAB
ANION GAP SERPL CALC-SCNC: 6 MMOL/L (ref 0–18)
BUN BLD-MCNC: 24 MG/DL (ref 9–23)
BUN/CREAT SERPL: 17.4 (ref 10–20)
CALCIUM BLD-MCNC: 9.3 MG/DL (ref 8.7–10.4)
CHLORIDE SERPL-SCNC: 112 MMOL/L (ref 98–112)
CO2 SERPL-SCNC: 27 MMOL/L (ref 21–32)
CREAT BLD-MCNC: 1.38 MG/DL
EGFRCR SERPLBLD CKD-EPI 2021: 50 ML/MIN/1.73M2 (ref 60–?)
FASTING STATUS PATIENT QL REPORTED: NO
GLUCOSE BLD-MCNC: 137 MG/DL (ref 70–99)
OSMOLALITY SERPL CALC.SUM OF ELEC: 306 MOSM/KG (ref 275–295)
POTASSIUM SERPL-SCNC: 4.4 MMOL/L (ref 3.5–5.1)
SODIUM SERPL-SCNC: 145 MMOL/L (ref 136–145)

## 2024-07-29 PROCEDURE — 1126F AMNT PAIN NOTED NONE PRSNT: CPT | Performed by: INTERNAL MEDICINE

## 2024-07-29 PROCEDURE — 1111F DSCHRG MED/CURRENT MED MERGE: CPT | Performed by: INTERNAL MEDICINE

## 2024-07-29 PROCEDURE — 3077F SYST BP >= 140 MM HG: CPT | Performed by: INTERNAL MEDICINE

## 2024-07-29 PROCEDURE — 36415 COLL VENOUS BLD VENIPUNCTURE: CPT

## 2024-07-29 PROCEDURE — 80048 BASIC METABOLIC PNL TOTAL CA: CPT

## 2024-07-29 PROCEDURE — 3079F DIAST BP 80-89 MM HG: CPT | Performed by: INTERNAL MEDICINE

## 2024-07-29 PROCEDURE — 1159F MED LIST DOCD IN RCRD: CPT | Performed by: INTERNAL MEDICINE

## 2024-07-29 PROCEDURE — 3008F BODY MASS INDEX DOCD: CPT | Performed by: INTERNAL MEDICINE

## 2024-07-29 PROCEDURE — 99214 OFFICE O/P EST MOD 30 MIN: CPT | Performed by: INTERNAL MEDICINE

## 2024-07-29 RX ORDER — LOSARTAN POTASSIUM 100 MG/1
TABLET ORAL DAILY
COMMUNITY
Start: 2024-07-29

## 2024-07-29 RX ORDER — AMLODIPINE BESYLATE 5 MG/1
5 TABLET ORAL DAILY
Qty: 30 TABLET | Refills: 5 | Status: SHIPPED | OUTPATIENT
Start: 2024-07-29 | End: 2025-07-24

## 2024-07-29 RX ORDER — AMLODIPINE BESYLATE 10 MG/1
10 TABLET ORAL DAILY
COMMUNITY

## 2024-07-29 NOTE — TELEPHONE ENCOUNTER
Please call Darin and let him know the following    Please let him know that I would prefer the amlodipine to be a smaller dose i.e. 5 mg rather than the 10 mg.  Therefore I called in the 5 mg tablets to his local pharmacy.    2.   He can start the amlodipine 5 mg tablets once a day in addition to his losartan and atenolol.    3.   He can then see [facility] for his blood pressure check in 2 weeks.  If we need to go to 10 mg at that time then he will have them at home.    4.   His blood test show that his kidney level is a little bit reduced but for now it looks to be the same as it has been in the past so no changes in any of his medication.

## 2024-07-29 NOTE — PROGRESS NOTES
Darin Hernandez is a 84 year old male.  HPI:     Chief Complaint   Patient presents with    Follow - Up     Pt here for blood pressure follow up       Darin is here for blood pressure follow-up.  His blood pressures have been reading high at home with his blood pressure meter.  They have been reading in the 170s.    We talked about this.  He does have soup at lunch and I did tell him there may be little bit too much salt in his.    I did review that he was in the hospital from July 12 to July 14.  At that time he saw Dr. Gilmore in the office and was found to have a blood pressure of 88/40.  He was sent to urgent care and then to emergency room.  In the emergency room he was noted to have acute kidney injury with creatinine 1.6 and lactic acidosis of 2.2.  Urinalysis was suspicious for UTI.  He was given IV fluids and IV Rocephin.  He improved and was able to be discharged    When he was discharged from the hospital he was told to hold his amlodipine which was 10 mg and the losartan.    He then followed up with Dr. Person July 19.  During that visit his blood pressure was 144/76.  He was asked to continue his atenolol which she has been on all along and to resume his losartan.  I believe his losartan dose is 100 mg a day.  He was to continue to hold his amlodipine.    I did review Dr. Person's note showing that he had acute kidney injury.  Sepsis due to UTI.  Urinary frequency.  Flomax was added.  History of hypertension.  Dyspnea on exertion multifactorial.  It was noted that he did have a normal Lexiscan stress test November 2023.  Diabetes mellitus type 2 with hemoglobin A1c 8.0.  History of abdominal aortic aneurysm.    All of these diagnoses seem to be stable.    He feels well.  No cardiac pulmonary GI or  symptoms.  Current Outpatient Medications   Medication Sig Dispense Refill    losartan 100 MG Oral Tab Take by mouth daily.      tamsulosin 0.4 MG Oral Cap Take 1 capsule (0.4 mg total) by mouth daily. 90  capsule 3    atenolol 25 MG Oral Tab Take 1 tablet (25 mg total) by mouth daily. 90 tablet 3    BRIMONIDINE 0.15 % Ophthalmic Solution INSTILL 1 DROP INTO BOTH EYES IN THE MORNING AND 1 DROP BEFORE  BEDTIME 3 each 3    TIMOLOL 0.5 % Ophthalmic Solution INSTILL 1 DROP INTO BOTH EYES  TWICE DAILY 15 mL 3    Budesonide-Formoterol Fumarate 80-4.5 MCG/ACT Inhalation Aerosol Inhale 2 puffs into the lungs 2 (two) times daily. 1 each 3    alendronate 70 MG Oral Tab Take 1 tablet (70 mg total) by mouth once a week. 13 tablet 3    pioglitazone 45 MG Oral Tab Take 1 tablet (45 mg total) by mouth daily. 90 tablet 3    HYDROcodone-acetaminophen 5-325 MG Oral Tab Take 1 tablet by mouth every 6 (six) hours as needed. 20 tablet 0    aspirin-acetaminophen-caffeine 250-250-65 MG Oral Tab Take 1 tablet by mouth every 6 (six) hours as needed for Pain.      Glucose Blood (ONETOUCH VERIO) In Vitro Strip Use daily 100 strip 3    OneTouch Delica Lancets 33G Does not apply Misc Use daily 100 each 3    empagliflozin (JARDIANCE) 10 MG Oral Tab Take 1 tablet (10 mg total) by mouth daily. 30 tablet 5    glipiZIDE 10 MG Oral Tab Take 1 tablet (10 mg total) by mouth daily. Daily in the morning 90 tablet 3    atorvastatin 40 MG Oral Tab Take 1 tablet (40 mg total) by mouth daily. 90 tablet 3    metFORMIN HCl 1000 MG Oral Tab Take 1 tablet (1,000 mg total) by mouth in the morning and 1 tablet (1,000 mg total) before bedtime. 180 tablet 3    Omeprazole 20 MG Oral Tab EC Take 20 mg by mouth daily. 90 tablet 3    Potassium Chloride ER 10 MEQ Oral Tab CR Take 1 tablet (10 mEq total) by mouth daily. 90 tablet 3    aspirin 81 MG Oral Tab EC Take 1 tablet (81 mg total) by mouth daily.      Ascorbic Acid (VITAMIN C OR) Take 1 tablet by mouth daily.        Past Medical History:    Aorta aneurysm (HCC)    Back problem    Benign essential HTN    Cataract    Chronic rhinitis    Diabetes (HCC)    DM (diabetes mellitus), type 2 (HCC)    Hearing impairment     High blood pressure    High cholesterol    Hyperlipidemia    POAG (primary open-angle glaucoma)    first visit with Dr. Ang patient has been taking Brimonidine 0.15% OU BID and Timolol 0.5% OU BID for 10 yrs per MD in [place].  10/28/23 patient to continue taking gtts per [facility] due to abnormal VF OS and abnormal OCT OU    PVD (peripheral vascular disease) (HCC)    Skin cancer    Visual impairment      Social History:  Social History     Socioeconomic History    Marital status:    Tobacco Use    Smoking status: Former     Types: Cigarettes     Passive exposure: Past    Smokeless tobacco: Never   Vaping Use    Vaping status: Never Used   Substance and Sexual Activity    Alcohol use: Yes     Alcohol/week: 2.0 standard drinks of alcohol     Types: 2 Cans of beer per week     Comment: 1 - 2 beers occasional    Drug use: Never     Social Determinants of Health     Financial Resource Strain: Low Risk  (7/15/2024)    Financial Resource Strain     Difficulty of Paying Living Expenses: Not hard at all     Med Affordability: No   Food Insecurity: No Food Insecurity (7/12/2024)    Food Insecurity     Food Insecurity: Never true   Transportation Needs: No Transportation Needs (7/15/2024)    Transportation Needs     Lack of Transportation: No   Housing Stability: Low Risk  (7/12/2024)    Housing Stability     Housing Instability: No        REVIEW OF SYSTEMS:   GENERAL HEALTH:  feels well otherwise  RESPIRATORY:  Voices no shortness of breath with exertion or cough  CARDIOVASCULAR:  Voices no chest pain on exertion or shortness of breath  GI:   Voices no abdominal pain or changes of bowels   :Viices no urning or frequency of urination.  NEURO:  Voices no  headaches or dizziness    EXAM:   BP (!) 178/84   Pulse 79   Temp 97 °F (36.1 °C)   Ht 6' 2\" (1.88 m)   Wt 203 lb 9.6 oz (92.4 kg)   SpO2 97%   BMI 26.14 kg/m²     I rechecked his blood pressure and he was 150/80 right arm.  150/76 left arm.  His machine left  arm gave 161/83.  GENERAL:  well developed, well nourished, in no apparent distress  LUNGS:  clear to auscultation.  Effort normal  CARDIO:  RRR without murmur.   S1 and S2 normal  GI:  good BS's,  no masses,   HSM or tenderness  EXTREMITIES : no cyanosis, clubbing or edema    ASSESSMENT AND PLAN:     1. Primary hypertension  Hypertension.  I did give him a note to call us later in the day to confirm that his losartan is 100 mg.  Also to let us know what dose amlodipine he has at home either 5 mg or 10 mg.  Will also get BMP to update his renal function  - Basic Metabolic Panel (8); Future    2. Type 2 diabetes mellitus without complication, without long-term current use of insulin (HCC)  Stable.  On therapy.  No new issues  - Basic Metabolic Panel (8); Future    3. Peripheral vascular disease (HCC)  Stable.  See above.  No new issues.    4. Stage 3 chronic kidney disease, unspecified whether stage 3a or 3b CKD (HCC)  Stable.  Check BMP.    This visit was 30 minutes.  I spent 10 minutes before visit preparing and reviewing old records.  Greater than 50% of the visit was engaged in counseling and review of past data.     The patient indicates understanding of these issues and agrees to the plan.    All Cornejo MD  7/29/2024  11:26 AM

## 2024-07-29 NOTE — TELEPHONE ENCOUNTER
Ginette, can amlodipine 10 mg be cut in half.    Patient had been on 10 mg but I do want to go a little bit slower and him so I would like him to have 5 mg tablets.    Please let me know your thoughts and then I can call in the amlodipine 5 mg tablets if needed and contact patient with the above.

## 2024-07-29 NOTE — TELEPHONE ENCOUNTER
Patient called to let Dr. Cornejo that he is taking Losartan 100 mg daily and Amlodipine 10 mg daily

## 2024-08-07 ENCOUNTER — OFFICE VISIT (OUTPATIENT)
Dept: OPHTHALMOLOGY | Facility: CLINIC | Age: 84
End: 2024-08-07

## 2024-08-07 ENCOUNTER — APPOINTMENT (OUTPATIENT)
Dept: GENERAL RADIOLOGY | Facility: HOSPITAL | Age: 84
End: 2024-08-07
Attending: STUDENT IN AN ORGANIZED HEALTH CARE EDUCATION/TRAINING PROGRAM
Payer: MEDICARE

## 2024-08-07 ENCOUNTER — HOSPITAL ENCOUNTER (EMERGENCY)
Facility: HOSPITAL | Age: 84
Discharge: HOME | End: 2024-08-07
Attending: STUDENT IN AN ORGANIZED HEALTH CARE EDUCATION/TRAINING PROGRAM
Payer: MEDICARE

## 2024-08-07 VITALS
HEART RATE: 75 BPM | RESPIRATION RATE: 18 BRPM | DIASTOLIC BLOOD PRESSURE: 92 MMHG | OXYGEN SATURATION: 95 % | TEMPERATURE: 98 F | SYSTOLIC BLOOD PRESSURE: 172 MMHG

## 2024-08-07 DIAGNOSIS — R53.1 EPISODE OF GENERALIZED WEAKNESS: Primary | ICD-10-CM

## 2024-08-07 DIAGNOSIS — H40.1131 PRIMARY OPEN ANGLE GLAUCOMA OF BOTH EYES, MILD STAGE: Primary | ICD-10-CM

## 2024-08-07 LAB
ALBUMIN SERPL-MCNC: 4.3 G/DL (ref 3.2–4.8)
ALBUMIN/GLOB SERPL: 1.8 {RATIO} (ref 1–2)
ALP LIVER SERPL-CCNC: 83 U/L
ALT SERPL-CCNC: 10 U/L
ANION GAP SERPL CALC-SCNC: 6 MMOL/L (ref 0–18)
AST SERPL-CCNC: 16 U/L (ref ?–34)
ATRIAL RATE: 61 BPM
BASOPHILS # BLD AUTO: 0.04 X10(3) UL (ref 0–0.2)
BASOPHILS NFR BLD AUTO: 0.6 %
BILIRUB SERPL-MCNC: 0.5 MG/DL (ref 0.2–1.1)
BUN BLD-MCNC: 22 MG/DL (ref 9–23)
BUN/CREAT SERPL: 13.6 (ref 10–20)
CALCIUM BLD-MCNC: 10.1 MG/DL (ref 8.7–10.4)
CHLORIDE SERPL-SCNC: 109 MMOL/L (ref 98–112)
CO2 SERPL-SCNC: 27 MMOL/L (ref 21–32)
CREAT BLD-MCNC: 1.62 MG/DL
DEPRECATED RDW RBC AUTO: 45.6 FL (ref 35.1–46.3)
EGFRCR SERPLBLD CKD-EPI 2021: 41 ML/MIN/1.73M2 (ref 60–?)
EOSINOPHIL # BLD AUTO: 0.2 X10(3) UL (ref 0–0.7)
EOSINOPHIL NFR BLD AUTO: 2.8 %
ERYTHROCYTE [DISTWIDTH] IN BLOOD BY AUTOMATED COUNT: 13.2 % (ref 11–15)
GLOBULIN PLAS-MCNC: 2.4 G/DL (ref 2–3.5)
GLUCOSE BLD-MCNC: 193 MG/DL (ref 70–99)
GLUCOSE BLDC GLUCOMTR-MCNC: 186 MG/DL (ref 70–99)
HCT VFR BLD AUTO: 40.7 %
HGB BLD-MCNC: 14 G/DL
IMM GRANULOCYTES # BLD AUTO: 0.02 X10(3) UL (ref 0–1)
IMM GRANULOCYTES NFR BLD: 0.3 %
LYMPHOCYTES # BLD AUTO: 1.18 X10(3) UL (ref 1–4)
LYMPHOCYTES NFR BLD AUTO: 16.7 %
MCH RBC QN AUTO: 32.6 PG (ref 26–34)
MCHC RBC AUTO-ENTMCNC: 34.4 G/DL (ref 31–37)
MCV RBC AUTO: 94.7 FL
MONOCYTES # BLD AUTO: 0.49 X10(3) UL (ref 0.1–1)
MONOCYTES NFR BLD AUTO: 6.9 %
NEUTROPHILS # BLD AUTO: 5.13 X10 (3) UL (ref 1.5–7.7)
NEUTROPHILS # BLD AUTO: 5.13 X10(3) UL (ref 1.5–7.7)
NEUTROPHILS NFR BLD AUTO: 72.7 %
OSMOLALITY SERPL CALC.SUM OF ELEC: 303 MOSM/KG (ref 275–295)
P AXIS: 84 DEGREES
P-R INTERVAL: 350 MS
PLATELET # BLD AUTO: 229 10(3)UL (ref 150–450)
POTASSIUM SERPL-SCNC: 4.7 MMOL/L (ref 3.5–5.1)
PROT SERPL-MCNC: 6.7 G/DL (ref 5.7–8.2)
Q-T INTERVAL: 408 MS
QRS DURATION: 94 MS
QTC CALCULATION (BEZET): 410 MS
R AXIS: 23 DEGREES
RBC # BLD AUTO: 4.3 X10(6)UL
SODIUM SERPL-SCNC: 142 MMOL/L (ref 136–145)
T AXIS: 32 DEGREES
TROPONIN I SERPL HS-MCNC: 5 NG/L
VENTRICULAR RATE: 61 BPM
WBC # BLD AUTO: 7.1 X10(3) UL (ref 4–11)

## 2024-08-07 PROCEDURE — 96361 HYDRATE IV INFUSION ADD-ON: CPT

## 2024-08-07 PROCEDURE — 84484 ASSAY OF TROPONIN QUANT: CPT | Performed by: STUDENT IN AN ORGANIZED HEALTH CARE EDUCATION/TRAINING PROGRAM

## 2024-08-07 PROCEDURE — 93010 ELECTROCARDIOGRAM REPORT: CPT

## 2024-08-07 PROCEDURE — 93005 ELECTROCARDIOGRAM TRACING: CPT

## 2024-08-07 PROCEDURE — 99284 EMERGENCY DEPT VISIT MOD MDM: CPT

## 2024-08-07 PROCEDURE — 82962 GLUCOSE BLOOD TEST: CPT

## 2024-08-07 PROCEDURE — 85025 COMPLETE CBC W/AUTO DIFF WBC: CPT | Performed by: STUDENT IN AN ORGANIZED HEALTH CARE EDUCATION/TRAINING PROGRAM

## 2024-08-07 PROCEDURE — 71045 X-RAY EXAM CHEST 1 VIEW: CPT | Performed by: STUDENT IN AN ORGANIZED HEALTH CARE EDUCATION/TRAINING PROGRAM

## 2024-08-07 PROCEDURE — 96360 HYDRATION IV INFUSION INIT: CPT

## 2024-08-07 PROCEDURE — 99285 EMERGENCY DEPT VISIT HI MDM: CPT

## 2024-08-07 PROCEDURE — 99213 OFFICE O/P EST LOW 20 MIN: CPT | Performed by: OPHTHALMOLOGY

## 2024-08-07 PROCEDURE — 1111F DSCHRG MED/CURRENT MED MERGE: CPT | Performed by: OPHTHALMOLOGY

## 2024-08-07 PROCEDURE — 80053 COMPREHEN METABOLIC PANEL: CPT | Performed by: STUDENT IN AN ORGANIZED HEALTH CARE EDUCATION/TRAINING PROGRAM

## 2024-08-07 NOTE — PROGRESS NOTES
Darin Hernandez is a 84 year old male.    HPI:     HPI    Patient is here for an IOP check.  He is taking Alphagan OU BID and Timoptic 0.5% OU BID as directed.  Patient states his vision is stable.    Last edited by Zakiya Perry OT on 8/7/2024 11:21 AM.        Patient History:  Past Medical History:    Aorta aneurysm (HCC)    Back problem    Benign essential HTN    Cataract    Chronic rhinitis    Diabetes (HCC)    DM (diabetes mellitus), type 2 (HCC)    Hearing impairment    High blood pressure    High cholesterol    Hyperlipidemia    POAG (primary open-angle glaucoma)    first visit with Dr. Ang patient has been taking Brimonidine 0.15% OU BID and Timolol 0.5% OU BID for 10 yrs per MD in [place].  10/28/23 patient to continue taking gtts per [facility] due to abnormal VF OS and abnormal OCT OU    PVD (peripheral vascular disease) ([facility])    Skin cancer    Visual impairment       Surgical History: Darin Hernandez has a past surgical history that includes other accessory (T and A); other accessory (appendectomy); other accessory (cholycystectomy); other accessory (bilateral cataract); other accessory (facial basal cell cancer); Cataract extraction w/  intraocular lens implant (Left, 04/07/2014) (Done by Dr. Chico Park in [place]); Cataract extraction w/  intraocular lens implant (Right, 2014) (Done by Dr. Chico Park in [place]); appendectomy; and tonsillectomy.    Family History   Problem Relation Age of Onset    Macular degeneration Brother     Glaucoma Neg     Diabetes Neg        Social History:   Social History     Socioeconomic History    Marital status:    Tobacco Use    Smoking status: Former     Types: Cigarettes     Passive exposure: Past    Smokeless tobacco: Never   Vaping Use    Vaping status: Never Used   Substance and Sexual Activity    Alcohol use: Yes     Alcohol/week: 2.0 standard drinks of alcohol     Types: 2 Cans of beer per week     Comment: 1 - 2 beers  occasional    Drug use: Never     Social Determinants of Health     Financial Resource Strain: Low Risk  (7/15/2024)    Financial Resource Strain     Difficulty of Paying Living Expenses: Not hard at all     Med Affordability: No   Food Insecurity: No Food Insecurity (7/12/2024)    Food Insecurity     Food Insecurity: Never true   Transportation Needs: No Transportation Needs (7/15/2024)    Transportation Needs     Lack of Transportation: No   Housing Stability: Low Risk  (7/12/2024)    Housing Stability     Housing Instability: No       Medications:  Current Outpatient Medications   Medication Sig Dispense Refill    losartan 100 MG Oral Tab Take by mouth daily.      amLODIPine 10 MG Oral Tab Take 1 tablet (10 mg total) by mouth daily.      amLODIPine 5 MG Oral Tab Take 1 tablet (5 mg total) by mouth daily. 30 tablet 5    tamsulosin 0.4 MG Oral Cap Take 1 capsule (0.4 mg total) by mouth daily. 90 capsule 3    atenolol 25 MG Oral Tab Take 1 tablet (25 mg total) by mouth daily. 90 tablet 3    BRIMONIDINE 0.15 % Ophthalmic Solution INSTILL 1 DROP INTO BOTH EYES IN THE MORNING AND 1 DROP BEFORE  BEDTIME 3 each 3    TIMOLOL 0.5 % Ophthalmic Solution INSTILL 1 DROP INTO BOTH EYES  TWICE DAILY 15 mL 3    Budesonide-Formoterol Fumarate 80-4.5 MCG/ACT Inhalation Aerosol Inhale 2 puffs into the lungs 2 (two) times daily. 1 each 3    alendronate 70 MG Oral Tab Take 1 tablet (70 mg total) by mouth once a week. 13 tablet 3    pioglitazone 45 MG Oral Tab Take 1 tablet (45 mg total) by mouth daily. 90 tablet 3    HYDROcodone-acetaminophen 5-325 MG Oral Tab Take 1 tablet by mouth every 6 (six) hours as needed. 20 tablet 0    aspirin-acetaminophen-caffeine 250-250-65 MG Oral Tab Take 1 tablet by mouth every 6 (six) hours as needed for Pain.      Glucose Blood (ONETOUCH VERIO) In Vitro Strip Use daily 100 strip 3    OneTouch Delica Lancets 33G Does not apply Misc Use daily 100 each 3    empagliflozin (JARDIANCE) 10 MG Oral Tab Take 1  tablet (10 mg total) by mouth daily. 30 tablet 5    glipiZIDE 10 MG Oral Tab Take 1 tablet (10 mg total) by mouth daily. Daily in the morning 90 tablet 3    atorvastatin 40 MG Oral Tab Take 1 tablet (40 mg total) by mouth daily. 90 tablet 3    metFORMIN HCl 1000 MG Oral Tab Take 1 tablet (1,000 mg total) by mouth in the morning and 1 tablet (1,000 mg total) before bedtime. 180 tablet 3    Omeprazole 20 MG Oral Tab EC Take 20 mg by mouth daily. 90 tablet 3    Potassium Chloride ER 10 MEQ Oral Tab CR Take 1 tablet (10 mEq total) by mouth daily. 90 tablet 3    aspirin 81 MG Oral Tab EC Take 1 tablet (81 mg total) by mouth daily.      Ascorbic Acid (VITAMIN C OR) Take 1 tablet by mouth daily.         Allergies:  Allergies   Allergen Reactions    Lisinopril ANGIOEDEMA and OTHER (SEE COMMENTS)     Lip swelling       ROS:       PHYSICAL EXAM:     Base Eye Exam       Visual Acuity (Snellen - Linear)         Right Left    Dist cc 20/50 20/30    Dist ph cc 20/40 NI              Tonometry (Applanation, 11:25 AM)         Right Left    Pressure 16 16              Pachymetry (10/28/2023)         Right Left    Thickness 554/-1 542/0              Pupils         Pupils    Right PERRL    Left PERRL                  Slit Lamp and Fundus Exam       Slit Lamp Exam         Right Left    Lids/Lashes Dermatochalasis, Meibomian gland dysfunction Dermatochalasis, Meibomian gland dysfunction    Conjunctiva/Sclera Nasal pinguecula Nasal pinguecula    Cornea no krukenberg's spindle, sub epi haze no krukenberg's spindle, sub epi haze    Anterior Chamber Deep and quiet Deep and quiet    Iris No transillumination defects No transillumination defects    Lens PC IOL with YAG PC IOL with YAG              Fundus Exam         Right Left    Disc Sloping margin, Temporal crescent Sloping margin, Temporal crescent    C/D Ratio 0.9 0.9                  Refraction       Wearing Rx         Sphere Cylinder Axis Add    Right -1.75 +2.25 005 +2.75    Left -2.00  +2.50 165 +2.75      Type: Progressive bifocal                     ASSESSMENT/PLAN:     Diagnoses and Plan:     Primary open angle glaucoma of both eyes, mild stage  IOP is stable  Continue Alphagan OU BID and Timoptic 0.5% OU BID as directed    Will see patient in 4 months for a visual field, OCT and diabetic exam    No orders of the defined types were placed in this encounter.      Meds This Visit:  Requested Prescriptions      No prescriptions requested or ordered in this encounter        Follow up instructions:  Return in about 4 months (around 12/7/2024) for Visual Field, OCT, Diabetic eye exam.    8/7/2024  Scribed by: Balbir Ang MD

## 2024-08-07 NOTE — ED INITIAL ASSESSMENT (HPI)
Pt to ED via [facility] from 's office w/ c/o difficulty breathing at the eye doctor's office. Per EMS, staff noticed the pt's lips turning blue and quickly resolved.   Pt denies any chest pain or shortness of breath currently.

## 2024-08-07 NOTE — ASSESSMENT & PLAN NOTE
IOP is stable  Continue Alphagan OU BID and Timoptic 0.5% OU BID as directed    Will see patient in 4 months for a visual field, OCT and diabetic exam

## 2024-08-07 NOTE — PATIENT INSTRUCTIONS
Primary open angle glaucoma of both eyes, mild stage  IOP is stable  Continue Alphagan OU BID and Timoptic 0.5% OU BID as directed    Will see patient in 4 months for a visual field, OCT and diabetic exam

## 2024-08-09 NOTE — ED PROVIDER NOTES
Patient Seen in: [facility] Emergency Department      History     Chief Complaint   Patient presents with    Shortness Of Breath     Stated Complaint:     Subjective:   HPI    84-year-old male with history of diabetes mellitus, hyperlipidemia, hypertension, open-angle glaucoma, presenting for evaluation of an episode of weakness.  Was at ophthalmologist office today, was getting eye pressures checked.  Shortly after that developed generalized weakness and reports of some difficulty breathing.  By the time EMS arrived and brought him to the hospital symptoms have resolved.  Denies chest pain.  States he has been feeling in his normal state of health the past few days.    Objective:   Past Medical History:    Aorta aneurysm ([facility])    Back problem    Benign essential HTN    Cataract    Chronic rhinitis    Diabetes (HCC)    DM (diabetes mellitus), type 2 (HCC)    Hearing impairment    High blood pressure    High cholesterol    Hyperlipidemia    POAG (primary open-angle glaucoma)    first visit with Dr. Ang patient has been taking Brimonidine 0.15% OU BID and Timolol 0.5% OU BID for 10 yrs per MD in [place].  10/28/23 patient to continue taking gtts per [facility] due to abnormal VF OS and abnormal OCT OU    PVD (peripheral vascular disease) ([facility])    Skin cancer    Visual impairment              Past Surgical History:   Procedure Laterality Date    Appendectomy      Cataract extraction w/  intraocular lens implant Left 04/07/2014    Done by Dr. Chico Park in [place]    Cataract extraction w/  intraocular lens implant Right 2014    Done by Dr. Chico Park in [place]    Other accessory      T and A    Other accessory      appendectomy    Other accessory      cholycystectomy    Other accessory      bilateral cataract    Other accessory      facial basal cell cancer    Tonsillectomy                  Social History     Socioeconomic History    Marital status:    Tobacco Use    Smoking  status: Former     Types: Cigarettes     Passive exposure: Past    Smokeless tobacco: Never   Vaping Use    Vaping status: Never Used   Substance and Sexual Activity    Alcohol use: Yes     Alcohol/week: 2.0 standard drinks of alcohol     Types: 2 Cans of beer per week     Comment: 1 - 2 beers occasional    Drug use: Never     Social Determinants of Health     Financial Resource Strain: Low Risk  (7/15/2024)    Financial Resource Strain     Difficulty of Paying Living Expenses: Not hard at all     Med Affordability: No   Food Insecurity: No Food Insecurity (7/12/2024)    Food Insecurity     Food Insecurity: Never true   Transportation Needs: No Transportation Needs (7/15/2024)    Transportation Needs     Lack of Transportation: No   Housing Stability: Low Risk  (7/12/2024)    Housing Stability     Housing Instability: No              Review of Systems    Positive for stated Chief Complaint: Shortness Of Breath    Other systems are as noted in HPI.  Constitutional and vital signs reviewed.      All other systems reviewed and negative except as noted above.    Physical Exam     ED Triage Vitals [08/07/24 1204]   /72   Pulse 60   Resp 18   Temp 97.8 °F (36.6 °C)   Temp src Temporal   SpO2 95 %   O2 Device None (Room air)       Current Vitals:   No data recorded        Physical Exam    Constitutional: awake, alert, no sig distress  HENT: mmm, no lesions,  Neck: normal range of motion, no tenderness, supple.  Eyes: PERRL, EOMI, conjunctiva normal, no discharge. Sclera anicteric.  Cardiovascular: rr no murmur  Respiratory: Normal breath sounds, no respiratory distress, no wheezing, no chest tenderness.  GI: Bowel sounds normal, Soft, no tenderness, no masses, no pulsatile masses.  : No CVA tenderness.  Skin: Warm, dry, no erythema, no rash.  Musculoskeletal: Intact distal pulses, no edema, no tenderness, no cyanosis, no clubbing. Good range of motion in all major joints. No tenderness to palpation or major  deformities noted. Back- No tenderness.  Neurologic: Alert & oriented x 3, normal motor function, normal sensory function, no focal deficits noted.  Psych: Calm, cooperative, nl affect    ED Course     Labs Reviewed   COMP METABOLIC PANEL (14) - Abnormal; Notable for the following components:       Result Value    Glucose 193 (*)     Creatinine 1.62 (*)     Calculated Osmolality 303 (*)     eGFR-Cr 41 (*)     All other components within normal limits   POCT GLUCOSE - Abnormal; Notable for the following components:    POC Glucose  186 (*)     All other components within normal limits   TROPONIN I HIGH SENSITIVITY - Normal   CBC WITH DIFFERENTIAL WITH PLATELET   RAINBOW DRAW BLUE                   ED Course as of 08/09/24 0050  ------------------------------------------------------------  Time: 08/07 1312  Comment: EKG as interpreted by ED physician: Sinus rhythm with first-degree AV block 61 bpm normal axis normal intervals, incomplete right bundle branch no acute ST segment changes STEMI.  QTc 410  ------------------------------------------------------------  Time: 08/07 1359  Comment: I have independently reviewed patient's chest x-ray do not appreciate any acute cardiopulmonary findings.  ------------------------------------------------------------  Time: 08/07 1359  Comment: Return precautions and follow-up instructions were discussed with patient who voiced understanding and agreement the plan.  All questions were answered to patient satisfaction.              MDM      84-year-old male presenting with an episode of generalized weakness -reportedly relative bradycardia and hypotension now resolved.  On arrival vitals are stable and reassuring.  DDx includes vasovagal syncope/presyncope, arrhythmia, oculocardiac reflex, metabolic derangement, ACS                                 Medical Decision Making      Disposition and Plan     Clinical Impression:  1. Episode of generalized weakness          Disposition:  Discharge  8/7/2024  2:37 pm    Follow-up:  Dick George MD  [address] [facility] [address]  [phone]    Follow up in 2 day(s)      [facility] Emergency Department  [address] [facility] [address]  [phone]  Follow up  As needed, If symptoms worsen    We recommend that you schedule follow up care with a primary care provider within the next three months to obtain basic health screening including reassessment of your blood pressure.      Medications Prescribed:  Discharge Medication List as of 8/7/2024  3:51 PM

## 2024-08-12 ENCOUNTER — NURSE ONLY (OUTPATIENT)
Dept: INTERNAL MEDICINE CLINIC | Facility: CLINIC | Age: 84
End: 2024-08-12

## 2024-08-12 VITALS — HEART RATE: 72 BPM | SYSTOLIC BLOOD PRESSURE: 124 MMHG | DIASTOLIC BLOOD PRESSURE: 64 MMHG

## 2024-08-12 RX ORDER — ATENOLOL 25 MG/1
25 TABLET ORAL
COMMUNITY
Start: 2024-08-12

## 2024-08-12 NOTE — PROGRESS NOTES
Darin Hernandez is a 84 year old male who has an elevated blood pressure reading at home and brings in his machine to correlate.  Lives with his brother and does not drive.  He has been checking home blood pressures and it is not well controlled, running about 168 - 180s/  80-90s.  He denies chest pain, dizziness, edema, palpitations, and urinary frequency;  he does note HAs that he takes 2 Excedrin for every morning.  He is not exercising and does not restrict his sodium intake.       He has been taking home BP after excedrin and before morning meds    Med review  Jardiance -  Does not take due to expense  Norco -   Does not take  Omeprazole - Does not take  Flomax -  Pt feels this does help his nocturia  Amlodipine -  5 mg restarted after 7/29 see TT    BP  Manual -  130/66  124/64  Machine -  133/67  119/64    Blood Pressure and Cardiac Medications            losartan 100 MG Oral Tab    amLODIPine 10 MG Oral Tab    amLODIPine 5 MG Oral Tab    atenolol 25 MG Oral Tab             BP Readings from Last 3 Encounters:   08/07/24 (!) 172/92   07/29/24 (!) 178/84   07/19/24 (!) 169/84      There is no height or weight on file to calculate BMI.   ASSESSMENT:   HTN - controlled today    PLAN:   Pt to decrease and wean excedrin - suspect rebound HAs;  discussed and pt will go to one tablet for HA for 7-10 days and then use only PRN  Pt to change atenolol to evening dosing -   BP check after morning meds    Home machine acceptable - unsure about prior high readings vs. today  Will call to follow BP   No follow-ups on file.   Rosa Maria Golden, PharmD, 8/12/2024, 11:06 AM

## 2024-08-22 ENCOUNTER — TELEPHONE (OUTPATIENT)
Dept: INTERNAL MEDICINE CLINIC | Facility: CLINIC | Age: 84
End: 2024-08-22

## 2024-08-22 NOTE — TELEPHONE ENCOUNTER
Patient called, he is scheduled for appointment on 9/27/24 at 12:30  He is requesting appointment at 9:00 on another date as this is the time his brother can drive him   Tasked to Dr George to advise on date where patient can be added  Tasked to Dr George

## 2024-08-25 ENCOUNTER — HOSPITAL ENCOUNTER (OUTPATIENT)
Age: 84
Discharge: HOME | End: 2024-08-25
Payer: MEDICARE

## 2024-08-25 VITALS
OXYGEN SATURATION: 98 % | DIASTOLIC BLOOD PRESSURE: 67 MMHG | TEMPERATURE: 98 F | HEART RATE: 77 BPM | RESPIRATION RATE: 18 BRPM | SYSTOLIC BLOOD PRESSURE: 132 MMHG

## 2024-08-25 DIAGNOSIS — S51.802A OPEN WOUND OF LEFT FOREARM, INITIAL ENCOUNTER: Primary | ICD-10-CM

## 2024-08-25 PROCEDURE — 99213 OFFICE O/P EST LOW 20 MIN: CPT | Performed by: PHYSICIAN ASSISTANT

## 2024-08-25 RX ORDER — DOXYCYCLINE 100 MG/1
100 CAPSULE ORAL 2 TIMES DAILY
Qty: 14 CAPSULE | Refills: 0 | Status: SHIPPED | OUTPATIENT
Start: 2024-08-25 | End: 2024-09-01

## 2024-08-25 RX ORDER — MUPIROCIN 20 MG/G
1 OINTMENT TOPICAL DAILY
Qty: 1 G | Refills: 0 | Status: SHIPPED | OUTPATIENT
Start: 2024-08-25 | End: 2024-09-01

## 2024-08-25 NOTE — ED INITIAL ASSESSMENT (HPI)
Patient states he has a rash and now a wound to his left forearm that started about 2-3 weeks ago. Patient is a diabetic. Patient denies any trauma to his left forearm.

## 2024-08-25 NOTE — DISCHARGE INSTRUCTIONS
CHANGE DRESSING DAILY BY RECOMMENDATION AND APPLY PRESCRIPTION CREAM.     TAKE ORAL ANTIBIOTIC as PRESCRIBED.     READDRESS OVER DAYS FOLLOWING WITH PRIMARY OR RETURN FOR CONCERNS as DISCUSSED.

## 2024-08-25 NOTE — ED PROVIDER NOTES
Chief Complaint   Patient presents with    Rash Skin Problem    Wound Care       HPI:     Darin Hernandez is a 84 year old male who presents for evaluation of wound to the left forearm over the last 2 weeks.  Denies injury or trauma placing bandage daily with periodic Vaseline topically without improvement.  Notes history of previous complicated skin infections including MRSA without recent antibiotic.  Denies associated fevers chills numbness tingling chest pain shortness of breath abdominal pain back pain.      PFSH    PFSH asessment screens reviewed and agree.  Nurses notes reviewed I agree with documentation.    Family History   Problem Relation Age of Onset    Macular degeneration Brother     Glaucoma Neg     Diabetes Neg      Family history reviewed with patient/caregiver and is not pertinent to presenting problem.  Social History     Socioeconomic History    Marital status:      Spouse name: Not on file    Number of children: Not on file    Years of education: Not on file    Highest education level: Not on file   Occupational History    Not on file   Tobacco Use    Smoking status: Former     Types: Cigarettes     Passive exposure: Past    Smokeless tobacco: Never   Vaping Use    Vaping status: Never Used   Substance and Sexual Activity    Alcohol use: Yes     Alcohol/week: 2.0 standard drinks of alcohol     Types: 2 Cans of beer per week     Comment: 1 - 2 beers occasional    Drug use: Never    Sexual activity: Not on file   Other Topics Concern    Not on file   Social History Narrative    Not on file     Social Determinants of Health     Financial Resource Strain: Low Risk  (7/15/2024)    Financial Resource Strain     Difficulty of Paying Living Expenses: Not hard at all     Med Affordability: No   Food Insecurity: No Food Insecurity (7/12/2024)    Food Insecurity     Food Insecurity: Never true   Transportation Needs: No Transportation Needs (7/15/2024)    Transportation Needs     Lack of  Transportation: No     Car Seat: Not on file   Physical Activity: Not on file   Stress: Not on file   Social Connections: Not on file   Housing Stability: Low Risk  (7/12/2024)    Housing Stability     Housing Instability: No     Housing Instability Emergency: Not on file     Crib or Bassinette: Not on file         ROS:   Positive for stated complaint: Forearm wound.  All other systems reviewed and negative except as noted above.  Constitutional and Vital Signs Reviewed.      Physical Exam:     Findings:    /67   Pulse 77   Temp 97.9 °F (36.6 °C) (Temporal)   Resp 18   SpO2 98%   GENERAL: well developed, well nourished, well hydrated, no distress  SKIN: good skin turgor, no obvious rash  EXTREMITIES: Superficial epidermal layer opening wound left dorsal distal forearm with surrounding erythema slight warmth.  No induration fluctuance or dehiscence.  No cyanosis or edema. GALVEZ without difficulty    HEAD: normocephalic, atraumatic  EYES: sclera non icteric bilateral, conjunctiva clear  NEURO: No focal deficits  PSYCH: Alert and oriented x3.  Answering questions appropriately.  Mood appropriate.    MDM/Assessment/Plan:   Orders for this encounter:    Orders Placed This Encounter    mupirocin 2 % External Ointment     Sig: Apply 1 Application topically daily for 7 days.     Dispense:  1 g     Refill:  0    doxycycline 100 MG Oral Cap     Sig: Take 1 capsule (100 mg total) by mouth 2 (two) times daily for 7 days.     Dispense:  14 capsule     Refill:  0       Labs performed this visit:  No results found for this or any previous visit (from the past 10 hour(s)).    MDM:  Patient wound cleansed and dressed, instructed on home care agreeable to topical and oral antibiotic and outpatient follow-up over the days ahead as needed.  Patient and brother happy with plan of care instructed on indications to readdress emergently.    Diagnosis:    ICD-10-CM    1. Open wound of left forearm, initial encounter  S51.802A            All results reviewed and discussed with patient.  See AVS for detailed discharge instructions for your condition today.    Follow Up with:  Dick George MD  [address] [place] [id number]  [phone]    Schedule an appointment as soon as possible for a visit in 3 days  As needed, For wound re-check, If symptoms worsen

## 2024-08-27 ENCOUNTER — OFFICE VISIT (OUTPATIENT)
Dept: INTERNAL MEDICINE CLINIC | Facility: CLINIC | Age: 84
End: 2024-08-27

## 2024-08-27 VITALS
DIASTOLIC BLOOD PRESSURE: 42 MMHG | BODY MASS INDEX: 25.95 KG/M2 | SYSTOLIC BLOOD PRESSURE: 100 MMHG | HEIGHT: 74 IN | HEART RATE: 78 BPM | TEMPERATURE: 98 F | WEIGHT: 202.19 LBS | OXYGEN SATURATION: 100 %

## 2024-08-27 DIAGNOSIS — T14.8XXA SKIN ABRASION: Primary | ICD-10-CM

## 2024-08-27 PROCEDURE — 3078F DIAST BP <80 MM HG: CPT | Performed by: INTERNAL MEDICINE

## 2024-08-27 PROCEDURE — 1159F MED LIST DOCD IN RCRD: CPT | Performed by: INTERNAL MEDICINE

## 2024-08-27 PROCEDURE — 3074F SYST BP LT 130 MM HG: CPT | Performed by: INTERNAL MEDICINE

## 2024-08-27 PROCEDURE — 1126F AMNT PAIN NOTED NONE PRSNT: CPT | Performed by: INTERNAL MEDICINE

## 2024-08-27 PROCEDURE — 99213 OFFICE O/P EST LOW 20 MIN: CPT | Performed by: INTERNAL MEDICINE

## 2024-08-27 PROCEDURE — 3008F BODY MASS INDEX DOCD: CPT | Performed by: INTERNAL MEDICINE

## 2024-08-27 NOTE — PROGRESS NOTES
Darin Hernandez is a 84 year old male.  Chief Complaint   Patient presents with    Urgent Care F/u     Skin tear left arm        HPI:   Darin Hernandez is a 84 year old male who presents for: follow up UC    Developed wound on L forearm. He doesn't recall how this occurred. His brother noticed it and took him to  for evaluation.   Wound was cleaned; pt was given mupirocin ointment and dressed with nonadherent gauze.     Wt Readings from Last 6 Encounters:   08/27/24 202 lb 3.2 oz (91.7 kg)   07/29/24 203 lb 9.6 oz (92.4 kg)   07/19/24 198 lb (89.8 kg)   07/12/24 202 lb 8 oz (91.9 kg)   07/12/24 195 lb (88.5 kg)   07/12/24 195 lb (88.5 kg)     Body mass index is 25.96 kg/m².       Current Outpatient Medications   Medication Sig Dispense Refill    mupirocin 2 % External Ointment Apply 1 Application topically daily for 7 days. 1 g 0    doxycycline 100 MG Oral Cap Take 1 capsule (100 mg total) by mouth 2 (two) times daily for 7 days. 14 capsule 0    atenolol 25 MG Oral Tab Take 1 tablet (25 mg total) by mouth daily with dinner.      losartan 100 MG Oral Tab Take by mouth daily.      amLODIPine 5 MG Oral Tab Take 1 tablet (5 mg total) by mouth daily. 30 tablet 5    tamsulosin 0.4 MG Oral Cap Take 1 capsule (0.4 mg total) by mouth daily. 90 capsule 3    BRIMONIDINE 0.15 % Ophthalmic Solution INSTILL 1 DROP INTO BOTH EYES IN THE MORNING AND 1 DROP BEFORE  BEDTIME 3 each 3    TIMOLOL 0.5 % Ophthalmic Solution INSTILL 1 DROP INTO BOTH EYES  TWICE DAILY 15 mL 3    Budesonide-Formoterol Fumarate 80-4.5 MCG/ACT Inhalation Aerosol Inhale 2 puffs into the lungs 2 (two) times daily. 1 each 3    alendronate 70 MG Oral Tab Take 1 tablet (70 mg total) by mouth once a week. 13 tablet 3    pioglitazone 45 MG Oral Tab Take 1 tablet (45 mg total) by mouth daily. 90 tablet 3    aspirin-acetaminophen-caffeine 250-250-65 MG Oral Tab Take 1 tablet by mouth every 6 (six) hours as needed for Pain.      Glucose Blood (ONETOUCH VERIO)  In Vitro Strip Use daily 100 strip 3    OneTouch Delica Lancets 33G Does not apply Misc Use daily 100 each 3    glipiZIDE 10 MG Oral Tab Take 1 tablet (10 mg total) by mouth daily. Daily in the morning 90 tablet 3    atorvastatin 40 MG Oral Tab Take 1 tablet (40 mg total) by mouth daily. 90 tablet 3    metFORMIN HCl 1000 MG Oral Tab Take 1 tablet (1,000 mg total) by mouth in the morning and 1 tablet (1,000 mg total) before bedtime. 180 tablet 3    Potassium Chloride ER 10 MEQ Oral Tab CR Take 1 tablet (10 mEq total) by mouth daily. 90 tablet 3    aspirin 81 MG Oral Tab EC Take 1 tablet (81 mg total) by mouth daily.      Ascorbic Acid (VITAMIN C OR) Take 1 tablet by mouth daily.        Past Medical History:    Aorta aneurysm (HCC)    Back problem    Benign essential HTN    Cataract    Chronic rhinitis    Diabetes (HCC)    DM (diabetes mellitus), type 2 (Formerly [facility])    Hearing impairment    High blood pressure    High cholesterol    Hyperlipidemia    POAG (primary open-angle glaucoma)    first visit with Dr. Ang patient has been taking Brimonidine 0.15% OU BID and Timolol 0.5% OU BID for 10 yrs per MD in [place].  10/28/23 patient to continue taking gtts per [facility] due to abnormal VF OS and abnormal OCT OU    PVD (peripheral vascular disease) (Formerly [facility])    Skin cancer    Visual impairment      Past Surgical History:   Procedure Laterality Date    Appendectomy      Cataract extraction w/  intraocular lens implant Left 04/07/2014    Done by Dr. Ch[place] Park in [place]    Cataract extraction w/  intraocular lens implant Right 2014    Done by Dr. Ch[place] Park in [place]    Other accessory      T and A    Other accessory      appendectomy    Other accessory      cholycystectomy    Other accessory      bilateral cataract    Other accessory      facial basal cell cancer    Tonsillectomy        Family History   Problem Relation Age of Onset    Macular degeneration Brother     Glaucoma Neg     Diabetes Neg        Social History:   Social History     Socioeconomic History    Marital status:    Tobacco Use    Smoking status: Former     Types: Cigarettes     Passive exposure: Past    Smokeless tobacco: Never   Vaping Use    Vaping status: Never Used   Substance and Sexual Activity    Alcohol use: Yes     Alcohol/week: 2.0 standard drinks of alcohol     Types: 2 Cans of beer per week     Comment: 1 - 2 beers occasional    Drug use: Never     Social Determinants of Health     Financial Resource Strain: Low Risk  (7/15/2024)    Financial Resource Strain     Difficulty of Paying Living Expenses: Not hard at all     Med Affordability: No   Food Insecurity: No Food Insecurity (7/12/2024)    Food Insecurity     Food Insecurity: Never true   Transportation Needs: No Transportation Needs (7/15/2024)    Transportation Needs     Lack of Transportation: No   Housing Stability: Low Risk  (7/12/2024)    Housing Stability     Housing Instability: No          REVIEW OF SYSTEMS:   GENERAL: feels well otherwise  SKIN: +wound      EXAM:   /42   Pulse 78   Temp 97.8 °F (36.6 °C) (Oral)   Ht 6' 2\" (1.88 m)   Wt 202 lb 3.2 oz (91.7 kg)   SpO2 100%   BMI 25.96 kg/m²     GENERAL: well developed, well nourished, in no apparent distress  L forearm with 2.5cm in diameter abrasion wound with healthy granulation tissue; no discharged noted; no surrounding erythema,       ASSESSMENT AND PLAN:     Abrasion  Continue wound care like this  Wash simply with soap and water; no scrubbing  Cover with nonadherent dressing  Has follow up with Dr. George on 9/6    Leah Person DO  8/27/2024  11:32 AM

## 2024-09-06 ENCOUNTER — LAB ENCOUNTER (OUTPATIENT)
Dept: LAB | Age: 84
End: 2024-09-06
Attending: INTERNAL MEDICINE
Payer: MEDICARE

## 2024-09-06 ENCOUNTER — OFFICE VISIT (OUTPATIENT)
Dept: INTERNAL MEDICINE CLINIC | Facility: CLINIC | Age: 84
End: 2024-09-06

## 2024-09-06 VITALS
WEIGHT: 202.38 LBS | HEIGHT: 74 IN | SYSTOLIC BLOOD PRESSURE: 120 MMHG | TEMPERATURE: 99 F | HEART RATE: 75 BPM | OXYGEN SATURATION: 98 % | DIASTOLIC BLOOD PRESSURE: 74 MMHG | BODY MASS INDEX: 25.97 KG/M2

## 2024-09-06 DIAGNOSIS — E11.9 TYPE 2 DIABETES MELLITUS WITHOUT COMPLICATION, WITHOUT LONG-TERM CURRENT USE OF INSULIN (HCC): ICD-10-CM

## 2024-09-06 DIAGNOSIS — Z13.29 SCREENING FOR THYROID DISORDER: ICD-10-CM

## 2024-09-06 DIAGNOSIS — N18.30 STAGE 3 CHRONIC KIDNEY DISEASE, UNSPECIFIED WHETHER STAGE 3A OR 3B CKD (HCC): ICD-10-CM

## 2024-09-06 DIAGNOSIS — T14.8XXA CHRONIC WOUND: ICD-10-CM

## 2024-09-06 DIAGNOSIS — Z13.0 SCREENING FOR DEFICIENCY ANEMIA: ICD-10-CM

## 2024-09-06 DIAGNOSIS — T14.8XXA SKIN ABRASION: Primary | ICD-10-CM

## 2024-09-06 DIAGNOSIS — E78.5 HYPERLIPIDEMIA, UNSPECIFIED HYPERLIPIDEMIA TYPE: ICD-10-CM

## 2024-09-06 DIAGNOSIS — G89.29 CHRONIC LOW BACK PAIN, UNSPECIFIED BACK PAIN LATERALITY, UNSPECIFIED WHETHER SCIATICA PRESENT: ICD-10-CM

## 2024-09-06 DIAGNOSIS — M54.50 CHRONIC LOW BACK PAIN, UNSPECIFIED BACK PAIN LATERALITY, UNSPECIFIED WHETHER SCIATICA PRESENT: ICD-10-CM

## 2024-09-06 DIAGNOSIS — I10 BENIGN ESSENTIAL HTN: ICD-10-CM

## 2024-09-06 DIAGNOSIS — I10 PRIMARY HYPERTENSION: ICD-10-CM

## 2024-09-06 DIAGNOSIS — E11.8 DIABETIC FOOT (HCC): ICD-10-CM

## 2024-09-06 LAB
ANION GAP SERPL CALC-SCNC: 7 MMOL/L (ref 0–18)
BASOPHILS # BLD AUTO: 0.03 X10(3) UL (ref 0–0.2)
BASOPHILS NFR BLD AUTO: 0.5 %
BUN BLD-MCNC: 24 MG/DL (ref 9–23)
BUN/CREAT SERPL: 19 (ref 10–20)
CALCIUM BLD-MCNC: 9.6 MG/DL (ref 8.7–10.4)
CHLORIDE SERPL-SCNC: 108 MMOL/L (ref 98–112)
CHOLEST SERPL-MCNC: 116 MG/DL (ref ?–200)
CO2 SERPL-SCNC: 26 MMOL/L (ref 21–32)
CREAT BLD-MCNC: 1.26 MG/DL
DEPRECATED RDW RBC AUTO: 44 FL (ref 35.1–46.3)
EGFRCR SERPLBLD CKD-EPI 2021: 56 ML/MIN/1.73M2 (ref 60–?)
EOSINOPHIL # BLD AUTO: 0.18 X10(3) UL (ref 0–0.7)
EOSINOPHIL NFR BLD AUTO: 2.8 %
ERYTHROCYTE [DISTWIDTH] IN BLOOD BY AUTOMATED COUNT: 12.3 % (ref 11–15)
EST. AVERAGE GLUCOSE BLD GHB EST-MCNC: 160 MG/DL (ref 68–126)
FASTING PATIENT LIPID ANSWER: YES
FASTING STATUS PATIENT QL REPORTED: YES
GLUCOSE BLD-MCNC: 137 MG/DL (ref 70–99)
HBA1C MFR BLD: 7.2 % (ref ?–5.7)
HCT VFR BLD AUTO: 39.5 %
HDLC SERPL-MCNC: 47 MG/DL (ref 40–59)
HGB BLD-MCNC: 13 G/DL
IMM GRANULOCYTES # BLD AUTO: 0.02 X10(3) UL (ref 0–1)
IMM GRANULOCYTES NFR BLD: 0.3 %
LDLC SERPL CALC-MCNC: 51 MG/DL (ref ?–100)
LYMPHOCYTES # BLD AUTO: 1.55 X10(3) UL (ref 1–4)
LYMPHOCYTES NFR BLD AUTO: 23.9 %
MCH RBC QN AUTO: 31.9 PG (ref 26–34)
MCHC RBC AUTO-ENTMCNC: 32.9 G/DL (ref 31–37)
MCV RBC AUTO: 97.1 FL
MONOCYTES # BLD AUTO: 0.62 X10(3) UL (ref 0.1–1)
MONOCYTES NFR BLD AUTO: 9.6 %
NEUTROPHILS # BLD AUTO: 4.09 X10 (3) UL (ref 1.5–7.7)
NEUTROPHILS # BLD AUTO: 4.09 X10(3) UL (ref 1.5–7.7)
NEUTROPHILS NFR BLD AUTO: 62.9 %
NONHDLC SERPL-MCNC: 69 MG/DL (ref ?–130)
OSMOLALITY SERPL CALC.SUM OF ELEC: 298 MOSM/KG (ref 275–295)
PLATELET # BLD AUTO: 201 10(3)UL (ref 150–450)
POTASSIUM SERPL-SCNC: 4.8 MMOL/L (ref 3.5–5.1)
RBC # BLD AUTO: 4.07 X10(6)UL
SODIUM SERPL-SCNC: 141 MMOL/L (ref 136–145)
TRIGL SERPL-MCNC: 92 MG/DL (ref 30–149)
TSI SER-ACNC: 1.67 MIU/ML (ref 0.55–4.78)
VLDLC SERPL CALC-MCNC: 13 MG/DL (ref 0–30)
WBC # BLD AUTO: 6.5 X10(3) UL (ref 4–11)

## 2024-09-06 PROCEDURE — 80061 LIPID PANEL: CPT

## 2024-09-06 PROCEDURE — 99214 OFFICE O/P EST MOD 30 MIN: CPT | Performed by: INTERNAL MEDICINE

## 2024-09-06 PROCEDURE — 1159F MED LIST DOCD IN RCRD: CPT | Performed by: INTERNAL MEDICINE

## 2024-09-06 PROCEDURE — 80048 BASIC METABOLIC PNL TOTAL CA: CPT

## 2024-09-06 PROCEDURE — 85025 COMPLETE CBC W/AUTO DIFF WBC: CPT

## 2024-09-06 PROCEDURE — 84443 ASSAY THYROID STIM HORMONE: CPT

## 2024-09-06 PROCEDURE — 1160F RVW MEDS BY RX/DR IN RCRD: CPT | Performed by: INTERNAL MEDICINE

## 2024-09-06 PROCEDURE — 3078F DIAST BP <80 MM HG: CPT | Performed by: INTERNAL MEDICINE

## 2024-09-06 PROCEDURE — 3074F SYST BP LT 130 MM HG: CPT | Performed by: INTERNAL MEDICINE

## 2024-09-06 PROCEDURE — 1126F AMNT PAIN NOTED NONE PRSNT: CPT | Performed by: INTERNAL MEDICINE

## 2024-09-06 PROCEDURE — 3008F BODY MASS INDEX DOCD: CPT | Performed by: INTERNAL MEDICINE

## 2024-09-06 PROCEDURE — 36415 COLL VENOUS BLD VENIPUNCTURE: CPT

## 2024-09-06 PROCEDURE — 83036 HEMOGLOBIN GLYCOSYLATED A1C: CPT

## 2024-09-06 NOTE — PATIENT INSTRUCTIONS
You were seen in clinic today for follow-up.  Today, we did review your urgent care visit with skin abrasion.  - Seems to be healing well  - As long as there is no further bleeding or drainage, can leave the area open to dry  - Simple soap and water should suffice  - Avoid any harsh chemicals such as iodine, alcohol  - Monitor for any changes including fevers, surrounding redness or warmth to the area    We will request home health services to help us with wound closure.  There may be some other solutions, creams and ointments that can help with long-term chronic wounds.  - Lets also ask home health nursing to assist you with blood pressure checks to ensure that your machine is calibrated correctly.  - It is unusual that the blood pressure here in the office is normal, but home readings are slightly high.    Lets continue with trying to manage sugar levels  Diabetes can be better controlled with a goal less than 7.0  - Please continue trying to cut down on carbohydrates not just in sweets but also in bread/grain/rice/breads  - Targeting weight loss over time can help by optimizing nutrition, targeting exercise as tolerated  - Maintain annual eye examination  - If no improvement, may need to consider adjusting the medication regimen further    Lets keep a close eye on your back pain  -Acetaminophen 500-650 mg every 4-6 hours as needed for pain relief  - Did complete physical therapy, continue with home stretches and exercises  - Referral provided for Dr. Garcia in the event he is a candidate for epidural steroid injection.    Please complete the fasting blood work anytime follow-up on your cholesterol and sugar levels    Return to clinic in 3 months for follow-up

## 2024-09-06 NOTE — PROGRESS NOTES
Chief Complaint:   Chief Complaint   Patient presents with    Follow - Up     4 month f/u         HPI:     Mr. BROOKE is a 84 year old male PMHX peripheral arterial disease, AAA, CKD stage III, type 2 diabetes, hypertension, hyperlipidemia coming in for follow-up.    Of note, was seen in urgent care 8/25/2024.  He did not have any trauma with left upper extremity wound. he had the area dressed and treated with topical and oral antibiotics.    No fevers, chills. Has had 2 months. Minimal drainage, no bleeding. It does not hurt. Will     BS Sugars good at home. He questions if his machine is not accurate as he has readings 130-160/70-90s.  Reports that the machine is new and he is able to demonstrate good blood pressure technique    Has some limitations with transportation and ambulation.    Past Medical History:    Aorta aneurysm (HCC)    Back problem    Benign essential HTN    Cataract    Chronic rhinitis    Diabetes (HCC)    DM (diabetes mellitus), type 2 (HCC)    Hearing impairment    High blood pressure    High cholesterol    Hyperlipidemia    POAG (primary open-angle glaucoma)    first visit with Dr. Ang patient has been taking Brimonidine 0.15% OU BID and Timolol 0.5% OU BID for 10 yrs per MD in [place].  10/28/23 patient to continue taking gtts per [facility] due to abnormal VF OS and abnormal OCT OU    PVD (peripheral vascular disease) (HCC)    Skin cancer    Visual impairment     Past Surgical History:   Procedure Laterality Date    Appendectomy      Cataract extraction w/  intraocular lens implant Left 04/07/2014    Done by Dr. [place] Park in [place]    Cataract extraction w/  intraocular lens implant Right 2014    Done by Dr. Ch[place] Park in [place]    Other accessory      T and A    Other accessory      appendectomy    Other accessory      cholycystectomy    Other accessory      bilateral cataract    Other accessory      facial basal cell cancer    Tonsillectomy       Social  History:  Social History     Socioeconomic History    Marital status:    Tobacco Use    Smoking status: Former     Types: Cigarettes     Passive exposure: Never    Smokeless tobacco: Never   Vaping Use    Vaping status: Never Used   Substance and Sexual Activity    Alcohol use: Yes     Alcohol/week: 2.0 standard drinks of alcohol     Types: 2 Cans of beer per week     Comment: 1 - 2 beers occasional    Drug use: Never     Social Determinants of Health     Financial Resource Strain: Low Risk  (7/15/2024)    Financial Resource Strain     Difficulty of Paying Living Expenses: Not hard at all     Med Affordability: No   Food Insecurity: No Food Insecurity (7/12/2024)    Food Insecurity     Food Insecurity: Never true   Transportation Needs: No Transportation Needs (7/15/2024)    Transportation Needs     Lack of Transportation: No   Housing Stability: Low Risk  (7/12/2024)    Housing Stability     Housing Instability: No     Family History:  Family History   Problem Relation Age of Onset    Macular degeneration Brother     Glaucoma Neg     Diabetes Neg      Allergies:  Allergies   Allergen Reactions    Lisinopril ANGIOEDEMA and OTHER (SEE COMMENTS)     Lip swelling     Current Meds:  Current Outpatient Medications   Medication Sig Dispense Refill    atenolol 25 MG Oral Tab Take 1 tablet (25 mg total) by mouth daily with dinner.      losartan 100 MG Oral Tab Take by mouth daily.      amLODIPine 5 MG Oral Tab Take 1 tablet (5 mg total) by mouth daily. 30 tablet 5    tamsulosin 0.4 MG Oral Cap Take 1 capsule (0.4 mg total) by mouth daily. 90 capsule 3    BRIMONIDINE 0.15 % Ophthalmic Solution INSTILL 1 DROP INTO BOTH EYES IN THE MORNING AND 1 DROP BEFORE  BEDTIME 3 each 3    TIMOLOL 0.5 % Ophthalmic Solution INSTILL 1 DROP INTO BOTH EYES  TWICE DAILY 15 mL 3    Budesonide-Formoterol Fumarate 80-4.5 MCG/ACT Inhalation Aerosol Inhale 2 puffs into the lungs 2 (two) times daily. 1 each 3    alendronate 70 MG Oral Tab  Take 1 tablet (70 mg total) by mouth once a week. 13 tablet 3    pioglitazone 45 MG Oral Tab Take 1 tablet (45 mg total) by mouth daily. 90 tablet 3    aspirin-acetaminophen-caffeine 250-250-65 MG Oral Tab Take 1 tablet by mouth every 6 (six) hours as needed for Pain.      Glucose Blood (ONETOUCH VERIO) In Vitro Strip Use daily 100 strip 3    OneTouch Delica Lancets 33G Does not apply Misc Use daily 100 each 3    glipiZIDE 10 MG Oral Tab Take 1 tablet (10 mg total) by mouth daily. Daily in the morning 90 tablet 3    atorvastatin 40 MG Oral Tab Take 1 tablet (40 mg total) by mouth daily. 90 tablet 3    metFORMIN HCl 1000 MG Oral Tab Take 1 tablet (1,000 mg total) by mouth in the morning and 1 tablet (1,000 mg total) before bedtime. 180 tablet 3    Potassium Chloride ER 10 MEQ Oral Tab CR Take 1 tablet (10 mEq total) by mouth daily. 90 tablet 3    aspirin 81 MG Oral Tab EC Take 1 tablet (81 mg total) by mouth daily.      Ascorbic Acid (VITAMIN C OR) Take 1 tablet by mouth daily.        Counseling given: Not Answered       REVIEW OF SYSTEMS:   Positive Findings indicated in BOLD    Constitutional: Fever, Chills, Weight Gain, Weight Loss, Night Sweats, Fatigue, Malaise  ENT/Mouth:  Hearing Changes, Ear Pain, Nasal Congestion, Sinus Pain, Hoarseness, Sore throat, Rhinorrhea, Swallowing Difficulty  Eyes: Eye Pain, Swelling, Redness, Foreign Body, Discharge, Vision Changes  Cardiovascular: Chest Pain, SOB, PND, Dyspnea on Exertion, Orthopnea, Claudication, Edema, Palpitations  Respiratory: Cough, Sputum, Wheezing, Shortness of breath  Gastrointestinal: Nausea, Vomiting, Diarrhea, Constipation, Pain, Heartburn, Dysphagia, Bloody stools, Tarry stools  Genitourinary: Dysmenorrhea, Dysuria, Urinary Frequency, Hematuria, Urinary Incontinence, Urgency,  Flank Pain  Musculoskeletal: Arthralgias, Myalgias, Joint Swelling, Joint Stiffness, Back Pain, Neck Pain  Integumentary: Skin Lesions, Pruritis, Hair Changes, Jaundice, Nail  changes  Neuro: Weakness, Numbness, Paresthesias, Loss of Consciousness, Syncope, Dizziness, Headache, Falls  Psych: Anxiety, Depression, Insomnia, Suicidal Ideation, Homicidal ideation, Memory Changes  Heme/Lymph: Bruising, Bleeding, Lymphadenopathy  Endocrine: Polyuria, Polydipsia, Temperature Intolerance    EXAM:   Vital Signs:  Blood pressure 120/74, pulse 75, temperature 98.8 °F (37.1 °C), height 6' 2\" (1.88 m), weight 202 lb 6.4 oz (91.8 kg), SpO2 98%.     Constitutional: No acute distress. Alert and oriented x 3.  Eyes: EOMI, PERRLA, clear sclera b/l  HENT: NCAT, Moist mucous membranes, Oropharynx without erythema or exudates  Neck: No JVD, no thyromegaly  Cardiovascular: S1, S2, no S3, no S4, Regular rate and rhythm, No murmurs/gallops/rubs.   Respiratory: Clear to auscultation bilaterally.  No wheezes/rales/rhonchi  Gastrointestinal: Soft, nontender, nondistended. Positive bowel sounds x 4. No rebound tenderness. No hepatomegaly, No splenomegaly  Genitourinary: No CVA tenderness bilaterally  Neurologic: No focal neurological deficits, CN II-XII intact, light touch intact,   Musculoskeletal: Full range of motion of all extremities, no clubbing/swelling/edema  Skin: + Left arm wound, no active bleeding or discharge.  Psychiatric: Appropriate mood and affect  Heme/Lymph/Immune: No cervical LAD          DATA REVIEWED   Labs:  Recent Results (from the past 8760 hour(s))   Comp Metabolic Panel (14)    Collection Time: 08/07/24 12:07 PM   Result Value Ref Range    Glucose 193 (H) 70 - 99 mg/dL    Sodium 142 136 - 145 mmol/L    Potassium 4.7 3.5 - 5.1 mmol/L    Chloride 109 98 - 112 mmol/L    CO2 27.0 21.0 - 32.0 mmol/L    Anion Gap 6 0 - 18 mmol/L    BUN 22 9 - 23 mg/dL    Creatinine 1.62 (H) 0.70 - 1.30 mg/dL    BUN/CREA Ratio 13.6 10.0 - 20.0    Calcium, Total 10.1 8.7 - 10.4 mg/dL    Calculated Osmolality 303 (H) 275 - 295 mOsm/kg    eGFR-Cr 41 (L) >=60 mL/min/1.73m2    ALT 10 10 - 49 U/L    AST 16 <34 U/L     Alkaline Phosphatase 83 45 - 117 U/L    Bilirubin, Total 0.5 0.2 - 1.1 mg/dL    Total Protein 6.7 5.7 - 8.2 g/dL    Albumin 4.3 3.2 - 4.8 g/dL    Globulin  2.4 2.0 - 3.5 g/dL    A/G Ratio 1.8 1.0 - 2.0     *Note: Due to a large number of results and/or encounters for the requested time period, some results have not been displayed. A complete set of results can be found in Results Review.       Recent Results (from the past 8760 hour(s))   CBC With Differential With Platelet    Collection Time: 08/07/24 12:07 PM   Result Value Ref Range    WBC 7.1 4.0 - 11.0 x10(3) uL    RBC 4.30 3.80 - 5.80 x10(6)uL    HGB 14.0 13.0 - 17.5 g/dL    HCT 40.7 39.0 - 53.0 %    MCV 94.7 80.0 - 100.0 fL    MCH 32.6 26.0 - 34.0 pg    MCHC 34.4 31.0 - 37.0 g/dL    RDW-SD 45.6 35.1 - 46.3 fL    RDW 13.2 11.0 - 15.0 %    .0 150.0 - 450.0 10(3)uL    Neutrophil Absolute Prelim 5.13 1.50 - 7.70 x10 (3) uL    Neutrophil Absolute 5.13 1.50 - 7.70 x10(3) uL    Lymphocyte Absolute 1.18 1.00 - 4.00 x10(3) uL    Monocyte Absolute 0.49 0.10 - 1.00 x10(3) uL    Eosinophil Absolute 0.20 0.00 - 0.70 x10(3) uL    Basophil Absolute 0.04 0.00 - 0.20 x10(3) uL    Immature Granulocyte Absolute 0.02 0.00 - 1.00 x10(3) uL    Neutrophil % 72.7 %    Lymphocyte % 16.7 %    Monocyte % 6.9 %    Eosinophil % 2.8 %    Basophil % 0.6 %    Immature Granulocyte % 0.3 %     *Note: Due to a large number of results and/or encounters for the requested time period, some results have not been displayed. A complete set of results can be found in Results Review.       CTA abdomen pelvis 9/21/2023  Impression   CONCLUSION:     Infrarenal abdominal aortic aneurysm measuring 50 x 52 millimeter     Right lower extremity:  Diffuse severe stenosis throughout the mid to distal SFA due to calcified plaque.  Severe three-vessel runoff disease with only the peroneal artery showing opacification throughout     Left lower extremity:  Multifocal severe stenosis in the CFA .  Severe  stenosis at the origin of the SFA due to calcified plaque.  Multifocal areas of moderate to severe stenosis throughout the rest of the SFA.  Severe three-vessel runoff disease.    Opacification throughout the peroneal artery.  The posterior tibial artery is likely opacified near the ankle.        Chest x-ray 12/22/2023    Impression   CONCLUSION:  1. No acute airspace disease.  Asymmetric mild biapical pleural thickening left more than right.  If any old films are available they should be submitted for comparison.  If not, then follow-up studies are advised.  2. Suggestion of mild hyperinflation which may suggest some degree of COPD.       ASSESSMENT AND PLAN:     Skin wound  - Left upper extremity wound, urgent care visit reviewed  - Seems to be healing well  - As long as there is no further bleeding or drainage, can leave the area open to dry  - Simple soap and water should suffice  - Avoid any harsh chemicals such as iodine, alcohol  - Monitor for any changes including fevers, surrounding redness or warmth to the area  - He does have some difficulty with transportation and ambulation at baseline.  Would be candidate for home health services to assist with home health wound care    Dyspnea on exertion  Likely multifactorial in setting of allergic rhinitis, there does seem to be some degree of COPD/bibasilar scarring right greater than left on chest x-ray 12/2023  - Did have a normal Lexiscan stress test 11/2023  - Start Symbicort 2 puffs twice a day  - Continue with management of allergic rhinitis as below    Low back pain  Chronic, recurring issue.  Has not worsened.  However constant 5/10 pain.  May be limiting his usual physical activities  -Would recommend initial trial of conservative therapy:    -Acetaminophen 500-650 mg every 4-6 hours as needed for pain relief  - Did complete physical therapy, continue with home stretches and exercises  - Referral provided for Dr. Garcia in the event he is a candidate for  epidural steroid injection.       Peripheral arterial disease  Abdominal aortic aneurysm  Noted ongoing claudication.  Outpatient CT scan with AAA 50 x 52 mm as an outpatient.  Status post endovascular aortic repair, left femoral endarterectomy Dr. Shabazz of  and Dr. Chu cardiovascular surgery 12/22  - Seems to be recovering well, tolerating diet  - Pain well-controlled  - Resumed on aspirin 81 mg  -Seen in vascular surgery clinic 1/9/2024, due for repeat CTA of the abdomen pelvis for positioning and endoleak.  Repeat ultrasound in 6 months     CKD stage III  - Baseline seems to be 1.2-1.4  - Creatinine most recently 1.45, EGFR 47 and stable     Type 2 diabetes  Recent A1c:   HEMOGLOBIN A1C (%)   Date Value   09/19/2023 7.6 (A)     HgbA1C (%)   Date Value   05/08/2024 8.0 (H)     Recent urine microalbumin: No components found for: \"MICROALB/CREAT RATIO\"  Current medications:  glipizide 10 mg daily, Jardiance 10 mg daily, pioglitazone 45 mg daily, metformin 1000 mg twice a day  Eye exam: Continue following with ophthalmology - UTD with Dr. Ang  Foot exam: Check feet daily  - His preference is to monitor sugars off jardiance given cost.  -Increase in A1c, needs to decrease carbohydrate intake.     Hypertension  -Blood pressure today at goal  - Check blood pressures at home  - Continue with home amlodipine 5 mg daily, losartan 100 mg daily, atenolol 25 mg daily  - Able demonstrate good blood pressure checks.  His blood pressure is good x 2 readings here.  At home, he has elevated blood pressures  - Would benefit from home health with blood pressure checks periodically     Hyperlipidemia  -Cholesterol overall well-controlled LDL 56, total cholesterol 150  - Continue with atorvastatin 40 mg daily, aspirin  - Plan to repeat lipid panel     Allergic rhinitis  - Trial of flonase 1 spray each nostril until symptoms improve           Orders This Visit:  No orders of the defined types were placed in this  encounter.      Meds This Visit:  Requested Prescriptions      No prescriptions requested or ordered in this encounter       Imaging & Referrals:  PODIATRY - INTERNAL     Health Maintenance  Due for shingles vaccine series, COVID series    Spent 30 minutes obtaining history, evaluating patient, discussing treatment options, diet, exercise, review of available labs and radiology reports, and completing documentation.     Return to clinic in 3 months for follow-up    Dick George MD, 09/06/24, 9:41 AM

## 2024-09-09 ENCOUNTER — TELEPHONE (OUTPATIENT)
Dept: INTERNAL MEDICINE CLINIC | Facility: CLINIC | Age: 84
End: 2024-09-09

## 2024-09-09 NOTE — TELEPHONE ENCOUNTER
Please notify the patient that the blood work from 9/6 overall look good.  Sugar levels remain adequately controlled 7.2%, improved from the last visit.  Cholesterol remains well-controlled.  No new recommendations for now

## 2024-09-23 ENCOUNTER — APPOINTMENT (OUTPATIENT)
Dept: URBAN - METROPOLITAN AREA CLINIC 244 | Age: 84
Setting detail: DERMATOLOGY
End: 2024-09-25

## 2024-09-23 DIAGNOSIS — L20.89 OTHER ATOPIC DERMATITIS: ICD-10-CM

## 2024-09-23 DIAGNOSIS — L57.0 ACTINIC KERATOSIS: ICD-10-CM

## 2024-09-23 PROBLEM — D48.5 NEOPLASM OF UNCERTAIN BEHAVIOR OF SKIN: Status: ACTIVE | Noted: 2024-09-23

## 2024-09-23 PROCEDURE — 17004 DESTROY PREMAL LESIONS 15/>: CPT

## 2024-09-23 PROCEDURE — 11103 TANGNTL BX SKIN EA SEP/ADDL: CPT

## 2024-09-23 PROCEDURE — OTHER BIOPSY BY SHAVE METHOD: OTHER

## 2024-09-23 PROCEDURE — 99204 OFFICE O/P NEW MOD 45 MIN: CPT | Mod: 25

## 2024-09-23 PROCEDURE — OTHER MEDICATION COUNSELING: OTHER

## 2024-09-23 PROCEDURE — OTHER PRESCRIPTION: OTHER

## 2024-09-23 PROCEDURE — 11102 TANGNTL BX SKIN SINGLE LES: CPT | Mod: 59

## 2024-09-23 PROCEDURE — OTHER LIQUID NITROGEN: OTHER

## 2024-09-23 PROCEDURE — OTHER COUNSELING: OTHER

## 2024-09-23 RX ORDER — CLOBETASOL PROPIONATE 0.5 MG/G
CREAM TOPICAL
Qty: 60 | Refills: 0 | Status: ERX | COMMUNITY
Start: 2024-09-23

## 2024-09-23 ASSESSMENT — LOCATION ZONE DERM
LOCATION ZONE: FACE
LOCATION ZONE: ARM
LOCATION ZONE: LEG
LOCATION ZONE: HAND

## 2024-09-23 ASSESSMENT — LOCATION SIMPLE DESCRIPTION DERM
LOCATION SIMPLE: RIGHT FOREHEAD
LOCATION SIMPLE: LEFT HAND
LOCATION SIMPLE: RIGHT HAND
LOCATION SIMPLE: LEFT FOREARM
LOCATION SIMPLE: RIGHT FOREARM
LOCATION SIMPLE: LEFT PRETIBIAL REGION

## 2024-09-23 ASSESSMENT — LOCATION DETAILED DESCRIPTION DERM
LOCATION DETAILED: RIGHT INFERIOR FOREHEAD
LOCATION DETAILED: RIGHT PROXIMAL DORSAL FOREARM
LOCATION DETAILED: LEFT PROXIMAL DORSAL FOREARM
LOCATION DETAILED: LEFT PROXIMAL PRETIBIAL REGION
LOCATION DETAILED: LEFT ULNAR DORSAL HAND
LOCATION DETAILED: RIGHT RADIAL DORSAL HAND

## 2024-09-23 NOTE — PROCEDURE: LIQUID NITROGEN
Render In Bullet Format When Appropriate: Yes
Number Of Freeze-Thaw Cycles: 1 freeze-thaw cycle
Post-Care Instructions: I reviewed with the patient post-care instructions. Patient is to wear sunprotection / sun protective clothing and avoid picking areas. Vaseline use daily is encouraged until healed.
Detail Level: Simple
Consent: The patient has had the opportunity to ask questions and understand the purpose of the treatment, benefits, risks, and alternatives. Consent has been obtained after discussing/reviewing the following:  \\n- Risks to the procedure may include but are not limited to pain/discomfort, redness, swelling, crusting/scabbing/blistering, discoloration, recurrence, persistence, and the risk of scarring. \\n- Rec re-evaluation in clinic if an AK does not resolve within 6 wks and/or sooner if worsening.
Total Number Of Aks Treated: 17

## 2024-09-23 NOTE — PROCEDURE: BIOPSY BY SHAVE METHOD
Validate Triangulation: No
Additional Anesthesia Volume In Cc (Will Not Render If 0): 0
Cryotherapy Text: The wound bed was treated with cryotherapy after the biopsy was performed.
Silver Nitrate Text: The wound bed was treated with silver nitrate after the biopsy was performed.
Anesthesia Volume In Cc: 0.5
Consent: Written consent was obtained and risks were reviewed including but not limited to scarring, infection, bleeding, scabbing, incomplete removal, nerve damage and allergy to anesthesia.
Depth Of Biopsy: dermis
Type Of Destruction Used: Curettage
Biopsy Method: double edge Personna blade
Post-Care Instructions: I reviewed with the patient in detail post-care instructions. Patient is to keep the biopsy site dry overnight, and then apply bacitracin twice daily until healed. Patient may apply hydrogen peroxide soaks to remove any crusting.
Was A Bandage Applied: Yes
Notification Instructions: Patient will be notified of biopsy results. However, patient instructed to call the office if not contacted within 2 weeks.
Detail Level: Detailed
Electrodesiccation Text: The wound bed was treated with electrodesiccation after the biopsy was performed.
Information: Selecting Yes will display possible errors in your note based on the variables you have selected. This validation is only offered as a suggestion for you. PLEASE NOTE THAT THE VALIDATION TEXT WILL BE REMOVED WHEN YOU FINALIZE YOUR NOTE. IF YOU WANT TO FAX A PRELIMINARY NOTE YOU WILL NEED TO TOGGLE THIS TO 'NO' IF YOU DO NOT WANT IT IN YOUR FAXED NOTE.
Hemostasis: Drysol
Dressing: bandage
Lab: [id number]
Electrodesiccation And Curettage Text: The wound bed was treated with electrodesiccation and curettage after the biopsy was performed.
Wound Care: Petrolatum
Biopsy Type: H and E
Curettage Text: The wound bed was treated with curettage after the biopsy was performed.
Anesthesia Type: 0.5% lidocaine with 1:100,000 epinephrine and a 1:10 solution of 8.4% sodium bicarbonate
Billing Type: Third-Party Bill
Biopsy Method: Dermablade
Anesthesia Type: 1% lidocaine with epinephrine

## 2024-09-23 NOTE — PROCEDURE: MEDICATION COUNSELING
Valtrex Counseling: I discussed with the patient the risks of valacyclovir including but not limited to kidney damage, nausea, vomiting and severe allergy.  The patient understands that if the infection seems to be worsening or is not improving, they are to call.
Bactrim Counseling:  I discussed with the patient the risks of sulfa antibiotics including but not limited to GI upset, allergic reaction, drug rash, diarrhea, dizziness, photosensitivity, and yeast infections.  Rarely, more serious reactions can occur including but not limited to aplastic anemia, agranulocytosis, methemoglobinemia, blood dyscrasias, liver or kidney failure, lung infiltrates or desquamative/blistering drug rashes.
Spevigo Counseling: I discussed with the patient the risks of Spevigo including but not limited to fatigue, nasuea, vomiting, headache, pruritus, urinary tract infection, an infusion related reactions.  The patient understands that monitoring is required including screening for tuberculosis at baseline and yearly screening thereafter while continuing Spevigo therapy. They should contact us if symptoms of infection or other concerning signs are noted.
Enbrel Counseling:  I discussed with the patient the risks of etanercept including but not limited to myelosuppression, immunosuppression, autoimmune hepatitis, demyelinating diseases, lymphoma, and infections.  The patient understands that monitoring is required including a PPD at baseline and must alert us or the primary physician if symptoms of infection or other concerning signs are noted.
Benzoyl Peroxide Counseling: Patient counseled that medicine may cause skin irritation and bleach clothing.  In the event of skin irritation, the patient was advised to reduce the amount of the drug applied or use it less frequently.   The patient verbalized understanding of the proper use and possible adverse effects of benzoyl peroxide.  All of the patient's questions and concerns were addressed.
Cyclophosphamide Pregnancy And Lactation Text: This medication is Pregnancy Category D and it isn't considered safe during pregnancy. This medication is excreted in breast milk.
Cimetidine Pregnancy And Lactation Text: This medication is Pregnancy Category B and is considered safe during pregnancy. It is also excreted in breast milk and breast feeding isn't recommended.
Cimzia Pregnancy And Lactation Text: This medication crosses the placenta but can be considered safe in certain situations. Cimzia may be excreted in breast milk.
Niacinamide Pregnancy And Lactation Text: These medications are considered safe during pregnancy.
Azelaic Acid Pregnancy And Lactation Text: This medication is considered safe during pregnancy and breast feeding.
Sotyktu Pregnancy And Lactation Text: There is insufficient data to evaluate whether or not Sotyktu is safe to use during pregnancy.   It is not known if Sotyktu passes into breast milk and whether or not it is safe to use when breastfeeding.  
Topical Steroids Counseling: I discussed with the patient that prolonged use of topical steroids can result in the increased appearance of superficial blood vessels (telangiectasias), lightening (hypopigmentation) and thinning of the skin (atrophy).  Patient understands to avoid using high potency steroids in skin folds, the groin or the face.  The patient verbalized understanding of the proper use and possible adverse effects of topical steroids.  All of the patient's questions and concerns were addressed.
Sarecycline Counseling: Patient advised regarding possible photosensitivity and discoloration of the teeth, skin, lips, tongue and gums.  Patient instructed to avoid sunlight, if possible.  When exposed to sunlight, patients should wear protective clothing, sunglasses, and sunscreen.  The patient was instructed to call the office immediately if the following severe adverse effects occur:  hearing changes, easy bruising/bleeding, severe headache, or vision changes.  The patient verbalized understanding of the proper use and possible adverse effects of sarecycline.  All of the patient's questions and concerns were addressed.
Imiquimod Counseling:  I discussed with the patient the risks of imiquimod including but not limited to erythema, scaling, itching, weeping, crusting, and pain.  Patient understands that the inflammatory response to imiquimod is variable from person to person and was educated regarded proper titration schedule.  If flu-like symptoms develop, patient knows to discontinue the medication and contact us.
Enbrel Pregnancy And Lactation Text: This medication is Pregnancy Category B and is considered safe during pregnancy. It is unknown if this medication is excreted in breast milk.
Doxepin Counseling:  Patient advised that the medication is sedating and not to drive a car after taking this medication. Patient informed of potential adverse effects including but not limited to dry mouth, urinary retention, and blurry vision.  The patient verbalized understanding of the proper use and possible adverse effects of doxepin.  All of the patient's questions and concerns were addressed.
Cyclosporine Counseling:  I discussed with the patient the risks of cyclosporine including but not limited to hypertension, gingival hyperplasia,myelosuppression, immunosuppression, liver damage, kidney damage, neurotoxicity, lymphoma, and serious infections. The patient understands that monitoring is required including baseline blood pressure, CBC, CMP, lipid panel and uric acid, and then 1-2 times monthly CMP and blood pressure.
Nsaids Pregnancy And Lactation Text: These medications are considered safe up to 30 weeks gestation. It is excreted in breast milk.
Dutasteride Male Counseling: Dustasteride Counseling:  I discussed with the patient the risks of use of dutasteride including but not limited to decreased libido, decreased ejaculate volume, and gynecomastia. Women who can become pregnant should not handle medication.  All of the patient's questions and concerns were addressed.
Cosentyx Counseling:  I discussed with the patient the risks of Cosentyx including but not limited to worsening of Crohn's disease, immunosuppression, allergic reactions and infections.  The patient understands that monitoring is required including a PPD at baseline and must alert us or the primary physician if symptoms of infection or other concerning signs are noted.
Bactrim Pregnancy And Lactation Text: This medication is Pregnancy Category D and is known to cause fetal risk.  It is also excreted in breast milk.
Valtrex Pregnancy And Lactation Text: this medication is Pregnancy Category B and is considered safe during pregnancy. This medication is not directly found in breast milk but it's metabolite acyclovir is present.
Benzoyl Peroxide Pregnancy And Lactation Text: This medication is Pregnancy Category C. It is unknown if benzoyl peroxide is excreted in breast milk.
Topical Steroids Applications Pregnancy And Lactation Text: Most topical steroids are considered safe to use during pregnancy and lactation.  Any topical steroid applied to the breast or nipple should be washed off before breastfeeding.
Sarecycline Pregnancy And Lactation Text: This medication is Pregnancy Category D and not consider safe during pregnancy. It is also excreted in breast milk.
Imiquimod Pregnancy And Lactation Text: This medication is Pregnancy Category C. It is unknown if this medication is excreted in breast milk.
Arava Counseling:  Patient counseled regarding adverse effects of Arava including but not limited to nausea, vomiting, abnormalities in liver function tests. Patients may develop mouth sores, rash, diarrhea, and abnormalities in blood counts. The patient understands that monitoring is required including LFTs and blood counts.  There is a rare possibility of scarring of the liver and lung problems that can occur when taking methotrexate. Persistent nausea, loss of appetite, pale stools, dark urine, cough, and shortness of breath should be reported immediately. Patient advised to discontinue Arava treatment and consult with a physician prior to attempting conception. The patient will have to undergo a treatment to eliminate Arava from the body prior to conception.
Spevigo Pregnancy And Lactation Text: The risk during pregnancy and breastfeeding is uncertain with this medication. This medication does cross the placenta. It is unknown if this medication is found in breast milk.
Nsaids Counseling: NSAID Counseling: I discussed with the patient that NSAIDs should be taken with food. Prolonged use of NSAIDs can result in the development of stomach ulcers.  Patient advised to stop taking NSAIDs if abdominal pain occurs.  The patient verbalized understanding of the proper use and possible adverse effects of NSAIDs.  All of the patient's questions and concerns were addressed.
Xeljanz Counseling: I discussed with the patient the risks of Xeljanz therapy including increased risk of infection, liver issues, headache, diarrhea, or cold symptoms. Live vaccines should be avoided. They were instructed to call if they have any problems.
Olanzapine Counseling- I discussed with the patient the common side effects of olanzapine including but are not limited to: lack of energy, dry mouth, increased appetite, sleepiness, tremor, constipation, dizziness, changes in behavior, or restlessness.  Explained that teenagers are more likely to experience headaches, abdominal pain, pain in the arms or legs, tiredness, and sleepiness.  Serious side effects include but are not limited: increased risk of death in elderly patients who are confused, have memory loss, or dementia-related psychosis; hyperglycemia; increased cholesterol and triglycerides; and weight gain.
Dutasteride Female Counseling: Dutasteride Counseling:  I discussed with the patient the risks of use of dutasteride including but not limited to decreased libido and sexual dysfunction. Explained the teratogenic nature of the medication and stressed the importance of not getting pregnant during treatment. All of the patient's questions and concerns were addressed.
Carac Counseling:  I discussed with the patient the risks of Carac including but not limited to erythema, scaling, itching, weeping, crusting, and pain.
Humira Counseling:  I discussed with the patient the risks of adalimumab including but not limited to myelosuppression, immunosuppression, autoimmune hepatitis, demyelinating diseases, lymphoma, and serious infections.  The patient understands that monitoring is required including a PPD at baseline and must alert us or the primary physician if symptoms of infection or other concerning signs are noted.
Topical Sulfur Applications Pregnancy And Lactation Text: This medication is considered safe during pregnancy and breast feeding secondary to limited systemic absorption.
Cephalexin Counseling: I counseled the patient regarding use of cephalexin as an antibiotic for prophylactic and/or therapeutic purposes. Cephalexin (commonly prescribed under brand name Keflex) is a cephalosporin antibiotic which is active against numerous classes of bacteria, including most skin bacteria. Side effects may include nausea, diarrhea, gastrointestinal upset, rash, hives, yeast infections, and in rare cases, hepatitis, kidney disease, seizures, fever, confusion, neurologic symptoms, and others. Patients with severe allergies to penicillin medications are cautioned that there is about a 10% incidence of cross-reactivity with cephalosporins. When possible, patients with penicillin allergies should use alternatives to cephalosporins for antibiotic therapy.
Doxepin Pregnancy And Lactation Text: This medication is Pregnancy Category C and it isn't known if it is safe during pregnancy. It is also excreted in breast milk and breast feeding isn't recommended.
Arava Pregnancy And Lactation Text: This medication is Pregnancy Category X and is absolutely contraindicated during pregnancy. It is unknown if it is excreted in breast milk.
Stelara Counseling:  I discussed with the patient the risks of ustekinumab including but not limited to immunosuppression, malignancy, posterior leukoencephalopathy syndrome, and serious infections.  The patient understands that monitoring is required including a PPD at baseline and must alert us or the primary physician if symptoms of infection or other concerning signs are noted.
Xelbarrettz Pregnancy And Lactation Text: This medication is Pregnancy Category D and is not considered safe during pregnancy.  The risk during breast feeding is also uncertain.
Use Enhanced Medication Counseling?: Yes
Cyclosporine Pregnancy And Lactation Text: This medication is Pregnancy Category C and it isn't know if it is safe during pregnancy. This medication is excreted in breast milk.
Topical Sulfur Applications Counseling: Topical Sulfur Counseling: Patient counseled that this medication may cause skin irritation or allergic reactions.  In the event of skin irritation, the patient was advised to reduce the amount of the drug applied or use it less frequently.   The patient verbalized understanding of the proper use and possible adverse effects of topical sulfur application.  All of the patient's questions and concerns were addressed.
Klisyri Counseling:  I discussed with the patient the risks of Klisyri including but not limited to erythema, scaling, itching, weeping, crusting, and pain.
Tetracycline Counseling: Patient counseled regarding possible photosensitivity and increased risk for sunburn.  Patient instructed to avoid sunlight, if possible.  When exposed to sunlight, patients should wear protective clothing, sunglasses, and sunscreen.  The patient was instructed to call the office immediately if the following severe adverse effects occur:  hearing changes, easy bruising/bleeding, severe headache, or vision changes.  The patient verbalized understanding of the proper use and possible adverse effects of tetracycline.  All of the patient's questions and concerns were addressed. Patient understands to avoid pregnancy while on therapy due to potential birth defects.
Wartpeel Counseling:  I discussed with the patient the risks of Wartpeel including but not limited to erythema, scaling, itching, weeping, crusting, and pain.
Dutasteride Pregnancy And Lactation Text: This medication is absolutely contraindicated in women, especially during pregnancy and breast feeding. Feminization of male fetuses is possible if taking while pregnant.
Minoxidil Counseling: Minoxidil is a topical medication which can increase blood flow where it is applied. It is uncertain how this medication increases hair growth. Side effects are uncommon and include stinging and allergic reactions.
Taltz Counseling: I discussed with the patient the risks of ixekizumab including but not limited to immunosuppression, serious infections, worsening of inflammatory bowel disease and drug reactions.  The patient understands that monitoring is required including a PPD at baseline and must alert us or the primary physician if symptoms of infection or other concerning signs are noted.
Olanzapine Pregnancy And Lactation Text: This medication is pregnancy category C.   There are no adequate and well controlled trials with olanzapine in pregnant females.  Olanzapine should be used during pregnancy only if the potential benefit justifies the potential risk to the fetus.   In a study in lactating healthy women, olanzapine was excreted in breast milk.  It is recommended that women taking olanzapine should not breast feed.
Hydroxyzine Counseling: Patient advised that the medication is sedating and not to drive a car after taking this medication.  Patient informed of potential adverse effects including but not limited to dry mouth, urinary retention, and blurry vision.  The patient verbalized understanding of the proper use and possible adverse effects of hydroxyzine.  All of the patient's questions and concerns were addressed.
Cephalexin Pregnancy And Lactation Text: This medication is Pregnancy Category B and considered safe during pregnancy.  It is also excreted in breast milk but can be used safely for shorter doses.
Klisyri Pregnancy And Lactation Text: It is unknown if this medication can harm a developing fetus or if it is excreted in breast milk.
Carac Pregnancy And Lactation Text: This medication is Pregnancy Category X and contraindicated in pregnancy and in women who may become pregnant. It is unknown if this medication is excreted in breast milk.
Clofazimine Counseling:  I discussed with the patient the risks of clofazimine including but not limited to skin and eye pigmentation, liver damage, nausea/vomiting, gastrointestinal bleeding and allergy.
Dupixent Counseling: I discussed with the patient the risks of dupilumab including but not limited to eye inflammation and irritation, cold sores, injection site reactions, allergic reactions and increased risk of parasitic infection. The patient understands that monitoring is required and they must alert us or the primary physician if symptoms of infection or other concerning signs are noted.
Methotrexate Counseling:  Patient counseled regarding adverse effects of methotrexate including but not limited to nausea, vomiting, abnormalities in liver function tests. Patients may develop mouth sores, rash, diarrhea, and abnormalities in blood counts. The patient understands that monitoring is required including LFT's and blood counts.  There is a rare possibility of scarring of the liver and lung problems that can occur when taking methotrexate. Persistent nausea, loss of appetite, pale stools, dark urine, cough, and shortness of breath should be reported immediately. Patient advised to discontinue methotrexate treatment at least three months before attempting to become pregnant.  I discussed the need for folate supplements while taking methotrexate.  These supplements can decrease side effects during methotrexate treatment. The patient verbalized understanding of the proper use and possible adverse effects of methotrexate.  All of the patient's questions and concerns were addressed.
Acitretin Counseling:  I discussed with the patient the risks of acitretin including but not limited to hair loss, dry lips/skin/eyes, liver damage, hyperlipidemia, depression/suicidal ideation, photosensitivity.  Serious rare side effects can include but are not limited to pancreatitis, pseudotumor cerebri, bony changes, clot formation/stroke/heart attack.  Patient understands that alcohol is contraindicated since it can result in liver toxicity and significantly prolong the elimination of the drug by many years.
Clindamycin Pregnancy And Lactation Text: This medication can be used in pregnancy if certain situations. Clindamycin is also present in breast milk.
Prednisone Counseling:  I discussed with the patient the risks of prolonged use of prednisone including but not limited to weight gain, insomnia, osteoporosis, mood changes, diabetes, susceptibility to infection, glaucoma and high blood pressure.  In cases where prednisone use is prolonged, patients should be monitored with blood pressure checks, serum glucose levels and an eye exam.  Additionally, the patient may need to be placed on GI prophylaxis, PCP prophylaxis, and calcium and vitamin D supplementation and/or a bisphosphonate.  The patient verbalized understanding of the proper use and the possible adverse effects of prednisone.  All of the patient's questions and concerns were addressed.
Solaraze Pregnancy And Lactation Text: This medication is Pregnancy Category B and is considered safe. There is some data to suggest avoiding during the third trimester. It is unknown if this medication is excreted in breast milk.
Minoxidil Pregnancy And Lactation Text: This medication has not been assigned a Pregnancy Risk Category but animal studies failed to show danger with the topical medication. It is unknown if the medication is excreted in breast milk.
Clofazimine Pregnancy And Lactation Text: This medication is Pregnancy Category C and isn't considered safe during pregnancy. It is excreted in breast milk.
Hydroxyzine Pregnancy And Lactation Text: This medication is not safe during pregnancy and should not be taken. It is also excreted in breast milk and breast feeding isn't recommended.
Hyrimoz Counseling:  I discussed with the patient the risks of adalimumab including but not limited to myelosuppression, immunosuppression, autoimmune hepatitis, demyelinating diseases, lymphoma, and serious infections.  The patient understands that monitoring is required including a PPD at baseline and must alert us or the primary physician if symptoms of infection or other concerning signs are noted.
Solaraze Counseling:  I discussed with the patient the risks of Solaraze including but not limited to erythema, scaling, itching, weeping, crusting, and pain.
Oral Minoxidil Counseling- I discussed with the patient the risks of oral minoxidil including but not limited to shortness of breath, swelling of the feet or ankles, dizziness, lightheadedness, unwanted hair growth and allergic reaction.  The patient verbalized understanding of the proper use and possible adverse effects of oral minoxidil.  All of the patient's questions and concerns were addressed.
Methotrexate Pregnancy And Lactation Text: This medication is Pregnancy Category X and is known to cause fetal harm. This medication is excreted in breast milk.
Finasteride Male Counseling: Finasteride Counseling:  I discussed with the patient the risks of use of finasteride including but not limited to decreased libido, decreased ejaculate volume, gynecomastia, and depression. Women should not handle medication.  All of the patient's questions and concerns were addressed.
Dupixent Pregnancy And Lactation Text: This medication likely crosses the placenta but the risk for the fetus is uncertain. This medication is excreted in breast milk.
Clindamycin Counseling: I counseled the patient regarding use of clindamycin as an antibiotic for prophylactic and/or therapeutic purposes. Clindamycin is active against numerous classes of bacteria, including skin bacteria. Side effects may include nausea, diarrhea, gastrointestinal upset, rash, hives, yeast infections, and in rare cases, colitis.
Calcipotriene Counseling:  I discussed with the patient the risks of calcipotriene including but not limited to erythema, scaling, itching, and irritation.
Doxycycline Counseling: Patient counseled regarding possible photosensitivity and increased risk for sunburn. Patient instructed to avoid sunlight, if possible. When exposed to sunlight, patients should wear protective clothing, sunglasses, and sunscreen. Counseled on GI AE / can cause nausea/vomiting/abdominal pain among other symptoms. The patient verbalized understanding of the proper use and possible adverse effects of doxycycline. To take with light meal to avoid any upset stomach (nausea vomiting or ab pain). Not to take concurrently with vitamins or dairy products (take at separate times). Take with full glass of water. Do not lay down for at least 1 hour after taking. All of the patient's questions and concerns were addressed. Female patients should avoid pregnancy while on therapy due to potential birth defects.
Mirvaso Counseling: Mirvaso is a topical medication which can decrease superficial blood flow where applied. Side effects are uncommon and include stinging, redness and allergic reactions.
Tremfya Counseling: I discussed with the patient the risks of guselkumab including but not limited to immunosuppression, serious infections, and drug reactions.  The patient understands that monitoring is required including a PPD at baseline and must alert us or the primary physician if symptoms of infection or other concerning signs are noted.
Cantharidin Counseling:  I discussed with the patient the risks of Cantharidin including but not limited to pain, redness, burning, itching, and blistering.
Fluconazole Counseling:  Patient counseled regarding adverse effects of fluconazole including but not limited to headache, diarrhea, nausea, upset stomach, liver function test abnormalities, taste disturbance, and stomach pain.  There is a rare possibility of liver failure that can occur when taking fluconazole.  The patient understands that monitoring of LFTs and kidney function test may be required, especially at baseline. The patient verbalized understanding of the proper use and possible adverse effects of fluconazole.  All of the patient's questions and concerns were addressed.
Oral Minoxidil Pregnancy And Lactation Text: This medication should only be used when clearly needed if you are pregnant, attempting to become pregnant or breast feeding.
Soolantra Counseling: I discussed with the patients the risks of topial Soolantra. This is a medicine which decreases the number of mites and inflammation in the skin. You experience burning, stinging, eye irritation or allergic reactions.  Please call our office if you develop any problems from using this medication.
Finasteride Female Counseling: Finasteride Counseling:  I discussed with the patient the risks of use of finasteride including but not limited to decreased libido and sexual dysfunction. Explained the teratogenic nature of the medication and stressed the importance of not getting pregnant during treatment. All of the patient's questions and concerns were addressed.
Calcipotriene Pregnancy And Lactation Text: The use of this medication during pregnancy or lactation is not recommended as there is insufficient data.
Taltz Pregnancy And Lactation Text: The risk during pregnancy and breastfeeding is uncertain with this medication.
Winlevi Counseling:  I discussed with the patient the risks of topical clascoterone including but not limited to erythema, scaling, itching, and stinging. Patient voiced their understanding.
Acitretin Pregnancy And Lactation Text: This medication is Pregnancy Category X and should not be given to women who are pregnant or may become pregnant in the future. This medication is excreted in breast milk.
Colchicine Counseling:  Patient counseled regarding adverse effects including but not limited to stomach upset (nausea, vomiting, stomach pain, or diarrhea).  Patient instructed to limit alcohol consumption while taking this medication.  Colchicine may reduce blood counts especially with prolonged use.  The patient understands that monitoring of kidney function and blood counts may be required, especially at baseline. The patient verbalized understanding of the proper use and possible adverse effects of colchicine.  All of the patient's questions and concerns were addressed.
Albendazole Counseling:  I discussed with the patient the risks of albendazole including but not limited to cytopenia, kidney damage, nausea/vomiting and severe allergy.  The patient understands that this medication is being used in an off-label manner.
Dapsone Counseling: I discussed with the patient the risks of dapsone including but not limited to hemolytic anemia, agranulocytosis, rashes, methemoglobinemia, kidney failure, peripheral neuropathy, headaches, GI upset, and liver toxicity.  Patients who start dapsone require monitoring including baseline LFTs and weekly CBCs for the first month, then every month thereafter.  The patient verbalized understanding of the proper use and possible adverse effects of dapsone.  All of the patient's questions and concerns were addressed.
Bexarotene Counseling:  I discussed with the patient the risks of bexarotene including but not limited to hair loss, dry lips/skin/eyes, liver abnormalities, hyperlipidemia, pancreatitis, depression/suicidal ideation, photosensitivity, drug rash/allergic reactions, hypothyroidism, anemia, leukopenia, infection, cataracts, and teratogenicity.  Patient understands that they will need regular blood tests to check lipid profile, liver function tests, white blood cell count, thyroid function tests and pregnancy test if applicable.
Soolantra Pregnancy And Lactation Text: This medication is Pregnancy Category C. This medication is considered safe during breast feeding.
Doxycycline Pregnancy And Lactation Text: This medication is Pregnancy Category D and not consider safe during pregnancy. It is also excreted in breast milk but is considered safe for shorter treatment courses.
Cantharidin Pregnancy And Lactation Text: This medication has not been proven safe during pregnancy. It is unknown if this medication is excreted in breast milk.
Winlevi Pregnancy And Lactation Text: This medication is considered safe during pregnancy and breastfeeding.
Fluconazole Pregnancy And Lactation Text: This medication is Pregnancy Category C and it isn't know if it is safe during pregnancy. It is also excreted in breast milk.
Ilumya Counseling: I discussed with the patient the risks of tildrakizumab including but not limited to immunosuppression, malignancy, posterior leukoencephalopathy syndrome, and serious infections.  The patient understands that monitoring is required including a PPD at baseline and must alert us or the primary physician if symptoms of infection or other concerning signs are noted.
Otezla Counseling: The side effects of Otezla were discussed with the patient, including but not limited to worsening or new depression, weight loss, diarrhea, nausea, upper respiratory tract infection, and headache. Patient instructed to call the office should any adverse effect occur.  The patient verbalized understanding of the proper use and possible adverse effects of Otezla.  All the patient's questions and concerns were addressed.
Mirvaso Pregnancy And Lactation Text: This medication has not been assigned a Pregnancy Risk Category. It is unknown if the medication is excreted in breast milk.
Finasteride Pregnancy And Lactation Text: This medication is absolutely contraindicated during pregnancy. It is unknown if it is excreted in breast milk.
Detail Level: Simple
5-Fu Counseling: 5-Fluorouracil Counseling:  I discussed with the patient the risks of 5-fluorouracil including but not limited to erythema, scaling, itching, weeping, crusting, and pain.
Vtama Pregnancy And Lactation Text: It is unknown if this medication can cause problems during pregnancy and breastfeeding.
Griseofulvin Pregnancy And Lactation Text: This medication is Pregnancy Category X and is known to cause serious birth defects. It is unknown if this medication is excreted in breast milk but breast feeding should be avoided.
Xolair Counseling:  Patient informed of potential adverse effects including but not limited to fever, muscle aches, rash and allergic reactions.  The patient verbalized understanding of the proper use and possible adverse effects of Xolair.  All of the patient's questions and concerns were addressed.
Infliximab Counseling:  I discussed with the patient the risks of infliximab including but not limited to myelosuppression, immunosuppression, autoimmune hepatitis, demyelinating diseases, lymphoma, and serious infections.  The patient understands that monitoring is required including a PPD at baseline and must alert us or the primary physician if symptoms of infection or other concerning signs are noted.
Dapsone Pregnancy And Lactation Text: This medication is Pregnancy Category C and is not considered safe during pregnancy or breast feeding.
Albendazole Pregnancy And Lactation Text: This medication is Pregnancy Category C and it isn't known if it is safe during pregnancy. It is also excreted in breast milk.
Otezla Pregnancy And Lactation Text: This medication is Pregnancy Category C and it isn't known if it is safe during pregnancy. It is unknown if it is excreted in breast milk.
Erythromycin Counseling:  I discussed with the patient the risks of erythromycin including but not limited to GI upset, allergic reaction, drug rash, diarrhea, increase in liver enzymes, and yeast infections.
Bexarotene Pregnancy And Lactation Text: This medication is Pregnancy Category X and should not be given to women who are pregnant or may become pregnant. This medication should not be used if you are breast feeding.
Topical Retinoid counseling:  Patient advised to apply a pea-sized amount only at bedtime and wait 30 minutes after washing their face before applying.  If too drying, patient may add a non-comedogenic moisturizer. The patient verbalized understanding of the proper use and possible adverse effects of retinoids.  All of the patient's questions and concerns were addressed.
VTAMA Counseling: I discussed with the patient that VTAMA is not for use in the eyes, mouth or mouth. They should call the office if they develop any signs of allergic reactions to VTAMA. The patient verbalized understanding of the proper use and possible adverse effects of VTAMA.  All of the patient's questions and concerns were addressed.
Griseofulvin Counseling:  I discussed with the patient the risks of griseofulvin including but not limited to photosensitivity, cytopenia, liver damage, nausea/vomiting and severe allergy.  The patient understands that this medication is best absorbed when taken with a fatty meal (e.g., ice cream or french fries).
Birth Control Pills Counseling: Birth Control Pill Counseling: I discussed with the patient the potential side effects of OCPs including but not limited to increased risk of stroke, heart attack, thrombophlebitis, deep venous thrombosis, hepatic adenomas, breast changes, GI upset, headaches, and depression.  The patient verbalized understanding of the proper use and possible adverse effects of OCPs. All of the patient's questions and concerns were addressed.
Opzelura Counseling:  I discussed with the patient the risks of Opzelura including but not limited to nasopharngitis, bronchitis, ear infection, eosinophila, hives, diarrhea, folliculitis, tonsillitis, and rhinorrhea.  Taken orally, this medication has been linked to serious infections; higher rate of mortality; malignancy and lymphoproliferative disorders; major adverse cardiovascular events; thrombosis; thrombocytopenia, anemia, and neutropenia; and lipid elevations.
Zoryve Counseling:  I discussed with the patient that Zoryve is not for use in the eyes, mouth or vagina. The most commonly reported side effects include diarrhea, headache, insomnia, application site pain, upper respiratory tract infections, and urinary tract infections.  All of the patient's questions and concerns were addressed.
Cibinqo Counseling: I discussed with the patient the risks of Cibinqo therapy including but not limited to common cold, nausea, headache, cold sores, increased blood CPK levels, dizziness, UTIs, fatigue, acne, and vomitting. Live vaccines should be avoided.  This medication has been linked to serious infections; higher rate of mortality; malignancy and lymphoproliferative disorders; major adverse cardiovascular events; thrombosis; thrombocytopenia and lymphopenia; lipid elevations; and retinal detachment.
Oxybutynin Pregnancy And Lactation Text: This medication is Pregnancy Category B and is considered safe during pregnancy. It is unknown if it is excreted in breast milk.
Spironolactone Counseling: Patient advised regarding risks of diarrhea, abdominal pain, hyperkalemia, birth defects (for female patients), liver toxicity and renal toxicity. The patient may need blood work to monitor liver and kidney function and potassium levels while on therapy. The patient verbalized understanding of the proper use and possible adverse effects of spironolactone.  All of the patient's questions and concerns were addressed.
Picato Counseling:  I discussed with the patient the risks of Picato including but not limited to erythema, scaling, itching, weeping, crusting, and pain.
Ivermectin Counseling:  Patient instructed to take medication on an empty stomach with a full glass of water.  Patient informed of potential adverse effects including but not limited to nausea, diarrhea, dizziness, itching, and swelling of the extremities or lymph nodes.  The patient verbalized understanding of the proper use and possible adverse effects of ivermectin.  All of the patient's questions and concerns were addressed.
Erythromycin Pregnancy And Lactation Text: This medication is Pregnancy Category B and is considered safe during pregnancy. It is also excreted in breast milk.
Opzelura Pregnancy And Lactation Text: There is insufficient data to evaluate drug-associated risk for major birth defects, miscarriage, or other adverse maternal or fetal outcomes.  There is a pregnancy registry that monitors pregnancy outcomes in pregnant persons exposed to the medication during pregnancy.  It is unknown if this medication is excreted in breast milk.  Do not breastfeed during treatment and for about 4 weeks after the last dose.
Birth Control Pills Pregnancy And Lactation Text: This medication should be avoided if pregnant and for the first 30 days post-partum.
Gabapentin Counseling: I discussed with the patient the risks of gabapentin including but not limited to dizziness, somnolence, fatigue and ataxia.
Xolair Pregnancy And Lactation Text: This medication is Pregnancy Category B and is considered safe during pregnancy. This medication is excreted in breast milk.
Oxybutynin Counseling:  I discussed with the patient the risks of oxybutynin including but not limited to skin rash, drowsiness, dry mouth, difficulty urinating, and blurred vision.
Isotretinoin Counseling: Patient should get monthly blood tests, not donate blood, not drive at night if vision affected, not share medication, and not undergo elective surgery for 6 months after tx completed. Side effects reviewed, pt to contact office should one occur.
Propranolol Counseling:  I discussed with the patient the risks of propranolol including but not limited to low heart rate, low blood pressure, low blood sugar, restlessness and increased cold sensitivity. They should call the office if they experience any of these side effects.
Include Pregnancy/Lactation Warning?: No
Drysol Counseling:  I discussed with the patient the risks of drysol/aluminum chloride including but not limited to skin rash, itching, irritation, burning.
Rituxan Counseling:  I discussed with the patient the risks of Rituxan infusions. Side effects can include infusion reactions, severe drug rashes including mucocutaneous reactions, reactivation of latent hepatitis and other infections and rarely progressive multifocal leukoencephalopathy.  All of the patient's questions and concerns were addressed.
High Dose Vitamin A Counseling: Side effects reviewed, pt to contact office should one occur.
Metronidazole Counseling:  I discussed with the patient the risks of metronidazole including but not limited to seizures, nausea/vomiting, a metallic taste in the mouth, nausea/vomiting and severe allergy.
Spironolactone Pregnancy And Lactation Text: This medication can cause feminization of the male fetus and should be avoided during pregnancy. The active metabolite is also found in breast milk.
Isotretinoin Pregnancy And Lactation Text: This medication is Pregnancy Category X and is considered extremely dangerous during pregnancy. It is unknown if it is excreted in breast milk.
Cibinqo Pregnancy And Lactation Text: It is unknown if this medication will adversely affect pregnancy or breast feeding.  You should not take this medication if you are currently pregnant or planning a pregnancy or while breastfeeding.
Tazorac Counseling:  Patient advised that medication is irritating and drying.  Patient may need to apply sparingly and wash off after an hour before eventually leaving it on overnight.  The patient verbalized understanding of the proper use and possible adverse effects of tazorac.  All of the patient's questions and concerns were addressed.
Itraconazole Counseling:  I discussed with the patient the risks of itraconazole including but not limited to liver damage, nausea/vomiting, neuropathy, and severe allergy.  The patient understands that this medication is best absorbed when taken with acidic beverages such as non-diet cola or ginger ale.  The patient understands that monitoring is required including baseline LFTs and repeat LFTs at intervals.  The patient understands that they are to contact us or the primary physician if concerning signs are noted.
Zyclara Counseling:  I discussed with the patient the risks of imiquimod including but not limited to erythema, scaling, itching, weeping, crusting, and pain.  Patient understands that the inflammatory response to imiquimod is variable from person to person and was educated regarded proper titration schedule.  If flu-like symptoms develop, patient knows to discontinue the medication and contact us.
Ketoconazole Counseling:   Patient counseled regarding improving absorption with orange juice.  Adverse effects include but are not limited to breast enlargement, headache, diarrhea, nausea, upset stomach, liver function test abnormalities, taste disturbance, and stomach pain.  There is a rare possibility of liver failure that can occur when taking ketoconazole. The patient understands that monitoring of LFTs may be required, especially at baseline. The patient verbalized understanding of the proper use and possible adverse effects of ketoconazole.  All of the patient's questions and concerns were addressed.
Protopic Counseling: \\nPatient Education Handout provided to pt regarding medication:\\n Topical Tacrolimus (Brand name: Protopic)  \\n- Dermatologists prefer topical tacrolimus for treating certain rashes on sensitive areas of your body such as the face (including eyelids), groin, & body folds (such as the armpits). \\n- It reduces inflammation on the skin. This improves the itching, flaking, and/or redness. \\n- Since it is not a steroid, it won’t thin the skin and is safe for sensitive body areas.\\n- At most pharmacies it is dispensed in a Vaseline-like form. \\n- At a specialty pharmacy it can be compounded into a cream for a fixed cash price. \\nCost: If your insurance denies coverage, please use [employer] to obtain a coupon to purchase it for cheaper. We also may send this prescription to a specialty pharmacy to get it for the cheapest price possible.\\nAlternatives: Hydrocortisone 2.5% (a prescription topical steroid) is an alternative, but it should not be used around the eyes due to the risk of causing glaucoma. \\nUse: Tacrolimus is FDA approved for use with eczema (atopic dermatitis). It is also commonly used “off-label” for other rashes that affect sensitive areas of the body. \\nPlease use this twice a day to affected areas until clear or symptoms resolve. Repeat as needed. \\nIf not improved after using for 4-6 weeks continuously, please stop use and follow up with me in clinic.  \\nNotable Side Effects: You may feel a warm, burning, or stinging sensation when applied to the skin. This usually improves as the rash improves. Alcohol intake may worsen the burning sensation as well as cause redness and flushing.\\nTo reduce the risk of a burning sensation: \\n1. Place the tube in the fridge before first time use (off-label).\\n2. First test it on the forearm before applying it to sensitive areas. \\n          Once tolerated, you may then try storing it at room temperature. \\nBox Warning: Rare cases of malignancy/cancer have been reported in patients treated with topical calcineurin inhibitors, but the relationship has not been proven. A large-scale study known as the APPLES study1 monitored children who used the medication and did not see an increased risk of cancer. I do NOT recommend the use of this medication if there is any concern, history, or treatment of an underlying cancer such as lymphoma or leukemia. Do not use it continuously for a long time and limit use to only the affected areas.\\[id number] AS, Ankita R, Nelda SC, et al. No evidence of increased cancer incidence in children using topical tacrolimus for atopic dermatitis. J Am Acad Dermatol. 2020;83(2):375-381
Glycopyrrolate Pregnancy And Lactation Text: This medication is Pregnancy Category B and is considered safe during pregnancy. It is unknown if it is excreted breast milk.
Erivedge Counseling- I discussed with the patient the risks of Erivedge including but not limited to nausea, vomiting, diarrhea, constipation, weight loss, changes in the sense of taste, decreased appetite, muscle spasms, and hair loss.  The patient verbalized understanding of the proper use and possible adverse effects of Erivedge.  All of the patient's questions and concerns were addressed.
Propranolol Pregnancy And Lactation Text: This medication is Pregnancy Category C and it isn't known if it is safe during pregnancy. It is excreted in breast milk.
High Dose Vitamin A Pregnancy And Lactation Text: High dose vitamin A therapy is contraindicated during pregnancy and breast feeding.
Metronidazole Pregnancy And Lactation Text: This medication is Pregnancy Category B and considered safe during pregnancy.  It is also excreted in breast milk.
Rituxan Pregnancy And Lactation Text: This medication is Pregnancy Category C and it isn't know if it is safe during pregnancy. It is unknown if this medication is excreted in breast milk but similar antibodies are known to be excreted.
Glycopyrrolate Counseling:  I discussed with the patient the risks of glycopyrrolate including but not limited to skin rash, drowsiness, dry mouth, difficulty urinating, and blurred vision.
Litfulo Counseling: I discussed with the patient the risks of Litfulo therapy including but not limited to upper respiratory tract infections, shingles, cold sores, and nausea. Live vaccines should be avoided.  This medication has been linked to serious infections; higher rate of mortality; malignancy and lymphoproliferative disorders; major adverse cardiovascular events; thrombosis; gastrointestinal perforations; neutropenia; lymphopenia; anemia; liver enzyme elevations; and lipid elevations.
Tazorac Pregnancy And Lactation Text: This medication is not safe during pregnancy. It is unknown if this medication is excreted in breast milk.
Olumiant Counseling: I discussed with the patient the risks of Olumiant therapy including but not limited to upper respiratory tract infections, shingles, cold sores, and nausea. Live vaccines should be avoided.  This medication has been linked to serious infections; higher rate of mortality; malignancy and lymphoproliferative disorders; major adverse cardiovascular events; thrombosis; gastrointestinal perforations; neutropenia; lymphopenia; anemia; liver enzyme elevations; and lipid elevations.
Protopic Pregnancy And Lactation Text: This medication is Pregnancy Category C. It is unknown if this medication is excreted in breast milk when applied topically.
Ketoconazole Pregnancy And Lactation Text: This medication is Pregnancy Category C and it isn't know if it is safe during pregnancy. It is also excreted in breast milk and breast feeding isn't recommended.
Siliq Counseling:  I discussed with the patient the risks of Siliq including but not limited to new or worsening depression, suicidal thoughts and behavior, immunosuppression, malignancy, posterior leukoencephalopathy syndrome, and serious infections.  The patient understands that monitoring is required including a PPD at baseline and must alert us or the primary physician if symptoms of infection or other concerning signs are noted. There is also a special program designed to monitor depression which is required with Siliq.
Topical Clindamycin Counseling: Patient counseled that this medication may cause skin irritation or allergic reactions.  In the event of skin irritation, the patient was advised to reduce the amount of the drug applied or use it less frequently.   The patient verbalized understanding of the proper use and possible adverse effects of clindamycin.  All of the patient's questions and concerns were addressed.
Litfulo Pregnancy And Lactation Text: Based on animal studies, Lifulo may cause embryo-fetal harm when administered to pregnant women.  The medication should not be used in pregnancy.  Breastfeeding is not recommended during treatment.
SSKI Counseling:  I discussed with the patient the risks of SSKI including but not limited to thyroid abnormalities, metallic taste, GI upset, fever, headache, acne, arthralgias, paraesthesias, lymphadenopathy, easy bleeding, arrhythmias, and allergic reaction.
Minocycline Counseling: Patient advised regarding possible photosensitivity and discoloration of the teeth, skin, lips, tongue and gums.  Patient instructed to avoid sunlight, if possible.  When exposed to sunlight, patients should wear protective clothing, sunglasses, and sunscreen.  The patient was instructed to call the office immediately if the following severe adverse effects occur:  hearing changes, easy bruising/bleeding, severe headache, or vision changes.  The patient verbalized understanding of the proper use and possible adverse effects of minocycline.  All of the patient's questions and concerns were addressed.
Elidel Counseling: Patient may experience a mild burning sensation during topical application. Elidel is not approved in children less than 2 years of age. There have been case reports of hematologic and skin malignancies in patients using topical calcineurin inhibitors although causality is questionable.
Quinolones Counseling:  I discussed with the patient the risks of fluoroquinolones including but not limited to GI upset, allergic reaction, drug rash, diarrhea, dizziness, photosensitivity, yeast infections, liver function test abnormalities, tendonitis/tendon rupture.
Qbrexza Counseling:  I discussed with the patient the risks of Qbrexza including but not limited to headache, mydriasis, blurred vision, dry eyes, nasal dryness, dry mouth, dry throat, dry skin, urinary hesitation, and constipation.  Local skin reactions including erythema, burning, stinging, and itching can also occur.
Libtayo Counseling- I discussed with the patient the risks of Libtayo including but not limited to nausea, vomiting, diarrhea, and bone or muscle pain.  The patient verbalized understanding of the proper use and possible adverse effects of Libtayo.  All of the patient's questions and concerns were addressed.
Hydroxychloroquine Pregnancy And Lactation Text: This medication has been shown to cause fetal harm but it isn't assigned a Pregnancy Risk Category. There are small amounts excreted in breast milk.
Eucrisa Counseling: Patient may experience a mild burning sensation during topical application. Eucrisa is not approved in children less than 3 months of age.
Terbinafine Counseling: Patient counseling regarding adverse effects of terbinafine including but not limited to headache, diarrhea, rash, upset stomach, liver function test abnormalities, itching, taste/smell disturbance, nausea, abdominal pain, and flatulence.  There is a rare possibility of liver failure that can occur when taking terbinafine.  The patient understands that a baseline LFT and kidney function test may be required. The patient verbalized understanding of the proper use and possible adverse effects of terbinafine.  All of the patient's questions and concerns were addressed.
Olumiant Pregnancy And Lactation Text: Based on animal studies, Olumiant may cause embryo-fetal harm when administered to pregnant women.  The medication should not be used in pregnancy.  Breastfeeding is not recommended during treatment.
Topical Ketoconazole Counseling: Patient counseled that this medication may cause skin irritation or allergic reactions.  In the event of skin irritation, the patient was advised to reduce the amount of the drug applied or use it less frequently.   The patient verbalized understanding of the proper use and possible adverse effects of ketoconazole.  All of the patient's questions and concerns were addressed.
Sski Pregnancy And Lactation Text: This medication is Pregnancy Category D and isn't considered safe during pregnancy. It is excreted in breast milk.
Azathioprine Counseling:  I discussed with the patient the risks of azathioprine including but not limited to myelosuppression, immunosuppression, hepatotoxicity, lymphoma, and infections.  The patient understands that monitoring is required including baseline LFTs, Creatinine, possible TPMP genotyping and weekly CBCs for the first month and then every 2 weeks thereafter.  The patient verbalized understanding of the proper use and possible adverse effects of azathioprine.  All of the patient's questions and concerns were addressed.
Hydroxychloroquine Counseling:  I discussed with the patient that a baseline ophthalmologic exam is needed at the start of therapy and every year thereafter while on therapy. A CBC may also be warranted for monitoring.  The side effects of this medication were discussed with the patient, including but not limited to agranulocytosis, aplastic anemia, seizures, rashes, retinopathy, and liver toxicity. Patient instructed to call the office should any adverse effect occur.  The patient verbalized understanding of the proper use and possible adverse effects of Plaquenil.  All the patient's questions and concerns were addressed.
Adbry Counseling: I discussed with the patient the risks of tralokinumab including but not limited to eye infection and irritation, cold sores, injection site reactions, worsening of asthma, allergic reactions and increased risk of parasitic infection.  Live vaccines should be avoided while taking tralokinumab. The patient understands that monitoring is required and they must alert us or the primary physician if symptoms of infection or other concerning signs are noted.
Low Dose Naltrexone Counseling- I discussed with the patient the potential risks and side effects of low dose naltrexone including but not limited to: more vivid dreams, headaches, nausea, vomiting, abdominal pain, fatigue, dizziness, and anxiety.
Bimzelx Counseling:  I discussed with the patient the risks of Bimzelx including but not limited to depression, immunosuppression, allergic reactions and infections.  The patient understands that monitoring is required including a PPD at baseline and must alert us or the primary physician if symptoms of infection or other concerning signs are noted.
Rinvoq Counseling: I discussed with the patient the risks of Rinvoq therapy including but not limited to upper respiratory tract infections, shingles, cold sores, bronchitis, nausea, cough, fever, acne, and headache. Live vaccines should be avoided.  This medication has been linked to serious infections; higher rate of mortality; malignancy and lymphoproliferative disorders; major adverse cardiovascular events; thrombosis; thrombocytopenia, anemia, and neutropenia; lipid elevations; liver enzyme elevations; and gastrointestinal perforations.
Cellcept Counseling:  I discussed with the patient the risks of mycophenolate mofetil including but not limited to infection/immunosuppression, GI upset, hypokalemia, hypercholesterolemia, bone marrow suppression, lymphoproliferative disorders, malignancy, GI ulceration/bleed/perforation, colitis, interstitial lung disease, kidney failure, progressive multifocal leukoencephalopathy, and birth defects.  The patient understands that monitoring is required including a baseline creatinine and regular CBC testing. In addition, patient must alert us immediately if symptoms of infection or other concerning signs are noted.
Libtayo Pregnancy And Lactation Text: This medication is contraindicated in pregnancy and when breast feeding.
Azathioprine Pregnancy And Lactation Text: This medication is Pregnancy Category D and isn't considered safe during pregnancy. It is unknown if this medication is excreted in breast milk.
Qbrexza Pregnancy And Lactation Text: There is no available data on Qbrexza use in pregnant women.  There is no available data on Qbrexza use in lactation.
Thalidomide Counseling: I discussed with the patient the risks of thalidomide including but not limited to birth defects, anxiety, weakness, chest pain, dizziness, cough and severe allergy.
Adbry Pregnancy And Lactation Text: It is unknown if this medication will adversely affect pregnancy or breast feeding.
Simponi Counseling:  I discussed with the patient the risks of golimumab including but not limited to myelosuppression, immunosuppression, autoimmune hepatitis, demyelinating diseases, lymphoma, and serious infections.  The patient understands that monitoring is required including a PPD at baseline and must alert us or the primary physician if symptoms of infection or other concerning signs are noted.
Aklief counseling:  Patient advised to apply a pea-sized amount only at bedtime and wait 30 minutes after washing their face before applying.  If too drying, patient may add a non-comedogenic moisturizer.  The most commonly reported side effects including irritation, redness, scaling, dryness, stinging, burning, itching, and increased risk of sunburn.  The patient verbalized understanding of the proper use and possible adverse effects of retinoids.  All of the patient's questions and concerns were addressed.
Azithromycin Counseling:  I discussed with the patient the risks of azithromycin including but not limited to GI upset, allergic reaction, drug rash, diarrhea, and yeast infections.
Hydroquinone Counseling:  Patient advised that medication may result in skin irritation, lightening (hypopigmentation), dryness, and burning.  In the event of skin irritation, the patient was advised to reduce the amount of the drug applied or use it less frequently.  Rarely, spots that are treated with hydroquinone can become darker (pseudoochronosis).  Should this occur, patient instructed to stop medication and call the office. The patient verbalized understanding of the proper use and possible adverse effects of hydroquinone.  All of the patient's questions and concerns were addressed.
Odomzo Counseling- I discussed with the patient the risks of Odomzo including but not limited to nausea, vomiting, diarrhea, constipation, weight loss, changes in the sense of taste, decreased appetite, muscle spasms, and hair loss.  The patient verbalized understanding of the proper use and possible adverse effects of Odomzo.  All of the patient's questions and concerns were addressed.
Opioid Pregnancy And Lactation Text: These medications can lead to premature delivery and should be avoided during pregnancy. These medications are also present in breast milk in small amounts.
Skyrizi Counseling: I discussed with the patient the risks of risankizumab-rzaa including but not limited to immunosuppression, and serious infections.  The patient understands that monitoring is required including a PPD at baseline and must alert us or the primary physician if symptoms of infection or other concerning signs are noted.
Azelaic Acid Counseling: Patient counseled that medicine may cause skin irritation and to avoid applying near the eyes.  In the event of skin irritation, the patient was advised to reduce the amount of the drug applied or use it less frequently.   The patient verbalized understanding of the proper use and possible adverse effects of azelaic acid.  All of the patient's questions and concerns were addressed.
Bimzelx Pregnancy And Lactation Text: This medication crosses the placenta and the safety is uncertain during pregnancy. It is unknown if this medication is present in breast milk.
Low Dose Naltrexone Pregnancy And Lactation Text: Naltrexone is pregnancy category C.  There have been no adequate and well-controlled studies in pregnant women.  It should be used in pregnancy only if the potential benefit justifies the potential risk to the fetus.   Limited data indicates that naltrexone is minimally excreted into breastmilk.
Rinvoq Pregnancy And Lactation Text: Based on animal studies, Rinvoq may cause embryo-fetal harm when administered to pregnant women.  The medication should not be used in pregnancy.  Breastfeeding is not recommended during treatment and for 6 days after the last dose.
Aklief Pregnancy And Lactation Text: It is unknown if this medication is safe to use during pregnancy.  It is unknown if this medication is excreted in breast milk.  Breastfeeding women should use the topical cream on the smallest area of the skin for the shortest time needed while breastfeeding.  Do not apply to nipple and areola.
Topical Metronidazole Counseling: Metronidazole is a topical antibiotic medication. You may experience burning, stinging, redness, or allergic reactions.  Please call our office if you develop any problems from using this medication.
Rifampin Counseling: I discussed with the patient the risks of rifampin including but not limited to liver damage, kidney damage, red-orange body fluids, nausea/vomiting and severe allergy.
Opioid Counseling: I discussed with the patient the potential side effects of opioids including but not limited to addiction, altered mental status, and depression. I stressed avoiding alcohol, benzodiazepines, muscle relaxants and sleep aids unless specifically okayed by a physician. The patient verbalized understanding of the proper use and possible adverse effects of opioids. All of the patient's questions and concerns were addressed. They were instructed to flush the remaining pills down the toilet if they did not need them for pain.
Rhofade Counseling: Rhofade is a topical medication which can decrease superficial blood flow where applied. Side effects are uncommon and include stinging, redness and allergic reactions.
Cimetidine Counseling:  I discussed with the patient the risks of Cimetidine including but not limited to gynecomastia, headache, diarrhea, nausea, drowsiness, arrhythmias, pancreatitis, skin rashes, psychosis, bone marrow suppression and kidney toxicity.
Sotyktu Counseling:  I discussed the most common side effects of Sotyktu including: common cold, sore throat, sinus infections, cold sores, canker sores, folliculitis, and acne.  I also discussed more serious side effects of Sotyktu including but not limited to: serious allergic reactions; increased risk for infections such as TB; cancers such as lymphomas; rhabdomyolysis and elevated CPK; and elevated triglycerides and liver enzymes. 
Tranexamic Acid Pregnancy And Lactation Text: It is unknown if this medication is safe during pregnancy or breast feeding.
Cimzia Counseling:  I discussed with the patient the risks of Cimzia including but not limited to immunosuppression, allergic reactions and infections.  The patient understands that monitoring is required including a PPD at baseline and must alert us or the primary physician if symptoms of infection or other concerning signs are noted.
Cyclophosphamide Counseling:  I discussed with the patient the risks of cyclophosphamide including but not limited to hair loss, hormonal abnormalities, decreased fertility, abdominal pain, diarrhea, nausea and vomiting, bone marrow suppression and infection. The patient understands that monitoring is required while taking this medication.
Azithromycin Pregnancy And Lactation Text: This medication is considered safe during pregnancy and is also secreted in breast milk.
Topical Metronidazole Pregnancy And Lactation Text: This medication is Pregnancy Category B and considered safe during pregnancy.  It is also considered safe to use while breastfeeding.
Rifampin Pregnancy And Lactation Text: This medication is Pregnancy Category C and it isn't know if it is safe during pregnancy. It is also excreted in breast milk and should not be used if you are breast feeding.
Niacinamide Counseling: I recommended taking niacin or niacinamide, also know as vitamin B3, twice daily. Recent evidence suggests that taking vitamin B3 (500 mg twice daily) can reduce the risk of actinic keratoses and non-melanoma skin cancers. Side effects of vitamin B3 include flushing and headache.
Tranexamic Acid Counseling:  Patient advised of the small risk of bleeding problems with tranexamic acid. They were also instructed to call if they developed any nausea, vomiting or diarrhea. All of the patient's questions and concerns were addressed.

## 2024-09-23 NOTE — PROCEDURE: COUNSELING
Detail Level: Simple
Detail Level: Detailed
Sunscreen Recommendations: Brands include neutrogena, La-Roche, and Elta-MD. Rec mineral sunscreen use (zinc/titanium dioxide) over chemical sunscreens due to safety.
Nicotinamide Supplementation Recommendations: All supplements should be USP (https://www.usp.org/verification-services/verified-sam).

## 2024-09-30 RX ORDER — GLIPIZIDE 10 MG/1
10 TABLET ORAL DAILY
Qty: 90 TABLET | Refills: 3 | Status: SHIPPED | OUTPATIENT
Start: 2024-09-30

## 2024-09-30 RX ORDER — ATORVASTATIN CALCIUM 40 MG/1
40 TABLET, FILM COATED ORAL DAILY
Qty: 90 TABLET | Refills: 3 | Status: SHIPPED | OUTPATIENT
Start: 2024-09-30

## 2024-09-30 NOTE — TELEPHONE ENCOUNTER
Refill request is for a maintenance medication and has met the criteria specified in the Ambulatory Medication Refill Standing Order for eligibility, visits, laboratory, alerts and was sent to the requested pharmacy.    Requested Prescriptions     Signed Prescriptions Disp Refills    atorvastatin 40 MG Oral Tab 90 tablet 3     Sig: Take 1 tablet (40 mg total) by mouth daily.     Authorizing Provider: AMBER YORK     Ordering User: PRADIP CORNEJO    glipiZIDE 10 MG Oral Tab 90 tablet 3     Sig: Take 1 tablet (10 mg total) by mouth daily. Daily in the morning     Authorizing Provider: AMBER YORK     Ordering User: PRADIP CORNEJO

## 2024-10-05 ENCOUNTER — TELEPHONE (OUTPATIENT)
Dept: INTERNAL MEDICINE CLINIC | Facility: CLINIC | Age: 84
End: 2024-10-05

## 2024-10-24 ENCOUNTER — APPOINTMENT (OUTPATIENT)
Dept: URBAN - METROPOLITAN AREA CLINIC 244 | Age: 84
Setting detail: DERMATOLOGY
End: 2024-10-24

## 2024-10-24 DIAGNOSIS — L57.0 ACTINIC KERATOSIS: ICD-10-CM

## 2024-10-24 DIAGNOSIS — L20.89 OTHER ATOPIC DERMATITIS: ICD-10-CM

## 2024-10-24 PROBLEM — D48.5 NEOPLASM OF UNCERTAIN BEHAVIOR OF SKIN: Status: ACTIVE | Noted: 2024-10-24

## 2024-10-24 PROBLEM — C44.41 BASAL CELL CARCINOMA OF SKIN OF SCALP AND NECK: Status: ACTIVE | Noted: 2024-10-24

## 2024-10-24 PROBLEM — C44.719 BASAL CELL CARCINOMA OF SKIN OF LEFT LOWER LIMB, INCLUDING HIP: Status: ACTIVE | Noted: 2024-10-24

## 2024-10-24 PROCEDURE — 17004 DESTROY PREMAL LESIONS 15/>: CPT

## 2024-10-24 PROCEDURE — OTHER BIOPSY BY SHAVE METHOD: OTHER

## 2024-10-24 PROCEDURE — 11102 TANGNTL BX SKIN SINGLE LES: CPT | Mod: 59

## 2024-10-24 PROCEDURE — OTHER CONSULTATION FOR MOHS SURGERY: OTHER

## 2024-10-24 PROCEDURE — OTHER MEDICATION COUNSELING: OTHER

## 2024-10-24 PROCEDURE — OTHER COUNSELING: OTHER

## 2024-10-24 PROCEDURE — OTHER PRESCRIPTION MEDICATION MANAGEMENT: OTHER

## 2024-10-24 PROCEDURE — OTHER PRESCRIPTION: OTHER

## 2024-10-24 PROCEDURE — OTHER DEFER: OTHER

## 2024-10-24 PROCEDURE — OTHER LIQUID NITROGEN: OTHER

## 2024-10-24 PROCEDURE — 99214 OFFICE O/P EST MOD 30 MIN: CPT | Mod: 25

## 2024-10-24 RX ORDER — CLOBETASOL PROPIONATE 0.5 MG/G
CREAM TOPICAL
Qty: 60 | Refills: 0 | Status: ERX

## 2024-10-24 ASSESSMENT — LOCATION DETAILED DESCRIPTION DERM
LOCATION DETAILED: SUPERIOR MID FOREHEAD
LOCATION DETAILED: LEFT SUPERIOR HELIX
LOCATION DETAILED: LEFT CENTRAL MALAR CHEEK
LOCATION DETAILED: LEFT DISTAL DORSAL FOREARM
LOCATION DETAILED: RIGHT ULNAR DORSAL HAND
LOCATION DETAILED: LEFT PROXIMAL DORSAL FOREARM
LOCATION DETAILED: LEFT PROXIMAL PRETIBIAL REGION

## 2024-10-24 ASSESSMENT — LOCATION ZONE DERM
LOCATION ZONE: HAND
LOCATION ZONE: ARM
LOCATION ZONE: LEG
LOCATION ZONE: EAR
LOCATION ZONE: FACE

## 2024-10-24 ASSESSMENT — LOCATION SIMPLE DESCRIPTION DERM
LOCATION SIMPLE: SUPERIOR FOREHEAD
LOCATION SIMPLE: LEFT EAR
LOCATION SIMPLE: LEFT FOREARM
LOCATION SIMPLE: LEFT CHEEK
LOCATION SIMPLE: LEFT PRETIBIAL REGION
LOCATION SIMPLE: RIGHT HAND

## 2024-10-24 NOTE — PROCEDURE: PRESCRIPTION MEDICATION MANAGEMENT
Render In Strict Bullet Format?: No
Continue Regimen: Clobetasol two weeks on two weeks off till f/u in 1 mo
Detail Level: Zone

## 2024-10-24 NOTE — PROCEDURE: COUNSELING
Detail Level: Simple
Detail Level: Detailed
Nicotinamide Supplementation Recommendations: All supplements should be USP (https://www.usp.org/verification-services/verified-sam).
Sunscreen Recommendations: Brands include neutrogena, La-Roche, and Elta-MD. Rec mineral sunscreen use (zinc/titanium dioxide) over chemical sunscreens due to safety.

## 2024-10-24 NOTE — PROCEDURE: BIOPSY BY SHAVE METHOD
Post-Care Instructions: I reviewed with the patient in detail post-care instructions. Patient is to keep the biopsy site dry overnight, and then apply bacitracin twice daily until healed. Patient may apply hydrogen peroxide soaks to remove any crusting.
Hide Accession Number?: No
Lab: [id number]
Biopsy Method: double edge Personna blade
Silver Nitrate Text: The wound bed was treated with silver nitrate after the biopsy was performed.
Depth Of Biopsy: dermis
Wound Care: Petrolatum
Hemostasis: Drysol
Was A Bandage Applied: Yes
Electrodesiccation Text: The wound bed was treated with electrodesiccation after the biopsy was performed.
Billing Type: Third-Party Bill
Detail Level: Detailed
X Size Of Lesion In Cm: 0
Curettage Text: The wound bed was treated with curettage after the biopsy was performed.
Electrodesiccation And Curettage Text: The wound bed was treated with electrodesiccation and curettage after the biopsy was performed.
Biopsy Type: H and E
Anesthesia Volume In Cc: 0.5
Cryotherapy Text: The wound bed was treated with cryotherapy after the biopsy was performed.
Information: Selecting Yes will display possible errors in your note based on the variables you have selected. This validation is only offered as a suggestion for you. PLEASE NOTE THAT THE VALIDATION TEXT WILL BE REMOVED WHEN YOU FINALIZE YOUR NOTE. IF YOU WANT TO FAX A PRELIMINARY NOTE YOU WILL NEED TO TOGGLE THIS TO 'NO' IF YOU DO NOT WANT IT IN YOUR FAXED NOTE.
Consent: Written consent was obtained and risks were reviewed including but not limited to scarring, infection, bleeding, scabbing, incomplete removal, nerve damage and allergy to anesthesia.
Notification Instructions: Patient will be notified of biopsy results. However, patient instructed to call the office if not contacted within 2 weeks.
Type Of Destruction Used: Curettage
Anesthesia Type: 0.5% lidocaine with 1:100,000 epinephrine and a 1:10 solution of 8.4% sodium bicarbonate
Dressing: bandage

## 2024-10-24 NOTE — PROCEDURE: MEDICATION COUNSELING
Valtrex Counseling: I discussed with the patient the risks of valacyclovir including but not limited to kidney damage, nausea, vomiting and severe allergy.  The patient understands that if the infection seems to be worsening or is not improving, they are to call.
Bactrim Counseling:  I discussed with the patient the risks of sulfa antibiotics including but not limited to GI upset, allergic reaction, drug rash, diarrhea, dizziness, photosensitivity, and yeast infections.  Rarely, more serious reactions can occur including but not limited to aplastic anemia, agranulocytosis, methemoglobinemia, blood dyscrasias, liver or kidney failure, lung infiltrates or desquamative/blistering drug rashes.
Spevigo Counseling: I discussed with the patient the risks of Spevigo including but not limited to fatigue, nasuea, vomiting, headache, pruritus, urinary tract infection, an infusion related reactions.  The patient understands that monitoring is required including screening for tuberculosis at baseline and yearly screening thereafter while continuing Spevigo therapy. They should contact us if symptoms of infection or other concerning signs are noted.
Enbrel Counseling:  I discussed with the patient the risks of etanercept including but not limited to myelosuppression, immunosuppression, autoimmune hepatitis, demyelinating diseases, lymphoma, and infections.  The patient understands that monitoring is required including a PPD at baseline and must alert us or the primary physician if symptoms of infection or other concerning signs are noted.
Benzoyl Peroxide Counseling: Patient counseled that medicine may cause skin irritation and bleach clothing.  In the event of skin irritation, the patient was advised to reduce the amount of the drug applied or use it less frequently.   The patient verbalized understanding of the proper use and possible adverse effects of benzoyl peroxide.  All of the patient's questions and concerns were addressed.
Cyclophosphamide Pregnancy And Lactation Text: This medication is Pregnancy Category D and it isn't considered safe during pregnancy. This medication is excreted in breast milk.
Cimetidine Pregnancy And Lactation Text: This medication is Pregnancy Category B and is considered safe during pregnancy. It is also excreted in breast milk and breast feeding isn't recommended.
Cimzia Pregnancy And Lactation Text: This medication crosses the placenta but can be considered safe in certain situations. Cimzia may be excreted in breast milk.
Niacinamide Pregnancy And Lactation Text: These medications are considered safe during pregnancy.
Azelaic Acid Pregnancy And Lactation Text: This medication is considered safe during pregnancy and breast feeding.
Sotyktu Pregnancy And Lactation Text: There is insufficient data to evaluate whether or not Sotyktu is safe to use during pregnancy.   It is not known if Sotyktu passes into breast milk and whether or not it is safe to use when breastfeeding.  
Topical Steroids Counseling: I discussed with the patient that prolonged use of topical steroids can result in the increased appearance of superficial blood vessels (telangiectasias), lightening (hypopigmentation) and thinning of the skin (atrophy).  Patient understands to avoid using high potency steroids in skin folds, the groin or the face.  The patient verbalized understanding of the proper use and possible adverse effects of topical steroids.  All of the patient's questions and concerns were addressed.
Sarecycline Counseling: Patient advised regarding possible photosensitivity and discoloration of the teeth, skin, lips, tongue and gums.  Patient instructed to avoid sunlight, if possible.  When exposed to sunlight, patients should wear protective clothing, sunglasses, and sunscreen.  The patient was instructed to call the office immediately if the following severe adverse effects occur:  hearing changes, easy bruising/bleeding, severe headache, or vision changes.  The patient verbalized understanding of the proper use and possible adverse effects of sarecycline.  All of the patient's questions and concerns were addressed.
Imiquimod Counseling:  I discussed with the patient the risks of imiquimod including but not limited to erythema, scaling, itching, weeping, crusting, and pain.  Patient understands that the inflammatory response to imiquimod is variable from person to person and was educated regarded proper titration schedule.  If flu-like symptoms develop, patient knows to discontinue the medication and contact us.
Enbrel Pregnancy And Lactation Text: This medication is Pregnancy Category B and is considered safe during pregnancy. It is unknown if this medication is excreted in breast milk.
Doxepin Counseling:  Patient advised that the medication is sedating and not to drive a car after taking this medication. Patient informed of potential adverse effects including but not limited to dry mouth, urinary retention, and blurry vision.  The patient verbalized understanding of the proper use and possible adverse effects of doxepin.  All of the patient's questions and concerns were addressed.
Cyclosporine Counseling:  I discussed with the patient the risks of cyclosporine including but not limited to hypertension, gingival hyperplasia,myelosuppression, immunosuppression, liver damage, kidney damage, neurotoxicity, lymphoma, and serious infections. The patient understands that monitoring is required including baseline blood pressure, CBC, CMP, lipid panel and uric acid, and then 1-2 times monthly CMP and blood pressure.
Nsaids Pregnancy And Lactation Text: These medications are considered safe up to 30 weeks gestation. It is excreted in breast milk.
Dutasteride Male Counseling: Dustasteride Counseling:  I discussed with the patient the risks of use of dutasteride including but not limited to decreased libido, decreased ejaculate volume, and gynecomastia. Women who can become pregnant should not handle medication.  All of the patient's questions and concerns were addressed.
Cosentyx Counseling:  I discussed with the patient the risks of Cosentyx including but not limited to worsening of Crohn's disease, immunosuppression, allergic reactions and infections.  The patient understands that monitoring is required including a PPD at baseline and must alert us or the primary physician if symptoms of infection or other concerning signs are noted.
Bactrim Pregnancy And Lactation Text: This medication is Pregnancy Category D and is known to cause fetal risk.  It is also excreted in breast milk.
Valtrex Pregnancy And Lactation Text: this medication is Pregnancy Category B and is considered safe during pregnancy. This medication is not directly found in breast milk but it's metabolite acyclovir is present.
Benzoyl Peroxide Pregnancy And Lactation Text: This medication is Pregnancy Category C. It is unknown if benzoyl peroxide is excreted in breast milk.
Topical Steroids Applications Pregnancy And Lactation Text: Most topical steroids are considered safe to use during pregnancy and lactation.  Any topical steroid applied to the breast or nipple should be washed off before breastfeeding.
Sarecycline Pregnancy And Lactation Text: This medication is Pregnancy Category D and not consider safe during pregnancy. It is also excreted in breast milk.
Imiquimod Pregnancy And Lactation Text: This medication is Pregnancy Category C. It is unknown if this medication is excreted in breast milk.
Arava Counseling:  Patient counseled regarding adverse effects of Arava including but not limited to nausea, vomiting, abnormalities in liver function tests. Patients may develop mouth sores, rash, diarrhea, and abnormalities in blood counts. The patient understands that monitoring is required including LFTs and blood counts.  There is a rare possibility of scarring of the liver and lung problems that can occur when taking methotrexate. Persistent nausea, loss of appetite, pale stools, dark urine, cough, and shortness of breath should be reported immediately. Patient advised to discontinue Arava treatment and consult with a physician prior to attempting conception. The patient will have to undergo a treatment to eliminate Arava from the body prior to conception.
Spevigo Pregnancy And Lactation Text: The risk during pregnancy and breastfeeding is uncertain with this medication. This medication does cross the placenta. It is unknown if this medication is found in breast milk.
Nsaids Counseling: NSAID Counseling: I discussed with the patient that NSAIDs should be taken with food. Prolonged use of NSAIDs can result in the development of stomach ulcers.  Patient advised to stop taking NSAIDs if abdominal pain occurs.  The patient verbalized understanding of the proper use and possible adverse effects of NSAIDs.  All of the patient's questions and concerns were addressed.
Xeljanz Counseling: I discussed with the patient the risks of Xeljanz therapy including increased risk of infection, liver issues, headache, diarrhea, or cold symptoms. Live vaccines should be avoided. They were instructed to call if they have any problems.
Olanzapine Counseling- I discussed with the patient the common side effects of olanzapine including but are not limited to: lack of energy, dry mouth, increased appetite, sleepiness, tremor, constipation, dizziness, changes in behavior, or restlessness.  Explained that teenagers are more likely to experience headaches, abdominal pain, pain in the arms or legs, tiredness, and sleepiness.  Serious side effects include but are not limited: increased risk of death in elderly patients who are confused, have memory loss, or dementia-related psychosis; hyperglycemia; increased cholesterol and triglycerides; and weight gain.
Dutasteride Female Counseling: Dutasteride Counseling:  I discussed with the patient the risks of use of dutasteride including but not limited to decreased libido and sexual dysfunction. Explained the teratogenic nature of the medication and stressed the importance of not getting pregnant during treatment. All of the patient's questions and concerns were addressed.
Carac Counseling:  I discussed with the patient the risks of Carac including but not limited to erythema, scaling, itching, weeping, crusting, and pain.
Humira Counseling:  I discussed with the patient the risks of adalimumab including but not limited to myelosuppression, immunosuppression, autoimmune hepatitis, demyelinating diseases, lymphoma, and serious infections.  The patient understands that monitoring is required including a PPD at baseline and must alert us or the primary physician if symptoms of infection or other concerning signs are noted.
Topical Sulfur Applications Pregnancy And Lactation Text: This medication is considered safe during pregnancy and breast feeding secondary to limited systemic absorption.
Cephalexin Counseling: I counseled the patient regarding use of cephalexin as an antibiotic for prophylactic and/or therapeutic purposes. Cephalexin (commonly prescribed under brand name Keflex) is a cephalosporin antibiotic which is active against numerous classes of bacteria, including most skin bacteria. Side effects may include nausea, diarrhea, gastrointestinal upset, rash, hives, yeast infections, and in rare cases, hepatitis, kidney disease, seizures, fever, confusion, neurologic symptoms, and others. Patients with severe allergies to penicillin medications are cautioned that there is about a 10% incidence of cross-reactivity with cephalosporins. When possible, patients with penicillin allergies should use alternatives to cephalosporins for antibiotic therapy.
Doxepin Pregnancy And Lactation Text: This medication is Pregnancy Category C and it isn't known if it is safe during pregnancy. It is also excreted in breast milk and breast feeding isn't recommended.
Arava Pregnancy And Lactation Text: This medication is Pregnancy Category X and is absolutely contraindicated during pregnancy. It is unknown if it is excreted in breast milk.
Stelara Counseling:  I discussed with the patient the risks of ustekinumab including but not limited to immunosuppression, malignancy, posterior leukoencephalopathy syndrome, and serious infections.  The patient understands that monitoring is required including a PPD at baseline and must alert us or the primary physician if symptoms of infection or other concerning signs are noted.
Xelbarrettz Pregnancy And Lactation Text: This medication is Pregnancy Category D and is not considered safe during pregnancy.  The risk during breast feeding is also uncertain.
Use Enhanced Medication Counseling?: Yes
Cyclosporine Pregnancy And Lactation Text: This medication is Pregnancy Category C and it isn't know if it is safe during pregnancy. This medication is excreted in breast milk.
Topical Sulfur Applications Counseling: Topical Sulfur Counseling: Patient counseled that this medication may cause skin irritation or allergic reactions.  In the event of skin irritation, the patient was advised to reduce the amount of the drug applied or use it less frequently.   The patient verbalized understanding of the proper use and possible adverse effects of topical sulfur application.  All of the patient's questions and concerns were addressed.
Klisyri Counseling:  I discussed with the patient the risks of Klisyri including but not limited to erythema, scaling, itching, weeping, crusting, and pain.
Tetracycline Counseling: Patient counseled regarding possible photosensitivity and increased risk for sunburn.  Patient instructed to avoid sunlight, if possible.  When exposed to sunlight, patients should wear protective clothing, sunglasses, and sunscreen.  The patient was instructed to call the office immediately if the following severe adverse effects occur:  hearing changes, easy bruising/bleeding, severe headache, or vision changes.  The patient verbalized understanding of the proper use and possible adverse effects of tetracycline.  All of the patient's questions and concerns were addressed. Patient understands to avoid pregnancy while on therapy due to potential birth defects.
Wartpeel Counseling:  I discussed with the patient the risks of Wartpeel including but not limited to erythema, scaling, itching, weeping, crusting, and pain.
Dutasteride Pregnancy And Lactation Text: This medication is absolutely contraindicated in women, especially during pregnancy and breast feeding. Feminization of male fetuses is possible if taking while pregnant.
Minoxidil Counseling: Minoxidil is a topical medication which can increase blood flow where it is applied. It is uncertain how this medication increases hair growth. Side effects are uncommon and include stinging and allergic reactions.
Taltz Counseling: I discussed with the patient the risks of ixekizumab including but not limited to immunosuppression, serious infections, worsening of inflammatory bowel disease and drug reactions.  The patient understands that monitoring is required including a PPD at baseline and must alert us or the primary physician if symptoms of infection or other concerning signs are noted.
Olanzapine Pregnancy And Lactation Text: This medication is pregnancy category C.   There are no adequate and well controlled trials with olanzapine in pregnant females.  Olanzapine should be used during pregnancy only if the potential benefit justifies the potential risk to the fetus.   In a study in lactating healthy women, olanzapine was excreted in breast milk.  It is recommended that women taking olanzapine should not breast feed.
Hydroxyzine Counseling: Patient advised that the medication is sedating and not to drive a car after taking this medication.  Patient informed of potential adverse effects including but not limited to dry mouth, urinary retention, and blurry vision.  The patient verbalized understanding of the proper use and possible adverse effects of hydroxyzine.  All of the patient's questions and concerns were addressed.
Cephalexin Pregnancy And Lactation Text: This medication is Pregnancy Category B and considered safe during pregnancy.  It is also excreted in breast milk but can be used safely for shorter doses.
Klisyri Pregnancy And Lactation Text: It is unknown if this medication can harm a developing fetus or if it is excreted in breast milk.
Carac Pregnancy And Lactation Text: This medication is Pregnancy Category X and contraindicated in pregnancy and in women who may become pregnant. It is unknown if this medication is excreted in breast milk.
Clofazimine Counseling:  I discussed with the patient the risks of clofazimine including but not limited to skin and eye pigmentation, liver damage, nausea/vomiting, gastrointestinal bleeding and allergy.
Dupixent Counseling: I discussed with the patient the risks of dupilumab including but not limited to eye inflammation and irritation, cold sores, injection site reactions, allergic reactions and increased risk of parasitic infection. The patient understands that monitoring is required and they must alert us or the primary physician if symptoms of infection or other concerning signs are noted.
Methotrexate Counseling:  Patient counseled regarding adverse effects of methotrexate including but not limited to nausea, vomiting, abnormalities in liver function tests. Patients may develop mouth sores, rash, diarrhea, and abnormalities in blood counts. The patient understands that monitoring is required including LFT's and blood counts.  There is a rare possibility of scarring of the liver and lung problems that can occur when taking methotrexate. Persistent nausea, loss of appetite, pale stools, dark urine, cough, and shortness of breath should be reported immediately. Patient advised to discontinue methotrexate treatment at least three months before attempting to become pregnant.  I discussed the need for folate supplements while taking methotrexate.  These supplements can decrease side effects during methotrexate treatment. The patient verbalized understanding of the proper use and possible adverse effects of methotrexate.  All of the patient's questions and concerns were addressed.
Acitretin Counseling:  I discussed with the patient the risks of acitretin including but not limited to hair loss, dry lips/skin/eyes, liver damage, hyperlipidemia, depression/suicidal ideation, photosensitivity.  Serious rare side effects can include but are not limited to pancreatitis, pseudotumor cerebri, bony changes, clot formation/stroke/heart attack.  Patient understands that alcohol is contraindicated since it can result in liver toxicity and significantly prolong the elimination of the drug by many years.
Clindamycin Pregnancy And Lactation Text: This medication can be used in pregnancy if certain situations. Clindamycin is also present in breast milk.
Prednisone Counseling:  I discussed with the patient the risks of prolonged use of prednisone including but not limited to weight gain, insomnia, osteoporosis, mood changes, diabetes, susceptibility to infection, glaucoma and high blood pressure.  In cases where prednisone use is prolonged, patients should be monitored with blood pressure checks, serum glucose levels and an eye exam.  Additionally, the patient may need to be placed on GI prophylaxis, PCP prophylaxis, and calcium and vitamin D supplementation and/or a bisphosphonate.  The patient verbalized understanding of the proper use and the possible adverse effects of prednisone.  All of the patient's questions and concerns were addressed.
Solaraze Pregnancy And Lactation Text: This medication is Pregnancy Category B and is considered safe. There is some data to suggest avoiding during the third trimester. It is unknown if this medication is excreted in breast milk.
Minoxidil Pregnancy And Lactation Text: This medication has not been assigned a Pregnancy Risk Category but animal studies failed to show danger with the topical medication. It is unknown if the medication is excreted in breast milk.
Clofazimine Pregnancy And Lactation Text: This medication is Pregnancy Category C and isn't considered safe during pregnancy. It is excreted in breast milk.
Hydroxyzine Pregnancy And Lactation Text: This medication is not safe during pregnancy and should not be taken. It is also excreted in breast milk and breast feeding isn't recommended.
Hyrimoz Counseling:  I discussed with the patient the risks of adalimumab including but not limited to myelosuppression, immunosuppression, autoimmune hepatitis, demyelinating diseases, lymphoma, and serious infections.  The patient understands that monitoring is required including a PPD at baseline and must alert us or the primary physician if symptoms of infection or other concerning signs are noted.
Solaraze Counseling:  I discussed with the patient the risks of Solaraze including but not limited to erythema, scaling, itching, weeping, crusting, and pain.
Oral Minoxidil Counseling- I discussed with the patient the risks of oral minoxidil including but not limited to shortness of breath, swelling of the feet or ankles, dizziness, lightheadedness, unwanted hair growth and allergic reaction.  The patient verbalized understanding of the proper use and possible adverse effects of oral minoxidil.  All of the patient's questions and concerns were addressed.
Methotrexate Pregnancy And Lactation Text: This medication is Pregnancy Category X and is known to cause fetal harm. This medication is excreted in breast milk.
Finasteride Male Counseling: Finasteride Counseling:  I discussed with the patient the risks of use of finasteride including but not limited to decreased libido, decreased ejaculate volume, gynecomastia, and depression. Women should not handle medication.  All of the patient's questions and concerns were addressed.
Dupixent Pregnancy And Lactation Text: This medication likely crosses the placenta but the risk for the fetus is uncertain. This medication is excreted in breast milk.
Clindamycin Counseling: I counseled the patient regarding use of clindamycin as an antibiotic for prophylactic and/or therapeutic purposes. Clindamycin is active against numerous classes of bacteria, including skin bacteria. Side effects may include nausea, diarrhea, gastrointestinal upset, rash, hives, yeast infections, and in rare cases, colitis.
Calcipotriene Counseling:  I discussed with the patient the risks of calcipotriene including but not limited to erythema, scaling, itching, and irritation.
Doxycycline Counseling: Patient counseled regarding possible photosensitivity and increased risk for sunburn. Patient instructed to avoid sunlight, if possible. When exposed to sunlight, patients should wear protective clothing, sunglasses, and sunscreen. Counseled on GI AE / can cause nausea/vomiting/abdominal pain among other symptoms. The patient verbalized understanding of the proper use and possible adverse effects of doxycycline. To take with light meal to avoid any upset stomach (nausea vomiting or ab pain). Not to take concurrently with vitamins or dairy products (take at separate times). Take with full glass of water. Do not lay down for at least 1 hour after taking. All of the patient's questions and concerns were addressed. Female patients should avoid pregnancy while on therapy due to potential birth defects.
Mirvaso Counseling: Mirvaso is a topical medication which can decrease superficial blood flow where applied. Side effects are uncommon and include stinging, redness and allergic reactions.
Tremfya Counseling: I discussed with the patient the risks of guselkumab including but not limited to immunosuppression, serious infections, and drug reactions.  The patient understands that monitoring is required including a PPD at baseline and must alert us or the primary physician if symptoms of infection or other concerning signs are noted.
Cantharidin Counseling:  I discussed with the patient the risks of Cantharidin including but not limited to pain, redness, burning, itching, and blistering.
Fluconazole Counseling:  Patient counseled regarding adverse effects of fluconazole including but not limited to headache, diarrhea, nausea, upset stomach, liver function test abnormalities, taste disturbance, and stomach pain.  There is a rare possibility of liver failure that can occur when taking fluconazole.  The patient understands that monitoring of LFTs and kidney function test may be required, especially at baseline. The patient verbalized understanding of the proper use and possible adverse effects of fluconazole.  All of the patient's questions and concerns were addressed.
Oral Minoxidil Pregnancy And Lactation Text: This medication should only be used when clearly needed if you are pregnant, attempting to become pregnant or breast feeding.
Soolantra Counseling: I discussed with the patients the risks of topial Soolantra. This is a medicine which decreases the number of mites and inflammation in the skin. You experience burning, stinging, eye irritation or allergic reactions.  Please call our office if you develop any problems from using this medication.
Finasteride Female Counseling: Finasteride Counseling:  I discussed with the patient the risks of use of finasteride including but not limited to decreased libido and sexual dysfunction. Explained the teratogenic nature of the medication and stressed the importance of not getting pregnant during treatment. All of the patient's questions and concerns were addressed.
Calcipotriene Pregnancy And Lactation Text: The use of this medication during pregnancy or lactation is not recommended as there is insufficient data.
Taltz Pregnancy And Lactation Text: The risk during pregnancy and breastfeeding is uncertain with this medication.
Winlevi Counseling:  I discussed with the patient the risks of topical clascoterone including but not limited to erythema, scaling, itching, and stinging. Patient voiced their understanding.
Acitretin Pregnancy And Lactation Text: This medication is Pregnancy Category X and should not be given to women who are pregnant or may become pregnant in the future. This medication is excreted in breast milk.
Colchicine Counseling:  Patient counseled regarding adverse effects including but not limited to stomach upset (nausea, vomiting, stomach pain, or diarrhea).  Patient instructed to limit alcohol consumption while taking this medication.  Colchicine may reduce blood counts especially with prolonged use.  The patient understands that monitoring of kidney function and blood counts may be required, especially at baseline. The patient verbalized understanding of the proper use and possible adverse effects of colchicine.  All of the patient's questions and concerns were addressed.
Albendazole Counseling:  I discussed with the patient the risks of albendazole including but not limited to cytopenia, kidney damage, nausea/vomiting and severe allergy.  The patient understands that this medication is being used in an off-label manner.
Dapsone Counseling: I discussed with the patient the risks of dapsone including but not limited to hemolytic anemia, agranulocytosis, rashes, methemoglobinemia, kidney failure, peripheral neuropathy, headaches, GI upset, and liver toxicity.  Patients who start dapsone require monitoring including baseline LFTs and weekly CBCs for the first month, then every month thereafter.  The patient verbalized understanding of the proper use and possible adverse effects of dapsone.  All of the patient's questions and concerns were addressed.
Bexarotene Counseling:  I discussed with the patient the risks of bexarotene including but not limited to hair loss, dry lips/skin/eyes, liver abnormalities, hyperlipidemia, pancreatitis, depression/suicidal ideation, photosensitivity, drug rash/allergic reactions, hypothyroidism, anemia, leukopenia, infection, cataracts, and teratogenicity.  Patient understands that they will need regular blood tests to check lipid profile, liver function tests, white blood cell count, thyroid function tests and pregnancy test if applicable.
Soolantra Pregnancy And Lactation Text: This medication is Pregnancy Category C. This medication is considered safe during breast feeding.
Doxycycline Pregnancy And Lactation Text: This medication is Pregnancy Category D and not consider safe during pregnancy. It is also excreted in breast milk but is considered safe for shorter treatment courses.
Cantharidin Pregnancy And Lactation Text: This medication has not been proven safe during pregnancy. It is unknown if this medication is excreted in breast milk.
Winlevi Pregnancy And Lactation Text: This medication is considered safe during pregnancy and breastfeeding.
Fluconazole Pregnancy And Lactation Text: This medication is Pregnancy Category C and it isn't know if it is safe during pregnancy. It is also excreted in breast milk.
Ilumya Counseling: I discussed with the patient the risks of tildrakizumab including but not limited to immunosuppression, malignancy, posterior leukoencephalopathy syndrome, and serious infections.  The patient understands that monitoring is required including a PPD at baseline and must alert us or the primary physician if symptoms of infection or other concerning signs are noted.
Otezla Counseling: The side effects of Otezla were discussed with the patient, including but not limited to worsening or new depression, weight loss, diarrhea, nausea, upper respiratory tract infection, and headache. Patient instructed to call the office should any adverse effect occur.  The patient verbalized understanding of the proper use and possible adverse effects of Otezla.  All the patient's questions and concerns were addressed.
Mirvaso Pregnancy And Lactation Text: This medication has not been assigned a Pregnancy Risk Category. It is unknown if the medication is excreted in breast milk.
Finasteride Pregnancy And Lactation Text: This medication is absolutely contraindicated during pregnancy. It is unknown if it is excreted in breast milk.
Detail Level: Simple
5-Fu Counseling: 5-Fluorouracil Counseling:  I discussed with the patient the risks of 5-fluorouracil including but not limited to erythema, scaling, itching, weeping, crusting, and pain.
Vtama Pregnancy And Lactation Text: It is unknown if this medication can cause problems during pregnancy and breastfeeding.
Griseofulvin Pregnancy And Lactation Text: This medication is Pregnancy Category X and is known to cause serious birth defects. It is unknown if this medication is excreted in breast milk but breast feeding should be avoided.
Xolair Counseling:  Patient informed of potential adverse effects including but not limited to fever, muscle aches, rash and allergic reactions.  The patient verbalized understanding of the proper use and possible adverse effects of Xolair.  All of the patient's questions and concerns were addressed.
Infliximab Counseling:  I discussed with the patient the risks of infliximab including but not limited to myelosuppression, immunosuppression, autoimmune hepatitis, demyelinating diseases, lymphoma, and serious infections.  The patient understands that monitoring is required including a PPD at baseline and must alert us or the primary physician if symptoms of infection or other concerning signs are noted.
Dapsone Pregnancy And Lactation Text: This medication is Pregnancy Category C and is not considered safe during pregnancy or breast feeding.
Albendazole Pregnancy And Lactation Text: This medication is Pregnancy Category C and it isn't known if it is safe during pregnancy. It is also excreted in breast milk.
Otezla Pregnancy And Lactation Text: This medication is Pregnancy Category C and it isn't known if it is safe during pregnancy. It is unknown if it is excreted in breast milk.
Erythromycin Counseling:  I discussed with the patient the risks of erythromycin including but not limited to GI upset, allergic reaction, drug rash, diarrhea, increase in liver enzymes, and yeast infections.
Bexarotene Pregnancy And Lactation Text: This medication is Pregnancy Category X and should not be given to women who are pregnant or may become pregnant. This medication should not be used if you are breast feeding.
Topical Retinoid counseling:  Patient advised to apply a pea-sized amount only at bedtime and wait 30 minutes after washing their face before applying.  If too drying, patient may add a non-comedogenic moisturizer. The patient verbalized understanding of the proper use and possible adverse effects of retinoids.  All of the patient's questions and concerns were addressed.
VTAMA Counseling: I discussed with the patient that VTAMA is not for use in the eyes, mouth or mouth. They should call the office if they develop any signs of allergic reactions to VTAMA. The patient verbalized understanding of the proper use and possible adverse effects of VTAMA.  All of the patient's questions and concerns were addressed.
Griseofulvin Counseling:  I discussed with the patient the risks of griseofulvin including but not limited to photosensitivity, cytopenia, liver damage, nausea/vomiting and severe allergy.  The patient understands that this medication is best absorbed when taken with a fatty meal (e.g., ice cream or french fries).
Birth Control Pills Counseling: Birth Control Pill Counseling: I discussed with the patient the potential side effects of OCPs including but not limited to increased risk of stroke, heart attack, thrombophlebitis, deep venous thrombosis, hepatic adenomas, breast changes, GI upset, headaches, and depression.  The patient verbalized understanding of the proper use and possible adverse effects of OCPs. All of the patient's questions and concerns were addressed.
Opzelura Counseling:  I discussed with the patient the risks of Opzelura including but not limited to nasopharngitis, bronchitis, ear infection, eosinophila, hives, diarrhea, folliculitis, tonsillitis, and rhinorrhea.  Taken orally, this medication has been linked to serious infections; higher rate of mortality; malignancy and lymphoproliferative disorders; major adverse cardiovascular events; thrombosis; thrombocytopenia, anemia, and neutropenia; and lipid elevations.
Zoryve Counseling:  I discussed with the patient that Zoryve is not for use in the eyes, mouth or vagina. The most commonly reported side effects include diarrhea, headache, insomnia, application site pain, upper respiratory tract infections, and urinary tract infections.  All of the patient's questions and concerns were addressed.
Cibinqo Counseling: I discussed with the patient the risks of Cibinqo therapy including but not limited to common cold, nausea, headache, cold sores, increased blood CPK levels, dizziness, UTIs, fatigue, acne, and vomitting. Live vaccines should be avoided.  This medication has been linked to serious infections; higher rate of mortality; malignancy and lymphoproliferative disorders; major adverse cardiovascular events; thrombosis; thrombocytopenia and lymphopenia; lipid elevations; and retinal detachment.
Oxybutynin Pregnancy And Lactation Text: This medication is Pregnancy Category B and is considered safe during pregnancy. It is unknown if it is excreted in breast milk.
Spironolactone Counseling: Patient advised regarding risks of diarrhea, abdominal pain, hyperkalemia, birth defects (for female patients), liver toxicity and renal toxicity. The patient may need blood work to monitor liver and kidney function and potassium levels while on therapy. The patient verbalized understanding of the proper use and possible adverse effects of spironolactone.  All of the patient's questions and concerns were addressed.
Picato Counseling:  I discussed with the patient the risks of Picato including but not limited to erythema, scaling, itching, weeping, crusting, and pain.
Ivermectin Counseling:  Patient instructed to take medication on an empty stomach with a full glass of water.  Patient informed of potential adverse effects including but not limited to nausea, diarrhea, dizziness, itching, and swelling of the extremities or lymph nodes.  The patient verbalized understanding of the proper use and possible adverse effects of ivermectin.  All of the patient's questions and concerns were addressed.
Erythromycin Pregnancy And Lactation Text: This medication is Pregnancy Category B and is considered safe during pregnancy. It is also excreted in breast milk.
Opzelura Pregnancy And Lactation Text: There is insufficient data to evaluate drug-associated risk for major birth defects, miscarriage, or other adverse maternal or fetal outcomes.  There is a pregnancy registry that monitors pregnancy outcomes in pregnant persons exposed to the medication during pregnancy.  It is unknown if this medication is excreted in breast milk.  Do not breastfeed during treatment and for about 4 weeks after the last dose.
Birth Control Pills Pregnancy And Lactation Text: This medication should be avoided if pregnant and for the first 30 days post-partum.
Gabapentin Counseling: I discussed with the patient the risks of gabapentin including but not limited to dizziness, somnolence, fatigue and ataxia.
Xolair Pregnancy And Lactation Text: This medication is Pregnancy Category B and is considered safe during pregnancy. This medication is excreted in breast milk.
Oxybutynin Counseling:  I discussed with the patient the risks of oxybutynin including but not limited to skin rash, drowsiness, dry mouth, difficulty urinating, and blurred vision.
Isotretinoin Counseling: Patient should get monthly blood tests, not donate blood, not drive at night if vision affected, not share medication, and not undergo elective surgery for 6 months after tx completed. Side effects reviewed, pt to contact office should one occur.
Propranolol Counseling:  I discussed with the patient the risks of propranolol including but not limited to low heart rate, low blood pressure, low blood sugar, restlessness and increased cold sensitivity. They should call the office if they experience any of these side effects.
Include Pregnancy/Lactation Warning?: No
Drysol Counseling:  I discussed with the patient the risks of drysol/aluminum chloride including but not limited to skin rash, itching, irritation, burning.
Rituxan Counseling:  I discussed with the patient the risks of Rituxan infusions. Side effects can include infusion reactions, severe drug rashes including mucocutaneous reactions, reactivation of latent hepatitis and other infections and rarely progressive multifocal leukoencephalopathy.  All of the patient's questions and concerns were addressed.
High Dose Vitamin A Counseling: Side effects reviewed, pt to contact office should one occur.
Metronidazole Counseling:  I discussed with the patient the risks of metronidazole including but not limited to seizures, nausea/vomiting, a metallic taste in the mouth, nausea/vomiting and severe allergy.
Spironolactone Pregnancy And Lactation Text: This medication can cause feminization of the male fetus and should be avoided during pregnancy. The active metabolite is also found in breast milk.
Isotretinoin Pregnancy And Lactation Text: This medication is Pregnancy Category X and is considered extremely dangerous during pregnancy. It is unknown if it is excreted in breast milk.
Cibinqo Pregnancy And Lactation Text: It is unknown if this medication will adversely affect pregnancy or breast feeding.  You should not take this medication if you are currently pregnant or planning a pregnancy or while breastfeeding.
Tazorac Counseling:  Patient advised that medication is irritating and drying.  Patient may need to apply sparingly and wash off after an hour before eventually leaving it on overnight.  The patient verbalized understanding of the proper use and possible adverse effects of tazorac.  All of the patient's questions and concerns were addressed.
Itraconazole Counseling:  I discussed with the patient the risks of itraconazole including but not limited to liver damage, nausea/vomiting, neuropathy, and severe allergy.  The patient understands that this medication is best absorbed when taken with acidic beverages such as non-diet cola or ginger ale.  The patient understands that monitoring is required including baseline LFTs and repeat LFTs at intervals.  The patient understands that they are to contact us or the primary physician if concerning signs are noted.
Zyclara Counseling:  I discussed with the patient the risks of imiquimod including but not limited to erythema, scaling, itching, weeping, crusting, and pain.  Patient understands that the inflammatory response to imiquimod is variable from person to person and was educated regarded proper titration schedule.  If flu-like symptoms develop, patient knows to discontinue the medication and contact us.
Ketoconazole Counseling:   Patient counseled regarding improving absorption with orange juice.  Adverse effects include but are not limited to breast enlargement, headache, diarrhea, nausea, upset stomach, liver function test abnormalities, taste disturbance, and stomach pain.  There is a rare possibility of liver failure that can occur when taking ketoconazole. The patient understands that monitoring of LFTs may be required, especially at baseline. The patient verbalized understanding of the proper use and possible adverse effects of ketoconazole.  All of the patient's questions and concerns were addressed.
Protopic Counseling: \\nPatient Education Handout provided to pt regarding medication:\\n Topical Tacrolimus (Brand name: Protopic)  \\n- Dermatologists prefer topical tacrolimus for treating certain rashes on sensitive areas of your body such as the face (including eyelids), groin, & body folds (such as the armpits). \\n- It reduces inflammation on the skin. This improves the itching, flaking, and/or redness. \\n- Since it is not a steroid, it won’t thin the skin and is safe for sensitive body areas.\\n- At most pharmacies it is dispensed in a Vaseline-like form. \\n- At a specialty pharmacy it can be compounded into a cream for a fixed cash price. \\nCost: If your insurance denies coverage, please use [employer] to obtain a coupon to purchase it for cheaper. We also may send this prescription to a specialty pharmacy to get it for the cheapest price possible.\\nAlternatives: Hydrocortisone 2.5% (a prescription topical steroid) is an alternative, but it should not be used around the eyes due to the risk of causing glaucoma. \\nUse: Tacrolimus is FDA approved for use with eczema (atopic dermatitis). It is also commonly used “off-label” for other rashes that affect sensitive areas of the body. \\nPlease use this twice a day to affected areas until clear or symptoms resolve. Repeat as needed. \\nIf not improved after using for 4-6 weeks continuously, please stop use and follow up with me in clinic.  \\nNotable Side Effects: You may feel a warm, burning, or stinging sensation when applied to the skin. This usually improves as the rash improves. Alcohol intake may worsen the burning sensation as well as cause redness and flushing.\\nTo reduce the risk of a burning sensation: \\n1. Place the tube in the fridge before first time use (off-label).\\n2. First test it on the forearm before applying it to sensitive areas. \\n          Once tolerated, you may then try storing it at room temperature. \\nBox Warning: Rare cases of malignancy/cancer have been reported in patients treated with topical calcineurin inhibitors, but the relationship has not been proven. A large-scale study known as the APPLES study1 monitored children who used the medication and did not see an increased risk of cancer. I do NOT recommend the use of this medication if there is any concern, history, or treatment of an underlying cancer such as lymphoma or leukemia. Do not use it continuously for a long time and limit use to only the affected areas.\\s0Sowdgn AS, Ankita R, Nelda SC, et al. No evidence of increased cancer incidence in children using topical tacrolimus for atopic dermatitis. J Am Acad Dermatol. 2020;83(2):375-381
Glycopyrrolate Pregnancy And Lactation Text: This medication is Pregnancy Category B and is considered safe during pregnancy. It is unknown if it is excreted breast milk.
Erivedge Counseling- I discussed with the patient the risks of Erivedge including but not limited to nausea, vomiting, diarrhea, constipation, weight loss, changes in the sense of taste, decreased appetite, muscle spasms, and hair loss.  The patient verbalized understanding of the proper use and possible adverse effects of Erivedge.  All of the patient's questions and concerns were addressed.
Propranolol Pregnancy And Lactation Text: This medication is Pregnancy Category C and it isn't known if it is safe during pregnancy. It is excreted in breast milk.
High Dose Vitamin A Pregnancy And Lactation Text: High dose vitamin A therapy is contraindicated during pregnancy and breast feeding.
Metronidazole Pregnancy And Lactation Text: This medication is Pregnancy Category B and considered safe during pregnancy.  It is also excreted in breast milk.
Rituxan Pregnancy And Lactation Text: This medication is Pregnancy Category C and it isn't know if it is safe during pregnancy. It is unknown if this medication is excreted in breast milk but similar antibodies are known to be excreted.
Glycopyrrolate Counseling:  I discussed with the patient the risks of glycopyrrolate including but not limited to skin rash, drowsiness, dry mouth, difficulty urinating, and blurred vision.
Litfulo Counseling: I discussed with the patient the risks of Litfulo therapy including but not limited to upper respiratory tract infections, shingles, cold sores, and nausea. Live vaccines should be avoided.  This medication has been linked to serious infections; higher rate of mortality; malignancy and lymphoproliferative disorders; major adverse cardiovascular events; thrombosis; gastrointestinal perforations; neutropenia; lymphopenia; anemia; liver enzyme elevations; and lipid elevations.
Tazorac Pregnancy And Lactation Text: This medication is not safe during pregnancy. It is unknown if this medication is excreted in breast milk.
Olumiant Counseling: I discussed with the patient the risks of Olumiant therapy including but not limited to upper respiratory tract infections, shingles, cold sores, and nausea. Live vaccines should be avoided.  This medication has been linked to serious infections; higher rate of mortality; malignancy and lymphoproliferative disorders; major adverse cardiovascular events; thrombosis; gastrointestinal perforations; neutropenia; lymphopenia; anemia; liver enzyme elevations; and lipid elevations.
Protopic Pregnancy And Lactation Text: This medication is Pregnancy Category C. It is unknown if this medication is excreted in breast milk when applied topically.
Ketoconazole Pregnancy And Lactation Text: This medication is Pregnancy Category C and it isn't know if it is safe during pregnancy. It is also excreted in breast milk and breast feeding isn't recommended.
Siliq Counseling:  I discussed with the patient the risks of Siliq including but not limited to new or worsening depression, suicidal thoughts and behavior, immunosuppression, malignancy, posterior leukoencephalopathy syndrome, and serious infections.  The patient understands that monitoring is required including a PPD at baseline and must alert us or the primary physician if symptoms of infection or other concerning signs are noted. There is also a special program designed to monitor depression which is required with Siliq.
Topical Clindamycin Counseling: Patient counseled that this medication may cause skin irritation or allergic reactions.  In the event of skin irritation, the patient was advised to reduce the amount of the drug applied or use it less frequently.   The patient verbalized understanding of the proper use and possible adverse effects of clindamycin.  All of the patient's questions and concerns were addressed.
Litfulo Pregnancy And Lactation Text: Based on animal studies, Lifulo may cause embryo-fetal harm when administered to pregnant women.  The medication should not be used in pregnancy.  Breastfeeding is not recommended during treatment.
SSKI Counseling:  I discussed with the patient the risks of SSKI including but not limited to thyroid abnormalities, metallic taste, GI upset, fever, headache, acne, arthralgias, paraesthesias, lymphadenopathy, easy bleeding, arrhythmias, and allergic reaction.
Minocycline Counseling: Patient advised regarding possible photosensitivity and discoloration of the teeth, skin, lips, tongue and gums.  Patient instructed to avoid sunlight, if possible.  When exposed to sunlight, patients should wear protective clothing, sunglasses, and sunscreen.  The patient was instructed to call the office immediately if the following severe adverse effects occur:  hearing changes, easy bruising/bleeding, severe headache, or vision changes.  The patient verbalized understanding of the proper use and possible adverse effects of minocycline.  All of the patient's questions and concerns were addressed.
Elidel Counseling: Patient may experience a mild burning sensation during topical application. Elidel is not approved in children less than 2 years of age. There have been case reports of hematologic and skin malignancies in patients using topical calcineurin inhibitors although causality is questionable.
Quinolones Counseling:  I discussed with the patient the risks of fluoroquinolones including but not limited to GI upset, allergic reaction, drug rash, diarrhea, dizziness, photosensitivity, yeast infections, liver function test abnormalities, tendonitis/tendon rupture.
Qbrexza Counseling:  I discussed with the patient the risks of Qbrexza including but not limited to headache, mydriasis, blurred vision, dry eyes, nasal dryness, dry mouth, dry throat, dry skin, urinary hesitation, and constipation.  Local skin reactions including erythema, burning, stinging, and itching can also occur.
Libtayo Counseling- I discussed with the patient the risks of Libtayo including but not limited to nausea, vomiting, diarrhea, and bone or muscle pain.  The patient verbalized understanding of the proper use and possible adverse effects of Libtayo.  All of the patient's questions and concerns were addressed.
Hydroxychloroquine Pregnancy And Lactation Text: This medication has been shown to cause fetal harm but it isn't assigned a Pregnancy Risk Category. There are small amounts excreted in breast milk.
Eucrisa Counseling: Patient may experience a mild burning sensation during topical application. Eucrisa is not approved in children less than 3 months of age.
Terbinafine Counseling: Patient counseling regarding adverse effects of terbinafine including but not limited to headache, diarrhea, rash, upset stomach, liver function test abnormalities, itching, taste/smell disturbance, nausea, abdominal pain, and flatulence.  There is a rare possibility of liver failure that can occur when taking terbinafine.  The patient understands that a baseline LFT and kidney function test may be required. The patient verbalized understanding of the proper use and possible adverse effects of terbinafine.  All of the patient's questions and concerns were addressed.
Olumiant Pregnancy And Lactation Text: Based on animal studies, Olumiant may cause embryo-fetal harm when administered to pregnant women.  The medication should not be used in pregnancy.  Breastfeeding is not recommended during treatment.
Topical Ketoconazole Counseling: Patient counseled that this medication may cause skin irritation or allergic reactions.  In the event of skin irritation, the patient was advised to reduce the amount of the drug applied or use it less frequently.   The patient verbalized understanding of the proper use and possible adverse effects of ketoconazole.  All of the patient's questions and concerns were addressed.
Sski Pregnancy And Lactation Text: This medication is Pregnancy Category D and isn't considered safe during pregnancy. It is excreted in breast milk.
Azathioprine Counseling:  I discussed with the patient the risks of azathioprine including but not limited to myelosuppression, immunosuppression, hepatotoxicity, lymphoma, and infections.  The patient understands that monitoring is required including baseline LFTs, Creatinine, possible TPMP genotyping and weekly CBCs for the first month and then every 2 weeks thereafter.  The patient verbalized understanding of the proper use and possible adverse effects of azathioprine.  All of the patient's questions and concerns were addressed.
Hydroxychloroquine Counseling:  I discussed with the patient that a baseline ophthalmologic exam is needed at the start of therapy and every year thereafter while on therapy. A CBC may also be warranted for monitoring.  The side effects of this medication were discussed with the patient, including but not limited to agranulocytosis, aplastic anemia, seizures, rashes, retinopathy, and liver toxicity. Patient instructed to call the office should any adverse effect occur.  The patient verbalized understanding of the proper use and possible adverse effects of Plaquenil.  All the patient's questions and concerns were addressed.
Adbry Counseling: I discussed with the patient the risks of tralokinumab including but not limited to eye infection and irritation, cold sores, injection site reactions, worsening of asthma, allergic reactions and increased risk of parasitic infection.  Live vaccines should be avoided while taking tralokinumab. The patient understands that monitoring is required and they must alert us or the primary physician if symptoms of infection or other concerning signs are noted.
Low Dose Naltrexone Counseling- I discussed with the patient the potential risks and side effects of low dose naltrexone including but not limited to: more vivid dreams, headaches, nausea, vomiting, abdominal pain, fatigue, dizziness, and anxiety.
Bimzelx Counseling:  I discussed with the patient the risks of Bimzelx including but not limited to depression, immunosuppression, allergic reactions and infections.  The patient understands that monitoring is required including a PPD at baseline and must alert us or the primary physician if symptoms of infection or other concerning signs are noted.
Rinvoq Counseling: I discussed with the patient the risks of Rinvoq therapy including but not limited to upper respiratory tract infections, shingles, cold sores, bronchitis, nausea, cough, fever, acne, and headache. Live vaccines should be avoided.  This medication has been linked to serious infections; higher rate of mortality; malignancy and lymphoproliferative disorders; major adverse cardiovascular events; thrombosis; thrombocytopenia, anemia, and neutropenia; lipid elevations; liver enzyme elevations; and gastrointestinal perforations.
Cellcept Counseling:  I discussed with the patient the risks of mycophenolate mofetil including but not limited to infection/immunosuppression, GI upset, hypokalemia, hypercholesterolemia, bone marrow suppression, lymphoproliferative disorders, malignancy, GI ulceration/bleed/perforation, colitis, interstitial lung disease, kidney failure, progressive multifocal leukoencephalopathy, and birth defects.  The patient understands that monitoring is required including a baseline creatinine and regular CBC testing. In addition, patient must alert us immediately if symptoms of infection or other concerning signs are noted.
Libtayo Pregnancy And Lactation Text: This medication is contraindicated in pregnancy and when breast feeding.
Azathioprine Pregnancy And Lactation Text: This medication is Pregnancy Category D and isn't considered safe during pregnancy. It is unknown if this medication is excreted in breast milk.
Qbrexza Pregnancy And Lactation Text: There is no available data on Qbrexza use in pregnant women.  There is no available data on Qbrexza use in lactation.
Thalidomide Counseling: I discussed with the patient the risks of thalidomide including but not limited to birth defects, anxiety, weakness, chest pain, dizziness, cough and severe allergy.
Adbry Pregnancy And Lactation Text: It is unknown if this medication will adversely affect pregnancy or breast feeding.
Simponi Counseling:  I discussed with the patient the risks of golimumab including but not limited to myelosuppression, immunosuppression, autoimmune hepatitis, demyelinating diseases, lymphoma, and serious infections.  The patient understands that monitoring is required including a PPD at baseline and must alert us or the primary physician if symptoms of infection or other concerning signs are noted.
Aklief counseling:  Patient advised to apply a pea-sized amount only at bedtime and wait 30 minutes after washing their face before applying.  If too drying, patient may add a non-comedogenic moisturizer.  The most commonly reported side effects including irritation, redness, scaling, dryness, stinging, burning, itching, and increased risk of sunburn.  The patient verbalized understanding of the proper use and possible adverse effects of retinoids.  All of the patient's questions and concerns were addressed.
Azithromycin Counseling:  I discussed with the patient the risks of azithromycin including but not limited to GI upset, allergic reaction, drug rash, diarrhea, and yeast infections.
Hydroquinone Counseling:  Patient advised that medication may result in skin irritation, lightening (hypopigmentation), dryness, and burning.  In the event of skin irritation, the patient was advised to reduce the amount of the drug applied or use it less frequently.  Rarely, spots that are treated with hydroquinone can become darker (pseudoochronosis).  Should this occur, patient instructed to stop medication and call the office. The patient verbalized understanding of the proper use and possible adverse effects of hydroquinone.  All of the patient's questions and concerns were addressed.
Odomzo Counseling- I discussed with the patient the risks of Odomzo including but not limited to nausea, vomiting, diarrhea, constipation, weight loss, changes in the sense of taste, decreased appetite, muscle spasms, and hair loss.  The patient verbalized understanding of the proper use and possible adverse effects of Odomzo.  All of the patient's questions and concerns were addressed.
Opioid Pregnancy And Lactation Text: These medications can lead to premature delivery and should be avoided during pregnancy. These medications are also present in breast milk in small amounts.
Skyrizi Counseling: I discussed with the patient the risks of risankizumab-rzaa including but not limited to immunosuppression, and serious infections.  The patient understands that monitoring is required including a PPD at baseline and must alert us or the primary physician if symptoms of infection or other concerning signs are noted.
Azelaic Acid Counseling: Patient counseled that medicine may cause skin irritation and to avoid applying near the eyes.  In the event of skin irritation, the patient was advised to reduce the amount of the drug applied or use it less frequently.   The patient verbalized understanding of the proper use and possible adverse effects of azelaic acid.  All of the patient's questions and concerns were addressed.
Bimzelx Pregnancy And Lactation Text: This medication crosses the placenta and the safety is uncertain during pregnancy. It is unknown if this medication is present in breast milk.
Low Dose Naltrexone Pregnancy And Lactation Text: Naltrexone is pregnancy category C.  There have been no adequate and well-controlled studies in pregnant women.  It should be used in pregnancy only if the potential benefit justifies the potential risk to the fetus.   Limited data indicates that naltrexone is minimally excreted into breastmilk.
Rinvoq Pregnancy And Lactation Text: Based on animal studies, Rinvoq may cause embryo-fetal harm when administered to pregnant women.  The medication should not be used in pregnancy.  Breastfeeding is not recommended during treatment and for 6 days after the last dose.
Aklief Pregnancy And Lactation Text: It is unknown if this medication is safe to use during pregnancy.  It is unknown if this medication is excreted in breast milk.  Breastfeeding women should use the topical cream on the smallest area of the skin for the shortest time needed while breastfeeding.  Do not apply to nipple and areola.
Topical Metronidazole Counseling: Metronidazole is a topical antibiotic medication. You may experience burning, stinging, redness, or allergic reactions.  Please call our office if you develop any problems from using this medication.
Rifampin Counseling: I discussed with the patient the risks of rifampin including but not limited to liver damage, kidney damage, red-orange body fluids, nausea/vomiting and severe allergy.
Opioid Counseling: I discussed with the patient the potential side effects of opioids including but not limited to addiction, altered mental status, and depression. I stressed avoiding alcohol, benzodiazepines, muscle relaxants and sleep aids unless specifically okayed by a physician. The patient verbalized understanding of the proper use and possible adverse effects of opioids. All of the patient's questions and concerns were addressed. They were instructed to flush the remaining pills down the toilet if they did not need them for pain.
Rhofade Counseling: Rhofade is a topical medication which can decrease superficial blood flow where applied. Side effects are uncommon and include stinging, redness and allergic reactions.
Cimetidine Counseling:  I discussed with the patient the risks of Cimetidine including but not limited to gynecomastia, headache, diarrhea, nausea, drowsiness, arrhythmias, pancreatitis, skin rashes, psychosis, bone marrow suppression and kidney toxicity.
Sotyktu Counseling:  I discussed the most common side effects of Sotyktu including: common cold, sore throat, sinus infections, cold sores, canker sores, folliculitis, and acne.  I also discussed more serious side effects of Sotyktu including but not limited to: serious allergic reactions; increased risk for infections such as TB; cancers such as lymphomas; rhabdomyolysis and elevated CPK; and elevated triglycerides and liver enzymes. 
Tranexamic Acid Pregnancy And Lactation Text: It is unknown if this medication is safe during pregnancy or breast feeding.
Cimzia Counseling:  I discussed with the patient the risks of Cimzia including but not limited to immunosuppression, allergic reactions and infections.  The patient understands that monitoring is required including a PPD at baseline and must alert us or the primary physician if symptoms of infection or other concerning signs are noted.
Cyclophosphamide Counseling:  I discussed with the patient the risks of cyclophosphamide including but not limited to hair loss, hormonal abnormalities, decreased fertility, abdominal pain, diarrhea, nausea and vomiting, bone marrow suppression and infection. The patient understands that monitoring is required while taking this medication.
Azithromycin Pregnancy And Lactation Text: This medication is considered safe during pregnancy and is also secreted in breast milk.
Topical Metronidazole Pregnancy And Lactation Text: This medication is Pregnancy Category B and considered safe during pregnancy.  It is also considered safe to use while breastfeeding.
Rifampin Pregnancy And Lactation Text: This medication is Pregnancy Category C and it isn't know if it is safe during pregnancy. It is also excreted in breast milk and should not be used if you are breast feeding.
Niacinamide Counseling: I recommended taking niacin or niacinamide, also know as vitamin B3, twice daily. Recent evidence suggests that taking vitamin B3 (500 mg twice daily) can reduce the risk of actinic keratoses and non-melanoma skin cancers. Side effects of vitamin B3 include flushing and headache.
Tranexamic Acid Counseling:  Patient advised of the small risk of bleeding problems with tranexamic acid. They were also instructed to call if they developed any nausea, vomiting or diarrhea. All of the patient's questions and concerns were addressed.

## 2024-10-24 NOTE — PROCEDURE: CONSULTATION FOR MOHS SURGERY
Detail Level: Simple
X Size Of Lesion In Cm (Optional): 0
Date Scheduled For Mohs (Optional): mohs 1- Dec 10,2024
Incorporate Mauc In Note: Yes
Date Scheduled For Mohs (Optional): mohs 2- Nov 26,2024

## 2024-10-24 NOTE — PROCEDURE: LIQUID NITROGEN
Consent: The patient has had the opportunity to ask questions and understand the purpose of the treatment, benefits, risks, and alternatives. Consent has been obtained after discussing/reviewing the following:  \\n- Risks to the procedure may include but are not limited to pain/discomfort, redness, swelling, crusting/scabbing/blistering, discoloration, recurrence, persistence, and the risk of scarring. \\n- Rec re-evaluation in clinic if an AK does not resolve within 6 wks and/or sooner if worsening.
Detail Level: Simple
Post-Care Instructions: I reviewed with the patient post-care instructions. Patient is to wear sunprotection / sun protective clothing and avoid picking areas. Vaseline use daily is encouraged until healed.
Number Of Freeze-Thaw Cycles: 1 freeze-thaw cycle
Total Number Of Aks Treated: 16
Render Post-Care Instructions In Note?: yes

## 2024-10-24 NOTE — PROCEDURE: DEFER
X Size Of Lesion In Cm (Optional): 0
Instructions (Optional): ED&C scheduled for Dec 3,2024
Detail Level: Simple
Scheduling Instructions (Optional): ED&C
Introduction Text (Please End With A Colon): The following procedure was deferred:

## 2024-10-28 ENCOUNTER — TELEPHONE (OUTPATIENT)
Dept: INTERNAL MEDICINE CLINIC | Facility: CLINIC | Age: 84
End: 2024-10-28

## 2024-10-28 RX ORDER — TAMSULOSIN HYDROCHLORIDE 0.4 MG/1
0.4 CAPSULE ORAL DAILY
Qty: 90 CAPSULE | Refills: 3 | Status: SHIPPED | OUTPATIENT
Start: 2024-10-28 | End: 2025-10-23

## 2024-10-28 RX ORDER — TAMSULOSIN HYDROCHLORIDE 0.4 MG/1
0.4 CAPSULE ORAL DAILY
Qty: 90 CAPSULE | Refills: 3 | Status: CANCELLED | OUTPATIENT
Start: 2024-10-28 | End: 2025-10-23

## 2024-10-28 NOTE — TELEPHONE ENCOUNTER
Pt. Called asking if he is to continue taking the Tamsulosin?  If so, he will need refills sent to [facility] today as he only has 3 pills left.

## 2024-10-28 NOTE — TELEPHONE ENCOUNTER
To Dr. WELLS     Please advise if patient should continue medication     Pt was seen by Dr. Person in July   Per OV note   Urinary frequency; incomplete emptying  Add flomax daily

## 2024-10-30 ENCOUNTER — TELEPHONE (OUTPATIENT)
Dept: INTERNAL MEDICINE CLINIC | Facility: CLINIC | Age: 84
End: 2024-10-30

## 2024-10-30 DIAGNOSIS — I73.9 INTERMITTENT CLAUDICATION (HCC): Primary | ICD-10-CM

## 2024-10-30 NOTE — TELEPHONE ENCOUNTER
Called Ada TRUJILLO RN T# [phone]  and left voice mail asking for her to follow up with pt tomorrow and have him call to schedule US of legs and provide d her with scheduling T#

## 2024-10-30 NOTE — TELEPHONE ENCOUNTER
Ada from [facility] calling to speak to nurse.  Patient is progressing well.  Will be discharged from home healthcare the week of Nov 4.    Patient complaining of achy in both legs. Patient stating that he is to have US of lower extremities.   No order or appointment found in patient chart.    Best call back number Ada [phone]

## 2024-10-30 NOTE — TELEPHONE ENCOUNTER
Noted, chronic intermittent issue for which he has been evaluated previously.  Lets go ahead and proceed with the ultrasound of the legs to evaluate the arterial blood flow:      Scheduling Instructions:  To schedule an appointment for your radiology test please call [facility] Central Scheduling at [phone].  Further recommendations to follow once completed

## 2024-11-01 ENCOUNTER — TELEPHONE (OUTPATIENT)
Dept: INTERNAL MEDICINE CLINIC | Facility: CLINIC | Age: 84
End: 2024-11-01

## 2024-11-01 NOTE — TELEPHONE ENCOUNTER
Received outside hospital records from [facility], 10/24/2424, Dr. Loco office visit    Basal cell carcinoma on the scalp seen on biopsy, consultation for Mohs surgery was recommended.  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  Papule left medial forehead    16 areas of actinic keratoses treated with cryotherapy    Follow-up again in 6 weeks.

## 2024-11-04 RX ORDER — ALENDRONATE SODIUM 70 MG/1
70 TABLET ORAL
Qty: 12 TABLET | Refills: 3 | OUTPATIENT
Start: 2024-11-04

## 2024-11-04 NOTE — TELEPHONE ENCOUNTER
Too soon for refill     Current refill request refused due to refill is either a duplicate request or has active refills at the pharmacy.  Check previous templates.    Requested Prescriptions     Refused Prescriptions Disp Refills    ALENDRONATE 70 MG Oral Tab [Pharmacy Med Name: Alendronate Sodium 70 MG Oral Tablet] 12 tablet 3     Sig: TAKE 1 TABLET BY MOUTH WEEKLY  WITH 8 OZ OF PLAIN WATER 30  MINUTES BEFORE FIRST FOOD, DRINK OR MEDS. STAY UPRIGHT FOR 30  MINS     Refused By: ENRIQUE KC     Reason for Refusal: Patient has requested refill too soon

## 2024-11-04 NOTE — TELEPHONE ENCOUNTER
As FYI to  -called Ada from [facility] and relayed  message , also number for scheduling US given . Ada will see patient tomorrow and give him the information . She will also discharge patient this week from [facility] services

## 2024-11-08 ENCOUNTER — HOSPITAL ENCOUNTER (OUTPATIENT)
Dept: ULTRASOUND IMAGING | Facility: HOSPITAL | Age: 84
Discharge: HOME | End: 2024-11-08
Attending: INTERNAL MEDICINE
Payer: MEDICARE

## 2024-11-08 ENCOUNTER — TELEPHONE (OUTPATIENT)
Dept: INTERNAL MEDICINE CLINIC | Facility: CLINIC | Age: 84
End: 2024-11-08

## 2024-11-08 DIAGNOSIS — I73.9 INTERMITTENT CLAUDICATION (HCC): ICD-10-CM

## 2024-11-08 PROCEDURE — 93925 LOWER EXTREMITY STUDY: CPT | Performed by: INTERNAL MEDICINE

## 2024-11-08 NOTE — TELEPHONE ENCOUNTER
To ANUPAMA Gottlieb...    I received a call from Stephanie from the [facility] dept at [facility].    She currently has pt there for an exam and is requesting order be changed to US duplex instead of sequentila so best views of arterial blood flow can be evaluated    I did give verbal order to change order as she requested

## 2024-11-09 ENCOUNTER — TELEPHONE (OUTPATIENT)
Dept: INTERNAL MEDICINE CLINIC | Facility: CLINIC | Age: 84
End: 2024-11-09

## 2024-11-09 NOTE — TELEPHONE ENCOUNTER
Received outside hospital records, 9/23/2024  - Liquid nitrogen actinic keratosis x 17 areas, neoplasm of uncertain behavior left central postauricular skin.  - Note telephone, 11/1/2024, I did receive follow-up records for 10/24 appointment

## 2024-11-11 ENCOUNTER — TELEPHONE (OUTPATIENT)
Dept: INTERNAL MEDICINE CLINIC | Facility: CLINIC | Age: 84
End: 2024-11-11

## 2024-11-11 RX ORDER — LOSARTAN POTASSIUM 100 MG/1
100 TABLET ORAL DAILY
Qty: 90 TABLET | Refills: 3 | Status: SHIPPED | OUTPATIENT
Start: 2024-11-11

## 2024-11-11 RX ORDER — BLOOD SUGAR DIAGNOSTIC
STRIP MISCELLANEOUS
Qty: 100 STRIP | Refills: 3 | Status: SHIPPED | OUTPATIENT
Start: 2024-11-11

## 2024-11-11 NOTE — TELEPHONE ENCOUNTER
Refill request is for a maintenance medication and has met the criteria specified in the Ambulatory Medication Refill Standing Order for eligibility, visits, laboratory, alerts and was sent to the requested pharmacy.    Requested Prescriptions     Signed Prescriptions Disp Refills    Glucose Blood (ONETOUCH VERIO) In Vitro Strip 100 strip 3     Sig: Checl blood sugar once daily     Authorizing Provider: AMBER YORK     Ordering User: FELI FIELD

## 2024-11-11 NOTE — TELEPHONE ENCOUNTER
Patient is calling to ask if he is supposed to continue taking Losartan.    If patient needs to continue the medication please send to [facility]    Please let patient know [phone]

## 2024-11-13 ENCOUNTER — TELEPHONE (OUTPATIENT)
Dept: INTERNAL MEDICINE CLINIC | Facility: CLINIC | Age: 84
End: 2024-11-13

## 2024-11-13 DIAGNOSIS — I73.9 PAD (PERIPHERAL ARTERY DISEASE) (HCC): Primary | ICD-10-CM

## 2024-11-13 NOTE — TELEPHONE ENCOUNTER
Patient Is calling checking the states of his test results from his Ultra Sound he had on 11/8/2024.    Please call and advise

## 2024-11-13 NOTE — TELEPHONE ENCOUNTER
S/w patient and relayed MD message.  Verbalizes understanding and agreement with tx. plan     Pt provided with Dr Chu T# and he will call now for an appointment

## 2024-11-13 NOTE — TELEPHONE ENCOUNTER
Please notify the patient that the ultrasound did show severe peripheral arterial disease.  His prior left leg blood vessel surgery remains open, however there is narrowings that are severe due to cholesterol buildup in the lower parts of the legs, this is likely causing his symptoms of pain and difficulty with prolonged walking.    He should follow-up with Dr. Chu his vascular surgeon for further evaluation:    Provider Address Phone   Jean-Claude Chu MD [address] [address] [phone]

## 2024-11-14 NOTE — TELEPHONE ENCOUNTER
I called pt and relayed his scheduled appointments below. Pt did write both appointment details down    Pt voiced understanding and will call Dr Chu office now to inform them that he is aware of his scheduled appointments

## 2024-11-14 NOTE — TELEPHONE ENCOUNTER
I called and  spoke with Blanca at Dr Chu office.    Pt is scheduled for a 90 minute US @ 11 am on 1-24-25. Pt then has an appointment with Dr Chu at 12:45 PM. Both are at the [place] location

## 2024-11-14 NOTE — TELEPHONE ENCOUNTER
Please call patient re: next steps after his ultrasound, he called Dr Chu office but they were not able to schedule an appointment - patient is not sure why     [phone]

## 2024-11-18 RX ORDER — ALENDRONATE SODIUM 70 MG/1
70 TABLET ORAL
Qty: 12 TABLET | Refills: 3 | OUTPATIENT
Start: 2024-11-18

## 2024-11-18 NOTE — TELEPHONE ENCOUNTER
Current refill request refused due to refill is either a duplicate request or has active refills at the pharmacy.  Check previous templates.    Requested Prescriptions     Refused Prescriptions Disp Refills    ALENDRONATE 70 MG Oral Tab [Pharmacy Med Name: Alendronate Sodium 70 MG Oral Tablet] 12 tablet 3     Sig: TAKE 1 TABLET BY MOUTH WEEKLY  WITH 8 OZ OF PLAIN WATER 30  MINUTES BEFORE FIRST FOOD, DRINK OR MEDS. STAY UPRIGHT FOR 30  MINS     Refused By: ENRIQUE KC     Reason for Refusal: Patient has requested refill too soon

## 2024-11-22 RX ORDER — POTASSIUM CHLORIDE 750 MG/1
10 TABLET, EXTENDED RELEASE ORAL DAILY
Qty: 90 TABLET | Refills: 3 | Status: SHIPPED | OUTPATIENT
Start: 2024-11-22

## 2024-11-22 NOTE — TELEPHONE ENCOUNTER
Refill request is for a maintenance medication and has met the criteria specified in the Ambulatory Medication Refill Standing Order for eligibility, visits, laboratory, alerts and was sent to the requested pharmacy.    Requested Prescriptions     Signed Prescriptions Disp Refills    Potassium Chloride ER 10 MEQ Oral Tab CR 90 tablet 3     Sig: Take 1 tablet (10 mEq total) by mouth daily.     Authorizing Provider: AMBER YORK     Ordering User: ENRIQUE KC

## 2024-11-26 ENCOUNTER — APPOINTMENT (OUTPATIENT)
Dept: URBAN - METROPOLITAN AREA CLINIC 244 | Age: 84
Setting detail: DERMATOLOGY
End: 2024-11-27

## 2024-11-26 PROBLEM — C44.41 BASAL CELL CARCINOMA OF SKIN OF SCALP AND NECK: Status: ACTIVE | Noted: 2024-11-26

## 2024-11-26 PROCEDURE — 13121 CMPLX RPR S/A/L 2.6-7.5 CM: CPT

## 2024-11-26 PROCEDURE — 17312 MOHS ADDL STAGE: CPT

## 2024-11-26 PROCEDURE — OTHER MIPS QUALITY: OTHER

## 2024-11-26 PROCEDURE — 17311 MOHS 1 STAGE H/N/HF/G: CPT

## 2024-11-26 PROCEDURE — OTHER MOHS SURGERY: OTHER

## 2024-11-26 NOTE — PROCEDURE: MIPS QUALITY
Quality 358: Patient-Centered Surgical Risk Assessment And Communication: A patient-specific risk assessment with a risk calculator was not completed or communicated to patient and/or family.
Detail Level: Detailed

## 2024-11-26 NOTE — PROCEDURE: MOHS SURGERY
Mohs Case Number: [id number]
Biopsy Photograph Reviewed: Yes
Consent Type: Consent 1 (Standard)
Eye Shield Used: No
Initial Size Of Lesion: 2.5
X Size Of Lesion In Cm (Optional): 1
Primary Defect Length In Cm (Final Defect Size - Required For Flaps/Grafts): 3.6
Primary Defect Width In Cm (Final Defect Size - Required For Flaps/Grafts): 2.6
Primary Defect Depth In Cm (Optional But Required For Some Insurers): 0
Repair Type: Complex Repair
Which Instrument Did You Use For Dermabrasion?: Wire Brush
Which Eyelid Repair Cpt Are You Using?: 24313
Oculoplastic Surgeon Procedure Text (A): After obtaining clear surgical margins the patient was sent to oculoplastics for surgical repair.  The patient understands they will receive post-surgical care and follow-up from the referring physician's office.
Otolaryngologist Procedure Text (A): After obtaining clear surgical margins the patient was sent to otolaryngology for surgical repair.  The patient understands they will receive post-surgical care and follow-up from the referring physician's office.
Plastic Surgeon Procedure Text (A): After obtaining clear surgical margins the patient was sent to plastics for surgical repair.  The patient understands they will receive post-surgical care and follow-up from the referring physician's office.
Mid-Level Procedure Text (A): After obtaining clear surgical margins the patient was sent to a mid-level provider for surgical repair.  The patient understands they will receive post-surgical care and follow-up from the mid-level provider.
Provider Procedure Text (A): After obtaining clear surgical margins the defect was repaired by another provider.
Asc Procedure Text (A): After obtaining clear surgical margins the patient was sent to an ASC for surgical repair.  The patient understands they will receive post-surgical care and follow-up from the ASC physician.
Simple / Intermediate / Complex Repair - Final Wound Length In Cm: 4.5
Suturegard Retention Suture: 2-0 Nylon
Retention Suture Bite Size: 3 mm
Length To Time In Minutes Device Was In Place: 10
Distance Of Undermining In Cm (Required): 3
Undermining Type: One Wound Edge
Debridement Text: The wound edges were debrided prior to proceeding with the closure to facilitate wound healing.
Complex Requirements: Involvement Of Free Margin?: Helical Rim
Helical Rim Text: The closure involved the helical rim.
Vermilion Border Text: The closure involved the vermilion border.
Nostril Rim Text: The closure involved the nostril rim.
Retention Suture Text: Retention sutures were placed to support the closure and prevent dehiscence.
Area H Indication Text: Tumors in this location are included in Area H (eyelids, eyebrows, nose, lips, chin, ear, pre-auricular, post-auricular, temple, genitalia, hands, feet, ankles and areola).  Tissue conservation is critical in these anatomic locations.
Area M Indication Text: Tumors in this location are included in Area M (cheek, forehead, scalp, neck, jawline and pretibial skin).  Mohs surgery is indicated for tumors in these anatomic locations.
Area L Indication Text: Tumors in this location are included in Area L (trunk and extremities).  Mohs surgery is indicated for larger tumors, or tumors with aggressive histologic features, in these anatomic locations.
Depth Of Tumor Invasion (For Histology): epidermis
Perineural Invasion (For Histology - Be Specific If Possible): absent
Presence Of Scar Tissue (For Histology): present
Special Stains Stage 1 - Results: Base On Clearance Noted Above
Stage 2: Additional Anesthesia Type: 1% lidocaine with epinephrine
Stage 2 Override Histology Text: superficial bcc observed on final slide, initial slides clear. this was discussed with pt  
Staging Info: By selecting yes to the question above you will include information on AJCC 8 tumor staging in your Mohs note. Information on tumor staging will be automatically added for SCCs on the head and neck. AJCC 8 includes tumor size, tumor depth, perineural involvement and bone invasion.
Tumor Depth: Less than 6mm from granular layer and no invasion beyond the subcutaneous fat
Was The Patient On Physician Recommended Anticoagulation Therapy?: Please Select the Appropriate Response
Medical Necessity Statement: Based on my medical judgement, Mohs surgery is the most appropriate treatment for this cancer compared to other treatments.
Alternatives Discussed Intro (Do Not Add Period): I discussed alternative treatments to Mohs surgery and specifically discussed the risks and benefits of
Consent 1/Introductory Paragraph: The rationale for Mohs was explained to the patient and consent was obtained. The risks, benefits and alternatives to therapy were discussed in detail. Specifically, the risks of infection, scarring, bleeding, prolonged wound healing, incomplete removal, allergy to anesthesia, nerve injury and recurrence were addressed. Prior to the procedure, the treatment site was clearly identified and confirmed by the patient. All components of Universal Protocol/PAUSE Rule completed.
Consent 2/Introductory Paragraph: Mohs surgery was explained to the patient and consent was obtained. The risks, benefits and alternatives to therapy were discussed in detail. Specifically, the risks of infection, scarring, bleeding, prolonged wound healing, incomplete removal, allergy to anesthesia, nerve injury and recurrence were addressed. Prior to the procedure, the treatment site was clearly identified and confirmed by the patient. All components of Universal Protocol/PAUSE Rule completed.
Consent 3/Introductory Paragraph: I gave the patient a chance to ask questions they had about the procedure.  Following this I explained the Mohs procedure and consent was obtained. The risks, benefits and alternatives to therapy were discussed in detail. Specifically, the risks of infection, scarring, bleeding, prolonged wound healing, incomplete removal, allergy to anesthesia, nerve injury and recurrence were addressed. Prior to the procedure, the treatment site was clearly identified and confirmed by the patient. All components of Universal Protocol/PAUSE Rule completed.
Consent (Temporal Branch)/Introductory Paragraph: The rationale for Mohs was explained to the patient and consent was obtained. The risks, benefits and alternatives to therapy were discussed in detail. Specifically, the risks of damage to the temporal branch of the facial nerve, infection, scarring, bleeding, prolonged wound healing, incomplete removal, allergy to anesthesia, and recurrence were addressed. Prior to the procedure, the treatment site was clearly identified and confirmed by the patient. All components of Universal Protocol/PAUSE Rule completed.
Consent (Marginal Mandibular)/Introductory Paragraph: The rationale for Mohs was explained to the patient and consent was obtained. The risks, benefits and alternatives to therapy were discussed in detail. Specifically, the risks of damage to the marginal mandibular branch of the facial nerve, infection, scarring, bleeding, prolonged wound healing, incomplete removal, allergy to anesthesia, and recurrence were addressed. Prior to the procedure, the treatment site was clearly identified and confirmed by the patient. All components of Universal Protocol/PAUSE Rule completed.
Consent (Spinal Accessory)/Introductory Paragraph: The rationale for Mohs was explained to the patient and consent was obtained. The risks, benefits and alternatives to therapy were discussed in detail. Specifically, the risks of damage to the spinal accessory nerve, infection, scarring, bleeding, prolonged wound healing, incomplete removal, allergy to anesthesia, and recurrence were addressed. Prior to the procedure, the treatment site was clearly identified and confirmed by the patient. All components of Universal Protocol/PAUSE Rule completed.
Consent (Near Eyelid Margin)/Introductory Paragraph: The rationale for Mohs was explained to the patient and consent was obtained. The risks, benefits and alternatives to therapy were discussed in detail. Specifically, the risks of ectropion or eyelid deformity, infection, scarring, bleeding, prolonged wound healing, incomplete removal, allergy to anesthesia, nerve injury and recurrence were addressed. Prior to the procedure, the treatment site was clearly identified and confirmed by the patient. All components of Universal Protocol/PAUSE Rule completed.
Consent (Ear)/Introductory Paragraph: The rationale for Mohs was explained to the patient and consent was obtained. The risks, benefits and alternatives to therapy were discussed in detail. Specifically, the risks of ear deformity, infection, scarring, bleeding, prolonged wound healing, incomplete removal, allergy to anesthesia, nerve injury and recurrence were addressed. Prior to the procedure, the treatment site was clearly identified and confirmed by the patient. All components of Universal Protocol/PAUSE Rule completed.
Consent (Nose)/Introductory Paragraph: The rationale for Mohs was explained to the patient and consent was obtained. The risks, benefits and alternatives to therapy were discussed in detail. Specifically, the risks of nasal deformity, changes in the flow of air through the nose, infection, scarring, bleeding, prolonged wound healing, incomplete removal, allergy to anesthesia, nerve injury and recurrence were addressed. Prior to the procedure, the treatment site was clearly identified and confirmed by the patient. All components of Universal Protocol/PAUSE Rule completed.
Consent (Lip)/Introductory Paragraph: The rationale for Mohs was explained to the patient and consent was obtained. The risks, benefits and alternatives to therapy were discussed in detail. Specifically, the risks of lip deformity, changes in the oral aperture, infection, scarring, bleeding, prolonged wound healing, incomplete removal, allergy to anesthesia, nerve injury and recurrence were addressed. Prior to the procedure, the treatment site was clearly identified and confirmed by the patient. All components of Universal Protocol/PAUSE Rule completed.
Consent (Scalp)/Introductory Paragraph: The rationale for Mohs was explained to the patient and consent was obtained. The risks, benefits and alternatives to therapy were discussed in detail. Specifically, the risks of changes in hair growth pattern secondary to repair, infection, scarring, bleeding, prolonged wound healing, incomplete removal, allergy to anesthesia, nerve injury and recurrence were addressed. Prior to the procedure, the treatment site was clearly identified and confirmed by the patient. All components of Universal Protocol/PAUSE Rule completed.
Detail Level: Detailed
Postop Diagnosis: same
Anesthesia Volume In Cc: 6
Hemostasis: Electrocautery
Estimated Blood Loss (Cc): minimal
Anesthesia Volume In Cc: 8
Brow Lift Text: A midfrontal incision was made medially to the defect to allow access to the tissues just superior to the left eyebrow. Following careful dissection inferiorly in a supraperiosteal plane to the level of the left eyebrow, several 3-0 monocryl sutures were used to resuspend the eyebrow orbicularis oculi muscular unit to the superior frontal bone periosteum. This resulted in an appropriate reapproximation of static eyebrow symmetry and correction of the left brow ptosis.
Deep Sutures: 4-0 Vicryl
Epidermal Sutures: 4-0 Nylon
Epidermal Closure: running
Suturegard Intro: Intraoperative tissue expansion was performed, utilizing the SUTUREGARD device, in order to reduce wound tension.
Suturegard Body: The suture ends were repeatedly re-tightened and re-clamped to achieve the desired tissue expansion.
Hemigard Intro: Due to skin fragility and wound tension, it was decided to use HEMIGARD adhesive retention suture devices to permit a linear closure. The skin was cleaned and dried for a 6cm distance away from the wound. Excessive hair, if present, was removed to allow for adhesion.
Hemigard Postcare Instructions: The HEMIGARD strips are to remain completely dry for at least 5-7 days.
Donor Site Anesthesia Type: same as repair anesthesia
Epidermal Closure Graft Donor Site (Optional): simple interrupted
Graft Donor Site Bandage (Optional-Leave Blank If You Don't Want In Note): Steri-strips and a pressure bandage were applied to the donor site.
Closure 2 Information: This tab is for additional flaps and grafts, including complex repair and grafts and complex repair and flaps. You can also specify a different location for the additional defect, if the location is the same you do not need to select a new one. We will insert the automated text for the repair you select below just as we do for solitary flaps and grafts. Please note that at this time if you select a location with a different insurance zone you will need to override the ICD10 and CPT if appropriate.
Closure 3 Information: This tab is for additional flaps and grafts above and beyond our usual structured repairs.  Please note if you enter information here it will not currently bill and you will need to add the billing information manually.
Wound Care: Petrolatum
Dressing: dry sterile dressing
Suture Removal: 7 days
Unna Boot Text: An Unna boot was placed to help immobilize the limb and facilitate more rapid healing.
Home Suture Removal Text: Patient was provided instructions on removing sutures and will remove their sutures at home.  If they have any questions or difficulties they will call the office.
Post-Care Instructions: I reviewed with the patient in detail post-care instructions. Patient is not to engage in any heavy lifting, exercise, or swimming for the next 14 days. Should the patient develop any fevers, chills, bleeding, severe pain patient will contact the office immediately.
Pain Refusal Text: I offered to prescribe pain medication but the patient refused to take this medication.
Mauc Instructions: By selecting yes to the question below the MAUC number will be added into the note.  This will be calculated automatically based on the diagnosis chosen, the size entered, the body zone selected (H,M,L) and the specific indications you chose. You will also have the option to override the Mohs AUC if you disagree with the automatically calculated number and this option is found in the Case Summary tab.
Where Do You Want The Question To Include Opioid Counseling Located?: Case Summary Tab
Eye Protection Verbiage: Before proceeding with the stage, a plastic scleral shield was inserted. The globe was anesthetized with a few drops of 1% lidocaine with 1:100,000 epinephrine. Then, an appropriate sized scleral shield was chosen and coated with lacrilube ointment. The shield was gently inserted and left in place for the duration of each stage. After the stage was completed, the shield was gently removed.
Mohs Method Verbiage: An incision at a 45 degree angle following the standard Mohs approach was done and the specimen was harvested as a microscopic controlled layer.
Surgeon/Pathologist Verbiage (Will Incorporate Name Of Surgeon From Intro If Not Blank): operated in two distinct and integrated capacities as the surgeon and pathologist.
Mohs Histo Method Verbiage: Each section was then chromacoded and processed in the Mohs lab using the Mohs protocol and submitted for frozen section, utilizing hematoxylin and eosin histochemical stains.
Subsequent Stages Histo Method Verbiage: Using a similar technique to that described above, a thin layer of tissue was removed from all areas where tumor was visible on the previous stage.  The tissue was again oriented, mapped, dyed, and processed as above.
Mohs Rapid Report Verbiage: The area of clinically evident tumor was marked with skin marking ink and appropriately hatched.  The initial incision was made following the Mohs approach through the skin.  The specimen was taken to the lab, divided into the necessary number of pieces, chromacoded and processed according to the Mohs protocol.  This was repeated in successive stages until a tumor free defect was achieved.
Complex Repair Preamble Text (Leave Blank If You Do Not Want): Extensive wide undermining was performed.
Intermediate Repair Preamble Text (Leave Blank If You Do Not Want): Undermining was performed with blunt dissection.
Graft Cartilage Fenestration Text: The cartilage was fenestrated with a 2mm punch biopsy to help facilitate graft survival and healing.
Non-Graft Cartilage Fenestration Text: The cartilage was fenestrated with a 2mm punch biopsy to help facilitate healing.
Secondary Intention Text (Leave Blank If You Do Not Want): The defect will heal with secondary intention.
No Repair - Repaired With Adjacent Surgical Defect Text (Leave Blank If You Do Not Want): After obtaining clear surgical margins the defect was repaired concurrently with another surgical defect which was in close approximation.
Adjacent Tissue Transfer Text: The defect edges were debeveled with a #15 scalpel blade. Given the location of the defect and the proximity to free margins an adjacent tissue transfer was deemed most appropriate. Using a sterile surgical marker, an appropriate flap was drawn incorporating the defect and placing the expected incisions within the relaxed skin tension lines where possible. The area thus outlined was incised deep to adipose tissue with a #15 scalpel blade. The skin margins were undermined to an appropriate distance in all directions utilizing iris scissors and carried over to close the primary defect.
Advancement Flap (Single) Text: The defect edges were debeveled with a #15 scalpel blade. Given the location of the defect and the proximity to free margins a single advancement flap was deemed most appropriate. Using a sterile surgical marker, an appropriate advancement flap was drawn incorporating the defect and placing the expected incisions within the relaxed skin tension lines where possible. The area thus outlined was incised deep to adipose tissue with a #15 scalpel blade. The skin margins were undermined to an appropriate distance in all directions utilizing iris scissors. Following this, the designed flap was advanced and carried over into the primary defect and sutured into place.
Advancement Flap (Double) Text: The defect edges were debeveled with a #15 scalpel blade. Given the location of the defect and the proximity to free margins a double advancement flap was deemed most appropriate. Using a sterile surgical marker, the appropriate advancement flaps were drawn incorporating the defect and placing the expected incisions within the relaxed skin tension lines where possible. The area thus outlined was incised deep to adipose tissue with a #15 scalpel blade. The skin margins were undermined to an appropriate distance in all directions utilizing iris scissors. Following this, the designed flaps were advanced and carried over into the primary defect and sutured into place.
Advancement-Rotation Flap Text: The defect edges were debeveled with a #15 scalpel blade. Given the location of the defect, shape of the defect and the proximity to free margins an advancement-rotation flap was deemed most appropriate. Using a sterile surgical marker, an appropriate flap was drawn incorporating the defect and placing the expected incisions within the relaxed skin tension lines where possible. The area thus outlined was incised deep to adipose tissue with a #15 scalpel blade. The skin margins were undermined to an appropriate distance in all directions utilizing iris scissors. Following this, the designed flap was carried over into the primary defect and sutured into place.
Alar Island Pedicle Flap Text: The defect edges were debeveled with a #15 scalpel blade. Given the location of the defect, shape of the defect and the proximity to the alar rim an island pedicle advancement flap was deemed most appropriate. Using a sterile surgical marker, an appropriate advancement flap was drawn incorporating the defect, outlining the appropriate donor tissue and placing the expected incisions within the nasal ala running parallel to the alar rim. The area thus outlined was incised with a #15 scalpel blade. The skin margins were undermined minimally to an appropriate distance in all directions around the primary defect and laterally outward around the island pedicle utilizing iris scissors.  There was minimal undermining beneath the pedicle flap. Following this, the designed flap was carried over into the primary defect and sutured into place.
A-T Advancement Flap Text: The defect edges were debeveled with a #15 scalpel blade. Given the location of the defect, shape of the defect and the proximity to free margins an A-T advancement flap was deemed most appropriate. Using a sterile surgical marker, an appropriate advancement flap was drawn incorporating the defect and placing the expected incisions within the relaxed skin tension lines where possible. The area thus outlined was incised deep to adipose tissue with a #15 scalpel blade. The skin margins were undermined to an appropriate distance in all directions utilizing iris scissors. Following this, the designed flap was advanced and carried over into the primary defect and sutured into place.
Banner Transposition Flap Text: The defect edges were debeveled with a #15 scalpel blade. Given the location of the defect and the proximity to free margins a Banner transposition flap was deemed most appropriate. Using a sterile surgical marker, an appropriate flap was drawn around the defect. The area thus outlined was incised deep to adipose tissue with a #15 scalpel blade. The skin margins were undermined to an appropriate distance in all directions utilizing iris scissors. Following this, the designed flap was carried into the primary defect and sutured into place.
Bilateral Helical Rim Advancement Flap Text: The defect edges were debeveled with a #15 blade scalpel.  Given the location of the defect and the proximity to free margins (helical rim) a bilateral helical rim advancement flap was deemed most appropriate. Using a sterile surgical marker, the appropriate advancement flaps were drawn incorporating the defect and placing the expected incisions between the helical rim and antihelix where possible.  The area thus outlined was incised through and through with a #15 scalpel blade.  With a skin hook and iris scissors, the flaps were gently and sharply undermined and freed up. Following this, the designed flaps were placed into the primary defect and sutured into place.
Bilateral Rotation Flap Text: The defect edges were debeveled with a #15 scalpel blade. Given the location of the defect, shape of the defect and the proximity to free margins a bilateral rotation flap was deemed most appropriate. Using a sterile surgical marker, an appropriate rotation flap was drawn incorporating the defect and placing the expected incisions within the relaxed skin tension lines where possible. The area thus outlined was incised deep to adipose tissue with a #15 scalpel blade. The skin margins were undermined to an appropriate distance in all directions utilizing iris scissors. Following this, the designed flap was carried over into the primary defect and sutured into place.
Bilobed Flap Text: The defect edges were debeveled with a #15 scalpel blade. Given the location of the defect and the proximity to free margins a bilobe flap was deemed most appropriate. Using a sterile surgical marker, an appropriate bilobe flap drawn around the defect. The area thus outlined was incised deep to adipose tissue with a #15 scalpel blade. The skin margins were undermined to an appropriate distance in all directions utilizing iris scissors. Following this, the designed flap was carried over into the primary defect and sutured into place.
Bilobed Transposition Flap Text: The defect edges were debeveled with a #15 scalpel blade. Given the location of the defect and the proximity to free margins a bilobed transposition flap was deemed most appropriate. Using a sterile surgical marker, an appropriate bilobe flap drawn around the defect. The area thus outlined was incised deep to adipose tissue with a #15 scalpel blade. The skin margins were undermined to an appropriate distance in all directions utilizing iris scissors. Following this, the designed flap was carried over into the primary defect and sutured into place.
Bi-Rhombic Flap Text: The defect edges were debeveled with a #15 scalpel blade. Given the location of the defect and the proximity to free margins a bi-rhombic flap was deemed most appropriate. Using a sterile surgical marker, an appropriate rhombic flap was drawn incorporating the defect. The area thus outlined was incised deep to adipose tissue with a #15 scalpel blade. The skin margins were undermined to an appropriate distance in all directions utilizing iris scissors. Following this, the designed flap was carried over into the primary defect and sutured into place.
Burow's Advancement Flap Text: The defect edges were debeveled with a #15 scalpel blade. Given the location of the defect and the proximity to free margins a Burow's advancement flap was deemed most appropriate. Using a sterile surgical marker, the appropriate advancement flap was drawn incorporating the defect and placing the expected incisions within the relaxed skin tension lines where possible. The area thus outlined was incised deep to adipose tissue with a #15 scalpel blade. The skin margins were undermined to an appropriate distance in all directions utilizing iris scissors. Following this, the designed flap was advanced and carried over into the primary defect and sutured into place.
Chonodrocutaneous Helical Advancement Flap Text: The defect edges were debeveled with a #15 scalpel blade. Given the location of the defect and the proximity to free margins a chondrocutaneous helical advancement flap was deemed most appropriate. Using a sterile surgical marker, the appropriate advancement flap was drawn incorporating the defect and placing the expected incisions within the relaxed skin tension lines where possible. The area thus outlined was incised deep to adipose tissue with a #15 scalpel blade. The skin margins were undermined to an appropriate distance in all directions utilizing iris scissors. Following this, the designed flap was advanced and carried over into the primary defect and sutured into place.
Crescentic Advancement Flap Text: The defect edges were debeveled with a #15 scalpel blade. Given the location of the defect and the proximity to free margins a crescentic advancement flap was deemed most appropriate. Using a sterile surgical marker, the appropriate advancement flap was drawn incorporating the defect and placing the expected incisions within the relaxed skin tension lines where possible. The area thus outlined was incised deep to adipose tissue with a #15 scalpel blade. The skin margins were undermined to an appropriate distance in all directions utilizing iris scissors. Following this, the designed flap was advanced and carried over into the primary defect and sutured into place.
Dorsal Nasal Flap Text: The defect edges were debeveled with a #15 scalpel blade. Given the location of the defect and the proximity to free margins a dorsal nasal flap was deemed most appropriate. Using a sterile surgical marker, an appropriate dorsal nasal flap was drawn around the defect. The area thus outlined was incised deep to adipose tissue with a #15 scalpel blade. The skin margins were undermined to an appropriate distance in all directions utilizing iris scissors. Following this, the designed flap was carried into the primary defect and sutured into place.
Double Island Pedicle Flap Text: The defect edges were debeveled with a #15 scalpel blade. Given the location of the defect, shape of the defect and the proximity to free margins a double island pedicle advancement flap was deemed most appropriate. Using a sterile surgical marker, an appropriate advancement flap was drawn incorporating the defect, outlining the appropriate donor tissue and placing the expected incisions within the relaxed skin tension lines where possible. The area thus outlined was incised deep to adipose tissue with a #15 scalpel blade. The skin margins were undermined to an appropriate distance in all directions around the primary defect and laterally outward around the island pedicle utilizing iris scissors.  There was minimal undermining beneath the pedicle flap. Following this, the flap was carried over into the primary defect and sutured into place.
Double O-Z Flap Text: The defect edges were debeveled with a #15 scalpel blade. Given the location of the defect, shape of the defect and the proximity to free margins a Double O-Z flap was deemed most appropriate. Using a sterile surgical marker, an appropriate transposition flap was drawn incorporating the defect and placing the expected incisions within the relaxed skin tension lines where possible. The area thus outlined was incised deep to adipose tissue with a #15 scalpel blade. The skin margins were undermined to an appropriate distance in all directions utilizing iris scissors. Following this, the designed flap was carried over into the primary defect and sutured into place.
Double O-Z Plasty Text: The defect edges were debeveled with a #15 scalpel blade. Given the location of the defect, shape of the defect and the proximity to free margins a Double O-Z plasty (double transposition flap) was deemed most appropriate. Using a sterile surgical marker, the appropriate transposition flaps were drawn incorporating the defect and placing the expected incisions within the relaxed skin tension lines where possible. The area thus outlined was incised deep to adipose tissue with a #15 scalpel blade. The skin margins were undermined to an appropriate distance in all directions utilizing iris scissors. Hemostasis was achieved with electrocautery. The flaps were then transposed and carried over into place, one clockwise and the other counterclockwise, and anchored with interrupted buried subcutaneous sutures.
Double Z Plasty Text: The lesion was extirpated to the level of the fat with a #15 scalpel blade. Given the location of the defect, shape of the defect and the proximity to free margins a double Z-plasty was deemed most appropriate for repair. Using a sterile surgical marker, the appropriate transposition arms of the double Z-plasty were drawn incorporating the defect and placing the expected incisions within the relaxed skin tension lines where possible. The area thus outlined was incised deep to adipose tissue with a #15 scalpel blade. The skin margins were undermined to an appropriate distance in all directions utilizing iris scissors. The opposing transposition arms were then transposed and carried over into place in opposite direction and anchored with interrupted buried subcutaneous sutures.
Ear Star Wedge Flap Text: The defect edges were debeveled with a #15 blade scalpel.  Given the location of the defect and the proximity to free margins (helical rim) an ear star wedge flap was deemed most appropriate. Using a sterile surgical marker, the appropriate flap was drawn incorporating the defect and placing the expected incisions between the helical rim and antihelix where possible.  The area thus outlined was incised through and through with a #15 scalpel blade. Following this, the designed flap was carried over into the primary defect and sutured into place.
Flip-Flop Flap Text: The defect edges were debeveled with a #15 blade scalpel.  Given the location of the defect and the proximity to free margins a flip-flop flap was deemed most appropriate. Using a sterile surgical marker, the appropriate flap was drawn incorporating the defect and placing the expected incisions between the helical rim and antihelix where possible.  The area thus outlined was incised through and through with a #15 scalpel blade. Following this, the designed flap was carried over into the primary defect and sutured into place.
Hatchet Flap Text: The defect edges were debeveled with a #15 scalpel blade. Given the location of the defect, shape of the defect and the proximity to free margins a hatchet flap was deemed most appropriate. Using a sterile surgical marker, an appropriate hatchet flap was drawn incorporating the defect and placing the expected incisions within the relaxed skin tension lines where possible. The area thus outlined was incised deep to adipose tissue with a #15 scalpel blade. The skin margins were undermined to an appropriate distance in all directions utilizing iris scissors. Following this, the designed flap was carried over into the primary defect and sutured into place.
Helical Rim Advancement Flap Text: The defect edges were debeveled with a #15 blade scalpel.  Given the location of the defect and the proximity to free margins (helical rim) a double helical rim advancement flap was deemed most appropriate. Using a sterile surgical marker, the appropriate advancement flaps were drawn incorporating the defect and placing the expected incisions between the helical rim and antihelix where possible.  The area thus outlined was incised through and through with a #15 scalpel blade.  With a skin hook and iris scissors, the flaps were gently and sharply undermined and freed up. Folllowing this, the designed flaps were carried over into the primary defect and sutured into place.
H Plasty Text: Given the location of the defect, shape of the defect and the proximity to free margins a H-plasty was deemed most appropriate for repair. Using a sterile surgical marker, the appropriate advancement arms of the H-plasty were drawn incorporating the defect and placing the expected incisions within the relaxed skin tension lines where possible. The area thus outlined was incised deep to adipose tissue with a #15 scalpel blade. The skin margins were undermined to an appropriate distance in all directions utilizing iris scissors.  The opposing advancement arms were then advanced and carried over into place in opposite direction and anchored with interrupted buried subcutaneous sutures.
Island Pedicle Flap Text: The defect edges were debeveled with a #15 scalpel blade. Given the location of the defect, shape of the defect and the proximity to free margins an island pedicle advancement flap was deemed most appropriate. Using a sterile surgical marker, an appropriate advancement flap was drawn incorporating the defect, outlining the appropriate donor tissue and placing the expected incisions within the relaxed skin tension lines where possible. The area thus outlined was incised deep to adipose tissue with a #15 scalpel blade. The skin margins were undermined to an appropriate distance in all directions around the primary defect and laterally outward around the island pedicle utilizing iris scissors.  There was minimal undermining beneath the pedicle flap. Following this, the flap was carried over into the primary defect and sutured into place.
Island Pedicle Flap With Canthal Suspension Text: The defect edges were debeveled with a #15 scalpel blade. Given the location of the defect, shape of the defect and the proximity to free margins an island pedicle advancement flap was deemed most appropriate. Using a sterile surgical marker, an appropriate advancement flap was drawn incorporating the defect, outlining the appropriate donor tissue and placing the expected incisions within the relaxed skin tension lines where possible. The area thus outlined was incised deep to adipose tissue with a #15 scalpel blade. The skin margins were undermined to an appropriate distance in all directions around the primary defect and laterally outward around the island pedicle utilizing iris scissors.  There was minimal undermining beneath the pedicle flap. A suspension suture was placed in the canthal tendon to prevent tension and prevent ectropion. Following this, the designed flap was placed into the primary defect and sutured into place.
Island Pedicle Flap-Requiring Vessel Identification Text: The defect edges were debeveled with a #15 scalpel blade. Given the location of the defect, shape of the defect and the proximity to free margins an island pedicle advancement flap was deemed most appropriate. Using a sterile surgical marker, an appropriate advancement flap was drawn, based on the axial vessel mentioned above, incorporating the defect, outlining the appropriate donor tissue and placing the expected incisions within the relaxed skin tension lines where possible. The area thus outlined was incised deep to adipose tissue with a #15 scalpel blade. The skin margins were undermined to an appropriate distance in all directions around the primary defect and laterally outward around the island pedicle utilizing iris scissors.  There was minimal undermining beneath the pedicle flap. Following this, the designed flap was carried over into the primary defect and sutured into place.
Keystone Flap Text: The defect edges were debeveled with a #15 scalpel blade. Given the location of the defect, shape of the defect a keystone flap was deemed most appropriate. Using a sterile surgical marker, an appropriate keystone flap was drawn incorporating the defect, outlining the appropriate donor tissue and placing the expected incisions within the relaxed skin tension lines where possible. The area thus outlined was incised deep to adipose tissue with a #15 scalpel blade. The skin margins were undermined to an appropriate distance in all directions around the primary defect and laterally outward around the flap utilizing iris scissors. Following this, the designed flap was carried into the primary defect and sutured into place.
Melolabial Transposition Flap Text: The defect edges were debeveled with a #15 scalpel blade. Given the location of the defect and the proximity to free margins a melolabial flap was deemed most appropriate. Using a sterile surgical marker, an appropriate melolabial transposition flap was drawn incorporating the defect. The area thus outlined was incised deep to adipose tissue with a #15 scalpel blade. The skin margins were undermined to an appropriate distance in all directions utilizing iris scissors. Following this, the designed flap was carried over into the primary defect and sutured into place.
Mercedes Flap Text: The defect edges were debeveled with a #15 scalpel blade. Given the location of the defect, shape of the defect and the proximity to free margins a Mercedes flap was deemed most appropriate. Using a sterile surgical marker, an appropriate advancement flap was drawn incorporating the defect and placing the expected incisions within the relaxed skin tension lines where possible. The area thus outlined was incised deep to adipose tissue with a #15 scalpel blade. The skin margins were undermined to an appropriate distance in all directions utilizing iris scissors. Following this, the designed flap was advanced and carried over into the primary defect and sutured into place.
Modified Advancement Flap Text: The defect edges were debeveled with a #15 scalpel blade. Given the location of the defect, shape of the defect and the proximity to free margins a modified advancement flap was deemed most appropriate. Using a sterile surgical marker, an appropriate advancement flap was drawn incorporating the defect and placing the expected incisions within the relaxed skin tension lines where possible. The area thus outlined was incised deep to adipose tissue with a #15 scalpel blade. The skin margins were undermined to an appropriate distance in all directions utilizing iris scissors. Following this, the designed flap was advanced and carried over into the primary defect and sutured into place.
Mucosal Advancement Flap Text: Given the location of the defect, shape of the defect and the proximity to free margins a mucosal advancement flap was deemed most appropriate. Incisions were made with a 15 blade scalpel in the appropriate fashion along the cutaneous vermilion border and the mucosal lip. The remaining actinically damaged mucosal tissue was excised.  The mucosal advancement flap was then elevated to the gingival sulcus with care taken to preserve the neurovascular structures and advanced into the primary defect. Care was taken to ensure that precise realignment of the vermilion border was achieved.
Muscle Hinge Flap Text: The defect edges were debeveled with a #15 scalpel blade.  Given the size, depth and location of the defect and the proximity to free margins a muscle hinge flap was deemed most appropriate. Using a sterile surgical marker, an appropriate hinge flap was drawn incorporating the defect. The area thus outlined was incised with a #15 scalpel blade. The skin margins were undermined to an appropriate distance in all directions utilizing iris scissors. Following this, the designed flap was carried into the primary defect and sutured into place.
Mustarde Flap Text: The defect edges were debeveled with a #15 scalpel blade.  Given the size, depth and location of the defect and the proximity to free margins a Mustarde flap was deemed most appropriate. Using a sterile surgical marker, an appropriate flap was drawn incorporating the defect. The area thus outlined was incised with a #15 scalpel blade. The skin margins were undermined to an appropriate distance in all directions utilizing iris scissors. Following this, the designed flap was carried into the primary defect and sutured into place.
Nasal Turnover Hinge Flap Text: The defect edges were debeveled with a #15 scalpel blade.  Given the size, depth, location of the defect and the defect being full thickness a nasal turnover hinge flap was deemed most appropriate. Using a sterile surgical marker, an appropriate hinge flap was drawn incorporating the defect. The area thus outlined was incised with a #15 scalpel blade. The flap was designed to recreate the nasal mucosal lining and the alar rim. The skin margins were undermined to an appropriate distance in all directions utilizing iris scissors. Following this, the designed flap was carried over into the primary defect and sutured into place
Nasalis-Muscle-Based Myocutaneous Island Pedicle Flap Text: Using a #15 blade, an incision was made around the donor flap to the level of the nasalis muscle. Wide lateral undermining was then performed in both the subcutaneous plane above the nasalis muscle, and in a submuscular plane just above periosteum. This allowed the formation of a free nasalis muscle axial pedicle (based on the angular artery) which was still attached to the actual cutaneous flap, increasing its mobility and vascular viability. Hemostasis was obtained with pinpoint electrocoagulation. The flap was mobilized into position and the pivotal anchor points positioned and stabilized with buried interrupted sutures. Subcutaneous and dermal tissues were closed in a multilayered fashion with sutures. Tissue redundancies were excised, and the epidermal edges were apposed without significant tension and sutured with sutures.
Nasalis Myocutaneous Flap Text: Using a #15 blade, an incision was made around the donor flap to the level of the nasalis muscle. Wide lateral undermining was then performed in both the subcutaneous plane above the nasalis muscle, and in a submuscular plane just above periosteum. This allowed the formation of a free nasalis muscle axial pedicle which was still attached to the actual cutaneous flap, increasing its mobility and vascular viability. Hemostasis was obtained with pinpoint electrocoagulation. The flap was mobilized into position and the pivotal anchor points positioned and stabilized with buried interrupted sutures. Subcutaneous and dermal tissues were closed in a multilayered fashion with sutures. Tissue redundancies were excised, and the epidermal edges were apposed without significant tension and sutured with sutures.
Nasolabial Transposition Flap Text: The defect edges were debeveled with a #15 scalpel blade.  Given the size, depth and location of the defect and the proximity to free margins a nasolabial transposition flap was deemed most appropriate. Using a sterile surgical marker, an appropriate flap was drawn incorporating the defect. The area thus outlined was incised with a #15 scalpel blade. The skin margins were undermined to an appropriate distance in all directions utilizing iris scissors. Following this, the designed flap was carried into the primary defect and sutured into place.
Orbicularis Oris Muscle Flap Text: The defect edges were debeveled with a #15 scalpel blade.  Given that the defect affected the competency of the oral sphincter an orbicularis oris muscle flap was deemed most appropriate to restore this competency and normal muscle function.  Using a sterile surgical marker, an appropriate flap was drawn incorporating the defect. The area thus outlined was incised with a #15 scalpel blade. Following this, the designed flap was carried over into the primary defect and sutured into place.
O-T Advancement Flap Text: The defect edges were debeveled with a #15 scalpel blade. Given the location of the defect, shape of the defect and the proximity to free margins an O-T advancement flap was deemed most appropriate. Using a sterile surgical marker, an appropriate advancement flap was drawn incorporating the defect and placing the expected incisions within the relaxed skin tension lines where possible. The area thus outlined was incised deep to adipose tissue with a #15 scalpel blade. The skin margins were undermined to an appropriate distance in all directions utilizing iris scissors. Following this, the designed flap was advanced and carried over into the primary defect and sutured into place.
O-T Plasty Text: The defect edges were debeveled with a #15 scalpel blade. Given the location of the defect, shape of the defect and the proximity to free margins an O-T plasty was deemed most appropriate. Using a sterile surgical marker, an appropriate O-T plasty was drawn incorporating the defect and placing the expected incisions within the relaxed skin tension lines where possible. The area thus outlined was incised deep to adipose tissue with a #15 scalpel blade. The skin margins were undermined to an appropriate distance in all directions utilizing iris scissors. Following this, the designed flap was carried over into the primary defect and sutured into place.
O-L Flap Text: The defect edges were debeveled with a #15 scalpel blade. Given the location of the defect, shape of the defect and the proximity to free margins an O-L flap was deemed most appropriate. Using a sterile surgical marker, an appropriate advancement flap was drawn incorporating the defect and placing the expected incisions within the relaxed skin tension lines where possible. The area thus outlined was incised deep to adipose tissue with a #15 scalpel blade. The skin margins were undermined to an appropriate distance in all directions utilizing iris scissors. Following this, the designed flap was advanced and carried over into the primary defect and sutured into place.
O-Z Flap Text: The defect edges were debeveled with a #15 scalpel blade. Given the location of the defect, shape of the defect and the proximity to free margins an O-Z flap was deemed most appropriate. Using a sterile surgical marker, an appropriate transposition flap was drawn incorporating the defect and placing the expected incisions within the relaxed skin tension lines where possible. The area thus outlined was incised deep to adipose tissue with a #15 scalpel blade. The skin margins were undermined to an appropriate distance in all directions utilizing iris scissors. Following this, the designed flap was carried over into the primary defect and sutured into place.
O-Z Plasty Text: The defect edges were debeveled with a #15 scalpel blade. Given the location of the defect, shape of the defect and the proximity to free margins an O-Z plasty (double transposition flap) was deemed most appropriate. Using a sterile surgical marker, the appropriate transposition flaps were drawn incorporating the defect and placing the expected incisions within the relaxed skin tension lines where possible. The area thus outlined was incised deep to adipose tissue with a #15 scalpel blade. The skin margins were undermined to an appropriate distance in all directions utilizing iris scissors. Hemostasis was achieved with electrocautery. The flaps were then transposed and carried over into place, one clockwise and the other counterclockwise, and anchored with interrupted buried subcutaneous sutures.
Peng Advancement Flap Text: The defect edges were debeveled with a #15 scalpel blade. Given the location of the defect, shape of the defect and the proximity to free margins a Peng advancement flap was deemed most appropriate. Using a sterile surgical marker, an appropriate advancement flap was drawn incorporating the defect and placing the expected incisions within the relaxed skin tension lines where possible. The area thus outlined was incised deep to adipose tissue with a #15 scalpel blade. The skin margins were undermined to an appropriate distance in all directions utilizing iris scissors. Following this, the designed flap was advanced and carried over into the primary defect and sutured into place.
Rectangular Flap Text: The defect edges were debeveled with a #15 scalpel blade. Given the location of the defect and the proximity to free margins a rectangular flap was deemed most appropriate. Using a sterile surgical marker, an appropriate rectangular flap was drawn incorporating the defect. The area thus outlined was incised deep to adipose tissue with a #15 scalpel blade. The skin margins were undermined to an appropriate distance in all directions utilizing iris scissors. Following this, the designed flap was carried over into the primary defect and sutured into place.
Rhombic Flap Text: The defect edges were debeveled with a #15 scalpel blade. Given the location of the defect and the proximity to free margins a rhombic flap was deemed most appropriate. Using a sterile surgical marker, an appropriate rhombic flap was drawn incorporating the defect. The area thus outlined was incised deep to adipose tissue with a #15 scalpel blade. The skin margins were undermined to an appropriate distance in all directions utilizing iris scissors. Following this, the designed flap was carried over into the primary defect and sutured into place.
Rhomboid Transposition Flap Text: The defect edges were debeveled with a #15 scalpel blade. Given the location of the defect and the proximity to free margins a rhomboid transposition flap was deemed most appropriate. Using a sterile surgical marker, an appropriate rhomboid flap was drawn incorporating the defect. The area thus outlined was incised deep to adipose tissue with a #15 scalpel blade. The skin margins were undermined to an appropriate distance in all directions utilizing iris scissors. Following this, the designed flap was carried over into the primary defect and sutured into place.
Rotation Flap Text: The defect edges were debeveled with a #15 scalpel blade. Given the location of the defect, shape of the defect and the proximity to free margins a rotation flap was deemed most appropriate. Using a sterile surgical marker, an appropriate rotation flap was drawn incorporating the defect and placing the expected incisions within the relaxed skin tension lines where possible. The area thus outlined was incised deep to adipose tissue with a #15 scalpel blade. The skin margins were undermined to an appropriate distance in all directions utilizing iris scissors. Following this, the designed flap was carried over into the primary defect and sutured into place.
Spiral Flap Text: The defect edges were debeveled with a #15 scalpel blade. Given the location of the defect, shape of the defect and the proximity to free margins a spiral flap was deemed most appropriate. Using a sterile surgical marker, an appropriate rotation flap was drawn incorporating the defect and placing the expected incisions within the relaxed skin tension lines where possible. The area thus outlined was incised deep to adipose tissue with a #15 scalpel blade. The skin margins were undermined to an appropriate distance in all directions utilizing iris scissors. Following this, the designed flap was carried over into the primary defect and sutured into place.
Staged Advancement Flap Text: The defect edges were debeveled with a #15 scalpel blade. Given the location of the defect, shape of the defect and the proximity to free margins a staged advancement flap was deemed most appropriate. Using a sterile surgical marker, an appropriate advancement flap was drawn incorporating the defect and placing the expected incisions within the relaxed skin tension lines where possible. The area thus outlined was incised deep to adipose tissue with a #15 scalpel blade. The skin margins were undermined to an appropriate distance in all directions utilizing iris scissors. Following this, the designed flap was carried over into the primary defect and sutured into place.
Star Wedge Flap Text: The defect edges were debeveled with a #15 scalpel blade. Given the location of the defect, shape of the defect and the proximity to free margins a star wedge flap was deemed most appropriate. Using a sterile surgical marker, an appropriate rotation flap was drawn incorporating the defect and placing the expected incisions within the relaxed skin tension lines where possible. The area thus outlined was incised deep to adipose tissue with a #15 scalpel blade. The skin margins were undermined to an appropriate distance in all directions utilizing iris scissors. Following this, the designed flap was carried over into the primary defect and sutured into place.
Transposition Flap Text: The defect edges were debeveled with a #15 scalpel blade. Given the location of the defect and the proximity to free margins a transposition flap was deemed most appropriate. Using a sterile surgical marker, an appropriate transposition flap was drawn incorporating the defect. The area thus outlined was incised deep to adipose tissue with a #15 scalpel blade. The skin margins were undermined to an appropriate distance in all directions utilizing iris scissors. Following this, the designed flap was carried over into the primary defect and sutured into place.
Trilobed Flap Text: The defect edges were debeveled with a #15 scalpel blade. Given the location of the defect and the proximity to free margins a trilobed flap was deemed most appropriate. Using a sterile surgical marker, an appropriate trilobed flap was drawn around the defect. The area thus outlined was incised deep to adipose tissue with a #15 scalpel blade. The skin margins were undermined to an appropriate distance in all directions utilizing iris scissors. Following this, the designed flap was carried into the primary defect and sutured into place.
V-Y Flap Text: The defect edges were debeveled with a #15 scalpel blade. Given the location of the defect, shape of the defect and the proximity to free margins a V-Y flap was deemed most appropriate. Using a sterile surgical marker, an appropriate advancement flap was drawn incorporating the defect and placing the expected incisions within the relaxed skin tension lines where possible. The area thus outlined was incised deep to adipose tissue with a #15 scalpel blade. The skin margins were undermined to an appropriate distance in all directions utilizing iris scissors. Following this, the designed flap was advanced and carried over into the primary defect and sutured into place.
V-Y Plasty Text: The defect edges were debeveled with a #15 scalpel blade. Given the location of the defect, shape of the defect and the proximity to free margins an V-Y advancement flap was deemed most appropriate. Using a sterile surgical marker, an appropriate advancement flap was drawn incorporating the defect and placing the expected incisions within the relaxed skin tension lines where possible. The area thus outlined was incised deep to adipose tissue with a #15 scalpel blade. The skin margins were undermined to an appropriate distance in all directions utilizing iris scissors. Following this, the designed flap was advanced and carried over into the primary defect and sutured into place.
W Plasty Text: The lesion was extirpated to the level of the fat with a #15 scalpel blade. Given the location of the defect, shape of the defect and the proximity to free margins a W-plasty was deemed most appropriate for repair. Using a sterile surgical marker, the appropriate transposition arms of the W-plasty were drawn incorporating the defect and placing the expected incisions within the relaxed skin tension lines where possible. The area thus outlined was incised deep to adipose tissue with a #15 scalpel blade. The skin margins were undermined to an appropriate distance in all directions utilizing iris scissors. The opposing transposition arms were then transposed and carried over into place in opposite direction and anchored with interrupted buried subcutaneous sutures.
Z Plasty Text: The lesion was extirpated to the level of the fat with a #15 scalpel blade. Given the location of the defect, shape of the defect and the proximity to free margins a Z-plasty was deemed most appropriate for repair. Using a sterile surgical marker, the appropriate transposition arms of the Z-plasty were drawn incorporating the defect and placing the expected incisions within the relaxed skin tension lines where possible. The area thus outlined was incised deep to adipose tissue with a #15 scalpel blade. The skin margins were undermined to an appropriate distance in all directions utilizing iris scissors. The opposing transposition arms were then transposed and carried over into place in opposite direction and anchored with interrupted buried subcutaneous sutures.
Zygomaticofacial Flap Text: Given the location of the defect, shape of the defect and the proximity to free margins a zygomaticofacial flap was deemed most appropriate for repair. Using a sterile surgical marker, the appropriate flap was drawn incorporating the defect and placing the expected incisions within the relaxed skin tension lines where possible. The area thus outlined was incised deep to adipose tissue with a #15 scalpel blade with preservation of a vascular pedicle.  The skin margins were undermined to an appropriate distance in all directions utilizing iris scissors. The flap was then carried over into the defect and anchored with interrupted buried subcutaneous sutures.
Abbe Flap (Lower To Upper Lip) Text: The defect of the upper lip was assessed and measured.  Given the location and size of the defect, an Abbe flap was deemed most appropriate. Using a sterile surgical marker, an appropriate Abbe flap was measured and drawn on the lower lip. Local anesthesia was then infiltrated. A scalpel was then used to incise the upper lip through and through the skin, vermilion, muscle and mucosa, leaving the flap pedicled on the opposite side.  The flap was then rotated and transferred to the lower lip defect.  The flap was then sutured into place with a three layer technique, closing the orbicularis oris muscle layer with subcutaneous buried sutures, followed by a mucosal layer and an epidermal layer.
Abbe Flap (Upper To Lower Lip) Text: The defect of the lower lip was assessed and measured.  Given the location and size of the defect, an Abbe flap was deemed most appropriate. Using a sterile surgical marker, an appropriate Abbe flap was measured and drawn on the upper lip. Local anesthesia was then infiltrated.  A scalpel was then used to incise the upper lip through and through the skin, vermilion, muscle and mucosa, leaving the flap pedicled on the opposite side.  The flap was then rotated and transferred to the lower lip defect.  The flap was then sutured into place with a three layer technique, closing the orbicularis oris muscle layer with subcutaneous buried sutures, followed by a mucosal layer and an epidermal layer.
Cheek Interpolation Flap Text: A decision was made to reconstruct the defect utilizing an interpolation axial flap and a staged reconstruction.  A telfa template was made of the defect.  This telfa template was then used to outline the Cheek Interpolation flap.  The donor area for the pedicle flap was then injected with anesthesia.  The flap was excised through the skin and subcutaneous tissue down to the layer of the underlying musculature.  The interpolation flap was carefully excised within this deep plane to maintain its blood supply.  The edges of the donor site were undermined.   The donor site was closed in a primary fashion.  The pedicle was then rotated into position and sutured.  Once the tube was sutured into place, adequate blood supply was confirmed with blanching and refill.  The pedicle was then wrapped with xeroform gauze and dressed appropriately with a telfa and gauze bandage to ensure continued blood supply and protect the attached pedicle.
Cheek-To-Nose Interpolation Flap Text: A decision was made to reconstruct the defect utilizing an interpolation axial flap and a staged reconstruction.  A telfa template was made of the defect.  This telfa template was then used to outline the Cheek-To-Nose Interpolation flap.  The donor area for the pedicle flap was then injected with anesthesia.  The flap was excised through the skin and subcutaneous tissue down to the layer of the underlying musculature.  The interpolation flap was carefully excised within this deep plane to maintain its blood supply.  The edges of the donor site were undermined.   The donor site was closed in a primary fashion.  The pedicle was then rotated into position and sutured.  Once the tube was sutured into place, adequate blood supply was confirmed with blanching and refill.  The pedicle was then wrapped with xeroform gauze and dressed appropriately with a telfa and gauze bandage to ensure continued blood supply and protect the attached pedicle.
Estlander Flap (Lower To Upper Lip) Text: The defect of the lower lip was assessed and measured.  Given the location and size of the defect, an Estlander flap was deemed most appropriate. Using a sterile surgical marker, an appropriate Estlander flap was measured and drawn on the upper lip. Local anesthesia was then infiltrated. A scalpel was then used to incise the lateral aspect of the flap, through skin, muscle and mucosa, leaving the flap pedicled medially.  The flap was then rotated and positioned to fill the lower lip defect.  The flap was then sutured into place with a three layer technique, closing the orbicularis oris muscle layer with subcutaneous buried sutures, followed by a mucosal layer and an epidermal layer.
Interpolation Flap Text: A decision was made to reconstruct the defect utilizing an interpolation axial flap and a staged reconstruction.  A telfa template was made of the defect.  This telfa template was then used to outline the interpolation flap.  The donor area for the pedicle flap was then injected with anesthesia.  The flap was excised through the skin and subcutaneous tissue down to the layer of the underlying musculature.  The interpolation flap was carefully excised within this deep plane to maintain its blood supply.  The edges of the donor site were undermined.   The donor site was closed in a primary fashion.  The pedicle was then rotated into position and sutured.  Once the tube was sutured into place, adequate blood supply was confirmed with blanching and refill.  The pedicle was then wrapped with xeroform gauze and dressed appropriately with a telfa and gauze bandage to ensure continued blood supply and protect the attached pedicle.
Melolabial Interpolation Flap Text: A decision was made to reconstruct the defect utilizing an interpolation axial flap and a staged reconstruction.  A telfa template was made of the defect.  This telfa template was then used to outline the melolabial interpolation flap.  The donor area for the pedicle flap was then injected with anesthesia.  The flap was excised through the skin and subcutaneous tissue down to the layer of the underlying musculature.  The pedicle flap was carefully excised within this deep plane to maintain its blood supply.  The edges of the donor site were undermined.   The donor site was closed in a primary fashion.  The pedicle was then rotated into position and sutured.  Once the tube was sutured into place, adequate blood supply was confirmed with blanching and refill.  The pedicle was then wrapped with xeroform gauze and dressed appropriately with a telfa and gauze bandage to ensure continued blood supply and protect the attached pedicle.
Mastoid Interpolation Flap Text: A decision was made to reconstruct the defect utilizing an interpolation axial flap and a staged reconstruction.  A telfa template was made of the defect.  This telfa template was then used to outline the mastoid interpolation flap.  The donor area for the pedicle flap was then injected with anesthesia.  The flap was excised through the skin and subcutaneous tissue down to the layer of the underlying musculature.  The pedicle flap was carefully excised within this deep plane to maintain its blood supply.  The edges of the donor site were undermined.   The donor site was closed in a primary fashion.  The pedicle was then rotated into position and sutured.  Once the tube was sutured into place, adequate blood supply was confirmed with blanching and refill.  The pedicle was then wrapped with xeroform gauze and dressed appropriately with a telfa and gauze bandage to ensure continued blood supply and protect the attached pedicle.
Paramedian Forehead Flap Text: A decision was made to reconstruct the defect utilizing an interpolation axial flap and a staged reconstruction.  A telfa template was made of the defect.  This telfa template was then used to outline the paramedian forehead pedicle flap.  The donor area for the pedicle flap was then injected with anesthesia.  The flap was excised through the skin and subcutaneous tissue down to the layer of the underlying musculature.  The pedicle flap was carefully excised within this deep plane to maintain its blood supply.  The edges of the donor site were undermined.   The donor site was closed in a primary fashion.  The pedicle was then rotated into position and sutured.  Once the tube was sutured into place, adequate blood supply was confirmed with blanching and refill.  The pedicle was then wrapped with xeroform gauze and dressed appropriately with a telfa and gauze bandage to ensure continued blood supply and protect the attached pedicle.
Posterior Auricular Interpolation Flap Text: A decision was made to reconstruct the defect utilizing an interpolation axial flap and a staged reconstruction.  A telfa template was made of the defect.  This telfa template was then used to outline the posterior auricular interpolation flap.  The donor area for the pedicle flap was then injected with anesthesia.  The flap was excised through the skin and subcutaneous tissue down to the layer of the underlying musculature.  The pedicle flap was carefully excised within this deep plane to maintain its blood supply.  The edges of the donor site were undermined.   The donor site was closed in a primary fashion.  The pedicle was then rotated into position and sutured.  Once the tube was sutured into place, adequate blood supply was confirmed with blanching and refill.  The pedicle was then wrapped with xeroform gauze and dressed appropriately with a telfa and gauze bandage to ensure continued blood supply and protect the attached pedicle.
Cheiloplasty (Complex) Text: A decision was made to reconstruct the defect with a  cheiloplasty.  The defect was undermined extensively.  Additional orbicularis oris muscle was excised with a 15 blade scalpel.  The defect was converted into a full thickness wedge to facilite a better cosmetic result.  Small vessels were then tied off with 5-0 monocyrl. The orbicularis oris, superficial fascia, adipose and dermis were then reapproximated.  After the deeper layers were approximated the epidermis was reapproximated with particular care given to realign the vermilion border.
Cheiloplasty (Less Than 50%) Text: A decision was made to reconstruct the defect with a  cheiloplasty.  The defect was undermined extensively.  Additional orbicularis oris muscle was excised with a 15 blade scalpel.  The defect was converted into a full thickness wedge, of less than 50% of the vertical height of the lip, to facilite a better cosmetic result.  Small vessels were then tied off with 5-0 monocyrl. The orbicularis oris, superficial fascia, adipose and dermis were then reapproximated.  After the deeper layers were approximated the epidermis was reapproximated with particular care given to realign the vermilion border.
Ear Wedge Repair Text: A wedge excision was completed by carrying down an excision through the full thickness of the ear and cartilage with an inward facing Burow's triangle. The wound was then closed in a layered fashion.
Full Thickness Lip Wedge Repair (Flap) Text: Given the location of the defect and the proximity to free margins a full thickness wedge repair was deemed most appropriate. Using a sterile surgical marker, the appropriate repair was drawn incorporating the defect and placing the expected incisions perpendicular to the vermilion border.  The vermilion border was also meticulously outlined to ensure appropriate reapproximation during the repair.  The area thus outlined was incised through and through with a #15 scalpel blade.  The muscularis and dermis were reaproximated with deep sutures following hemostasis. Care was taken to realign the vermilion border before proceeding with the superficial closure.  Once the vermilion was realigned the superfical and mucosal closure was finished.
Burow's Graft Text: The defect edges were debeveled with a #15 scalpel blade. Given the location of the defect, shape of the defect, the proximity to free margins and the presence of a standing cone deformity a Burow's skin graft was deemed most appropriate. The standing cone was removed and this tissue was then trimmed to the shape of the primary defect. The adipose tissue was also removed until only dermis and epidermis were left.  The skin graft was then placed in the primary defect and oriented appropriately.
Cartilage Graft Text: The defect edges were debeveled with a #15 scalpel blade. Given the location of the defect, shape of the defect, the fact the defect involved a full thickness cartilage defect a cartilage graft was deemed most appropriate.  An appropriate donor site was identified, cleansed, and anesthetized. The cartilage graft was then harvested and transferred to the recipient site, oriented appropriately and then sutured into place.  The secondary defect was then repaired using a primary closure.
Composite Graft Text: The defect edges were debeveled with a #15 scalpel blade. Given the location of the defect, shape of the defect, the proximity to free margins and the fact the defect was full thickness a composite graft was deemed most appropriate.  The defect was outline and then transferred to the donor site.  A full thickness graft was then excised from the donor site. The graft was then placed in the primary defect, oriented appropriately and then sutured into place.  The secondary defect was then repaired using a primary closure.
Epidermal Autograft Text: The defect edges were debeveled with a #15 scalpel blade. Given the location of the defect, shape of the defect and the proximity to free margins an epidermal autograft was deemed most appropriate. Using a sterile surgical marker, the primary defect shape was transferred to the donor site. The epidermal graft was then harvested.  The skin graft was then placed in the primary defect and oriented appropriately.
Dermal Autograft Text: The defect edges were debeveled with a #15 scalpel blade. Given the location of the defect, shape of the defect and the proximity to free margins a dermal autograft was deemed most appropriate. Using a sterile surgical marker, the primary defect shape was transferred to the donor site. The area thus outlined was incised deep to adipose tissue with a #15 scalpel blade.  The harvested graft was then trimmed of adipose and epidermal tissue until only dermis was left.  The skin graft was then placed in the primary defect and oriented appropriately.
Ftsg Text: The defect edges were debeveled with a #15 scalpel blade. Given the location of the defect, shape of the defect and the proximity to free margins a full thickness skin graft was deemed most appropriate. Using a sterile surgical marker, the primary defect shape was transferred to the donor site. The area thus outlined was incised deep to adipose tissue with a #15 scalpel blade.  The harvested graft was then trimmed of adipose tissue until only dermis and epidermis was left.  The skin graft was then placed in the primary defect and oriented appropriately.
Pinch Graft Text: The defect edges were debeveled with a #15 scalpel blade. Given the location of the defect, shape of the defect and the proximity to free margins a pinch graft was deemed most appropriate. Using a sterile surgical marker, the primary defect shape was transferred to the donor site. The area thus outlined was incised deep to adipose tissue with a #15 scalpel blade.  The harvested graft was then trimmed of adipose tissue until only dermis and epidermis was left. The skin graft was then placed in the primary defect and oriented appropriately.
Skin Substitute Text: The defect edges were debeveled with a #15 scalpel blade. Given the location of the defect, shape of the defect and the proximity to free margins a skin substitute graft was deemed most appropriate.  The graft material was trimmed to fit the size of the defect. The graft was then placed in the primary defect and oriented appropriately.
Split-Thickness Skin Graft Text: The defect edges were debeveled with a #15 scalpel blade. Given the location of the defect, shape of the defect and the proximity to free margins a split thickness skin graft was deemed most appropriate. Using a sterile surgical marker, the primary defect shape was transferred to the donor site. The split thickness graft was then harvested.  The skin graft was then placed in the primary defect and oriented appropriately.
Tissue Cultured Epidermal Autograft Text: The defect edges were debeveled with a #15 scalpel blade. Given the location of the defect, shape of the defect and the proximity to free margins a tissue cultured epidermal autograft was deemed most appropriate.  The graft was then trimmed to fit the size of the defect.  The graft was then placed in the primary defect and oriented appropriately.
Xenograft Text: The defect edges were debeveled with a #15 scalpel blade. Given the location of the defect, shape of the defect and the proximity to free margins a xenograft was deemed most appropriate.  The graft was then trimmed to fit the size of the defect.  The graft was then placed in the primary defect and oriented appropriately.
Complex Repair And Flap Additional Text (Will Appearing After The Standard Complex Repair Text): The complex repair was not sufficient to completely close the primary defect. The remaining additional defect was repaired with the flap mentioned below.
Complex Repair And Graft Additional Text (Will Appearing After The Standard Complex Repair Text): The complex repair was not sufficient to completely close the primary defect. The remaining additional defect was repaired with the graft mentioned below.
Eyelid Full Thickness Repair - 12934: The eyelid defect was full thickness which required a wedge repair of the eyelid. Special care was taken to ensure that the eyelid margin was realligned when placing sutures.
Eyelid Partial Thickness Repair - 78446: The eyelid defect was partial thickness which required a wedge repair of the eyelid. Special care was taken to ensure that the eyelid margin was realligned when placing sutures.
Intermediate Repair And Flap Additional Text (Will Appearing After The Standard Complex Repair Text): The intermediate repair was not sufficient to completely close the primary defect. The remaining additional defect was repaired with the flap mentioned below.
Intermediate Repair And Graft Additional Text (Will Appearing After The Standard Complex Repair Text): The intermediate repair was not sufficient to completely close the primary defect. The remaining additional defect was repaired with the graft mentioned below.
Localized Dermabrasion With 15 Blade Text: The patient was draped in routine manner.  Localized dermabrasion using a 15 blade was performed in routine manner to papillary dermis. This spot dermabrasion is being performed to complete skin cancer reconstruction. It also will eliminate the other sun damaged precancerous cells that are known to be part of the regional effect of a lifetime's worth of sun exposure. This localized dermabrasion is therapeutic and should not be considered cosmetic in any regard.
Localized Dermabrasion With Sand Papertext: The patient was draped in routine manner.  Localized dermabrasion using sterile sand paper was performed in routine manner to papillary dermis. This spot dermabrasion is being performed to complete skin cancer reconstruction. It also will eliminate the other sun damaged precancerous cells that are known to be part of the regional effect of a lifetime's worth of sun exposure. This localized dermabrasion is therapeutic and should not be considered cosmetic in any regard.
Localized Dermabrasion With Wire Brush Text: The patient was draped in routine manner.  Localized dermabrasion using 3 x 17 mm wire brush was performed in routine manner to papillary dermis. This spot dermabrasion is being performed to complete skin cancer reconstruction. It also will eliminate the other sun damaged precancerous cells that are known to be part of the regional effect of a lifetime's worth of sun exposure. This localized dermabrasion is therapeutic and should not be considered cosmetic in any regard.
Purse String (Simple) Text: Given the location of the defect and the characteristics of the surrounding skin a purse string closure was deemed most appropriate.  Undermining was performed circumferentially around the surgical defect.  A purse string suture was then placed and tightened.
Purse String (Intermediate) Text: Given the location of the defect and the characteristics of the surrounding skin a purse string intermediate closure was deemed most appropriate.  Undermining was performed circumferentially around the surgical defect.  A purse string suture was then placed and tightened.
Partial Purse String (Simple) Text: Given the location of the defect and the characteristics of the surrounding skin a simple purse string closure was deemed most appropriate.  Undermining was performed circumferentially around the surgical defect.  A purse string suture was then placed and tightened. Wound tension only allowed a partial closure of the circular defect.
Partial Purse String (Intermediate) Text: Given the location of the defect and the characteristics of the surrounding skin an intermediate purse string closure was deemed most appropriate.  Undermining was performed circumferentially around the surgical defect.  A purse string suture was then placed and tightened. Wound tension only allowed a partial closure of the circular defect.
Tarsorrhaphy Text: A tarsorrhaphy was performed using Frost sutures.
Manual Repair Warning Statement: We plan on removing the manually selected variable below in favor of our much easier automatic structured text blocks found in the previous tab. We decided to do this to help make the flow better and give you the full power of structured data. Manual selection is never going to be ideal in our platform and I would encourage you to avoid using manual selection from this point on, especially since I will be sunsetting this feature. It is important that you do one of two things with the customized text below. First, you can save all of the text in a word file so you can have it for future reference. Second, transfer the text to the appropriate area in the Library tab. Lastly, if there is a flap or graft type which we do not have you need to let us know right away so I can add it in before the variable is hidden. No need to panic, we plan to give you roughly 6 months to make the change.
Same Histology In Subsequent Stages Text: The pattern and morphology of the tumor is as described in the first stage.
No Residual Tumor Seen Histology Text: There were no malignant cells seen in the sections examined.
Inflammation Suggestive Of Cancer Camouflage Histology Text: There was a dense lymphocytic infiltrate which prevented adequate histologic evaluation of adjacent structures.
Bcc Histology Text: There were numerous aggregates of basaloid cells.
Bcc Infiltrative Histology Text: There were numerous aggregates of basaloid cells demonstrating an infiltrative pattern.
Mart-1 - Positive Histology Text: MART-1 staining demonstrates areas of higher density and clustering of melanocytes with Pagetoid spread upwards within the epidermis. The surgical margins are positive for tumor cells.
Mart-1 - Negative Histology Text: MART-1 staining demonstrates a normal density and pattern of melanocytes along the dermal-epidermal junction. The surgical margins are negative for tumor cells.
Information: Selecting Yes will display possible errors in your note based on the variables you have selected. This validation is only offered as a suggestion for you. PLEASE NOTE THAT THE VALIDATION TEXT WILL BE REMOVED WHEN YOU FINALIZE YOUR NOTE. IF YOU WANT TO FAX A PRELIMINARY NOTE YOU WILL NEED TO TOGGLE THIS TO 'NO' IF YOU DO NOT WANT IT IN YOUR FAXED NOTE.
Bill 59 Modifier?: No - Continue to Bill 79 Modifier

## 2024-12-02 ENCOUNTER — TELEPHONE (OUTPATIENT)
Dept: INTERNAL MEDICINE CLINIC | Facility: CLINIC | Age: 84
End: 2024-12-02

## 2024-12-02 DIAGNOSIS — E11.9 TYPE 2 DIABETES MELLITUS WITHOUT COMPLICATION, WITHOUT LONG-TERM CURRENT USE OF INSULIN (HCC): Primary | ICD-10-CM

## 2024-12-02 DIAGNOSIS — E55.9 VITAMIN D DEFICIENCY: ICD-10-CM

## 2024-12-02 DIAGNOSIS — T14.8XXA CHRONIC WOUND: ICD-10-CM

## 2024-12-02 DIAGNOSIS — N18.30 STAGE 3 CHRONIC KIDNEY DISEASE, UNSPECIFIED WHETHER STAGE 3A OR 3B CKD (HCC): ICD-10-CM

## 2024-12-02 DIAGNOSIS — E78.5 HYPERLIPIDEMIA, UNSPECIFIED HYPERLIPIDEMIA TYPE: ICD-10-CM

## 2024-12-02 NOTE — TELEPHONE ENCOUNTER
Thank you for pending orders. Please kindly notify patient should obtain 7-10 days prior to our scheduled clinic visit.  We will go over the results in detail in-person  - Fasting labs

## 2024-12-02 NOTE — TELEPHONE ENCOUNTER
Please advise - patient has 3 months F/U appointment and wants to know if he needs labs done prior to visit - to

## 2024-12-03 ENCOUNTER — APPOINTMENT (OUTPATIENT)
Dept: URBAN - METROPOLITAN AREA CLINIC 244 | Age: 84
Setting detail: DERMATOLOGY
End: 2024-12-03

## 2024-12-03 DIAGNOSIS — Z48.02 ENCOUNTER FOR REMOVAL OF SUTURES: ICD-10-CM

## 2024-12-03 PROBLEM — C44.719 BASAL CELL CARCINOMA OF SKIN OF LEFT LOWER LIMB, INCLUDING HIP: Status: ACTIVE | Noted: 2024-12-03

## 2024-12-03 PROCEDURE — 17263 DSTRJ MAL LES T/A/L 2.1-3.0: CPT | Mod: 79

## 2024-12-03 PROCEDURE — OTHER CURETTAGE AND DESTRUCTION: OTHER

## 2024-12-03 PROCEDURE — OTHER OTC TREATMENT REGIMEN: OTHER

## 2024-12-03 PROCEDURE — OTHER COUNSELING: OTHER

## 2024-12-03 PROCEDURE — 99024 POSTOP FOLLOW-UP VISIT: CPT

## 2024-12-03 PROCEDURE — OTHER SUTURE REMOVAL (GLOBAL PERIOD): OTHER

## 2024-12-03 RX ORDER — PIOGLITAZONE 45 MG/1
45 TABLET ORAL DAILY
Qty: 90 TABLET | Refills: 3 | Status: SHIPPED | OUTPATIENT
Start: 2024-12-03

## 2024-12-03 ASSESSMENT — LOCATION DETAILED DESCRIPTION DERM: LOCATION DETAILED: RIGHT CENTRAL POSTAURICULAR SKIN

## 2024-12-03 ASSESSMENT — LOCATION ZONE DERM: LOCATION ZONE: SCALP

## 2024-12-03 ASSESSMENT — LOCATION SIMPLE DESCRIPTION DERM: LOCATION SIMPLE: SCALP

## 2024-12-03 NOTE — PROCEDURE: SUTURE REMOVAL (GLOBAL PERIOD)
Detail Level: Detailed
Add 47819 Cpt? (Important Note: In 2017 The Use Of 80031 Is Being Tracked By Cms To Determine Future Global Period Reimbursement For Global Periods): yes

## 2024-12-03 NOTE — PROCEDURE: CURETTAGE AND DESTRUCTION
Post-Care Instructions: I reviewed with the patient in detail post-care instructions. Patient is to keep the area dry for 48 hours, and not to engage in any swimming until the area is healed. If patient does have water exposure, recommend waterproof (hydrocolloid dressing) application. Should the patient develop any fevers, chills, bleeding, severe pain, then the patient will contact the office immediately. \\n\\nDilute vinegar soaks can be useful to help prevent infection (1 tbs white distilled vinegar in 5-8 fluid oz of distilled water). \\n\\n** The patient was explicitely instructed to NOT apply any other topicals to area other than plain vaseline with a clean q-tip **. No neosporin or topical Ab unless prescribed/recommended by the Dr is to be used.\\n\\nPatient aware to have area re-evaluated if it is not healing.

## 2024-12-03 NOTE — PROCEDURE: CURETTAGE AND DESTRUCTION
Consent was obtained from the patient. The risks, benefits and alternatives to therapy were discussed in detail. Specifically, the risks of infection, scarring, bleeding, prolonged wound healing, nerve injury, incomplete removal, allergy to anesthesia and recurrence were addressed. Alternatives to ED&C, such as: surgical removal, topical tx, and XRT were also discussed. Risks/benefits of procedures discussed in detail and patient elected for ED&C. \\n\\nPrior to the procedure, the treatment site was clearly identified and confirmed by the patient.

## 2024-12-05 ENCOUNTER — LAB ENCOUNTER (OUTPATIENT)
Dept: LAB | Age: 84
End: 2024-12-05
Attending: INTERNAL MEDICINE
Payer: MEDICARE

## 2024-12-05 DIAGNOSIS — T14.8XXA CHRONIC WOUND: ICD-10-CM

## 2024-12-05 DIAGNOSIS — E78.5 HYPERLIPIDEMIA, UNSPECIFIED HYPERLIPIDEMIA TYPE: ICD-10-CM

## 2024-12-05 DIAGNOSIS — N18.30 STAGE 3 CHRONIC KIDNEY DISEASE, UNSPECIFIED WHETHER STAGE 3A OR 3B CKD (HCC): ICD-10-CM

## 2024-12-05 DIAGNOSIS — E11.9 TYPE 2 DIABETES MELLITUS WITHOUT COMPLICATION, WITHOUT LONG-TERM CURRENT USE OF INSULIN (HCC): ICD-10-CM

## 2024-12-05 LAB
ALBUMIN SERPL-MCNC: 4.6 G/DL (ref 3.2–4.8)
ALBUMIN/GLOB SERPL: 1.8 {RATIO} (ref 1–2)
ALP LIVER SERPL-CCNC: 94 U/L
ALT SERPL-CCNC: 21 U/L
ANION GAP SERPL CALC-SCNC: 11 MMOL/L (ref 0–18)
AST SERPL-CCNC: 18 U/L (ref ?–34)
BASOPHILS # BLD AUTO: 0.04 X10(3) UL (ref 0–0.2)
BASOPHILS NFR BLD AUTO: 0.4 %
BILIRUB SERPL-MCNC: 0.6 MG/DL (ref 0.2–1.1)
BUN BLD-MCNC: 28 MG/DL (ref 9–23)
BUN/CREAT SERPL: 20.6 (ref 10–20)
CALCIUM BLD-MCNC: 10.1 MG/DL (ref 8.7–10.4)
CHLORIDE SERPL-SCNC: 108 MMOL/L (ref 98–112)
CHOLEST SERPL-MCNC: 101 MG/DL (ref ?–200)
CO2 SERPL-SCNC: 25 MMOL/L (ref 21–32)
CREAT BLD-MCNC: 1.36 MG/DL
DEPRECATED RDW RBC AUTO: 45.2 FL (ref 35.1–46.3)
EGFRCR SERPLBLD CKD-EPI 2021: 51 ML/MIN/1.73M2 (ref 60–?)
EOSINOPHIL # BLD AUTO: 0.2 X10(3) UL (ref 0–0.7)
EOSINOPHIL NFR BLD AUTO: 2 %
ERYTHROCYTE [DISTWIDTH] IN BLOOD BY AUTOMATED COUNT: 13.2 % (ref 11–15)
EST. AVERAGE GLUCOSE BLD GHB EST-MCNC: 200 MG/DL (ref 68–126)
FASTING PATIENT LIPID ANSWER: YES
FASTING STATUS PATIENT QL REPORTED: YES
GLOBULIN PLAS-MCNC: 2.6 G/DL (ref 2–3.5)
GLUCOSE BLD-MCNC: 171 MG/DL (ref 70–99)
HBA1C MFR BLD: 8.6 % (ref ?–5.7)
HCT VFR BLD AUTO: 43.6 %
HDLC SERPL-MCNC: 46 MG/DL (ref 40–59)
HGB BLD-MCNC: 15 G/DL
IMM GRANULOCYTES # BLD AUTO: 0.04 X10(3) UL (ref 0–1)
IMM GRANULOCYTES NFR BLD: 0.4 %
LDLC SERPL CALC-MCNC: 34 MG/DL (ref ?–100)
LYMPHOCYTES # BLD AUTO: 2.32 X10(3) UL (ref 1–4)
LYMPHOCYTES NFR BLD AUTO: 23.6 %
MCH RBC QN AUTO: 32.5 PG (ref 26–34)
MCHC RBC AUTO-ENTMCNC: 34.4 G/DL (ref 31–37)
MCV RBC AUTO: 94.4 FL
MONOCYTES # BLD AUTO: 0.73 X10(3) UL (ref 0.1–1)
MONOCYTES NFR BLD AUTO: 7.4 %
NEUTROPHILS # BLD AUTO: 6.49 X10 (3) UL (ref 1.5–7.7)
NEUTROPHILS # BLD AUTO: 6.49 X10(3) UL (ref 1.5–7.7)
NEUTROPHILS NFR BLD AUTO: 66.2 %
NONHDLC SERPL-MCNC: 55 MG/DL (ref ?–130)
OSMOLALITY SERPL CALC.SUM OF ELEC: 308 MOSM/KG (ref 275–295)
PLATELET # BLD AUTO: 213 10(3)UL (ref 150–450)
POTASSIUM SERPL-SCNC: 4.3 MMOL/L (ref 3.5–5.1)
PROT SERPL-MCNC: 7.2 G/DL (ref 5.7–8.2)
RBC # BLD AUTO: 4.62 X10(6)UL
SODIUM SERPL-SCNC: 144 MMOL/L (ref 136–145)
TRIGL SERPL-MCNC: 117 MG/DL (ref 30–149)
VLDLC SERPL CALC-MCNC: 16 MG/DL (ref 0–30)
WBC # BLD AUTO: 9.8 X10(3) UL (ref 4–11)

## 2024-12-05 PROCEDURE — 85025 COMPLETE CBC W/AUTO DIFF WBC: CPT

## 2024-12-05 PROCEDURE — 80053 COMPREHEN METABOLIC PANEL: CPT

## 2024-12-05 PROCEDURE — 80061 LIPID PANEL: CPT

## 2024-12-05 PROCEDURE — 83036 HEMOGLOBIN GLYCOSYLATED A1C: CPT

## 2024-12-05 PROCEDURE — 36415 COLL VENOUS BLD VENIPUNCTURE: CPT

## 2024-12-07 NOTE — PROGRESS NOTES
Chief Complaint:   Chief Complaint   Patient presents with    Checkup     3 month, shortness of breath at rest         HPI:     Mr. BROOKE is a 84 year old male PMHX peripheral arterial disease, AAA, CKD stage III, type 2 diabetes, hypertension, hyperlipidemia coming in for follow-up.    Off and on shortness of breath this morning. BP are good. At home.  He periodically gets some shortness of breath, however never find a specific trigger.  No alleviating factors.  Feels shortness of breath at this time with some wooziness.    No chest pain. No cough. Or swelling.    R eye redness and swelling, 2-3 weeks ago.  Has not had any sick contacts.    Past Medical History:    Aorta aneurysm (HCC)    Back problem    Benign essential HTN    Cataract    Chronic rhinitis    Diabetes (HCC)    DM (diabetes mellitus), type 2 (HCC)    Hearing impairment    High blood pressure    High cholesterol    Hyperlipidemia    POAG (primary open-angle glaucoma)    first visit with Dr. Ang patient has been taking Brimonidine 0.15% OU BID and Timolol 0.5% OU BID for 10 yrs per MD in [place].  10/28/23 patient to continue taking gtts per [facility] due to abnormal VF OS and abnormal OCT OU    PVD (peripheral vascular disease) (HCC)    Skin cancer    Visual impairment     Past Surgical History:   Procedure Laterality Date    Appendectomy      Cataract extraction w/  intraocular lens implant Left 04/07/2014    Done by Dr. [place] in [place]    Cataract extraction w/  intraocular lens implant Right 2014    Done by Dr. [place] in [place]    Other accessory      T and A    Other accessory      appendectomy    Other accessory      cholycystectomy    Other accessory      bilateral cataract    Other accessory      facial basal cell cancer    Tonsillectomy       Social History:  Social History     Socioeconomic History    Marital status:    Tobacco Use    Smoking status: Former     Types: Cigarettes     Passive  exposure: Never    Smokeless tobacco: Never   Vaping Use    Vaping status: Never Used   Substance and Sexual Activity    Alcohol use: Yes     Alcohol/week: 2.0 standard drinks of alcohol     Types: 2 Cans of beer per week     Comment: 1 - 2 beers occasional    Drug use: Never     Social Drivers of Health     Financial Resource Strain: Low Risk  (7/15/2024)    Financial Resource Strain     Difficulty of Paying Living Expenses: Not hard at all     Med Affordability: No   Food Insecurity: No Food Insecurity (7/12/2024)    Food Insecurity     Food Insecurity: Never true   Transportation Needs: No Transportation Needs (7/15/2024)    Transportation Needs     Lack of Transportation: No   Housing Stability: Low Risk  (7/12/2024)    Housing Stability     Housing Instability: No     Family History:  Family History   Problem Relation Age of Onset    Macular degeneration Brother     Glaucoma Neg     Diabetes Neg      Allergies:  Allergies   Allergen Reactions    Lisinopril ANGIOEDEMA and OTHER (SEE COMMENTS)     Lip swelling     Current Meds:  Current Outpatient Medications   Medication Sig Dispense Refill    amoxicillin clavulanate 875-125 MG Oral Tab Take 1 tablet by mouth 2 (two) times daily for 10 days. 20 tablet 0    PIOGLITAZONE 45 MG Oral Tab TAKE 1 TABLET BY MOUTH DAILY 90 tablet 3    Potassium Chloride ER 10 MEQ Oral Tab CR Take 1 tablet (10 mEq total) by mouth daily. 90 tablet 3    Glucose Blood (ONETOUCH VERIO) In Vitro Strip Checl blood sugar once daily 100 strip 3    losartan 100 MG Oral Tab Take 1 tablet (100 mg total) by mouth daily. 90 tablet 3    tamsulosin 0.4 MG Oral Cap Take 1 capsule (0.4 mg total) by mouth daily. 90 capsule 3    METFORMIN HCL 1000 MG Oral Tab TAKE 1 TABLET BY MOUTH EVERY MORNING AND EVERY NIGHT AT BEDTIME 180 tablet 3    atorvastatin 40 MG Oral Tab Take 1 tablet (40 mg total) by mouth daily. 90 tablet 3    glipiZIDE 10 MG Oral Tab Take 1 tablet (10 mg total) by mouth daily. Daily in the  morning 90 tablet 3    atenolol 25 MG Oral Tab Take 1 tablet (25 mg total) by mouth daily with dinner.      amLODIPine 5 MG Oral Tab Take 1 tablet (5 mg total) by mouth daily. 30 tablet 5    BRIMONIDINE 0.15 % Ophthalmic Solution INSTILL 1 DROP INTO BOTH EYES IN THE MORNING AND 1 DROP BEFORE  BEDTIME 3 each 3    TIMOLOL 0.5 % Ophthalmic Solution INSTILL 1 DROP INTO BOTH EYES  TWICE DAILY 15 mL 3    alendronate 70 MG Oral Tab Take 1 tablet (70 mg total) by mouth once a week. 13 tablet 3    aspirin-acetaminophen-caffeine 250-250-65 MG Oral Tab Take 1 tablet by mouth every 6 (six) hours as needed for Pain.      Glucose Blood (ONETOUCH VERIO) In Vitro Strip Use daily 100 strip 3    OneTouch Delica Lancets 33G Does not apply Misc Use daily 100 each 3    aspirin 81 MG Oral Tab EC Take 1 tablet (81 mg total) by mouth daily.      Ascorbic Acid (VITAMIN C OR) Take 1 tablet by mouth daily.      Budesonide-Formoterol Fumarate 80-4.5 MCG/ACT Inhalation Aerosol Inhale 2 puffs into the lungs 2 (two) times daily. (Patient not taking: Reported on 12/9/2024) 1 each 3      Counseling given: Not Answered       REVIEW OF SYSTEMS:   Positive Findings indicated in BOLD    Constitutional: Fever, Chills, Weight Gain, Weight Loss, Night Sweats, Fatigue, Malaise  ENT/Mouth:  Hearing Changes, Ear Pain, Nasal Congestion, Sinus Pain, Hoarseness, Sore throat, Rhinorrhea, Swallowing Difficulty  Eyes: Eye Pain, Swelling, Redness, Foreign Body, Discharge, Vision Changes  Cardiovascular: Chest Pain, SOB, PND, Dyspnea on Exertion, Orthopnea, Claudication, Edema, Palpitations  Respiratory: Cough, Sputum, Wheezing, Shortness of breath  Gastrointestinal: Nausea, Vomiting, Diarrhea, Constipation, Pain, Heartburn, Dysphagia, Bloody stools, Tarry stools  Genitourinary: Dysmenorrhea, Dysuria, Urinary Frequency, Hematuria, Urinary Incontinence, Urgency,  Flank Pain  Musculoskeletal: Arthralgias, Myalgias, Joint Swelling, Joint Stiffness, Back Pain, Neck  Pain  Integumentary: Skin Lesions, Pruritis, Hair Changes, Jaundice, Nail changes  Neuro: Weakness, Numbness, Paresthesias, Loss of Consciousness, Syncope, Dizziness, Headache, Falls  Psych: Anxiety, Depression, Insomnia, Suicidal Ideation, Homicidal ideation, Memory Changes  Heme/Lymph: Bruising, Bleeding, Lymphadenopathy  Endocrine: Polyuria, Polydipsia, Temperature Intolerance    EXAM:   Vital Signs:  Blood pressure 116/68, pulse 81, height 6' 2\" (1.88 m), weight 203 lb (92.1 kg), SpO2 96%.     Constitutional: No acute distress. Alert and oriented x 3.  Eyes: EOMI, PERRLA, clear sclera b/l  HENT: NCAT, Moist mucous membranes, Oropharynx without erythema or exudates  Neck: No JVD, no thyromegaly  Cardiovascular: S1, S2, no S3, no S4, Regular rate and rhythm, No murmurs/gallops/rubs.   Respiratory: Clear to auscultation bilaterally.  No wheezes/rales/rhonchi  Gastrointestinal: Soft, nontender, nondistended. Positive bowel sounds x 4. No rebound tenderness. No hepatomegaly, No splenomegaly  Genitourinary: No CVA tenderness bilaterally  Neurologic: No focal neurological deficits, CN II-XII intact, light touch intact,   Musculoskeletal: Full range of motion of all extremities, no clubbing/swelling/edema  Skin: + Left arm wound, no active bleeding or discharge.  Psychiatric: Appropriate mood and affect  Heme/Lymph/Immune: No cervical LAD      Diabetic foot examination  - No visible ulcers, wounds  - Nails - dystrophic nails  - Vibration sense mild deficiency  - Monofilament x 4 areas bilateral and symmetrical-great big toe, plantar surface; difficulty with detection of dorsal surface.      DATA REVIEWED   Labs:  Recent Results (from the past 8760 hours)   Comp Metabolic Panel (14)    Collection Time: 12/05/24 10:00 AM   Result Value Ref Range    Glucose 171 (H) 70 - 99 mg/dL    Sodium 144 136 - 145 mmol/L    Potassium 4.3 3.5 - 5.1 mmol/L    Chloride 108 98 - 112 mmol/L    CO2 25.0 21.0 - 32.0 mmol/L    Anion Gap 11 0  - 18 mmol/L    BUN 28 (H) 9 - 23 mg/dL    Creatinine 1.36 (H) 0.70 - 1.30 mg/dL    BUN/CREA Ratio 20.6 (H) 10.0 - 20.0    Calcium, Total 10.1 8.7 - 10.4 mg/dL    Calculated Osmolality 308 (H) 275 - 295 mOsm/kg    eGFR-Cr 51 (L) >=60 mL/min/1.73m2    ALT 21 10 - 49 U/L    AST 18 <34 U/L    Alkaline Phosphatase 94 45 - 117 U/L    Bilirubin, Total 0.6 0.2 - 1.1 mg/dL    Total Protein 7.2 5.7 - 8.2 g/dL    Albumin 4.6 3.2 - 4.8 g/dL    Globulin  2.6 2.0 - 3.5 g/dL    A/G Ratio 1.8 1.0 - 2.0    Patient Fasting for CMP? Yes      *Note: Due to a large number of results and/or encounters for the requested time period, some results have not been displayed. A complete set of results can be found in Results Review.       Recent Results (from the past 8760 hours)   CBC With Differential With Platelet    Collection Time: 12/05/24 10:00 AM   Result Value Ref Range    WBC 9.8 4.0 - 11.0 x10(3) uL    RBC 4.62 3.80 - 5.80 x10(6)uL    HGB 15.0 13.0 - 17.5 g/dL    HCT 43.6 39.0 - 53.0 %    MCV 94.4 80.0 - 100.0 fL    MCH 32.5 26.0 - 34.0 pg    MCHC 34.4 31.0 - 37.0 g/dL    RDW-SD 45.2 35.1 - 46.3 fL    RDW 13.2 11.0 - 15.0 %    .0 150.0 - 450.0 10(3)uL    Neutrophil Absolute Prelim 6.49 1.50 - 7.70 x10 (3) uL    Neutrophil Absolute 6.49 1.50 - 7.70 x10(3) uL    Lymphocyte Absolute 2.32 1.00 - 4.00 x10(3) uL    Monocyte Absolute 0.73 0.10 - 1.00 x10(3) uL    Eosinophil Absolute 0.20 0.00 - 0.70 x10(3) uL    Basophil Absolute 0.04 0.00 - 0.20 x10(3) uL    Immature Granulocyte Absolute 0.04 0.00 - 1.00 x10(3) uL    Neutrophil % 66.2 %    Lymphocyte % 23.6 %    Monocyte % 7.4 %    Eosinophil % 2.0 %    Basophil % 0.4 %    Immature Granulocyte % 0.4 %     *Note: Due to a large number of results and/or encounters for the requested time period, some results have not been displayed. A complete set of results can be found in Results Review.     EKG 12/9/2024  Rate: 68 rhythm: Sinus  Axis: Normal   Intervals: UT: 304  QRS: 114  QT: 408  QTc 433    ST Abnormalities: None    Impression: Sinus rhythm with first-degree AV block, incomplete right bundle branch block  Comparison: No significant changes from last EKG 8/7/2024      CTA abdomen pelvis 9/21/2023  Impression   CONCLUSION:     Infrarenal abdominal aortic aneurysm measuring 50 x 52 millimeter     Right lower extremity:  Diffuse severe stenosis throughout the mid to distal SFA due to calcified plaque.  Severe three-vessel runoff disease with only the peroneal artery showing opacification throughout     Left lower extremity:  Multifocal severe stenosis in the CFA .  Severe stenosis at the origin of the SFA due to calcified plaque.  Multifocal areas of moderate to severe stenosis throughout the rest of the SFA.  Severe three-vessel runoff disease.    Opacification throughout the peroneal artery.  The posterior tibial artery is likely opacified near the ankle.        Chest x-ray 12/22/2023    Impression   CONCLUSION:  1. No acute airspace disease.  Asymmetric mild biapical pleural thickening left more than right.  If any old films are available they should be submitted for comparison.  If not, then follow-up studies are advised.  2. Suggestion of mild hyperinflation which may suggest some degree of COPD.     Ultrasound arterial duplex 11/8/2024    Impression   CONCLUSION:  1. Severe calcific multilevel peripheral arterial occlusive disease.    2. Previous site of surgical left femoral endarterectomy remains widely patent.    3. There is diffuse disease within both femoral popliteal segments, with a chronic total occlusion on the right and diffuse narrowing on the left.    4. Tibial vessels are calcified, though patent where visualized, with dampened, monophasic flow due to the more proximal occlusions..       ASSESSMENT AND PLAN:     Dyspnea on exertion  Likely multifactorial in setting of allergic rhinitis, there does seem to be some degree of COPD/bibasilar scarring right greater than left on  chest x-ray 12/2023  - Did have a normal Lexiscan stress test 11/2023  - Actively short of breath for which we will proceed with blood test, seems to be euvolemic on examination  -Will repeat chest x-ray  - Started Symbicort 2 puffs twice a day  - Continue with management of allergic rhinitis as below  - Discussed red flag signs and symptoms that warrant immediate ER evaluation including chest pain, worsening shortness of breath which will require more urgent evaluation    Cellulitis,   Localized to the right upper eyelidongoing over the last 2-3 weeks  - Will proceed with Augmentin 1 tablet twice a day for 10 days  - Warm compresses to the area to help facilitate drainage and reduce inflammation      Low back pain  Chronic, recurring issue.  Has not worsened.  However constant 5/10 pain.  May be limiting his usual physical activities  -Would recommend initial trial of conservative therapy:    -Acetaminophen 500-650 mg every 4-6 hours as needed for pain relief  - Did complete physical therapy, continue with home stretches and exercises  - Referral provided for Dr. Garcia in the event he is a candidate for epidural steroid injection.       Peripheral arterial disease  Abdominal aortic aneurysm  Noted ongoing claudication.  Outpatient CT scan with AAA 50 x 52 mm as an outpatient.  Status post endovascular aortic repair, left femoral endarterectomy Dr. Shabazz of IR and Dr. Chu cardiovascular surgery 12/22/2023  - Aspirin 81 mg daily  - Ultrasound arterial duplex severe calcific multilevel peripheral arterial occlusive disease.  Left femoral endarterectomy site widely patent.  Diffuse disease within both femoral-popliteal segments with a chronic total occlusion on the right and diffuse narrowing on the left  -Seen in vascular surgery clinic 1/9/2024, due for repeat CTA of the abdomen pelvis for positioning and endoleak.  Repeat ultrasound in 6 months.  Advised to follow-up    CKD stage III  - Baseline seems to be  1.2-1.4  - Creatinine most recently 1.36, EGFR 51 and stable     Type 2 diabetes  Recent A1c:   HEMOGLOBIN A1C (%)   Date Value   09/19/2023 7.6 (A)     HgbA1C (%)   Date Value   12/05/2024 8.6 (H)     Recent urine microalbumin: No components found for: \"MICROALB/CREAT RATIO\"  Current medications:  glipizide 10 mg daily,pioglitazone 45 mg daily, metformin 1000 mg twice a day  Eye exam: Continue following with ophthalmology - UTD with Dr. Ang  Foot exam: Check feet daily  -Increase in A1c, needs to decrease carbohydrate intake.  - Will increase glipizide to 10 mg twice a day  -We did discuss various other options for management of sugars.  May be a candidate for GLP-1 agents, resuming Jardiance     Hypertension  -Blood pressure today at goal  - Check blood pressures at home  - Continue with home amlodipine 5 mg daily, losartan 100 mg daily, atenolol 25 mg daily  - Able demonstrate good blood pressure checks.  His blood pressure is good x 2 readings here.  At home, he has elevated blood pressures  - Would benefit from home health with blood pressure checks periodically     Hyperlipidemia  -Cholesterol overall well-controlled on last check  - Continue with atorvastatin 40 mg daily, aspirin  - Plan to repeat lipid panel     Allergic rhinitis  - Trial of flonase 1 spray each nostril until symptoms improve           Orders This Visit:  Orders Placed This Encounter   Procedures    CBC With Differential With Platelet    Comp Metabolic Panel (14)    C-Reactive Protein [E]    D-Dimer [E]       Meds This Visit:  Requested Prescriptions     Signed Prescriptions Disp Refills    amoxicillin clavulanate 875-125 MG Oral Tab 20 tablet 0     Sig: Take 1 tablet by mouth 2 (two) times daily for 10 days.       Imaging & Referrals:  ELECTROCARDIOGRAM, COMPLETE  OPHTHALMOLOGY - INTERNAL  XR CHEST PA + LAT CHEST (NYX=[id number])     Health Maintenance  Due for shingles vaccine series, COVID series    Spent 40 minutes obtaining history,  evaluating patient, discussing treatment options, diet, exercise, review of available labs and radiology reports, and completing documentation.     Return to clinic in 3 months for follow-up    Dick George MD, 12/09/24, 9:43 AM

## 2024-12-07 NOTE — PATIENT INSTRUCTIONS
You are seen in clinic today for follow-up.  Today, we did review your most recent ultrasound which does show ongoing peripheral arterial disease.  However the prior stent that was placed remains open  - Lets keep up with good cholesterol control as you have been doing  - Continue with exercise as tolerated especially in a safe manner during the wintertime  - Follow-up with Dr. Chu vascular surgery    We are repeating some blood tests and a chest x-ray to workup the shortness of breath.  Unclear precise cause    You likely have an infection of the right eyelid area  - Will proceed with Augmentin 1 tablet twice a day for 10 days  - Warm compresses to the area to help facilitate drainage and reduce inflammation  - If still no improvement, he may need to see ophthalmology    Monitor for any changes of back pain,  -Acetaminophen 500-650 mg every 4-6 hours as needed for pain relief  - Did complete physical therapy, continue with home stretches and exercises  - Referral provided for Dr. Garcia in the event he is a candidate for epidural steroid injection.    The sugar levels remain elevated, therefore we should increase your glipizide to 10 mg twice a day  - Continue cutting down on carbohydrates  - This is likely as result of coming off the Jardiance.  However we can try to maximize the glipizide in the meantime    Please make an appointment for eye examination    Return to clinic for Medicare physical exam in 3 months.

## 2024-12-09 ENCOUNTER — OFFICE VISIT (OUTPATIENT)
Dept: INTERNAL MEDICINE CLINIC | Facility: CLINIC | Age: 84
End: 2024-12-09

## 2024-12-09 ENCOUNTER — HOSPITAL ENCOUNTER (EMERGENCY)
Facility: HOSPITAL | Age: 84
Discharge: HOME | End: 2024-12-09
Attending: EMERGENCY MEDICINE
Payer: MEDICARE

## 2024-12-09 ENCOUNTER — LAB ENCOUNTER (OUTPATIENT)
Dept: LAB | Age: 84
End: 2024-12-09
Attending: INTERNAL MEDICINE
Payer: MEDICARE

## 2024-12-09 ENCOUNTER — APPOINTMENT (OUTPATIENT)
Dept: CT IMAGING | Facility: HOSPITAL | Age: 84
End: 2024-12-09
Attending: EMERGENCY MEDICINE
Payer: MEDICARE

## 2024-12-09 VITALS
HEIGHT: 74 IN | SYSTOLIC BLOOD PRESSURE: 116 MMHG | HEART RATE: 81 BPM | DIASTOLIC BLOOD PRESSURE: 68 MMHG | OXYGEN SATURATION: 96 % | BODY MASS INDEX: 26.05 KG/M2 | WEIGHT: 203 LBS

## 2024-12-09 VITALS
SYSTOLIC BLOOD PRESSURE: 177 MMHG | BODY MASS INDEX: 26.05 KG/M2 | OXYGEN SATURATION: 98 % | DIASTOLIC BLOOD PRESSURE: 84 MMHG | WEIGHT: 203 LBS | HEART RATE: 67 BPM | HEIGHT: 74 IN | TEMPERATURE: 98 F | RESPIRATION RATE: 16 BRPM

## 2024-12-09 DIAGNOSIS — Z13.5 DIABETIC RETINOPATHY SCREENING: ICD-10-CM

## 2024-12-09 DIAGNOSIS — E11.8 DIABETIC FOOT (HCC): ICD-10-CM

## 2024-12-09 DIAGNOSIS — I73.9 INTERMITTENT CLAUDICATION (HCC): ICD-10-CM

## 2024-12-09 DIAGNOSIS — E78.5 HYPERLIPIDEMIA, UNSPECIFIED HYPERLIPIDEMIA TYPE: ICD-10-CM

## 2024-12-09 DIAGNOSIS — Z13.29 SCREENING FOR THYROID DISORDER: ICD-10-CM

## 2024-12-09 DIAGNOSIS — R06.09 DYSPNEA ON EXERTION: ICD-10-CM

## 2024-12-09 DIAGNOSIS — R06.09 DYSPNEA ON EXERTION: Primary | ICD-10-CM

## 2024-12-09 DIAGNOSIS — M54.50 CHRONIC LOW BACK PAIN, UNSPECIFIED BACK PAIN LATERALITY, UNSPECIFIED WHETHER SCIATICA PRESENT: ICD-10-CM

## 2024-12-09 DIAGNOSIS — I10 PRIMARY HYPERTENSION: ICD-10-CM

## 2024-12-09 DIAGNOSIS — T14.8XXA CHRONIC WOUND: ICD-10-CM

## 2024-12-09 DIAGNOSIS — E11.9 TYPE 2 DIABETES MELLITUS WITHOUT COMPLICATION, WITHOUT LONG-TERM CURRENT USE OF INSULIN (HCC): ICD-10-CM

## 2024-12-09 DIAGNOSIS — I73.9 PERIPHERAL VASCULAR DISEASE (HCC): ICD-10-CM

## 2024-12-09 DIAGNOSIS — E11.9 TYPE 2 DIABETES MELLITUS WITHOUT COMPLICATION, WITHOUT LONG-TERM CURRENT USE OF INSULIN (HCC): Primary | ICD-10-CM

## 2024-12-09 DIAGNOSIS — N18.30 STAGE 3 CHRONIC KIDNEY DISEASE, UNSPECIFIED WHETHER STAGE 3A OR 3B CKD (HCC): ICD-10-CM

## 2024-12-09 DIAGNOSIS — I10 BENIGN ESSENTIAL HTN: ICD-10-CM

## 2024-12-09 DIAGNOSIS — I73.9 PAD (PERIPHERAL ARTERY DISEASE) (HCC): ICD-10-CM

## 2024-12-09 DIAGNOSIS — G89.29 CHRONIC LOW BACK PAIN, UNSPECIFIED BACK PAIN LATERALITY, UNSPECIFIED WHETHER SCIATICA PRESENT: ICD-10-CM

## 2024-12-09 LAB
ALBUMIN SERPL-MCNC: 4.6 G/DL (ref 3.2–4.8)
ALBUMIN/GLOB SERPL: 2.1 {RATIO} (ref 1–2)
ALP LIVER SERPL-CCNC: 88 U/L
ALT SERPL-CCNC: 16 U/L
ANION GAP SERPL CALC-SCNC: 5 MMOL/L (ref 0–18)
AST SERPL-CCNC: 18 U/L (ref ?–34)
ATRIAL RATE: 68 BPM
BASOPHILS # BLD AUTO: 0.04 X10(3) UL (ref 0–0.2)
BASOPHILS NFR BLD AUTO: 0.5 %
BILIRUB SERPL-MCNC: 0.5 MG/DL (ref 0.2–1.1)
BUN BLD-MCNC: 24 MG/DL (ref 9–23)
BUN/CREAT SERPL: 16.9 (ref 10–20)
CALCIUM BLD-MCNC: 9.8 MG/DL (ref 8.7–10.4)
CHLORIDE SERPL-SCNC: 107 MMOL/L (ref 98–112)
CO2 SERPL-SCNC: 27 MMOL/L (ref 21–32)
CREAT BLD-MCNC: 1.42 MG/DL
CRP SERPL-MCNC: <0.4 MG/DL (ref ?–1)
D DIMER PPP FEU-MCNC: 1.61 UG/ML FEU (ref ?–0.86)
DEPRECATED RDW RBC AUTO: 46.7 FL (ref 35.1–46.3)
EGFRCR SERPLBLD CKD-EPI 2021: 48 ML/MIN/1.73M2 (ref 60–?)
EOSINOPHIL # BLD AUTO: 0.17 X10(3) UL (ref 0–0.7)
EOSINOPHIL NFR BLD AUTO: 1.9 %
ERYTHROCYTE [DISTWIDTH] IN BLOOD BY AUTOMATED COUNT: 13.2 % (ref 11–15)
FASTING STATUS PATIENT QL REPORTED: NO
GLOBULIN PLAS-MCNC: 2.2 G/DL (ref 2–3.5)
GLUCOSE BLD-MCNC: 236 MG/DL (ref 70–99)
HCT VFR BLD AUTO: 41.6 %
HGB BLD-MCNC: 13.9 G/DL
IMM GRANULOCYTES # BLD AUTO: 0.03 X10(3) UL (ref 0–1)
IMM GRANULOCYTES NFR BLD: 0.3 %
LYMPHOCYTES # BLD AUTO: 1.52 X10(3) UL (ref 1–4)
LYMPHOCYTES NFR BLD AUTO: 17.4 %
MCH RBC QN AUTO: 32.3 PG (ref 26–34)
MCHC RBC AUTO-ENTMCNC: 33.4 G/DL (ref 31–37)
MCV RBC AUTO: 96.7 FL
MONOCYTES # BLD AUTO: 0.61 X10(3) UL (ref 0.1–1)
MONOCYTES NFR BLD AUTO: 7 %
NEUTROPHILS # BLD AUTO: 6.39 X10 (3) UL (ref 1.5–7.7)
NEUTROPHILS # BLD AUTO: 6.39 X10(3) UL (ref 1.5–7.7)
NEUTROPHILS NFR BLD AUTO: 72.9 %
OSMOLALITY SERPL CALC.SUM OF ELEC: 300 MOSM/KG (ref 275–295)
P AXIS: 78 DEGREES
P-R INTERVAL: 304 MS
PLATELET # BLD AUTO: 209 10(3)UL (ref 150–450)
POTASSIUM SERPL-SCNC: 4.3 MMOL/L (ref 3.5–5.1)
PROT SERPL-MCNC: 6.8 G/DL (ref 5.7–8.2)
Q-T INTERVAL: 408 MS
QRS DURATION: 114 MS
QTC CALCULATION (BEZET): 433 MS
R AXIS: 6 DEGREES
RBC # BLD AUTO: 4.3 X10(6)UL
SODIUM SERPL-SCNC: 139 MMOL/L (ref 136–145)
T AXIS: 28 DEGREES
TROPONIN I SERPL HS-MCNC: 7 NG/L
TROPONIN I SERPL HS-MCNC: 7 NG/L
VENTRICULAR RATE: 68 BPM
WBC # BLD AUTO: 8.8 X10(3) UL (ref 4–11)

## 2024-12-09 PROCEDURE — 71260 CT THORAX DX C+: CPT | Performed by: EMERGENCY MEDICINE

## 2024-12-09 PROCEDURE — 85025 COMPLETE CBC W/AUTO DIFF WBC: CPT

## 2024-12-09 PROCEDURE — 36415 COLL VENOUS BLD VENIPUNCTURE: CPT

## 2024-12-09 PROCEDURE — 99284 EMERGENCY DEPT VISIT MOD MDM: CPT

## 2024-12-09 PROCEDURE — 84484 ASSAY OF TROPONIN QUANT: CPT | Performed by: EMERGENCY MEDICINE

## 2024-12-09 PROCEDURE — 86140 C-REACTIVE PROTEIN: CPT

## 2024-12-09 PROCEDURE — 80053 COMPREHEN METABOLIC PANEL: CPT

## 2024-12-09 PROCEDURE — 85379 FIBRIN DEGRADATION QUANT: CPT

## 2024-12-09 PROCEDURE — 84484 ASSAY OF TROPONIN QUANT: CPT

## 2024-12-09 NOTE — TELEPHONE ENCOUNTER
PLease notify the patient that his D-dimer blood test is elevated, concerning for possible blood clot in the lung that is causing the shortness of breast at rest, should go the ER for evaluation and scan to rule out blood clot in the lungs

## 2024-12-09 NOTE — ED INITIAL ASSESSMENT (HPI)
Pt had routine check up with PCP and was noticed SOB in office. Had blood work drawn this morning with an elevated D.dimer and was told to come to ER to rule out PE.

## 2024-12-09 NOTE — TELEPHONE ENCOUNTER
To Dr WELLS,    ANUPAMA...    Called  and  spoke with patient and his brother/ Hernán.    Relayed Dr WELLS message    Hernán will take patient to the ED now for further evaluation/treatment    Advised patient to keep NPO at this time

## 2024-12-10 ENCOUNTER — APPOINTMENT (OUTPATIENT)
Dept: URBAN - METROPOLITAN AREA CLINIC 244 | Age: 84
Setting detail: DERMATOLOGY
End: 2024-12-11

## 2024-12-10 ENCOUNTER — TELEPHONE (OUTPATIENT)
Dept: INTERNAL MEDICINE CLINIC | Facility: CLINIC | Age: 84
End: 2024-12-10

## 2024-12-10 ENCOUNTER — PATIENT OUTREACH (OUTPATIENT)
Dept: CASE MANAGEMENT | Age: 84
End: 2024-12-10

## 2024-12-10 DIAGNOSIS — Z48.817 ENCOUNTER FOR SURGICAL AFTERCARE FOLLOWING SURGERY ON THE SKIN AND SUBCUTANEOUS TISSUE: ICD-10-CM

## 2024-12-10 PROBLEM — C44.41 BASAL CELL CARCINOMA OF SKIN OF SCALP AND NECK: Status: ACTIVE | Noted: 2024-12-10

## 2024-12-10 PROCEDURE — OTHER COUNSELING: OTHER

## 2024-12-10 PROCEDURE — 13121 CMPLX RPR S/A/L 2.6-7.5 CM: CPT | Mod: 79

## 2024-12-10 PROCEDURE — 17312 MOHS ADDL STAGE: CPT | Mod: 79

## 2024-12-10 PROCEDURE — 17311 MOHS 1 STAGE H/N/HF/G: CPT | Mod: 79

## 2024-12-10 PROCEDURE — OTHER MOHS SURGERY: OTHER

## 2024-12-10 RX ORDER — FAMOTIDINE 20 MG/1
20 TABLET, FILM COATED ORAL 2 TIMES DAILY
Qty: 180 TABLET | Refills: 0 | OUTPATIENT
Start: 2024-12-10

## 2024-12-10 RX ORDER — FAMOTIDINE 20 MG/1
20 TABLET, FILM COATED ORAL 2 TIMES DAILY
Qty: 60 TABLET | Refills: 0 | Status: SHIPPED | OUTPATIENT
Start: 2024-12-10

## 2024-12-10 ASSESSMENT — LOCATION DETAILED DESCRIPTION DERM: LOCATION DETAILED: LEFT KNEE

## 2024-12-10 ASSESSMENT — LOCATION ZONE DERM: LOCATION ZONE: LEG

## 2024-12-10 ASSESSMENT — LOCATION SIMPLE DESCRIPTION DERM: LOCATION SIMPLE: LEFT KNEE

## 2024-12-10 NOTE — TELEPHONE ENCOUNTER
Called patient spoke with brother per HIPAA and relayed FROIALNP message - RX sent per written order and he verbalized understanding

## 2024-12-10 NOTE — PROCEDURE: MOHS SURGERY
Mohs Case Number: [id number]
Biopsy Photograph Reviewed: Yes
Consent Type: Consent 1 (Standard)
Eye Shield Used: No
Initial Size Of Lesion: 1.2
X Size Of Lesion In Cm (Optional): 0.8
Number Of Stages: 1
Primary Defect Length In Cm (Final Defect Size - Required For Flaps/Grafts): 2.6
Primary Defect Width In Cm (Final Defect Size - Required For Flaps/Grafts): 1.6
Primary Defect Depth In Cm (Optional But Required For Some Insurers): 0
Repair Type: Complex Repair
Which Instrument Did You Use For Dermabrasion?: Wire Brush
Which Eyelid Repair Cpt Are You Using?: 60685
Oculoplastic Surgeon Procedure Text (A): After obtaining clear surgical margins the patient was sent to oculoplastics for surgical repair.  The patient understands they will receive post-surgical care and follow-up from the referring physician's office.
Otolaryngologist Procedure Text (A): After obtaining clear surgical margins the patient was sent to otolaryngology for surgical repair.  The patient understands they will receive post-surgical care and follow-up from the referring physician's office.
Plastic Surgeon Procedure Text (A): After obtaining clear surgical margins the patient was sent to plastics for surgical repair.  The patient understands they will receive post-surgical care and follow-up from the referring physician's office.
Mid-Level Procedure Text (A): After obtaining clear surgical margins the patient was sent to a mid-level provider for surgical repair.  The patient understands they will receive post-surgical care and follow-up from the mid-level provider.
Provider Procedure Text (A): After obtaining clear surgical margins the defect was repaired by another provider.
Asc Procedure Text (A): After obtaining clear surgical margins the patient was sent to an ASC for surgical repair.  The patient understands they will receive post-surgical care and follow-up from the ASC physician.
Simple / Intermediate / Complex Repair - Final Wound Length In Cm: 3.5
Suturegard Retention Suture: 2-0 Nylon
Retention Suture Bite Size: 3 mm
Length To Time In Minutes Device Was In Place: 10
Distance Of Undermining In Cm (Required): 2
Undermining Type: Entire Wound
Debridement Text: The wound edges were debrided prior to proceeding with the closure to facilitate wound healing.
Helical Rim Text: The closure involved the helical rim.
Vermilion Border Text: The closure involved the vermilion border.
Nostril Rim Text: The closure involved the nostril rim.
Retention Suture Text: Retention sutures were placed to support the closure and prevent dehiscence.
Area H Indication Text: Tumors in this location are included in Area H (eyelids, eyebrows, nose, lips, chin, ear, pre-auricular, post-auricular, temple, genitalia, hands, feet, ankles and areola).  Tissue conservation is critical in these anatomic locations.
Area M Indication Text: Tumors in this location are included in Area M (cheek, forehead, scalp, neck, jawline and pretibial skin).  Mohs surgery is indicated for tumors in these anatomic locations.
Area L Indication Text: Tumors in this location are included in Area L (trunk and extremities).  Mohs surgery is indicated for larger tumors, or tumors with aggressive histologic features, in these anatomic locations.
Depth Of Tumor Invasion (For Histology): dermis
Perineural Invasion (For Histology - Be Specific If Possible): absent
Presence Of Scar Tissue (For Histology): present
Special Stains Stage 1 - Results: Base On Clearance Noted Above
Stage 2: Additional Anesthesia Type: 1% lidocaine with epinephrine
Staging Info: By selecting yes to the question above you will include information on AJCC 8 tumor staging in your Mohs note. Information on tumor staging will be automatically added for SCCs on the head and neck. AJCC 8 includes tumor size, tumor depth, perineural involvement and bone invasion.
Tumor Depth: Less than 6mm from granular layer and no invasion beyond the subcutaneous fat
Was The Patient On Physician Recommended Anticoagulation Therapy?: Please Select the Appropriate Response
Medical Necessity Statement: Based on my medical judgement, Mohs surgery is the most appropriate treatment for this cancer compared to other treatments.
Alternatives Discussed Intro (Do Not Add Period): I discussed alternative treatments to Mohs surgery and specifically discussed the risks and benefits of
Consent 1/Introductory Paragraph: The rationale for Mohs was explained to the patient and consent was obtained. The risks, benefits and alternatives to therapy were discussed in detail. Specifically, the risks of infection, scarring, bleeding, prolonged wound healing, incomplete removal, allergy to anesthesia, nerve injury and recurrence were addressed. Prior to the procedure, the treatment site was clearly identified and confirmed by the patient. All components of Universal Protocol/PAUSE Rule completed.
Consent 2/Introductory Paragraph: Mohs surgery was explained to the patient and consent was obtained. The risks, benefits and alternatives to therapy were discussed in detail. Specifically, the risks of infection, scarring, bleeding, prolonged wound healing, incomplete removal, allergy to anesthesia, nerve injury and recurrence were addressed. Prior to the procedure, the treatment site was clearly identified and confirmed by the patient. All components of Universal Protocol/PAUSE Rule completed.
Consent 3/Introductory Paragraph: I gave the patient a chance to ask questions they had about the procedure.  Following this I explained the Mohs procedure and consent was obtained. The risks, benefits and alternatives to therapy were discussed in detail. Specifically, the risks of infection, scarring, bleeding, prolonged wound healing, incomplete removal, allergy to anesthesia, nerve injury and recurrence were addressed. Prior to the procedure, the treatment site was clearly identified and confirmed by the patient. All components of Universal Protocol/PAUSE Rule completed.
Consent (Temporal Branch)/Introductory Paragraph: The rationale for Mohs was explained to the patient and consent was obtained. The risks, benefits and alternatives to therapy were discussed in detail. Specifically, the risks of damage to the temporal branch of the facial nerve, infection, scarring, bleeding, prolonged wound healing, incomplete removal, allergy to anesthesia, and recurrence were addressed. Prior to the procedure, the treatment site was clearly identified and confirmed by the patient. All components of Universal Protocol/PAUSE Rule completed.
Consent (Marginal Mandibular)/Introductory Paragraph: The rationale for Mohs was explained to the patient and consent was obtained. The risks, benefits and alternatives to therapy were discussed in detail. Specifically, the risks of damage to the marginal mandibular branch of the facial nerve, infection, scarring, bleeding, prolonged wound healing, incomplete removal, allergy to anesthesia, and recurrence were addressed. Prior to the procedure, the treatment site was clearly identified and confirmed by the patient. All components of Universal Protocol/PAUSE Rule completed.
Consent (Spinal Accessory)/Introductory Paragraph: The rationale for Mohs was explained to the patient and consent was obtained. The risks, benefits and alternatives to therapy were discussed in detail. Specifically, the risks of damage to the spinal accessory nerve, infection, scarring, bleeding, prolonged wound healing, incomplete removal, allergy to anesthesia, and recurrence were addressed. Prior to the procedure, the treatment site was clearly identified and confirmed by the patient. All components of Universal Protocol/PAUSE Rule completed.
Consent (Near Eyelid Margin)/Introductory Paragraph: The rationale for Mohs was explained to the patient and consent was obtained. The risks, benefits and alternatives to therapy were discussed in detail. Specifically, the risks of ectropion or eyelid deformity, infection, scarring, bleeding, prolonged wound healing, incomplete removal, allergy to anesthesia, nerve injury and recurrence were addressed. Prior to the procedure, the treatment site was clearly identified and confirmed by the patient. All components of Universal Protocol/PAUSE Rule completed.
Consent (Ear)/Introductory Paragraph: The rationale for Mohs was explained to the patient and consent was obtained. The risks, benefits and alternatives to therapy were discussed in detail. Specifically, the risks of ear deformity, infection, scarring, bleeding, prolonged wound healing, incomplete removal, allergy to anesthesia, nerve injury and recurrence were addressed. Prior to the procedure, the treatment site was clearly identified and confirmed by the patient. All components of Universal Protocol/PAUSE Rule completed.
Consent (Nose)/Introductory Paragraph: The rationale for Mohs was explained to the patient and consent was obtained. The risks, benefits and alternatives to therapy were discussed in detail. Specifically, the risks of nasal deformity, changes in the flow of air through the nose, infection, scarring, bleeding, prolonged wound healing, incomplete removal, allergy to anesthesia, nerve injury and recurrence were addressed. Prior to the procedure, the treatment site was clearly identified and confirmed by the patient. All components of Universal Protocol/PAUSE Rule completed.
Consent (Lip)/Introductory Paragraph: The rationale for Mohs was explained to the patient and consent was obtained. The risks, benefits and alternatives to therapy were discussed in detail. Specifically, the risks of lip deformity, changes in the oral aperture, infection, scarring, bleeding, prolonged wound healing, incomplete removal, allergy to anesthesia, nerve injury and recurrence were addressed. Prior to the procedure, the treatment site was clearly identified and confirmed by the patient. All components of Universal Protocol/PAUSE Rule completed.
Consent (Scalp)/Introductory Paragraph: The rationale for Mohs was explained to the patient and consent was obtained. The risks, benefits and alternatives to therapy were discussed in detail. Specifically, the risks of changes in hair growth pattern secondary to repair, infection, scarring, bleeding, prolonged wound healing, incomplete removal, allergy to anesthesia, nerve injury and recurrence were addressed. Prior to the procedure, the treatment site was clearly identified and confirmed by the patient. All components of Universal Protocol/PAUSE Rule completed.
Detail Level: Detailed
Postop Diagnosis: same
Anesthesia Volume In Cc: 6
Hemostasis: Electrocautery
Estimated Blood Loss (Cc): minimal
Anesthesia Volume In Cc: 8
Brow Lift Text: A midfrontal incision was made medially to the defect to allow access to the tissues just superior to the left eyebrow. Following careful dissection inferiorly in a supraperiosteal plane to the level of the left eyebrow, several 3-0 monocryl sutures were used to resuspend the eyebrow orbicularis oculi muscular unit to the superior frontal bone periosteum. This resulted in an appropriate reapproximation of static eyebrow symmetry and correction of the left brow ptosis.
Deep Sutures: 4-0 Vicryl
Epidermal Sutures: 5-0 Nylon
Epidermal Closure: running
Suturegard Intro: Intraoperative tissue expansion was performed, utilizing the SUTUREGARD device, in order to reduce wound tension.
Suturegard Body: The suture ends were repeatedly re-tightened and re-clamped to achieve the desired tissue expansion.
Hemigard Intro: Due to skin fragility and wound tension, it was decided to use HEMIGARD adhesive retention suture devices to permit a linear closure. The skin was cleaned and dried for a 6cm distance away from the wound. Excessive hair, if present, was removed to allow for adhesion.
Hemigard Postcare Instructions: The HEMIGARD strips are to remain completely dry for at least 5-7 days.
Donor Site Anesthesia Type: same as repair anesthesia
Epidermal Closure Graft Donor Site (Optional): simple interrupted
Graft Donor Site Bandage (Optional-Leave Blank If You Don't Want In Note): Steri-strips and a pressure bandage were applied to the donor site.
Closure 2 Information: This tab is for additional flaps and grafts, including complex repair and grafts and complex repair and flaps. You can also specify a different location for the additional defect, if the location is the same you do not need to select a new one. We will insert the automated text for the repair you select below just as we do for solitary flaps and grafts. Please note that at this time if you select a location with a different insurance zone you will need to override the ICD10 and CPT if appropriate.
Closure 3 Information: This tab is for additional flaps and grafts above and beyond our usual structured repairs.  Please note if you enter information here it will not currently bill and you will need to add the billing information manually.
Wound Care: Petrolatum
Dressing: dry sterile dressing
Suture Removal: 7 days
Unna Boot Text: An Unna boot was placed to help immobilize the limb and facilitate more rapid healing.
Home Suture Removal Text: Patient was provided instructions on removing sutures and will remove their sutures at home.  If they have any questions or difficulties they will call the office.
Post-Care Instructions: I reviewed with the patient in detail post-care instructions. Patient is not to engage in any heavy lifting, exercise, or swimming for the next 14 days. Should the patient develop any fevers, chills, bleeding, severe pain patient will contact the office immediately.
Pain Refusal Text: I offered to prescribe pain medication but the patient refused to take this medication.
Mauc Instructions: By selecting yes to the question below the MAUC number will be added into the note.  This will be calculated automatically based on the diagnosis chosen, the size entered, the body zone selected (H,M,L) and the specific indications you chose. You will also have the option to override the Mohs AUC if you disagree with the automatically calculated number and this option is found in the Case Summary tab.
Where Do You Want The Question To Include Opioid Counseling Located?: Case Summary Tab
Eye Protection Verbiage: Before proceeding with the stage, a plastic scleral shield was inserted. The globe was anesthetized with a few drops of 1% lidocaine with 1:100,000 epinephrine. Then, an appropriate sized scleral shield was chosen and coated with lacrilube ointment. The shield was gently inserted and left in place for the duration of each stage. After the stage was completed, the shield was gently removed.
Mohs Method Verbiage: An incision at a 45 degree angle following the standard Mohs approach was done and the specimen was harvested as a microscopic controlled layer.
Surgeon/Pathologist Verbiage (Will Incorporate Name Of Surgeon From Intro If Not Blank): operated in two distinct and integrated capacities as the surgeon and pathologist.
Mohs Histo Method Verbiage: Each section was then chromacoded and processed in the Mohs lab using the Mohs protocol and submitted for frozen section, utilizing hematoxylin and eosin histochemical stains.
Subsequent Stages Histo Method Verbiage: Using a similar technique to that described above, a thin layer of tissue was removed from all areas where tumor was visible on the previous stage.  The tissue was again oriented, mapped, dyed, and processed as above.
Mohs Rapid Report Verbiage: The area of clinically evident tumor was marked with skin marking ink and appropriately hatched.  The initial incision was made following the Mohs approach through the skin.  The specimen was taken to the lab, divided into the necessary number of pieces, chromacoded and processed according to the Mohs protocol.  This was repeated in successive stages until a tumor free defect was achieved.
Complex Repair Preamble Text (Leave Blank If You Do Not Want): Extensive wide undermining was performed.
Intermediate Repair Preamble Text (Leave Blank If You Do Not Want): Undermining was performed with blunt dissection.
Graft Cartilage Fenestration Text: The cartilage was fenestrated with a 2mm punch biopsy to help facilitate graft survival and healing.
Non-Graft Cartilage Fenestration Text: The cartilage was fenestrated with a 2mm punch biopsy to help facilitate healing.
Secondary Intention Text (Leave Blank If You Do Not Want): The defect will heal with secondary intention.
No Repair - Repaired With Adjacent Surgical Defect Text (Leave Blank If You Do Not Want): After obtaining clear surgical margins the defect was repaired concurrently with another surgical defect which was in close approximation.
Adjacent Tissue Transfer Text: The defect edges were debeveled with a #15 scalpel blade. Given the location of the defect and the proximity to free margins an adjacent tissue transfer was deemed most appropriate. Using a sterile surgical marker, an appropriate flap was drawn incorporating the defect and placing the expected incisions within the relaxed skin tension lines where possible. The area thus outlined was incised deep to adipose tissue with a #15 scalpel blade. The skin margins were undermined to an appropriate distance in all directions utilizing iris scissors and carried over to close the primary defect.
Advancement Flap (Single) Text: The defect edges were debeveled with a #15 scalpel blade. Given the location of the defect and the proximity to free margins a single advancement flap was deemed most appropriate. Using a sterile surgical marker, an appropriate advancement flap was drawn incorporating the defect and placing the expected incisions within the relaxed skin tension lines where possible. The area thus outlined was incised deep to adipose tissue with a #15 scalpel blade. The skin margins were undermined to an appropriate distance in all directions utilizing iris scissors. Following this, the designed flap was advanced and carried over into the primary defect and sutured into place.
Advancement Flap (Double) Text: The defect edges were debeveled with a #15 scalpel blade. Given the location of the defect and the proximity to free margins a double advancement flap was deemed most appropriate. Using a sterile surgical marker, the appropriate advancement flaps were drawn incorporating the defect and placing the expected incisions within the relaxed skin tension lines where possible. The area thus outlined was incised deep to adipose tissue with a #15 scalpel blade. The skin margins were undermined to an appropriate distance in all directions utilizing iris scissors. Following this, the designed flaps were advanced and carried over into the primary defect and sutured into place.
Advancement-Rotation Flap Text: The defect edges were debeveled with a #15 scalpel blade. Given the location of the defect, shape of the defect and the proximity to free margins an advancement-rotation flap was deemed most appropriate. Using a sterile surgical marker, an appropriate flap was drawn incorporating the defect and placing the expected incisions within the relaxed skin tension lines where possible. The area thus outlined was incised deep to adipose tissue with a #15 scalpel blade. The skin margins were undermined to an appropriate distance in all directions utilizing iris scissors. Following this, the designed flap was carried over into the primary defect and sutured into place.
Alar Island Pedicle Flap Text: The defect edges were debeveled with a #15 scalpel blade. Given the location of the defect, shape of the defect and the proximity to the alar rim an island pedicle advancement flap was deemed most appropriate. Using a sterile surgical marker, an appropriate advancement flap was drawn incorporating the defect, outlining the appropriate donor tissue and placing the expected incisions within the nasal ala running parallel to the alar rim. The area thus outlined was incised with a #15 scalpel blade. The skin margins were undermined minimally to an appropriate distance in all directions around the primary defect and laterally outward around the island pedicle utilizing iris scissors.  There was minimal undermining beneath the pedicle flap. Following this, the designed flap was carried over into the primary defect and sutured into place.
A-T Advancement Flap Text: The defect edges were debeveled with a #15 scalpel blade. Given the location of the defect, shape of the defect and the proximity to free margins an A-T advancement flap was deemed most appropriate. Using a sterile surgical marker, an appropriate advancement flap was drawn incorporating the defect and placing the expected incisions within the relaxed skin tension lines where possible. The area thus outlined was incised deep to adipose tissue with a #15 scalpel blade. The skin margins were undermined to an appropriate distance in all directions utilizing iris scissors. Following this, the designed flap was advanced and carried over into the primary defect and sutured into place.
Banner Transposition Flap Text: The defect edges were debeveled with a #15 scalpel blade. Given the location of the defect and the proximity to free margins a Banner transposition flap was deemed most appropriate. Using a sterile surgical marker, an appropriate flap was drawn around the defect. The area thus outlined was incised deep to adipose tissue with a #15 scalpel blade. The skin margins were undermined to an appropriate distance in all directions utilizing iris scissors. Following this, the designed flap was carried into the primary defect and sutured into place.
Bilateral Helical Rim Advancement Flap Text: The defect edges were debeveled with a #15 blade scalpel.  Given the location of the defect and the proximity to free margins (helical rim) a bilateral helical rim advancement flap was deemed most appropriate. Using a sterile surgical marker, the appropriate advancement flaps were drawn incorporating the defect and placing the expected incisions between the helical rim and antihelix where possible.  The area thus outlined was incised through and through with a #15 scalpel blade.  With a skin hook and iris scissors, the flaps were gently and sharply undermined and freed up. Following this, the designed flaps were placed into the primary defect and sutured into place.
Bilateral Rotation Flap Text: The defect edges were debeveled with a #15 scalpel blade. Given the location of the defect, shape of the defect and the proximity to free margins a bilateral rotation flap was deemed most appropriate. Using a sterile surgical marker, an appropriate rotation flap was drawn incorporating the defect and placing the expected incisions within the relaxed skin tension lines where possible. The area thus outlined was incised deep to adipose tissue with a #15 scalpel blade. The skin margins were undermined to an appropriate distance in all directions utilizing iris scissors. Following this, the designed flap was carried over into the primary defect and sutured into place.
Bilobed Flap Text: The defect edges were debeveled with a #15 scalpel blade. Given the location of the defect and the proximity to free margins a bilobe flap was deemed most appropriate. Using a sterile surgical marker, an appropriate bilobe flap drawn around the defect. The area thus outlined was incised deep to adipose tissue with a #15 scalpel blade. The skin margins were undermined to an appropriate distance in all directions utilizing iris scissors. Following this, the designed flap was carried over into the primary defect and sutured into place.
Bilobed Transposition Flap Text: The defect edges were debeveled with a #15 scalpel blade. Given the location of the defect and the proximity to free margins a bilobed transposition flap was deemed most appropriate. Using a sterile surgical marker, an appropriate bilobe flap drawn around the defect. The area thus outlined was incised deep to adipose tissue with a #15 scalpel blade. The skin margins were undermined to an appropriate distance in all directions utilizing iris scissors. Following this, the designed flap was carried over into the primary defect and sutured into place.
Bi-Rhombic Flap Text: The defect edges were debeveled with a #15 scalpel blade. Given the location of the defect and the proximity to free margins a bi-rhombic flap was deemed most appropriate. Using a sterile surgical marker, an appropriate rhombic flap was drawn incorporating the defect. The area thus outlined was incised deep to adipose tissue with a #15 scalpel blade. The skin margins were undermined to an appropriate distance in all directions utilizing iris scissors. Following this, the designed flap was carried over into the primary defect and sutured into place.
Burow's Advancement Flap Text: The defect edges were debeveled with a #15 scalpel blade. Given the location of the defect and the proximity to free margins a Burow's advancement flap was deemed most appropriate. Using a sterile surgical marker, the appropriate advancement flap was drawn incorporating the defect and placing the expected incisions within the relaxed skin tension lines where possible. The area thus outlined was incised deep to adipose tissue with a #15 scalpel blade. The skin margins were undermined to an appropriate distance in all directions utilizing iris scissors. Following this, the designed flap was advanced and carried over into the primary defect and sutured into place.
Chonodrocutaneous Helical Advancement Flap Text: The defect edges were debeveled with a #15 scalpel blade. Given the location of the defect and the proximity to free margins a chondrocutaneous helical advancement flap was deemed most appropriate. Using a sterile surgical marker, the appropriate advancement flap was drawn incorporating the defect and placing the expected incisions within the relaxed skin tension lines where possible. The area thus outlined was incised deep to adipose tissue with a #15 scalpel blade. The skin margins were undermined to an appropriate distance in all directions utilizing iris scissors. Following this, the designed flap was advanced and carried over into the primary defect and sutured into place.
Crescentic Advancement Flap Text: The defect edges were debeveled with a #15 scalpel blade. Given the location of the defect and the proximity to free margins a crescentic advancement flap was deemed most appropriate. Using a sterile surgical marker, the appropriate advancement flap was drawn incorporating the defect and placing the expected incisions within the relaxed skin tension lines where possible. The area thus outlined was incised deep to adipose tissue with a #15 scalpel blade. The skin margins were undermined to an appropriate distance in all directions utilizing iris scissors. Following this, the designed flap was advanced and carried over into the primary defect and sutured into place.
Dorsal Nasal Flap Text: The defect edges were debeveled with a #15 scalpel blade. Given the location of the defect and the proximity to free margins a dorsal nasal flap was deemed most appropriate. Using a sterile surgical marker, an appropriate dorsal nasal flap was drawn around the defect. The area thus outlined was incised deep to adipose tissue with a #15 scalpel blade. The skin margins were undermined to an appropriate distance in all directions utilizing iris scissors. Following this, the designed flap was carried into the primary defect and sutured into place.
Double Island Pedicle Flap Text: The defect edges were debeveled with a #15 scalpel blade. Given the location of the defect, shape of the defect and the proximity to free margins a double island pedicle advancement flap was deemed most appropriate. Using a sterile surgical marker, an appropriate advancement flap was drawn incorporating the defect, outlining the appropriate donor tissue and placing the expected incisions within the relaxed skin tension lines where possible. The area thus outlined was incised deep to adipose tissue with a #15 scalpel blade. The skin margins were undermined to an appropriate distance in all directions around the primary defect and laterally outward around the island pedicle utilizing iris scissors.  There was minimal undermining beneath the pedicle flap. Following this, the flap was carried over into the primary defect and sutured into place.
Double O-Z Flap Text: The defect edges were debeveled with a #15 scalpel blade. Given the location of the defect, shape of the defect and the proximity to free margins a Double O-Z flap was deemed most appropriate. Using a sterile surgical marker, an appropriate transposition flap was drawn incorporating the defect and placing the expected incisions within the relaxed skin tension lines where possible. The area thus outlined was incised deep to adipose tissue with a #15 scalpel blade. The skin margins were undermined to an appropriate distance in all directions utilizing iris scissors. Following this, the designed flap was carried over into the primary defect and sutured into place.
Double O-Z Plasty Text: The defect edges were debeveled with a #15 scalpel blade. Given the location of the defect, shape of the defect and the proximity to free margins a Double O-Z plasty (double transposition flap) was deemed most appropriate. Using a sterile surgical marker, the appropriate transposition flaps were drawn incorporating the defect and placing the expected incisions within the relaxed skin tension lines where possible. The area thus outlined was incised deep to adipose tissue with a #15 scalpel blade. The skin margins were undermined to an appropriate distance in all directions utilizing iris scissors. Hemostasis was achieved with electrocautery. The flaps were then transposed and carried over into place, one clockwise and the other counterclockwise, and anchored with interrupted buried subcutaneous sutures.
Double Z Plasty Text: The lesion was extirpated to the level of the fat with a #15 scalpel blade. Given the location of the defect, shape of the defect and the proximity to free margins a double Z-plasty was deemed most appropriate for repair. Using a sterile surgical marker, the appropriate transposition arms of the double Z-plasty were drawn incorporating the defect and placing the expected incisions within the relaxed skin tension lines where possible. The area thus outlined was incised deep to adipose tissue with a #15 scalpel blade. The skin margins were undermined to an appropriate distance in all directions utilizing iris scissors. The opposing transposition arms were then transposed and carried over into place in opposite direction and anchored with interrupted buried subcutaneous sutures.
Ear Star Wedge Flap Text: The defect edges were debeveled with a #15 blade scalpel.  Given the location of the defect and the proximity to free margins (helical rim) an ear star wedge flap was deemed most appropriate. Using a sterile surgical marker, the appropriate flap was drawn incorporating the defect and placing the expected incisions between the helical rim and antihelix where possible.  The area thus outlined was incised through and through with a #15 scalpel blade. Following this, the designed flap was carried over into the primary defect and sutured into place.
Flip-Flop Flap Text: The defect edges were debeveled with a #15 blade scalpel.  Given the location of the defect and the proximity to free margins a flip-flop flap was deemed most appropriate. Using a sterile surgical marker, the appropriate flap was drawn incorporating the defect and placing the expected incisions between the helical rim and antihelix where possible.  The area thus outlined was incised through and through with a #15 scalpel blade. Following this, the designed flap was carried over into the primary defect and sutured into place.
Hatchet Flap Text: The defect edges were debeveled with a #15 scalpel blade. Given the location of the defect, shape of the defect and the proximity to free margins a hatchet flap was deemed most appropriate. Using a sterile surgical marker, an appropriate hatchet flap was drawn incorporating the defect and placing the expected incisions within the relaxed skin tension lines where possible. The area thus outlined was incised deep to adipose tissue with a #15 scalpel blade. The skin margins were undermined to an appropriate distance in all directions utilizing iris scissors. Following this, the designed flap was carried over into the primary defect and sutured into place.
Helical Rim Advancement Flap Text: The defect edges were debeveled with a #15 blade scalpel.  Given the location of the defect and the proximity to free margins (helical rim) a double helical rim advancement flap was deemed most appropriate. Using a sterile surgical marker, the appropriate advancement flaps were drawn incorporating the defect and placing the expected incisions between the helical rim and antihelix where possible.  The area thus outlined was incised through and through with a #15 scalpel blade.  With a skin hook and iris scissors, the flaps were gently and sharply undermined and freed up. Folllowing this, the designed flaps were carried over into the primary defect and sutured into place.
H Plasty Text: Given the location of the defect, shape of the defect and the proximity to free margins a H-plasty was deemed most appropriate for repair. Using a sterile surgical marker, the appropriate advancement arms of the H-plasty were drawn incorporating the defect and placing the expected incisions within the relaxed skin tension lines where possible. The area thus outlined was incised deep to adipose tissue with a #15 scalpel blade. The skin margins were undermined to an appropriate distance in all directions utilizing iris scissors.  The opposing advancement arms were then advanced and carried over into place in opposite direction and anchored with interrupted buried subcutaneous sutures.
Island Pedicle Flap Text: The defect edges were debeveled with a #15 scalpel blade. Given the location of the defect, shape of the defect and the proximity to free margins an island pedicle advancement flap was deemed most appropriate. Using a sterile surgical marker, an appropriate advancement flap was drawn incorporating the defect, outlining the appropriate donor tissue and placing the expected incisions within the relaxed skin tension lines where possible. The area thus outlined was incised deep to adipose tissue with a #15 scalpel blade. The skin margins were undermined to an appropriate distance in all directions around the primary defect and laterally outward around the island pedicle utilizing iris scissors.  There was minimal undermining beneath the pedicle flap. Following this, the flap was carried over into the primary defect and sutured into place.
Island Pedicle Flap With Canthal Suspension Text: The defect edges were debeveled with a #15 scalpel blade. Given the location of the defect, shape of the defect and the proximity to free margins an island pedicle advancement flap was deemed most appropriate. Using a sterile surgical marker, an appropriate advancement flap was drawn incorporating the defect, outlining the appropriate donor tissue and placing the expected incisions within the relaxed skin tension lines where possible. The area thus outlined was incised deep to adipose tissue with a #15 scalpel blade. The skin margins were undermined to an appropriate distance in all directions around the primary defect and laterally outward around the island pedicle utilizing iris scissors.  There was minimal undermining beneath the pedicle flap. A suspension suture was placed in the canthal tendon to prevent tension and prevent ectropion. Following this, the designed flap was placed into the primary defect and sutured into place.
Island Pedicle Flap-Requiring Vessel Identification Text: The defect edges were debeveled with a #15 scalpel blade. Given the location of the defect, shape of the defect and the proximity to free margins an island pedicle advancement flap was deemed most appropriate. Using a sterile surgical marker, an appropriate advancement flap was drawn, based on the axial vessel mentioned above, incorporating the defect, outlining the appropriate donor tissue and placing the expected incisions within the relaxed skin tension lines where possible. The area thus outlined was incised deep to adipose tissue with a #15 scalpel blade. The skin margins were undermined to an appropriate distance in all directions around the primary defect and laterally outward around the island pedicle utilizing iris scissors.  There was minimal undermining beneath the pedicle flap. Following this, the designed flap was carried over into the primary defect and sutured into place.
Keystone Flap Text: The defect edges were debeveled with a #15 scalpel blade. Given the location of the defect, shape of the defect a keystone flap was deemed most appropriate. Using a sterile surgical marker, an appropriate keystone flap was drawn incorporating the defect, outlining the appropriate donor tissue and placing the expected incisions within the relaxed skin tension lines where possible. The area thus outlined was incised deep to adipose tissue with a #15 scalpel blade. The skin margins were undermined to an appropriate distance in all directions around the primary defect and laterally outward around the flap utilizing iris scissors. Following this, the designed flap was carried into the primary defect and sutured into place.
Melolabial Transposition Flap Text: The defect edges were debeveled with a #15 scalpel blade. Given the location of the defect and the proximity to free margins a melolabial flap was deemed most appropriate. Using a sterile surgical marker, an appropriate melolabial transposition flap was drawn incorporating the defect. The area thus outlined was incised deep to adipose tissue with a #15 scalpel blade. The skin margins were undermined to an appropriate distance in all directions utilizing iris scissors. Following this, the designed flap was carried over into the primary defect and sutured into place.
Mercedes Flap Text: The defect edges were debeveled with a #15 scalpel blade. Given the location of the defect, shape of the defect and the proximity to free margins a Mercedes flap was deemed most appropriate. Using a sterile surgical marker, an appropriate advancement flap was drawn incorporating the defect and placing the expected incisions within the relaxed skin tension lines where possible. The area thus outlined was incised deep to adipose tissue with a #15 scalpel blade. The skin margins were undermined to an appropriate distance in all directions utilizing iris scissors. Following this, the designed flap was advanced and carried over into the primary defect and sutured into place.
Modified Advancement Flap Text: The defect edges were debeveled with a #15 scalpel blade. Given the location of the defect, shape of the defect and the proximity to free margins a modified advancement flap was deemed most appropriate. Using a sterile surgical marker, an appropriate advancement flap was drawn incorporating the defect and placing the expected incisions within the relaxed skin tension lines where possible. The area thus outlined was incised deep to adipose tissue with a #15 scalpel blade. The skin margins were undermined to an appropriate distance in all directions utilizing iris scissors. Following this, the designed flap was advanced and carried over into the primary defect and sutured into place.
Mucosal Advancement Flap Text: Given the location of the defect, shape of the defect and the proximity to free margins a mucosal advancement flap was deemed most appropriate. Incisions were made with a 15 blade scalpel in the appropriate fashion along the cutaneous vermilion border and the mucosal lip. The remaining actinically damaged mucosal tissue was excised.  The mucosal advancement flap was then elevated to the gingival sulcus with care taken to preserve the neurovascular structures and advanced into the primary defect. Care was taken to ensure that precise realignment of the vermilion border was achieved.
Muscle Hinge Flap Text: The defect edges were debeveled with a #15 scalpel blade.  Given the size, depth and location of the defect and the proximity to free margins a muscle hinge flap was deemed most appropriate. Using a sterile surgical marker, an appropriate hinge flap was drawn incorporating the defect. The area thus outlined was incised with a #15 scalpel blade. The skin margins were undermined to an appropriate distance in all directions utilizing iris scissors. Following this, the designed flap was carried into the primary defect and sutured into place.
Mustarde Flap Text: The defect edges were debeveled with a #15 scalpel blade.  Given the size, depth and location of the defect and the proximity to free margins a Mustarde flap was deemed most appropriate. Using a sterile surgical marker, an appropriate flap was drawn incorporating the defect. The area thus outlined was incised with a #15 scalpel blade. The skin margins were undermined to an appropriate distance in all directions utilizing iris scissors. Following this, the designed flap was carried into the primary defect and sutured into place.
Nasal Turnover Hinge Flap Text: The defect edges were debeveled with a #15 scalpel blade.  Given the size, depth, location of the defect and the defect being full thickness a nasal turnover hinge flap was deemed most appropriate. Using a sterile surgical marker, an appropriate hinge flap was drawn incorporating the defect. The area thus outlined was incised with a #15 scalpel blade. The flap was designed to recreate the nasal mucosal lining and the alar rim. The skin margins were undermined to an appropriate distance in all directions utilizing iris scissors. Following this, the designed flap was carried over into the primary defect and sutured into place
Nasalis-Muscle-Based Myocutaneous Island Pedicle Flap Text: Using a #15 blade, an incision was made around the donor flap to the level of the nasalis muscle. Wide lateral undermining was then performed in both the subcutaneous plane above the nasalis muscle, and in a submuscular plane just above periosteum. This allowed the formation of a free nasalis muscle axial pedicle (based on the angular artery) which was still attached to the actual cutaneous flap, increasing its mobility and vascular viability. Hemostasis was obtained with pinpoint electrocoagulation. The flap was mobilized into position and the pivotal anchor points positioned and stabilized with buried interrupted sutures. Subcutaneous and dermal tissues were closed in a multilayered fashion with sutures. Tissue redundancies were excised, and the epidermal edges were apposed without significant tension and sutured with sutures.
Nasalis Myocutaneous Flap Text: Using a #15 blade, an incision was made around the donor flap to the level of the nasalis muscle. Wide lateral undermining was then performed in both the subcutaneous plane above the nasalis muscle, and in a submuscular plane just above periosteum. This allowed the formation of a free nasalis muscle axial pedicle which was still attached to the actual cutaneous flap, increasing its mobility and vascular viability. Hemostasis was obtained with pinpoint electrocoagulation. The flap was mobilized into position and the pivotal anchor points positioned and stabilized with buried interrupted sutures. Subcutaneous and dermal tissues were closed in a multilayered fashion with sutures. Tissue redundancies were excised, and the epidermal edges were apposed without significant tension and sutured with sutures.
Nasolabial Transposition Flap Text: The defect edges were debeveled with a #15 scalpel blade.  Given the size, depth and location of the defect and the proximity to free margins a nasolabial transposition flap was deemed most appropriate. Using a sterile surgical marker, an appropriate flap was drawn incorporating the defect. The area thus outlined was incised with a #15 scalpel blade. The skin margins were undermined to an appropriate distance in all directions utilizing iris scissors. Following this, the designed flap was carried into the primary defect and sutured into place.
Orbicularis Oris Muscle Flap Text: The defect edges were debeveled with a #15 scalpel blade.  Given that the defect affected the competency of the oral sphincter an orbicularis oris muscle flap was deemed most appropriate to restore this competency and normal muscle function.  Using a sterile surgical marker, an appropriate flap was drawn incorporating the defect. The area thus outlined was incised with a #15 scalpel blade. Following this, the designed flap was carried over into the primary defect and sutured into place.
O-T Advancement Flap Text: The defect edges were debeveled with a #15 scalpel blade. Given the location of the defect, shape of the defect and the proximity to free margins an O-T advancement flap was deemed most appropriate. Using a sterile surgical marker, an appropriate advancement flap was drawn incorporating the defect and placing the expected incisions within the relaxed skin tension lines where possible. The area thus outlined was incised deep to adipose tissue with a #15 scalpel blade. The skin margins were undermined to an appropriate distance in all directions utilizing iris scissors. Following this, the designed flap was advanced and carried over into the primary defect and sutured into place.
O-T Plasty Text: The defect edges were debeveled with a #15 scalpel blade. Given the location of the defect, shape of the defect and the proximity to free margins an O-T plasty was deemed most appropriate. Using a sterile surgical marker, an appropriate O-T plasty was drawn incorporating the defect and placing the expected incisions within the relaxed skin tension lines where possible. The area thus outlined was incised deep to adipose tissue with a #15 scalpel blade. The skin margins were undermined to an appropriate distance in all directions utilizing iris scissors. Following this, the designed flap was carried over into the primary defect and sutured into place.
O-L Flap Text: The defect edges were debeveled with a #15 scalpel blade. Given the location of the defect, shape of the defect and the proximity to free margins an O-L flap was deemed most appropriate. Using a sterile surgical marker, an appropriate advancement flap was drawn incorporating the defect and placing the expected incisions within the relaxed skin tension lines where possible. The area thus outlined was incised deep to adipose tissue with a #15 scalpel blade. The skin margins were undermined to an appropriate distance in all directions utilizing iris scissors. Following this, the designed flap was advanced and carried over into the primary defect and sutured into place.
O-Z Flap Text: The defect edges were debeveled with a #15 scalpel blade. Given the location of the defect, shape of the defect and the proximity to free margins an O-Z flap was deemed most appropriate. Using a sterile surgical marker, an appropriate transposition flap was drawn incorporating the defect and placing the expected incisions within the relaxed skin tension lines where possible. The area thus outlined was incised deep to adipose tissue with a #15 scalpel blade. The skin margins were undermined to an appropriate distance in all directions utilizing iris scissors. Following this, the designed flap was carried over into the primary defect and sutured into place.
O-Z Plasty Text: The defect edges were debeveled with a #15 scalpel blade. Given the location of the defect, shape of the defect and the proximity to free margins an O-Z plasty (double transposition flap) was deemed most appropriate. Using a sterile surgical marker, the appropriate transposition flaps were drawn incorporating the defect and placing the expected incisions within the relaxed skin tension lines where possible. The area thus outlined was incised deep to adipose tissue with a #15 scalpel blade. The skin margins were undermined to an appropriate distance in all directions utilizing iris scissors. Hemostasis was achieved with electrocautery. The flaps were then transposed and carried over into place, one clockwise and the other counterclockwise, and anchored with interrupted buried subcutaneous sutures.
Peng Advancement Flap Text: The defect edges were debeveled with a #15 scalpel blade. Given the location of the defect, shape of the defect and the proximity to free margins a Peng advancement flap was deemed most appropriate. Using a sterile surgical marker, an appropriate advancement flap was drawn incorporating the defect and placing the expected incisions within the relaxed skin tension lines where possible. The area thus outlined was incised deep to adipose tissue with a #15 scalpel blade. The skin margins were undermined to an appropriate distance in all directions utilizing iris scissors. Following this, the designed flap was advanced and carried over into the primary defect and sutured into place.
Rectangular Flap Text: The defect edges were debeveled with a #15 scalpel blade. Given the location of the defect and the proximity to free margins a rectangular flap was deemed most appropriate. Using a sterile surgical marker, an appropriate rectangular flap was drawn incorporating the defect. The area thus outlined was incised deep to adipose tissue with a #15 scalpel blade. The skin margins were undermined to an appropriate distance in all directions utilizing iris scissors. Following this, the designed flap was carried over into the primary defect and sutured into place.
Rhombic Flap Text: The defect edges were debeveled with a #15 scalpel blade. Given the location of the defect and the proximity to free margins a rhombic flap was deemed most appropriate. Using a sterile surgical marker, an appropriate rhombic flap was drawn incorporating the defect. The area thus outlined was incised deep to adipose tissue with a #15 scalpel blade. The skin margins were undermined to an appropriate distance in all directions utilizing iris scissors. Following this, the designed flap was carried over into the primary defect and sutured into place.
Rhomboid Transposition Flap Text: The defect edges were debeveled with a #15 scalpel blade. Given the location of the defect and the proximity to free margins a rhomboid transposition flap was deemed most appropriate. Using a sterile surgical marker, an appropriate rhomboid flap was drawn incorporating the defect. The area thus outlined was incised deep to adipose tissue with a #15 scalpel blade. The skin margins were undermined to an appropriate distance in all directions utilizing iris scissors. Following this, the designed flap was carried over into the primary defect and sutured into place.
Rotation Flap Text: The defect edges were debeveled with a #15 scalpel blade. Given the location of the defect, shape of the defect and the proximity to free margins a rotation flap was deemed most appropriate. Using a sterile surgical marker, an appropriate rotation flap was drawn incorporating the defect and placing the expected incisions within the relaxed skin tension lines where possible. The area thus outlined was incised deep to adipose tissue with a #15 scalpel blade. The skin margins were undermined to an appropriate distance in all directions utilizing iris scissors. Following this, the designed flap was carried over into the primary defect and sutured into place.
Spiral Flap Text: The defect edges were debeveled with a #15 scalpel blade. Given the location of the defect, shape of the defect and the proximity to free margins a spiral flap was deemed most appropriate. Using a sterile surgical marker, an appropriate rotation flap was drawn incorporating the defect and placing the expected incisions within the relaxed skin tension lines where possible. The area thus outlined was incised deep to adipose tissue with a #15 scalpel blade. The skin margins were undermined to an appropriate distance in all directions utilizing iris scissors. Following this, the designed flap was carried over into the primary defect and sutured into place.
Staged Advancement Flap Text: The defect edges were debeveled with a #15 scalpel blade. Given the location of the defect, shape of the defect and the proximity to free margins a staged advancement flap was deemed most appropriate. Using a sterile surgical marker, an appropriate advancement flap was drawn incorporating the defect and placing the expected incisions within the relaxed skin tension lines where possible. The area thus outlined was incised deep to adipose tissue with a #15 scalpel blade. The skin margins were undermined to an appropriate distance in all directions utilizing iris scissors. Following this, the designed flap was carried over into the primary defect and sutured into place.
Star Wedge Flap Text: The defect edges were debeveled with a #15 scalpel blade. Given the location of the defect, shape of the defect and the proximity to free margins a star wedge flap was deemed most appropriate. Using a sterile surgical marker, an appropriate rotation flap was drawn incorporating the defect and placing the expected incisions within the relaxed skin tension lines where possible. The area thus outlined was incised deep to adipose tissue with a #15 scalpel blade. The skin margins were undermined to an appropriate distance in all directions utilizing iris scissors. Following this, the designed flap was carried over into the primary defect and sutured into place.
Transposition Flap Text: The defect edges were debeveled with a #15 scalpel blade. Given the location of the defect and the proximity to free margins a transposition flap was deemed most appropriate. Using a sterile surgical marker, an appropriate transposition flap was drawn incorporating the defect. The area thus outlined was incised deep to adipose tissue with a #15 scalpel blade. The skin margins were undermined to an appropriate distance in all directions utilizing iris scissors. Following this, the designed flap was carried over into the primary defect and sutured into place.
Trilobed Flap Text: The defect edges were debeveled with a #15 scalpel blade. Given the location of the defect and the proximity to free margins a trilobed flap was deemed most appropriate. Using a sterile surgical marker, an appropriate trilobed flap was drawn around the defect. The area thus outlined was incised deep to adipose tissue with a #15 scalpel blade. The skin margins were undermined to an appropriate distance in all directions utilizing iris scissors. Following this, the designed flap was carried into the primary defect and sutured into place.
V-Y Flap Text: The defect edges were debeveled with a #15 scalpel blade. Given the location of the defect, shape of the defect and the proximity to free margins a V-Y flap was deemed most appropriate. Using a sterile surgical marker, an appropriate advancement flap was drawn incorporating the defect and placing the expected incisions within the relaxed skin tension lines where possible. The area thus outlined was incised deep to adipose tissue with a #15 scalpel blade. The skin margins were undermined to an appropriate distance in all directions utilizing iris scissors. Following this, the designed flap was advanced and carried over into the primary defect and sutured into place.
V-Y Plasty Text: The defect edges were debeveled with a #15 scalpel blade. Given the location of the defect, shape of the defect and the proximity to free margins an V-Y advancement flap was deemed most appropriate. Using a sterile surgical marker, an appropriate advancement flap was drawn incorporating the defect and placing the expected incisions within the relaxed skin tension lines where possible. The area thus outlined was incised deep to adipose tissue with a #15 scalpel blade. The skin margins were undermined to an appropriate distance in all directions utilizing iris scissors. Following this, the designed flap was advanced and carried over into the primary defect and sutured into place.
W Plasty Text: The lesion was extirpated to the level of the fat with a #15 scalpel blade. Given the location of the defect, shape of the defect and the proximity to free margins a W-plasty was deemed most appropriate for repair. Using a sterile surgical marker, the appropriate transposition arms of the W-plasty were drawn incorporating the defect and placing the expected incisions within the relaxed skin tension lines where possible. The area thus outlined was incised deep to adipose tissue with a #15 scalpel blade. The skin margins were undermined to an appropriate distance in all directions utilizing iris scissors. The opposing transposition arms were then transposed and carried over into place in opposite direction and anchored with interrupted buried subcutaneous sutures.
Z Plasty Text: The lesion was extirpated to the level of the fat with a #15 scalpel blade. Given the location of the defect, shape of the defect and the proximity to free margins a Z-plasty was deemed most appropriate for repair. Using a sterile surgical marker, the appropriate transposition arms of the Z-plasty were drawn incorporating the defect and placing the expected incisions within the relaxed skin tension lines where possible. The area thus outlined was incised deep to adipose tissue with a #15 scalpel blade. The skin margins were undermined to an appropriate distance in all directions utilizing iris scissors. The opposing transposition arms were then transposed and carried over into place in opposite direction and anchored with interrupted buried subcutaneous sutures.
Zygomaticofacial Flap Text: Given the location of the defect, shape of the defect and the proximity to free margins a zygomaticofacial flap was deemed most appropriate for repair. Using a sterile surgical marker, the appropriate flap was drawn incorporating the defect and placing the expected incisions within the relaxed skin tension lines where possible. The area thus outlined was incised deep to adipose tissue with a #15 scalpel blade with preservation of a vascular pedicle.  The skin margins were undermined to an appropriate distance in all directions utilizing iris scissors. The flap was then carried over into the defect and anchored with interrupted buried subcutaneous sutures.
Abbe Flap (Lower To Upper Lip) Text: The defect of the upper lip was assessed and measured.  Given the location and size of the defect, an Abbe flap was deemed most appropriate. Using a sterile surgical marker, an appropriate Abbe flap was measured and drawn on the lower lip. Local anesthesia was then infiltrated. A scalpel was then used to incise the upper lip through and through the skin, vermilion, muscle and mucosa, leaving the flap pedicled on the opposite side.  The flap was then rotated and transferred to the lower lip defect.  The flap was then sutured into place with a three layer technique, closing the orbicularis oris muscle layer with subcutaneous buried sutures, followed by a mucosal layer and an epidermal layer.
Abbe Flap (Upper To Lower Lip) Text: The defect of the lower lip was assessed and measured.  Given the location and size of the defect, an Abbe flap was deemed most appropriate. Using a sterile surgical marker, an appropriate Abbe flap was measured and drawn on the upper lip. Local anesthesia was then infiltrated.  A scalpel was then used to incise the upper lip through and through the skin, vermilion, muscle and mucosa, leaving the flap pedicled on the opposite side.  The flap was then rotated and transferred to the lower lip defect.  The flap was then sutured into place with a three layer technique, closing the orbicularis oris muscle layer with subcutaneous buried sutures, followed by a mucosal layer and an epidermal layer.
Cheek Interpolation Flap Text: A decision was made to reconstruct the defect utilizing an interpolation axial flap and a staged reconstruction.  A telfa template was made of the defect.  This telfa template was then used to outline the Cheek Interpolation flap.  The donor area for the pedicle flap was then injected with anesthesia.  The flap was excised through the skin and subcutaneous tissue down to the layer of the underlying musculature.  The interpolation flap was carefully excised within this deep plane to maintain its blood supply.  The edges of the donor site were undermined.   The donor site was closed in a primary fashion.  The pedicle was then rotated into position and sutured.  Once the tube was sutured into place, adequate blood supply was confirmed with blanching and refill.  The pedicle was then wrapped with xeroform gauze and dressed appropriately with a telfa and gauze bandage to ensure continued blood supply and protect the attached pedicle.
Cheek-To-Nose Interpolation Flap Text: A decision was made to reconstruct the defect utilizing an interpolation axial flap and a staged reconstruction.  A telfa template was made of the defect.  This telfa template was then used to outline the Cheek-To-Nose Interpolation flap.  The donor area for the pedicle flap was then injected with anesthesia.  The flap was excised through the skin and subcutaneous tissue down to the layer of the underlying musculature.  The interpolation flap was carefully excised within this deep plane to maintain its blood supply.  The edges of the donor site were undermined.   The donor site was closed in a primary fashion.  The pedicle was then rotated into position and sutured.  Once the tube was sutured into place, adequate blood supply was confirmed with blanching and refill.  The pedicle was then wrapped with xeroform gauze and dressed appropriately with a telfa and gauze bandage to ensure continued blood supply and protect the attached pedicle.
Estlander Flap (Lower To Upper Lip) Text: The defect of the lower lip was assessed and measured.  Given the location and size of the defect, an Estlander flap was deemed most appropriate. Using a sterile surgical marker, an appropriate Estlander flap was measured and drawn on the upper lip. Local anesthesia was then infiltrated. A scalpel was then used to incise the lateral aspect of the flap, through skin, muscle and mucosa, leaving the flap pedicled medially.  The flap was then rotated and positioned to fill the lower lip defect.  The flap was then sutured into place with a three layer technique, closing the orbicularis oris muscle layer with subcutaneous buried sutures, followed by a mucosal layer and an epidermal layer.
Interpolation Flap Text: A decision was made to reconstruct the defect utilizing an interpolation axial flap and a staged reconstruction.  A telfa template was made of the defect.  This telfa template was then used to outline the interpolation flap.  The donor area for the pedicle flap was then injected with anesthesia.  The flap was excised through the skin and subcutaneous tissue down to the layer of the underlying musculature.  The interpolation flap was carefully excised within this deep plane to maintain its blood supply.  The edges of the donor site were undermined.   The donor site was closed in a primary fashion.  The pedicle was then rotated into position and sutured.  Once the tube was sutured into place, adequate blood supply was confirmed with blanching and refill.  The pedicle was then wrapped with xeroform gauze and dressed appropriately with a telfa and gauze bandage to ensure continued blood supply and protect the attached pedicle.
Melolabial Interpolation Flap Text: A decision was made to reconstruct the defect utilizing an interpolation axial flap and a staged reconstruction.  A telfa template was made of the defect.  This telfa template was then used to outline the melolabial interpolation flap.  The donor area for the pedicle flap was then injected with anesthesia.  The flap was excised through the skin and subcutaneous tissue down to the layer of the underlying musculature.  The pedicle flap was carefully excised within this deep plane to maintain its blood supply.  The edges of the donor site were undermined.   The donor site was closed in a primary fashion.  The pedicle was then rotated into position and sutured.  Once the tube was sutured into place, adequate blood supply was confirmed with blanching and refill.  The pedicle was then wrapped with xeroform gauze and dressed appropriately with a telfa and gauze bandage to ensure continued blood supply and protect the attached pedicle.
Mastoid Interpolation Flap Text: A decision was made to reconstruct the defect utilizing an interpolation axial flap and a staged reconstruction.  A telfa template was made of the defect.  This telfa template was then used to outline the mastoid interpolation flap.  The donor area for the pedicle flap was then injected with anesthesia.  The flap was excised through the skin and subcutaneous tissue down to the layer of the underlying musculature.  The pedicle flap was carefully excised within this deep plane to maintain its blood supply.  The edges of the donor site were undermined.   The donor site was closed in a primary fashion.  The pedicle was then rotated into position and sutured.  Once the tube was sutured into place, adequate blood supply was confirmed with blanching and refill.  The pedicle was then wrapped with xeroform gauze and dressed appropriately with a telfa and gauze bandage to ensure continued blood supply and protect the attached pedicle.
Paramedian Forehead Flap Text: A decision was made to reconstruct the defect utilizing an interpolation axial flap and a staged reconstruction.  A telfa template was made of the defect.  This telfa template was then used to outline the paramedian forehead pedicle flap.  The donor area for the pedicle flap was then injected with anesthesia.  The flap was excised through the skin and subcutaneous tissue down to the layer of the underlying musculature.  The pedicle flap was carefully excised within this deep plane to maintain its blood supply.  The edges of the donor site were undermined.   The donor site was closed in a primary fashion.  The pedicle was then rotated into position and sutured.  Once the tube was sutured into place, adequate blood supply was confirmed with blanching and refill.  The pedicle was then wrapped with xeroform gauze and dressed appropriately with a telfa and gauze bandage to ensure continued blood supply and protect the attached pedicle.
Posterior Auricular Interpolation Flap Text: A decision was made to reconstruct the defect utilizing an interpolation axial flap and a staged reconstruction.  A telfa template was made of the defect.  This telfa template was then used to outline the posterior auricular interpolation flap.  The donor area for the pedicle flap was then injected with anesthesia.  The flap was excised through the skin and subcutaneous tissue down to the layer of the underlying musculature.  The pedicle flap was carefully excised within this deep plane to maintain its blood supply.  The edges of the donor site were undermined.   The donor site was closed in a primary fashion.  The pedicle was then rotated into position and sutured.  Once the tube was sutured into place, adequate blood supply was confirmed with blanching and refill.  The pedicle was then wrapped with xeroform gauze and dressed appropriately with a telfa and gauze bandage to ensure continued blood supply and protect the attached pedicle.
Cheiloplasty (Complex) Text: A decision was made to reconstruct the defect with a  cheiloplasty.  The defect was undermined extensively.  Additional orbicularis oris muscle was excised with a 15 blade scalpel.  The defect was converted into a full thickness wedge to facilite a better cosmetic result.  Small vessels were then tied off with 5-0 monocyrl. The orbicularis oris, superficial fascia, adipose and dermis were then reapproximated.  After the deeper layers were approximated the epidermis was reapproximated with particular care given to realign the vermilion border.
Cheiloplasty (Less Than 50%) Text: A decision was made to reconstruct the defect with a  cheiloplasty.  The defect was undermined extensively.  Additional orbicularis oris muscle was excised with a 15 blade scalpel.  The defect was converted into a full thickness wedge, of less than 50% of the vertical height of the lip, to facilite a better cosmetic result.  Small vessels were then tied off with 5-0 monocyrl. The orbicularis oris, superficial fascia, adipose and dermis were then reapproximated.  After the deeper layers were approximated the epidermis was reapproximated with particular care given to realign the vermilion border.
Ear Wedge Repair Text: A wedge excision was completed by carrying down an excision through the full thickness of the ear and cartilage with an inward facing Burow's triangle. The wound was then closed in a layered fashion.
Full Thickness Lip Wedge Repair (Flap) Text: Given the location of the defect and the proximity to free margins a full thickness wedge repair was deemed most appropriate. Using a sterile surgical marker, the appropriate repair was drawn incorporating the defect and placing the expected incisions perpendicular to the vermilion border.  The vermilion border was also meticulously outlined to ensure appropriate reapproximation during the repair.  The area thus outlined was incised through and through with a #15 scalpel blade.  The muscularis and dermis were reaproximated with deep sutures following hemostasis. Care was taken to realign the vermilion border before proceeding with the superficial closure.  Once the vermilion was realigned the superfical and mucosal closure was finished.
Burow's Graft Text: The defect edges were debeveled with a #15 scalpel blade. Given the location of the defect, shape of the defect, the proximity to free margins and the presence of a standing cone deformity a Burow's skin graft was deemed most appropriate. The standing cone was removed and this tissue was then trimmed to the shape of the primary defect. The adipose tissue was also removed until only dermis and epidermis were left.  The skin graft was then placed in the primary defect and oriented appropriately.
Cartilage Graft Text: The defect edges were debeveled with a #15 scalpel blade. Given the location of the defect, shape of the defect, the fact the defect involved a full thickness cartilage defect a cartilage graft was deemed most appropriate.  An appropriate donor site was identified, cleansed, and anesthetized. The cartilage graft was then harvested and transferred to the recipient site, oriented appropriately and then sutured into place.  The secondary defect was then repaired using a primary closure.
Composite Graft Text: The defect edges were debeveled with a #15 scalpel blade. Given the location of the defect, shape of the defect, the proximity to free margins and the fact the defect was full thickness a composite graft was deemed most appropriate.  The defect was outline and then transferred to the donor site.  A full thickness graft was then excised from the donor site. The graft was then placed in the primary defect, oriented appropriately and then sutured into place.  The secondary defect was then repaired using a primary closure.
Epidermal Autograft Text: The defect edges were debeveled with a #15 scalpel blade. Given the location of the defect, shape of the defect and the proximity to free margins an epidermal autograft was deemed most appropriate. Using a sterile surgical marker, the primary defect shape was transferred to the donor site. The epidermal graft was then harvested.  The skin graft was then placed in the primary defect and oriented appropriately.
Dermal Autograft Text: The defect edges were debeveled with a #15 scalpel blade. Given the location of the defect, shape of the defect and the proximity to free margins a dermal autograft was deemed most appropriate. Using a sterile surgical marker, the primary defect shape was transferred to the donor site. The area thus outlined was incised deep to adipose tissue with a #15 scalpel blade.  The harvested graft was then trimmed of adipose and epidermal tissue until only dermis was left.  The skin graft was then placed in the primary defect and oriented appropriately.
Ftsg Text: The defect edges were debeveled with a #15 scalpel blade. Given the location of the defect, shape of the defect and the proximity to free margins a full thickness skin graft was deemed most appropriate. Using a sterile surgical marker, the primary defect shape was transferred to the donor site. The area thus outlined was incised deep to adipose tissue with a #15 scalpel blade.  The harvested graft was then trimmed of adipose tissue until only dermis and epidermis was left.  The skin graft was then placed in the primary defect and oriented appropriately.
Pinch Graft Text: The defect edges were debeveled with a #15 scalpel blade. Given the location of the defect, shape of the defect and the proximity to free margins a pinch graft was deemed most appropriate. Using a sterile surgical marker, the primary defect shape was transferred to the donor site. The area thus outlined was incised deep to adipose tissue with a #15 scalpel blade.  The harvested graft was then trimmed of adipose tissue until only dermis and epidermis was left. The skin graft was then placed in the primary defect and oriented appropriately.
Skin Substitute Text: The defect edges were debeveled with a #15 scalpel blade. Given the location of the defect, shape of the defect and the proximity to free margins a skin substitute graft was deemed most appropriate.  The graft material was trimmed to fit the size of the defect. The graft was then placed in the primary defect and oriented appropriately.
Split-Thickness Skin Graft Text: The defect edges were debeveled with a #15 scalpel blade. Given the location of the defect, shape of the defect and the proximity to free margins a split thickness skin graft was deemed most appropriate. Using a sterile surgical marker, the primary defect shape was transferred to the donor site. The split thickness graft was then harvested.  The skin graft was then placed in the primary defect and oriented appropriately.
Tissue Cultured Epidermal Autograft Text: The defect edges were debeveled with a #15 scalpel blade. Given the location of the defect, shape of the defect and the proximity to free margins a tissue cultured epidermal autograft was deemed most appropriate.  The graft was then trimmed to fit the size of the defect.  The graft was then placed in the primary defect and oriented appropriately.
Xenograft Text: The defect edges were debeveled with a #15 scalpel blade. Given the location of the defect, shape of the defect and the proximity to free margins a xenograft was deemed most appropriate.  The graft was then trimmed to fit the size of the defect.  The graft was then placed in the primary defect and oriented appropriately.
Complex Repair And Flap Additional Text (Will Appearing After The Standard Complex Repair Text): The complex repair was not sufficient to completely close the primary defect. The remaining additional defect was repaired with the flap mentioned below.
Complex Repair And Graft Additional Text (Will Appearing After The Standard Complex Repair Text): The complex repair was not sufficient to completely close the primary defect. The remaining additional defect was repaired with the graft mentioned below.
Eyelid Full Thickness Repair - 32153: The eyelid defect was full thickness which required a wedge repair of the eyelid. Special care was taken to ensure that the eyelid margin was realligned when placing sutures.
Eyelid Partial Thickness Repair - 28963: The eyelid defect was partial thickness which required a wedge repair of the eyelid. Special care was taken to ensure that the eyelid margin was realligned when placing sutures.
Intermediate Repair And Flap Additional Text (Will Appearing After The Standard Complex Repair Text): The intermediate repair was not sufficient to completely close the primary defect. The remaining additional defect was repaired with the flap mentioned below.
Intermediate Repair And Graft Additional Text (Will Appearing After The Standard Complex Repair Text): The intermediate repair was not sufficient to completely close the primary defect. The remaining additional defect was repaired with the graft mentioned below.
Localized Dermabrasion With 15 Blade Text: The patient was draped in routine manner.  Localized dermabrasion using a 15 blade was performed in routine manner to papillary dermis. This spot dermabrasion is being performed to complete skin cancer reconstruction. It also will eliminate the other sun damaged precancerous cells that are known to be part of the regional effect of a lifetime's worth of sun exposure. This localized dermabrasion is therapeutic and should not be considered cosmetic in any regard.
Localized Dermabrasion With Sand Papertext: The patient was draped in routine manner.  Localized dermabrasion using sterile sand paper was performed in routine manner to papillary dermis. This spot dermabrasion is being performed to complete skin cancer reconstruction. It also will eliminate the other sun damaged precancerous cells that are known to be part of the regional effect of a lifetime's worth of sun exposure. This localized dermabrasion is therapeutic and should not be considered cosmetic in any regard.
Localized Dermabrasion With Wire Brush Text: The patient was draped in routine manner.  Localized dermabrasion using 3 x 17 mm wire brush was performed in routine manner to papillary dermis. This spot dermabrasion is being performed to complete skin cancer reconstruction. It also will eliminate the other sun damaged precancerous cells that are known to be part of the regional effect of a lifetime's worth of sun exposure. This localized dermabrasion is therapeutic and should not be considered cosmetic in any regard.
Purse String (Simple) Text: Given the location of the defect and the characteristics of the surrounding skin a purse string closure was deemed most appropriate.  Undermining was performed circumferentially around the surgical defect.  A purse string suture was then placed and tightened.
Purse String (Intermediate) Text: Given the location of the defect and the characteristics of the surrounding skin a purse string intermediate closure was deemed most appropriate.  Undermining was performed circumferentially around the surgical defect.  A purse string suture was then placed and tightened.
Partial Purse String (Simple) Text: Given the location of the defect and the characteristics of the surrounding skin a simple purse string closure was deemed most appropriate.  Undermining was performed circumferentially around the surgical defect.  A purse string suture was then placed and tightened. Wound tension only allowed a partial closure of the circular defect.
Partial Purse String (Intermediate) Text: Given the location of the defect and the characteristics of the surrounding skin an intermediate purse string closure was deemed most appropriate.  Undermining was performed circumferentially around the surgical defect.  A purse string suture was then placed and tightened. Wound tension only allowed a partial closure of the circular defect.
Tarsorrhaphy Text: A tarsorrhaphy was performed using Frost sutures.
Manual Repair Warning Statement: We plan on removing the manually selected variable below in favor of our much easier automatic structured text blocks found in the previous tab. We decided to do this to help make the flow better and give you the full power of structured data. Manual selection is never going to be ideal in our platform and I would encourage you to avoid using manual selection from this point on, especially since I will be sunsetting this feature. It is important that you do one of two things with the customized text below. First, you can save all of the text in a word file so you can have it for future reference. Second, transfer the text to the appropriate area in the Library tab. Lastly, if there is a flap or graft type which we do not have you need to let us know right away so I can add it in before the variable is hidden. No need to panic, we plan to give you roughly 6 months to make the change.
Same Histology In Subsequent Stages Text: The pattern and morphology of the tumor is as described in the first stage.
No Residual Tumor Seen Histology Text: There were no malignant cells seen in the sections examined.
Inflammation Suggestive Of Cancer Camouflage Histology Text: There was a dense lymphocytic infiltrate which prevented adequate histologic evaluation of adjacent structures.
Bcc Histology Text: There were numerous aggregates of basaloid cells.
Bcc Infiltrative Histology Text: There were numerous aggregates of basaloid cells demonstrating an infiltrative pattern.
Mart-1 - Positive Histology Text: MART-1 staining demonstrates areas of higher density and clustering of melanocytes with Pagetoid spread upwards within the epidermis. The surgical margins are positive for tumor cells.
Mart-1 - Negative Histology Text: MART-1 staining demonstrates a normal density and pattern of melanocytes along the dermal-epidermal junction. The surgical margins are negative for tumor cells.
Information: Selecting Yes will display possible errors in your note based on the variables you have selected. This validation is only offered as a suggestion for you. PLEASE NOTE THAT THE VALIDATION TEXT WILL BE REMOVED WHEN YOU FINALIZE YOUR NOTE. IF YOU WANT TO FAX A PRELIMINARY NOTE YOU WILL NEED TO TOGGLE THIS TO 'NO' IF YOU DO NOT WANT IT IN YOUR FAXED NOTE.
Bill 59 Modifier?: No - Continue to Bill 79 Modifier

## 2024-12-10 NOTE — DISCHARGE INSTRUCTIONS
Return if chest pain or increasing shortness of breath  Continue your previously prescribed medications  Follow-up with your doctor

## 2024-12-10 NOTE — ED QUICK NOTES
Pt provided discharge paperwork and vital signs assessed prior to discharge. Pt verbalized understanding of all discharge paperwork with no further questions at this time.  Vital signs assessed prior to DC (See VS flowsheet for details). Pt wheeled by wheelchair by RN to ED WR.

## 2024-12-10 NOTE — TELEPHONE ENCOUNTER
ER visit reviewed, thankfully CT scan was negative for PE.  They performed a CT scan which showed no pneumonia, fluid collection.  Thankfully the lungs look good, no need to perform a chest x-ray that we ordered yesterday.  Should monitor for any further shortness of breath symptoms and notify us if there is any pattern such as cough, fever.    This may be also excessive gastric acid secretion, lets go ahead and proceed with Pepcid 20 mg twice a day for 2 weeks to see if this helps with the shortness of breath.  Nonurgent medication, please confirm which pharmacy to send it to, pended

## 2024-12-10 NOTE — PROGRESS NOTES
Patient had recent Emergency Room visit, calling to offer Primary Care Physician follow-up appointment (discharged 12/09)    Dr Anthony George  Internal Medicine  [address] [place] [id number]  [phone]    Attempt #1:  Left message on voicemail for patient to call transitions specialist back to schedule follow up appointments. Provided Transitions specialist scheduling phone number [phone].

## 2024-12-10 NOTE — TELEPHONE ENCOUNTER
Please call patient  He saw Dr George yesterday, patient was advised to go to ER based on labs/possible blood clot  Patient called to follow, no blood clot, all other tests were clear as well  Please call to discuss/advise, next steps  Patient has dermatologist appointment today at 12:30 today  Tasked to nursing

## 2024-12-10 NOTE — ED PROVIDER NOTES
Patient Seen in: [facility] Emergency Department      History     Chief Complaint   Patient presents with    Abnormal Labs     Stated Complaint: abnormal labs, concerned for clot    Subjective:   HPI      The patient is an 84-year-old male with a history of aortic aneurysm, hypertension, diabetes who had a routine visit with his primary care physician earlier today and complained of some shortness of breath.  He denies cough fevers or chills.  He denies chest pain.  No leg pain or swelling.  His primary did an EKG which was unremarkable and ordered outpatient lab work.  D-dimer came back elevated and he was sent to the emergency department for evaluation.  Patient denies any chest pain or shortness of breath at this time.    Objective:     Past Medical History:    Aorta aneurysm (HCC)    Back problem    Benign essential HTN    Cataract    Chronic rhinitis    Diabetes (HCC)    DM (diabetes mellitus), type 2 (HCC)    Hearing impairment    High blood pressure    High cholesterol    Hyperlipidemia    POAG (primary open-angle glaucoma)    first visit with Dr. Ang patient has been taking Brimonidine 0.15% OU BID and Timolol 0.5% OU BID for 10 yrs per MD in [place].  10/28/23 patient to continue taking gtts per [facility] due to abnormal VF OS and abnormal OCT OU    PVD (peripheral vascular disease) ([facility])    Skin cancer    Visual impairment              Past Surgical History:   Procedure Laterality Date    Appendectomy      Cataract extraction w/  intraocular lens implant Left 04/07/2014    Done by Dr. [place] in [place]    Cataract extraction w/  intraocular lens implant Right 2014    Done by Dr. [place] in [place]    Other accessory      T and A    Other accessory      appendectomy    Other accessory      cholycystectomy    Other accessory      bilateral cataract    Other accessory      facial basal cell cancer    Tonsillectomy                  Social History     Socioeconomic  History    Marital status:    Tobacco Use    Smoking status: Former     Types: Cigarettes     Passive exposure: Never    Smokeless tobacco: Never   Vaping Use    Vaping status: Never Used   Substance and Sexual Activity    Alcohol use: Yes     Alcohol/week: 2.0 standard drinks of alcohol     Types: 2 Cans of beer per week     Comment: 1 - 2 beers occasional    Drug use: Never     Social Drivers of Health     Financial Resource Strain: Low Risk  (7/15/2024)    Financial Resource Strain     Difficulty of Paying Living Expenses: Not hard at all     Med Affordability: No   Food Insecurity: No Food Insecurity (7/12/2024)    Food Insecurity     Food Insecurity: Never true   Transportation Needs: No Transportation Needs (7/15/2024)    Transportation Needs     Lack of Transportation: No   Housing Stability: Low Risk  (7/12/2024)    Housing Stability     Housing Instability: No                  Physical Exam     ED Triage Vitals [12/09/24 1747]   /82   Pulse 84   Resp 16   Temp 97.8 °F (36.6 °C)   Temp src Temporal   SpO2 97 %   O2 Device None (Room air)       Current Vitals:   Vital Signs  BP: (!) 162/79  Pulse: 65  Resp: 16  Temp: 97.8 °F (36.6 °C)  Temp src: Temporal  MAP (mmHg): 100    Oxygen Therapy  SpO2: 97 %  O2 Device: None (Room air)        Physical Exam  Vitals and nursing note reviewed.   Constitutional:       General: He is not in acute distress.     Appearance: Normal appearance. He is well-developed. He is not ill-appearing.   HENT:      Head: Normocephalic and atraumatic.      Nose: Nose normal.      Mouth/Throat:      Mouth: Mucous membranes are moist.   Eyes:      Conjunctiva/sclera: Conjunctivae normal.      Pupils: Pupils are equal, round, and reactive to light.   Neck:      Vascular: No JVD.   Cardiovascular:      Rate and Rhythm: Normal rate and regular rhythm.      Heart sounds: Normal heart sounds. No murmur heard.  Pulmonary:      Effort: Pulmonary effort is normal.      Breath sounds:  Normal breath sounds.   Abdominal:      General: Bowel sounds are normal. There is no distension.      Palpations: Abdomen is soft. There is no mass.      Tenderness: There is no abdominal tenderness. There is no guarding or rebound.   Musculoskeletal:         General: No tenderness (No calf tenderness). Normal range of motion.      Cervical back: Normal range of motion and neck supple.      Right lower leg: No edema.      Left lower leg: No edema.   Skin:     General: Skin is warm and dry.      Capillary Refill: Capillary refill takes less than 2 seconds.      Findings: No rash.   Neurological:      General: No focal deficit present.      Mental Status: He is alert and oriented to person, place, and time.      Deep Tendon Reflexes: Reflexes are normal and symmetric.   Psychiatric:         Judgment: Judgment normal.     Differential diagnosis includes but is not limited to upper respiratory infection, pneumonia, PE, CHF, deconditioning        ED Course     Labs Reviewed   TROPONIN I HIGH SENSITIVITY - Normal                   MDM      Pulse ox 98% on room air, normal        Medical Decision Making  No evidence of PE, normal oxygenation no dyspnea on exam  May be deconditioning or side effect of hiatal hernia  No other lung consolidation or abnormality on CT scan  Follow-up with primary MD  Patient comfortable with discharge plan    Problems Addressed:  Dyspnea on exertion: acute illness or injury    Amount and/or Complexity of Data Reviewed  External Data Reviewed: labs, ECG and notes.     Details: Outpatient labs EKG and notes from primary care physician today reviewed  Labs: ordered.  Radiology: ordered and independent interpretation performed.     Details: No pneumothorax other results per radiologist        Disposition and Plan     Clinical Impression:  1. Dyspnea on exertion         Disposition:  Discharge  12/9/2024  9:49 pm    Follow-up:  Dick George MD  [address] [place] [address]  [id number]  [phone]    Schedule an appointment as soon as possible for a visit      We recommend that you schedule follow up care with a primary care provider within the next three months to obtain basic health screening including reassessment of your blood pressure.      Medications Prescribed:  Current Discharge Medication List              Supplementary Documentation:

## 2024-12-10 NOTE — TELEPHONE ENCOUNTER
Current refill request refused due to refill is either a duplicate request or has active refills at the pharmacy.  Check previous templates.    Requested Prescriptions     Refused Prescriptions Disp Refills    FAMOTIDINE 20 MG Oral Tab [Pharmacy Med Name: FAMOTIDINE 20MG TABLETS] 180 tablet 0     Sig: TAKE 1 TABLET(20 MG) BY MOUTH TWICE DAILY     Refused By: OCTAVIO GLEASON     Reason for Refusal: Duplicate refill request

## 2024-12-11 NOTE — PROGRESS NOTES
Patient had recent Emergency Room visit, calling to offer Primary Care Physician follow-up appointment (discharged 12/09)     Dr Anthony George  Internal Medicine  [address] [place] [id number]  [phone]  Patient declined appointment; patient advised he will call if needed  Closing encounter

## 2024-12-14 ENCOUNTER — TELEPHONE (OUTPATIENT)
Dept: INTERNAL MEDICINE CLINIC | Facility: CLINIC | Age: 84
End: 2024-12-14

## 2024-12-14 NOTE — TELEPHONE ENCOUNTER
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  Complex repair.  Postoperative wound check.  Follow-up again in 1 week.

## 2024-12-14 NOTE — TELEPHONE ENCOUNTER
Clinical dermatology visit 12/3/2024 with Dr. Loco.  Left medial knee inadequately controlled superficial basal cell carcinoma status post curettage and destruction.,  Suture removal of the right central posterior auricular skin.  Follow-up for skin check, scheduled for Mohs and skin checks.

## 2024-12-16 ENCOUNTER — APPOINTMENT (OUTPATIENT)
Dept: URBAN - METROPOLITAN AREA CLINIC 244 | Age: 84
Setting detail: DERMATOLOGY
End: 2024-12-16

## 2024-12-16 DIAGNOSIS — Z48.02 ENCOUNTER FOR REMOVAL OF SUTURES: ICD-10-CM

## 2024-12-16 PROCEDURE — OTHER SUTURE REMOVAL (GLOBAL PERIOD): OTHER

## 2024-12-16 PROCEDURE — 99024 POSTOP FOLLOW-UP VISIT: CPT

## 2024-12-16 ASSESSMENT — LOCATION DETAILED DESCRIPTION DERM: LOCATION DETAILED: LEFT CENTRAL POSTAURICULAR SKIN

## 2024-12-16 ASSESSMENT — LOCATION SIMPLE DESCRIPTION DERM: LOCATION SIMPLE: SCALP

## 2024-12-16 ASSESSMENT — LOCATION ZONE DERM: LOCATION ZONE: SCALP

## 2024-12-16 NOTE — PROCEDURE: SUTURE REMOVAL (GLOBAL PERIOD)
Detail Level: Simple
Add 14093 Cpt? (Important Note: In 2017 The Use Of 68614 Is Being Tracked By Cms To Determine Future Global Period Reimbursement For Global Periods): yes

## 2024-12-17 ENCOUNTER — TELEPHONE (OUTPATIENT)
Dept: INTERNAL MEDICINE CLINIC | Facility: CLINIC | Age: 84
End: 2024-12-17

## 2024-12-17 NOTE — TELEPHONE ENCOUNTER
Patient received amoxicillin prescription from his mail order pharmacy - [facility]  Patient is taking the medication as he's picked it up from the local pharmacy     Should he discard the medication he received from [facility]

## 2024-12-17 NOTE — TELEPHONE ENCOUNTER
Called patient who states he got antibiotic from mail order pharmacy- RN instructed patient to put it in safe place - should check expiration date but NOT to use unless he speaks with a physician ( then he can mention that he has it at home)verbalized understanding

## 2025-01-02 ENCOUNTER — OFFICE VISIT (OUTPATIENT)
Dept: PODIATRY CLINIC | Facility: CLINIC | Age: 85
End: 2025-01-02

## 2025-01-02 DIAGNOSIS — R26.81 GAIT INSTABILITY: ICD-10-CM

## 2025-01-02 DIAGNOSIS — M79.672 BILATERAL FOOT PAIN: ICD-10-CM

## 2025-01-02 DIAGNOSIS — B35.1 ONYCHOMYCOSIS: ICD-10-CM

## 2025-01-02 DIAGNOSIS — E11.42 TYPE 2 DIABETES MELLITUS WITH POLYNEUROPATHY (HCC): Primary | ICD-10-CM

## 2025-01-02 DIAGNOSIS — M79.671 BILATERAL FOOT PAIN: ICD-10-CM

## 2025-01-02 PROCEDURE — 99213 OFFICE O/P EST LOW 20 MIN: CPT | Performed by: STUDENT IN AN ORGANIZED HEALTH CARE EDUCATION/TRAINING PROGRAM

## 2025-01-02 PROCEDURE — 1159F MED LIST DOCD IN RCRD: CPT | Performed by: STUDENT IN AN ORGANIZED HEALTH CARE EDUCATION/TRAINING PROGRAM

## 2025-01-02 NOTE — PROGRESS NOTES
[facility]  Progress Note      Darin Hernandez is a 84 year old male.   Chief Complaint   Patient presents with    Diabetic Foot Care     F/u Diabetic Foot Care,  this am,  has intermittent pain 7/10 at night time. on 12/5/24 A1C= 8.6, on Dick George MD.             HPI:     Patient is a pleasant 84-year-old diabetic male who presents to clinic for bilateral foot evaluation.  Admits to elongated toenails he is unable to trim himself.  Denies any pedal injuries or trauma.  Denies any signs of infection.    Allergies: Lisinopril    Current Outpatient Medications   Medication Sig Dispense Refill    famotidine 20 MG Oral Tab Take 1 tablet (20 mg total) by mouth 2 (two) times daily. 60 tablet 0    PIOGLITAZONE 45 MG Oral Tab TAKE 1 TABLET BY MOUTH DAILY 90 tablet 3    Potassium Chloride ER 10 MEQ Oral Tab CR Take 1 tablet (10 mEq total) by mouth daily. 90 tablet 3    Glucose Blood (ONETOUCH VERIO) In Vitro Strip Checl blood sugar once daily 100 strip 3    losartan 100 MG Oral Tab Take 1 tablet (100 mg total) by mouth daily. 90 tablet 3    tamsulosin 0.4 MG Oral Cap Take 1 capsule (0.4 mg total) by mouth daily. 90 capsule 3    METFORMIN HCL 1000 MG Oral Tab TAKE 1 TABLET BY MOUTH EVERY MORNING AND EVERY NIGHT AT BEDTIME 180 tablet 3    atorvastatin 40 MG Oral Tab Take 1 tablet (40 mg total) by mouth daily. 90 tablet 3    glipiZIDE 10 MG Oral Tab Take 1 tablet (10 mg total) by mouth daily. Daily in the morning 90 tablet 3    atenolol 25 MG Oral Tab Take 1 tablet (25 mg total) by mouth daily with dinner.      amLODIPine 5 MG Oral Tab Take 1 tablet (5 mg total) by mouth daily. 30 tablet 5    BRIMONIDINE 0.15 % Ophthalmic Solution INSTILL 1 DROP INTO BOTH EYES IN THE MORNING AND 1 DROP BEFORE  BEDTIME 3 each 3    TIMOLOL 0.5 % Ophthalmic Solution INSTILL 1 DROP INTO BOTH EYES  TWICE DAILY 15 mL 3    Budesonide-Formoterol Fumarate 80-4.5 MCG/ACT Inhalation Aerosol Inhale 2 puffs into the lungs 2  (two) times daily. 1 each 3    alendronate 70 MG Oral Tab Take 1 tablet (70 mg total) by mouth once a week. 13 tablet 3    aspirin-acetaminophen-caffeine 250-250-65 MG Oral Tab Take 1 tablet by mouth every 6 (six) hours as needed for Pain.      Glucose Blood (ONETOUCH VERIO) In Vitro Strip Use daily 100 strip 3    OneTouch Delica Lancets 33G Does not apply Misc Use daily 100 each 3    aspirin 81 MG Oral Tab EC Take 1 tablet (81 mg total) by mouth daily.      Ascorbic Acid (VITAMIN C OR) Take 1 tablet by mouth daily.        Past Medical History:    Aorta aneurysm ([facility])    Back problem    Benign essential HTN    Cataract    Chronic rhinitis    Diabetes (HCC)    DM (diabetes mellitus), type 2 ([facility])    Hearing impairment    High blood pressure    High cholesterol    Hyperlipidemia    POAG (primary open-angle glaucoma)    first visit with Dr. Ang patient has been taking Brimonidine 0.15% OU BID and Timolol 0.5% OU BID for 10 yrs per MD in [place].  10/28/23 patient to continue taking gtts per [facility] due to abnormal VF OS and abnormal OCT OU    PVD (peripheral vascular disease) ([facility])    Skin cancer    Visual impairment      Past Surgical History:   Procedure Laterality Date    Appendectomy      Cataract extraction w/  intraocular lens implant Left 04/07/2014    Done by Dr. [place] Park in [place]    Cataract extraction w/  intraocular lens implant Right 2014    Done by Dr. [place] Park in [place]    Other accessory      T and A    Other accessory      appendectomy    Other accessory      cholycystectomy    Other accessory      bilateral cataract    Other accessory      facial basal cell cancer    Tonsillectomy        Family History   Problem Relation Age of Onset    Macular degeneration Brother     Glaucoma Neg     Diabetes Neg       Social History     Socioeconomic History    Marital status:    Tobacco Use    Smoking status: Former     Types: Cigarettes     Passive exposure: Never     Smokeless tobacco: Never   Vaping Use    Vaping status: Never Used   Substance and Sexual Activity    Alcohol use: Yes     Alcohol/week: 2.0 standard drinks of alcohol     Types: 2 Cans of beer per week     Comment: 1 - 2 beers occasional    Drug use: Never           REVIEW OF SYSTEMS:     Denies nause, fever, chills  No calf pain  Denies chest pain or SOB      EXAM:   There were no vitals taken for this visit.  GENERAL: well developed, well nourished, in no apparent distress  EXTREMITIES:   1. Integument: Normal skin temperature and turgor. Toenails x10 are elongated, thickened and discolored with subungal derbi.     2. Vascular: Dorsalis pedis two out of four bilateral and posterior tibial pulses two out of   four bilateral, capillary refill normal.   3. Musculoskeletal: All muscle groups are graded 5 out of 5 in the foot and ankle.   4. Neurological: Normal sharp dull sensation; reflexes normal.             ASSESSMENT AND PLAN:   Diagnoses and all orders for this visit:    Type 2 diabetes mellitus with polyneuropathy (HCC)    Gait instability    Onychomycosis    Bilateral foot pain        Plan:       -Patient examined, chart history reviewed.  -Discussed importance of proper pedal hygiene, regular foot checks, and tight glucose control.  -Sharply debrided nails x10 with a sterile nail nipper achieving a 20% reduction in thickness and length, without incident.   -Ambulate with supportive shoes and inserts and avoid walking barefoot.  -Educated patient on acute signs of infection advised patient to seek immediate medical attention if symptoms arise.      The patient indicates understanding of these issues and agrees to the plan.        Mellissa Douglas DPM

## 2025-01-08 NOTE — TELEPHONE ENCOUNTER
Patient called for refills of     Atorvastatin 40 mg - takes one tablet daily and  Alendronate 70 mg tablet takes one tablet on Fridays first thing in the AM  Prescribed by physician in [address] [place] [address] if Dr Kieran Faria wants pt to continue taking these if so pt will need a refill sent in     Please call to confirm [phone] stretcher

## 2025-01-17 RX ORDER — AMLODIPINE BESYLATE 5 MG/1
5 TABLET ORAL DAILY
Qty: 90 TABLET | Refills: 3 | Status: SHIPPED | OUTPATIENT
Start: 2025-01-17 | End: 2026-01-12

## 2025-01-17 NOTE — TELEPHONE ENCOUNTER
Patient has refills remaining for both medications at the local pharmacy.  I contacted the patient and he requested that the refills be sent to [facility].    Sent as requested.     Refill request is for a maintenance medication and has met the criteria specified in the Ambulatory Medication Refill Standing Order for eligibility, visits, laboratory, alerts and was sent to the requested pharmacy.    Requested Prescriptions     Signed Prescriptions Disp Refills    amLODIPine 5 MG Oral Tab 90 tablet 3     Sig: Take 1 tablet (5 mg total) by mouth daily.     Authorizing Provider: AMBER YORK     Ordering User: MAGNO MUELLER    metFORMIN HCl 1000 MG Oral Tab 180 tablet 3     Sig: TAKE 1 TABLET BY MOUTH EVERY MORNING AND EVERY NIGHT AT BEDTIME     Authorizing Provider: AMBER YORK     Ordering User: MAGNO MUELLER

## 2025-01-21 ENCOUNTER — APPOINTMENT (OUTPATIENT)
Dept: URBAN - METROPOLITAN AREA CLINIC 244 | Age: 85
Setting detail: DERMATOLOGY
End: 2025-01-21

## 2025-01-21 PROBLEM — C44.319 BASAL CELL CARCINOMA OF SKIN OF OTHER PARTS OF FACE: Status: ACTIVE | Noted: 2025-01-21

## 2025-01-21 PROCEDURE — OTHER MOHS SURGERY: OTHER

## 2025-01-21 PROCEDURE — 13132 CMPLX RPR F/C/C/M/N/AX/G/H/F: CPT

## 2025-01-21 PROCEDURE — OTHER COUNSELING: OTHER

## 2025-01-21 PROCEDURE — 17311 MOHS 1 STAGE H/N/HF/G: CPT

## 2025-01-21 NOTE — PROCEDURE: MOHS SURGERY
Mohs Case Number: [id number]
Biopsy Photograph Reviewed: Yes
Consent Type: Consent 1 (Standard)
Eye Shield Used: No
Surgeon Performing Repair (Optional): Dr Duarte
Initial Size Of Lesion: 1
X Size Of Lesion In Cm (Optional): 0.7
Primary Defect Length In Cm (Final Defect Size - Required For Flaps/Grafts): 1.8
Primary Defect Width In Cm (Final Defect Size - Required For Flaps/Grafts): 1.5
Primary Defect Depth In Cm (Optional But Required For Some Insurers): 0
Repair Type: Complex Repair
Which Instrument Did You Use For Dermabrasion?: Wire Brush
Which Eyelid Repair Cpt Are You Using?: 51849
Oculoplastic Surgeon Procedure Text (A): After obtaining clear surgical margins the patient was sent to oculoplastics for surgical repair.  The patient understands they will receive post-surgical care and follow-up from the referring physician's office.
Otolaryngologist Procedure Text (A): After obtaining clear surgical margins the patient was sent to otolaryngology for surgical repair.  The patient understands they will receive post-surgical care and follow-up from the referring physician's office.
Plastic Surgeon Procedure Text (A): After obtaining clear surgical margins the patient was sent to plastics for surgical repair.  The patient understands they will receive post-surgical care and follow-up from the referring physician's office.
Mid-Level Procedure Text (A): After obtaining clear surgical margins the patient was sent to a mid-level provider for surgical repair.  The patient understands they will receive post-surgical care and follow-up from the mid-level provider.
Provider Procedure Text (A): After obtaining clear surgical margins the defect was repaired by another provider.
Asc Procedure Text (A): After obtaining clear surgical margins the patient was sent to an ASC for surgical repair.  The patient understands they will receive post-surgical care and follow-up from the ASC physician.
Simple / Intermediate / Complex Repair - Final Wound Length In Cm: 3.1
Suturegard Retention Suture: 2-0 Nylon
Retention Suture Bite Size: 3 mm
Length To Time In Minutes Device Was In Place: 10
Undermining Type: Entire Wound
Debridement Text: The wound edges were debrided prior to proceeding with the closure to facilitate wound healing.
Helical Rim Text: The closure involved the helical rim.
Vermilion Border Text: The closure involved the vermilion border.
Nostril Rim Text: The closure involved the nostril rim.
Retention Suture Text: Retention sutures were placed to support the closure and prevent dehiscence.
Area H Indication Text: Tumors in this location are included in Area H (eyelids, eyebrows, nose, lips, chin, ear, pre-auricular, post-auricular, temple, genitalia, hands, feet, ankles and areola).  Tissue conservation is critical in these anatomic locations.
Area M Indication Text: Tumors in this location are included in Area M (cheek, forehead, scalp, neck, jawline and pretibial skin).  Mohs surgery is indicated for tumors in these anatomic locations.
Area L Indication Text: Tumors in this location are included in Area L (trunk and extremities).  Mohs surgery is indicated for larger tumors, or tumors with aggressive histologic features, in these anatomic locations.
Depth Of Tumor Invasion (For Histology): tumor not visualized
Perineural Invasion (For Histology - Be Specific If Possible): absent
Special Stains Stage 1 - Results: Base On Clearance Noted Above
Stage 2: Additional Anesthesia Type: 1% lidocaine with epinephrine
Staging Info: By selecting yes to the question above you will include information on AJCC 8 tumor staging in your Mohs note. Information on tumor staging will be automatically added for SCCs on the head and neck. AJCC 8 includes tumor size, tumor depth, perineural involvement and bone invasion.
Tumor Depth: Less than 6mm from granular layer and no invasion beyond the subcutaneous fat
Was The Patient On Physician Recommended Anticoagulation Therapy?: Please Select the Appropriate Response
Medical Necessity Statement: Based on my medical judgement, Mohs surgery is the most appropriate treatment for this cancer compared to other treatments.
Alternatives Discussed Intro (Do Not Add Period): I discussed alternative treatments to Mohs surgery and specifically discussed the risks and benefits of
Consent 1/Introductory Paragraph: The rationale for Mohs was explained to the patient and consent was obtained. The risks, benefits and alternatives to therapy were discussed in detail. Specifically, the risks of infection, scarring, bleeding, prolonged wound healing, incomplete removal, allergy to anesthesia, nerve injury and recurrence were addressed. Prior to the procedure, the treatment site was clearly identified and confirmed by the patient. All components of Universal Protocol/PAUSE Rule completed.
Consent 2/Introductory Paragraph: Mohs surgery was explained to the patient and consent was obtained. The risks, benefits and alternatives to therapy were discussed in detail. Specifically, the risks of infection, scarring, bleeding, prolonged wound healing, incomplete removal, allergy to anesthesia, nerve injury and recurrence were addressed. Prior to the procedure, the treatment site was clearly identified and confirmed by the patient. All components of Universal Protocol/PAUSE Rule completed.
Consent 3/Introductory Paragraph: I gave the patient a chance to ask questions they had about the procedure.  Following this I explained the Mohs procedure and consent was obtained. The risks, benefits and alternatives to therapy were discussed in detail. Specifically, the risks of infection, scarring, bleeding, prolonged wound healing, incomplete removal, allergy to anesthesia, nerve injury and recurrence were addressed. Prior to the procedure, the treatment site was clearly identified and confirmed by the patient. All components of Universal Protocol/PAUSE Rule completed.
Consent (Temporal Branch)/Introductory Paragraph: The rationale for Mohs was explained to the patient and consent was obtained. The risks, benefits and alternatives to therapy were discussed in detail. Specifically, the risks of damage to the temporal branch of the facial nerve, infection, scarring, bleeding, prolonged wound healing, incomplete removal, allergy to anesthesia, and recurrence were addressed. Prior to the procedure, the treatment site was clearly identified and confirmed by the patient. All components of Universal Protocol/PAUSE Rule completed.
Consent (Marginal Mandibular)/Introductory Paragraph: The rationale for Mohs was explained to the patient and consent was obtained. The risks, benefits and alternatives to therapy were discussed in detail. Specifically, the risks of damage to the marginal mandibular branch of the facial nerve, infection, scarring, bleeding, prolonged wound healing, incomplete removal, allergy to anesthesia, and recurrence were addressed. Prior to the procedure, the treatment site was clearly identified and confirmed by the patient. All components of Universal Protocol/PAUSE Rule completed.
Consent (Spinal Accessory)/Introductory Paragraph: The rationale for Mohs was explained to the patient and consent was obtained. The risks, benefits and alternatives to therapy were discussed in detail. Specifically, the risks of damage to the spinal accessory nerve, infection, scarring, bleeding, prolonged wound healing, incomplete removal, allergy to anesthesia, and recurrence were addressed. Prior to the procedure, the treatment site was clearly identified and confirmed by the patient. All components of Universal Protocol/PAUSE Rule completed.
Consent (Near Eyelid Margin)/Introductory Paragraph: The rationale for Mohs was explained to the patient and consent was obtained. The risks, benefits and alternatives to therapy were discussed in detail. Specifically, the risks of ectropion or eyelid deformity, infection, scarring, bleeding, prolonged wound healing, incomplete removal, allergy to anesthesia, nerve injury and recurrence were addressed. Prior to the procedure, the treatment site was clearly identified and confirmed by the patient. All components of Universal Protocol/PAUSE Rule completed.
Consent (Ear)/Introductory Paragraph: The rationale for Mohs was explained to the patient and consent was obtained. The risks, benefits and alternatives to therapy were discussed in detail. Specifically, the risks of ear deformity, infection, scarring, bleeding, prolonged wound healing, incomplete removal, allergy to anesthesia, nerve injury and recurrence were addressed. Prior to the procedure, the treatment site was clearly identified and confirmed by the patient. All components of Universal Protocol/PAUSE Rule completed.
Consent (Nose)/Introductory Paragraph: The rationale for Mohs was explained to the patient and consent was obtained. The risks, benefits and alternatives to therapy were discussed in detail. Specifically, the risks of nasal deformity, changes in the flow of air through the nose, infection, scarring, bleeding, prolonged wound healing, incomplete removal, allergy to anesthesia, nerve injury and recurrence were addressed. Prior to the procedure, the treatment site was clearly identified and confirmed by the patient. All components of Universal Protocol/PAUSE Rule completed.
Consent (Lip)/Introductory Paragraph: The rationale for Mohs was explained to the patient and consent was obtained. The risks, benefits and alternatives to therapy were discussed in detail. Specifically, the risks of lip deformity, changes in the oral aperture, infection, scarring, bleeding, prolonged wound healing, incomplete removal, allergy to anesthesia, nerve injury and recurrence were addressed. Prior to the procedure, the treatment site was clearly identified and confirmed by the patient. All components of Universal Protocol/PAUSE Rule completed.
Consent (Scalp)/Introductory Paragraph: The rationale for Mohs was explained to the patient and consent was obtained. The risks, benefits and alternatives to therapy were discussed in detail. Specifically, the risks of changes in hair growth pattern secondary to repair, infection, scarring, bleeding, prolonged wound healing, incomplete removal, allergy to anesthesia, nerve injury and recurrence were addressed. Prior to the procedure, the treatment site was clearly identified and confirmed by the patient. All components of Universal Protocol/PAUSE Rule completed.
Detail Level: Detailed
Postop Diagnosis: same
Surgeon: Mati Duarte
Anesthesia Type: 0.5% lidocaine with 1:200,000 epinephrine and a 1:10 solution of 8.4% sodium bicarbonate
Anesthesia Volume In Cc: 5
Hemostasis: Electrodesiccation
Estimated Blood Loss (Cc): minimal
Repair Anesthesia Method: local infiltration
Anesthesia Volume In Cc: 2
Brow Lift Text: A midfrontal incision was made medially to the defect to allow access to the tissues just superior to the left eyebrow. Following careful dissection inferiorly in a supraperiosteal plane to the level of the left eyebrow, several 3-0 monocryl sutures were used to resuspend the eyebrow orbicularis oculi muscular unit to the superior frontal bone periosteum. This resulted in an appropriate reapproximation of static eyebrow symmetry and correction of the left brow ptosis.
Deep Sutures: 4-0 Vicryl
Epidermal Sutures: 5-0 Nylon
Epidermal Closure: running
Suturegard Intro: Intraoperative tissue expansion was performed, utilizing the SUTUREGARD device, in order to reduce wound tension.
Suturegard Body: The suture ends were repeatedly re-tightened and re-clamped to achieve the desired tissue expansion.
Hemigard Intro: Due to skin fragility and wound tension, it was decided to use HEMIGARD adhesive retention suture devices to permit a linear closure. The skin was cleaned and dried for a 6cm distance away from the wound. Excessive hair, if present, was removed to allow for adhesion.
Hemigard Postcare Instructions: The HEMIGARD strips are to remain completely dry for at least 5-7 days.
Donor Site Anesthesia Type: same as repair anesthesia
Epidermal Closure Graft Donor Site (Optional): simple interrupted
Graft Donor Site Bandage (Optional-Leave Blank If You Don't Want In Note): Steri-strips and a pressure bandage were applied to the donor site.
Closure 2 Information: This tab is for additional flaps and grafts, including complex repair and grafts and complex repair and flaps. You can also specify a different location for the additional defect, if the location is the same you do not need to select a new one. We will insert the automated text for the repair you select below just as we do for solitary flaps and grafts. Please note that at this time if you select a location with a different insurance zone you will need to override the ICD10 and CPT if appropriate.
Closure 3 Information: This tab is for additional flaps and grafts above and beyond our usual structured repairs.  Please note if you enter information here it will not currently bill and you will need to add the billing information manually.
Wound Care: Petrolatum
Dressing: dry sterile dressing
Suture Removal: 7 days
Unna Boot Text: An Unna boot was placed to help immobilize the limb and facilitate more rapid healing.
Home Suture Removal Text: Patient was provided instructions on removing sutures and will remove their sutures at home.  If they have any questions or difficulties they will call the office.
Post-Care Instructions: I reviewed with the patient in detail post-care instructions. Patient is not to engage in any heavy lifting, exercise, or swimming for the next 14 days. Should the patient develop any fevers, chills, bleeding, severe pain patient will contact the office immediately.
Pain Refusal Text: I offered to prescribe pain medication but the patient refused to take this medication.
Mauc Instructions: By selecting yes to the question below the MAUC number will be added into the note.  This will be calculated automatically based on the diagnosis chosen, the size entered, the body zone selected (H,M,L) and the specific indications you chose. You will also have the option to override the Mohs AUC if you disagree with the automatically calculated number and this option is found in the Case Summary tab.
Where Do You Want The Question To Include Opioid Counseling Located?: Case Summary Tab
Eye Protection Verbiage: Before proceeding with the stage, a plastic scleral shield was inserted. The globe was anesthetized with a few drops of 1% lidocaine with 1:100,000 epinephrine. Then, an appropriate sized scleral shield was chosen and coated with lacrilube ointment. The shield was gently inserted and left in place for the duration of each stage. After the stage was completed, the shield was gently removed.
Mohs Method Verbiage: An incision at a 45 degree angle following the standard Mohs approach was done and the specimen was harvested as a microscopic controlled layer.
Surgeon/Pathologist Verbiage (Will Incorporate Name Of Surgeon From Intro If Not Blank): operated in two distinct and integrated capacities as the surgeon and pathologist.
Mohs Histo Method Verbiage: Each section was then chromacoded and processed in the Mohs lab using the Mohs protocol and submitted for frozen section, utilizing hematoxylin and eosin histochemical stains.
Subsequent Stages Histo Method Verbiage: Using a similar technique to that described above, a thin layer of tissue was removed from all areas where tumor was visible on the previous stage.  The tissue was again oriented, mapped, dyed, and processed as above.
Mohs Rapid Report Verbiage: The area of clinically evident tumor was marked with skin marking ink and appropriately hatched.  The initial incision was made following the Mohs approach through the skin.  The specimen was taken to the lab, divided into the necessary number of pieces, chromacoded and processed according to the Mohs protocol.  This was repeated in successive stages until a tumor free defect was achieved.
Complex Repair Preamble Text (Leave Blank If You Do Not Want): Extensive wide undermining was performed.
Intermediate Repair Preamble Text (Leave Blank If You Do Not Want): Undermining was performed with blunt dissection.
Graft Cartilage Fenestration Text: The cartilage was fenestrated with a 2mm punch biopsy to help facilitate graft survival and healing.
Non-Graft Cartilage Fenestration Text: The cartilage was fenestrated with a 2mm punch biopsy to help facilitate healing.
Secondary Intention Text (Leave Blank If You Do Not Want): The defect will heal with secondary intention.
No Repair - Repaired With Adjacent Surgical Defect Text (Leave Blank If You Do Not Want): After obtaining clear surgical margins the defect was repaired concurrently with another surgical defect which was in close approximation.
Adjacent Tissue Transfer Text: The defect edges were debeveled with a #15 scalpel blade. Given the location of the defect and the proximity to free margins an adjacent tissue transfer was deemed most appropriate. Using a sterile surgical marker, an appropriate flap was drawn incorporating the defect and placing the expected incisions within the relaxed skin tension lines where possible. The area thus outlined was incised deep to adipose tissue with a #15 scalpel blade. The skin margins were undermined to an appropriate distance in all directions utilizing iris scissors and carried over to close the primary defect.
Advancement Flap (Single) Text: The defect edges were debeveled with a #15 scalpel blade. Given the location of the defect and the proximity to free margins a single advancement flap was deemed most appropriate. Using a sterile surgical marker, an appropriate advancement flap was drawn incorporating the defect and placing the expected incisions within the relaxed skin tension lines where possible. The area thus outlined was incised deep to adipose tissue with a #15 scalpel blade. The skin margins were undermined to an appropriate distance in all directions utilizing iris scissors. Following this, the designed flap was advanced and carried over into the primary defect and sutured into place.
Advancement Flap (Double) Text: The defect edges were debeveled with a #15 scalpel blade. Given the location of the defect and the proximity to free margins a double advancement flap was deemed most appropriate. Using a sterile surgical marker, the appropriate advancement flaps were drawn incorporating the defect and placing the expected incisions within the relaxed skin tension lines where possible. The area thus outlined was incised deep to adipose tissue with a #15 scalpel blade. The skin margins were undermined to an appropriate distance in all directions utilizing iris scissors. Following this, the designed flaps were advanced and carried over into the primary defect and sutured into place.
Advancement-Rotation Flap Text: The defect edges were debeveled with a #15 scalpel blade. Given the location of the defect, shape of the defect and the proximity to free margins an advancement-rotation flap was deemed most appropriate. Using a sterile surgical marker, an appropriate flap was drawn incorporating the defect and placing the expected incisions within the relaxed skin tension lines where possible. The area thus outlined was incised deep to adipose tissue with a #15 scalpel blade. The skin margins were undermined to an appropriate distance in all directions utilizing iris scissors. Following this, the designed flap was carried over into the primary defect and sutured into place.
Alar Island Pedicle Flap Text: The defect edges were debeveled with a #15 scalpel blade. Given the location of the defect, shape of the defect and the proximity to the alar rim an island pedicle advancement flap was deemed most appropriate. Using a sterile surgical marker, an appropriate advancement flap was drawn incorporating the defect, outlining the appropriate donor tissue and placing the expected incisions within the nasal ala running parallel to the alar rim. The area thus outlined was incised with a #15 scalpel blade. The skin margins were undermined minimally to an appropriate distance in all directions around the primary defect and laterally outward around the island pedicle utilizing iris scissors.  There was minimal undermining beneath the pedicle flap. Following this, the designed flap was carried over into the primary defect and sutured into place.
A-T Advancement Flap Text: The defect edges were debeveled with a #15 scalpel blade. Given the location of the defect, shape of the defect and the proximity to free margins an A-T advancement flap was deemed most appropriate. Using a sterile surgical marker, an appropriate advancement flap was drawn incorporating the defect and placing the expected incisions within the relaxed skin tension lines where possible. The area thus outlined was incised deep to adipose tissue with a #15 scalpel blade. The skin margins were undermined to an appropriate distance in all directions utilizing iris scissors. Following this, the designed flap was advanced and carried over into the primary defect and sutured into place.
Banner Transposition Flap Text: The defect edges were debeveled with a #15 scalpel blade. Given the location of the defect and the proximity to free margins a Banner transposition flap was deemed most appropriate. Using a sterile surgical marker, an appropriate flap was drawn around the defect. The area thus outlined was incised deep to adipose tissue with a #15 scalpel blade. The skin margins were undermined to an appropriate distance in all directions utilizing iris scissors. Following this, the designed flap was carried into the primary defect and sutured into place.
Bilateral Helical Rim Advancement Flap Text: The defect edges were debeveled with a #15 blade scalpel.  Given the location of the defect and the proximity to free margins (helical rim) a bilateral helical rim advancement flap was deemed most appropriate. Using a sterile surgical marker, the appropriate advancement flaps were drawn incorporating the defect and placing the expected incisions between the helical rim and antihelix where possible.  The area thus outlined was incised through and through with a #15 scalpel blade.  With a skin hook and iris scissors, the flaps were gently and sharply undermined and freed up. Following this, the designed flaps were placed into the primary defect and sutured into place.
Bilateral Rotation Flap Text: The defect edges were debeveled with a #15 scalpel blade. Given the location of the defect, shape of the defect and the proximity to free margins a bilateral rotation flap was deemed most appropriate. Using a sterile surgical marker, an appropriate rotation flap was drawn incorporating the defect and placing the expected incisions within the relaxed skin tension lines where possible. The area thus outlined was incised deep to adipose tissue with a #15 scalpel blade. The skin margins were undermined to an appropriate distance in all directions utilizing iris scissors. Following this, the designed flap was carried over into the primary defect and sutured into place.
Bilobed Flap Text: The defect edges were debeveled with a #15 scalpel blade. Given the location of the defect and the proximity to free margins a bilobe flap was deemed most appropriate. Using a sterile surgical marker, an appropriate bilobe flap drawn around the defect. The area thus outlined was incised deep to adipose tissue with a #15 scalpel blade. The skin margins were undermined to an appropriate distance in all directions utilizing iris scissors. Following this, the designed flap was carried over into the primary defect and sutured into place.
Bilobed Transposition Flap Text: The defect edges were debeveled with a #15 scalpel blade. Given the location of the defect and the proximity to free margins a bilobed transposition flap was deemed most appropriate. Using a sterile surgical marker, an appropriate bilobe flap drawn around the defect. The area thus outlined was incised deep to adipose tissue with a #15 scalpel blade. The skin margins were undermined to an appropriate distance in all directions utilizing iris scissors. Following this, the designed flap was carried over into the primary defect and sutured into place.
Bi-Rhombic Flap Text: The defect edges were debeveled with a #15 scalpel blade. Given the location of the defect and the proximity to free margins a bi-rhombic flap was deemed most appropriate. Using a sterile surgical marker, an appropriate rhombic flap was drawn incorporating the defect. The area thus outlined was incised deep to adipose tissue with a #15 scalpel blade. The skin margins were undermined to an appropriate distance in all directions utilizing iris scissors. Following this, the designed flap was carried over into the primary defect and sutured into place.
Burow's Advancement Flap Text: The defect edges were debeveled with a #15 scalpel blade. Given the location of the defect and the proximity to free margins a Burow's advancement flap was deemed most appropriate. Using a sterile surgical marker, the appropriate advancement flap was drawn incorporating the defect and placing the expected incisions within the relaxed skin tension lines where possible. The area thus outlined was incised deep to adipose tissue with a #15 scalpel blade. The skin margins were undermined to an appropriate distance in all directions utilizing iris scissors. Following this, the designed flap was advanced and carried over into the primary defect and sutured into place.
Chonodrocutaneous Helical Advancement Flap Text: The defect edges were debeveled with a #15 scalpel blade. Given the location of the defect and the proximity to free margins a chondrocutaneous helical advancement flap was deemed most appropriate. Using a sterile surgical marker, the appropriate advancement flap was drawn incorporating the defect and placing the expected incisions within the relaxed skin tension lines where possible. The area thus outlined was incised deep to adipose tissue with a #15 scalpel blade. The skin margins were undermined to an appropriate distance in all directions utilizing iris scissors. Following this, the designed flap was advanced and carried over into the primary defect and sutured into place.
Crescentic Advancement Flap Text: The defect edges were debeveled with a #15 scalpel blade. Given the location of the defect and the proximity to free margins a crescentic advancement flap was deemed most appropriate. Using a sterile surgical marker, the appropriate advancement flap was drawn incorporating the defect and placing the expected incisions within the relaxed skin tension lines where possible. The area thus outlined was incised deep to adipose tissue with a #15 scalpel blade. The skin margins were undermined to an appropriate distance in all directions utilizing iris scissors. Following this, the designed flap was advanced and carried over into the primary defect and sutured into place.
Dorsal Nasal Flap Text: The defect edges were debeveled with a #15 scalpel blade. Given the location of the defect and the proximity to free margins a dorsal nasal flap was deemed most appropriate. Using a sterile surgical marker, an appropriate dorsal nasal flap was drawn around the defect. The area thus outlined was incised deep to adipose tissue with a #15 scalpel blade. The skin margins were undermined to an appropriate distance in all directions utilizing iris scissors. Following this, the designed flap was carried into the primary defect and sutured into place.
Double Island Pedicle Flap Text: The defect edges were debeveled with a #15 scalpel blade. Given the location of the defect, shape of the defect and the proximity to free margins a double island pedicle advancement flap was deemed most appropriate. Using a sterile surgical marker, an appropriate advancement flap was drawn incorporating the defect, outlining the appropriate donor tissue and placing the expected incisions within the relaxed skin tension lines where possible. The area thus outlined was incised deep to adipose tissue with a #15 scalpel blade. The skin margins were undermined to an appropriate distance in all directions around the primary defect and laterally outward around the island pedicle utilizing iris scissors.  There was minimal undermining beneath the pedicle flap. Following this, the flap was carried over into the primary defect and sutured into place.
Double O-Z Flap Text: The defect edges were debeveled with a #15 scalpel blade. Given the location of the defect, shape of the defect and the proximity to free margins a Double O-Z flap was deemed most appropriate. Using a sterile surgical marker, an appropriate transposition flap was drawn incorporating the defect and placing the expected incisions within the relaxed skin tension lines where possible. The area thus outlined was incised deep to adipose tissue with a #15 scalpel blade. The skin margins were undermined to an appropriate distance in all directions utilizing iris scissors. Following this, the designed flap was carried over into the primary defect and sutured into place.
Double O-Z Plasty Text: The defect edges were debeveled with a #15 scalpel blade. Given the location of the defect, shape of the defect and the proximity to free margins a Double O-Z plasty (double transposition flap) was deemed most appropriate. Using a sterile surgical marker, the appropriate transposition flaps were drawn incorporating the defect and placing the expected incisions within the relaxed skin tension lines where possible. The area thus outlined was incised deep to adipose tissue with a #15 scalpel blade. The skin margins were undermined to an appropriate distance in all directions utilizing iris scissors. Hemostasis was achieved with electrocautery. The flaps were then transposed and carried over into place, one clockwise and the other counterclockwise, and anchored with interrupted buried subcutaneous sutures.
Double Z Plasty Text: The lesion was extirpated to the level of the fat with a #15 scalpel blade. Given the location of the defect, shape of the defect and the proximity to free margins a double Z-plasty was deemed most appropriate for repair. Using a sterile surgical marker, the appropriate transposition arms of the double Z-plasty were drawn incorporating the defect and placing the expected incisions within the relaxed skin tension lines where possible. The area thus outlined was incised deep to adipose tissue with a #15 scalpel blade. The skin margins were undermined to an appropriate distance in all directions utilizing iris scissors. The opposing transposition arms were then transposed and carried over into place in opposite direction and anchored with interrupted buried subcutaneous sutures.
Ear Star Wedge Flap Text: The defect edges were debeveled with a #15 blade scalpel.  Given the location of the defect and the proximity to free margins (helical rim) an ear star wedge flap was deemed most appropriate. Using a sterile surgical marker, the appropriate flap was drawn incorporating the defect and placing the expected incisions between the helical rim and antihelix where possible.  The area thus outlined was incised through and through with a #15 scalpel blade. Following this, the designed flap was carried over into the primary defect and sutured into place.
Flip-Flop Flap Text: The defect edges were debeveled with a #15 blade scalpel.  Given the location of the defect and the proximity to free margins a flip-flop flap was deemed most appropriate. Using a sterile surgical marker, the appropriate flap was drawn incorporating the defect and placing the expected incisions between the helical rim and antihelix where possible.  The area thus outlined was incised through and through with a #15 scalpel blade. Following this, the designed flap was carried over into the primary defect and sutured into place.
Hatchet Flap Text: The defect edges were debeveled with a #15 scalpel blade. Given the location of the defect, shape of the defect and the proximity to free margins a hatchet flap was deemed most appropriate. Using a sterile surgical marker, an appropriate hatchet flap was drawn incorporating the defect and placing the expected incisions within the relaxed skin tension lines where possible. The area thus outlined was incised deep to adipose tissue with a #15 scalpel blade. The skin margins were undermined to an appropriate distance in all directions utilizing iris scissors. Following this, the designed flap was carried over into the primary defect and sutured into place.
Helical Rim Advancement Flap Text: The defect edges were debeveled with a #15 blade scalpel.  Given the location of the defect and the proximity to free margins (helical rim) a double helical rim advancement flap was deemed most appropriate. Using a sterile surgical marker, the appropriate advancement flaps were drawn incorporating the defect and placing the expected incisions between the helical rim and antihelix where possible.  The area thus outlined was incised through and through with a #15 scalpel blade.  With a skin hook and iris scissors, the flaps were gently and sharply undermined and freed up. Folllowing this, the designed flaps were carried over into the primary defect and sutured into place.
H Plasty Text: Given the location of the defect, shape of the defect and the proximity to free margins a H-plasty was deemed most appropriate for repair. Using a sterile surgical marker, the appropriate advancement arms of the H-plasty were drawn incorporating the defect and placing the expected incisions within the relaxed skin tension lines where possible. The area thus outlined was incised deep to adipose tissue with a #15 scalpel blade. The skin margins were undermined to an appropriate distance in all directions utilizing iris scissors.  The opposing advancement arms were then advanced and carried over into place in opposite direction and anchored with interrupted buried subcutaneous sutures.
Island Pedicle Flap Text: The defect edges were debeveled with a #15 scalpel blade. Given the location of the defect, shape of the defect and the proximity to free margins an island pedicle advancement flap was deemed most appropriate. Using a sterile surgical marker, an appropriate advancement flap was drawn incorporating the defect, outlining the appropriate donor tissue and placing the expected incisions within the relaxed skin tension lines where possible. The area thus outlined was incised deep to adipose tissue with a #15 scalpel blade. The skin margins were undermined to an appropriate distance in all directions around the primary defect and laterally outward around the island pedicle utilizing iris scissors.  There was minimal undermining beneath the pedicle flap. Following this, the flap was carried over into the primary defect and sutured into place.
Island Pedicle Flap With Canthal Suspension Text: The defect edges were debeveled with a #15 scalpel blade. Given the location of the defect, shape of the defect and the proximity to free margins an island pedicle advancement flap was deemed most appropriate. Using a sterile surgical marker, an appropriate advancement flap was drawn incorporating the defect, outlining the appropriate donor tissue and placing the expected incisions within the relaxed skin tension lines where possible. The area thus outlined was incised deep to adipose tissue with a #15 scalpel blade. The skin margins were undermined to an appropriate distance in all directions around the primary defect and laterally outward around the island pedicle utilizing iris scissors.  There was minimal undermining beneath the pedicle flap. A suspension suture was placed in the canthal tendon to prevent tension and prevent ectropion. Following this, the designed flap was placed into the primary defect and sutured into place.
Island Pedicle Flap-Requiring Vessel Identification Text: The defect edges were debeveled with a #15 scalpel blade. Given the location of the defect, shape of the defect and the proximity to free margins an island pedicle advancement flap was deemed most appropriate. Using a sterile surgical marker, an appropriate advancement flap was drawn, based on the axial vessel mentioned above, incorporating the defect, outlining the appropriate donor tissue and placing the expected incisions within the relaxed skin tension lines where possible. The area thus outlined was incised deep to adipose tissue with a #15 scalpel blade. The skin margins were undermined to an appropriate distance in all directions around the primary defect and laterally outward around the island pedicle utilizing iris scissors.  There was minimal undermining beneath the pedicle flap. Following this, the designed flap was carried over into the primary defect and sutured into place.
Keystone Flap Text: The defect edges were debeveled with a #15 scalpel blade. Given the location of the defect, shape of the defect a keystone flap was deemed most appropriate. Using a sterile surgical marker, an appropriate keystone flap was drawn incorporating the defect, outlining the appropriate donor tissue and placing the expected incisions within the relaxed skin tension lines where possible. The area thus outlined was incised deep to adipose tissue with a #15 scalpel blade. The skin margins were undermined to an appropriate distance in all directions around the primary defect and laterally outward around the flap utilizing iris scissors. Following this, the designed flap was carried into the primary defect and sutured into place.
Melolabial Transposition Flap Text: The defect edges were debeveled with a #15 scalpel blade. Given the location of the defect and the proximity to free margins a melolabial flap was deemed most appropriate. Using a sterile surgical marker, an appropriate melolabial transposition flap was drawn incorporating the defect. The area thus outlined was incised deep to adipose tissue with a #15 scalpel blade. The skin margins were undermined to an appropriate distance in all directions utilizing iris scissors. Following this, the designed flap was carried over into the primary defect and sutured into place.
Mercedes Flap Text: The defect edges were debeveled with a #15 scalpel blade. Given the location of the defect, shape of the defect and the proximity to free margins a Mercedes flap was deemed most appropriate. Using a sterile surgical marker, an appropriate advancement flap was drawn incorporating the defect and placing the expected incisions within the relaxed skin tension lines where possible. The area thus outlined was incised deep to adipose tissue with a #15 scalpel blade. The skin margins were undermined to an appropriate distance in all directions utilizing iris scissors. Following this, the designed flap was advanced and carried over into the primary defect and sutured into place.
Modified Advancement Flap Text: The defect edges were debeveled with a #15 scalpel blade. Given the location of the defect, shape of the defect and the proximity to free margins a modified advancement flap was deemed most appropriate. Using a sterile surgical marker, an appropriate advancement flap was drawn incorporating the defect and placing the expected incisions within the relaxed skin tension lines where possible. The area thus outlined was incised deep to adipose tissue with a #15 scalpel blade. The skin margins were undermined to an appropriate distance in all directions utilizing iris scissors. Following this, the designed flap was advanced and carried over into the primary defect and sutured into place.
Mucosal Advancement Flap Text: Given the location of the defect, shape of the defect and the proximity to free margins a mucosal advancement flap was deemed most appropriate. Incisions were made with a 15 blade scalpel in the appropriate fashion along the cutaneous vermilion border and the mucosal lip. The remaining actinically damaged mucosal tissue was excised.  The mucosal advancement flap was then elevated to the gingival sulcus with care taken to preserve the neurovascular structures and advanced into the primary defect. Care was taken to ensure that precise realignment of the vermilion border was achieved.
Muscle Hinge Flap Text: The defect edges were debeveled with a #15 scalpel blade.  Given the size, depth and location of the defect and the proximity to free margins a muscle hinge flap was deemed most appropriate. Using a sterile surgical marker, an appropriate hinge flap was drawn incorporating the defect. The area thus outlined was incised with a #15 scalpel blade. The skin margins were undermined to an appropriate distance in all directions utilizing iris scissors. Following this, the designed flap was carried into the primary defect and sutured into place.
Mustarde Flap Text: The defect edges were debeveled with a #15 scalpel blade.  Given the size, depth and location of the defect and the proximity to free margins a Mustarde flap was deemed most appropriate. Using a sterile surgical marker, an appropriate flap was drawn incorporating the defect. The area thus outlined was incised with a #15 scalpel blade. The skin margins were undermined to an appropriate distance in all directions utilizing iris scissors. Following this, the designed flap was carried into the primary defect and sutured into place.
Nasal Turnover Hinge Flap Text: The defect edges were debeveled with a #15 scalpel blade.  Given the size, depth, location of the defect and the defect being full thickness a nasal turnover hinge flap was deemed most appropriate. Using a sterile surgical marker, an appropriate hinge flap was drawn incorporating the defect. The area thus outlined was incised with a #15 scalpel blade. The flap was designed to recreate the nasal mucosal lining and the alar rim. The skin margins were undermined to an appropriate distance in all directions utilizing iris scissors. Following this, the designed flap was carried over into the primary defect and sutured into place
Nasalis-Muscle-Based Myocutaneous Island Pedicle Flap Text: Using a #15 blade, an incision was made around the donor flap to the level of the nasalis muscle. Wide lateral undermining was then performed in both the subcutaneous plane above the nasalis muscle, and in a submuscular plane just above periosteum. This allowed the formation of a free nasalis muscle axial pedicle (based on the angular artery) which was still attached to the actual cutaneous flap, increasing its mobility and vascular viability. Hemostasis was obtained with pinpoint electrocoagulation. The flap was mobilized into position and the pivotal anchor points positioned and stabilized with buried interrupted sutures. Subcutaneous and dermal tissues were closed in a multilayered fashion with sutures. Tissue redundancies were excised, and the epidermal edges were apposed without significant tension and sutured with sutures.
Nasalis Myocutaneous Flap Text: Using a #15 blade, an incision was made around the donor flap to the level of the nasalis muscle. Wide lateral undermining was then performed in both the subcutaneous plane above the nasalis muscle, and in a submuscular plane just above periosteum. This allowed the formation of a free nasalis muscle axial pedicle which was still attached to the actual cutaneous flap, increasing its mobility and vascular viability. Hemostasis was obtained with pinpoint electrocoagulation. The flap was mobilized into position and the pivotal anchor points positioned and stabilized with buried interrupted sutures. Subcutaneous and dermal tissues were closed in a multilayered fashion with sutures. Tissue redundancies were excised, and the epidermal edges were apposed without significant tension and sutured with sutures.
Nasolabial Transposition Flap Text: The defect edges were debeveled with a #15 scalpel blade.  Given the size, depth and location of the defect and the proximity to free margins a nasolabial transposition flap was deemed most appropriate. Using a sterile surgical marker, an appropriate flap was drawn incorporating the defect. The area thus outlined was incised with a #15 scalpel blade. The skin margins were undermined to an appropriate distance in all directions utilizing iris scissors. Following this, the designed flap was carried into the primary defect and sutured into place.
Orbicularis Oris Muscle Flap Text: The defect edges were debeveled with a #15 scalpel blade.  Given that the defect affected the competency of the oral sphincter an orbicularis oris muscle flap was deemed most appropriate to restore this competency and normal muscle function.  Using a sterile surgical marker, an appropriate flap was drawn incorporating the defect. The area thus outlined was incised with a #15 scalpel blade. Following this, the designed flap was carried over into the primary defect and sutured into place.
O-T Advancement Flap Text: The defect edges were debeveled with a #15 scalpel blade. Given the location of the defect, shape of the defect and the proximity to free margins an O-T advancement flap was deemed most appropriate. Using a sterile surgical marker, an appropriate advancement flap was drawn incorporating the defect and placing the expected incisions within the relaxed skin tension lines where possible. The area thus outlined was incised deep to adipose tissue with a #15 scalpel blade. The skin margins were undermined to an appropriate distance in all directions utilizing iris scissors. Following this, the designed flap was advanced and carried over into the primary defect and sutured into place.
O-T Plasty Text: The defect edges were debeveled with a #15 scalpel blade. Given the location of the defect, shape of the defect and the proximity to free margins an O-T plasty was deemed most appropriate. Using a sterile surgical marker, an appropriate O-T plasty was drawn incorporating the defect and placing the expected incisions within the relaxed skin tension lines where possible. The area thus outlined was incised deep to adipose tissue with a #15 scalpel blade. The skin margins were undermined to an appropriate distance in all directions utilizing iris scissors. Following this, the designed flap was carried over into the primary defect and sutured into place.
O-L Flap Text: The defect edges were debeveled with a #15 scalpel blade. Given the location of the defect, shape of the defect and the proximity to free margins an O-L flap was deemed most appropriate. Using a sterile surgical marker, an appropriate advancement flap was drawn incorporating the defect and placing the expected incisions within the relaxed skin tension lines where possible. The area thus outlined was incised deep to adipose tissue with a #15 scalpel blade. The skin margins were undermined to an appropriate distance in all directions utilizing iris scissors. Following this, the designed flap was advanced and carried over into the primary defect and sutured into place.
O-Z Flap Text: The defect edges were debeveled with a #15 scalpel blade. Given the location of the defect, shape of the defect and the proximity to free margins an O-Z flap was deemed most appropriate. Using a sterile surgical marker, an appropriate transposition flap was drawn incorporating the defect and placing the expected incisions within the relaxed skin tension lines where possible. The area thus outlined was incised deep to adipose tissue with a #15 scalpel blade. The skin margins were undermined to an appropriate distance in all directions utilizing iris scissors. Following this, the designed flap was carried over into the primary defect and sutured into place.
O-Z Plasty Text: The defect edges were debeveled with a #15 scalpel blade. Given the location of the defect, shape of the defect and the proximity to free margins an O-Z plasty (double transposition flap) was deemed most appropriate. Using a sterile surgical marker, the appropriate transposition flaps were drawn incorporating the defect and placing the expected incisions within the relaxed skin tension lines where possible. The area thus outlined was incised deep to adipose tissue with a #15 scalpel blade. The skin margins were undermined to an appropriate distance in all directions utilizing iris scissors. Hemostasis was achieved with electrocautery. The flaps were then transposed and carried over into place, one clockwise and the other counterclockwise, and anchored with interrupted buried subcutaneous sutures.
Peng Advancement Flap Text: The defect edges were debeveled with a #15 scalpel blade. Given the location of the defect, shape of the defect and the proximity to free margins a Peng advancement flap was deemed most appropriate. Using a sterile surgical marker, an appropriate advancement flap was drawn incorporating the defect and placing the expected incisions within the relaxed skin tension lines where possible. The area thus outlined was incised deep to adipose tissue with a #15 scalpel blade. The skin margins were undermined to an appropriate distance in all directions utilizing iris scissors. Following this, the designed flap was advanced and carried over into the primary defect and sutured into place.
Rectangular Flap Text: The defect edges were debeveled with a #15 scalpel blade. Given the location of the defect and the proximity to free margins a rectangular flap was deemed most appropriate. Using a sterile surgical marker, an appropriate rectangular flap was drawn incorporating the defect. The area thus outlined was incised deep to adipose tissue with a #15 scalpel blade. The skin margins were undermined to an appropriate distance in all directions utilizing iris scissors. Following this, the designed flap was carried over into the primary defect and sutured into place.
Rhombic Flap Text: The defect edges were debeveled with a #15 scalpel blade. Given the location of the defect and the proximity to free margins a rhombic flap was deemed most appropriate. Using a sterile surgical marker, an appropriate rhombic flap was drawn incorporating the defect. The area thus outlined was incised deep to adipose tissue with a #15 scalpel blade. The skin margins were undermined to an appropriate distance in all directions utilizing iris scissors. Following this, the designed flap was carried over into the primary defect and sutured into place.
Rhomboid Transposition Flap Text: The defect edges were debeveled with a #15 scalpel blade. Given the location of the defect and the proximity to free margins a rhomboid transposition flap was deemed most appropriate. Using a sterile surgical marker, an appropriate rhomboid flap was drawn incorporating the defect. The area thus outlined was incised deep to adipose tissue with a #15 scalpel blade. The skin margins were undermined to an appropriate distance in all directions utilizing iris scissors. Following this, the designed flap was carried over into the primary defect and sutured into place.
Rotation Flap Text: The defect edges were debeveled with a #15 scalpel blade. Given the location of the defect, shape of the defect and the proximity to free margins a rotation flap was deemed most appropriate. Using a sterile surgical marker, an appropriate rotation flap was drawn incorporating the defect and placing the expected incisions within the relaxed skin tension lines where possible. The area thus outlined was incised deep to adipose tissue with a #15 scalpel blade. The skin margins were undermined to an appropriate distance in all directions utilizing iris scissors. Following this, the designed flap was carried over into the primary defect and sutured into place.
Spiral Flap Text: The defect edges were debeveled with a #15 scalpel blade. Given the location of the defect, shape of the defect and the proximity to free margins a spiral flap was deemed most appropriate. Using a sterile surgical marker, an appropriate rotation flap was drawn incorporating the defect and placing the expected incisions within the relaxed skin tension lines where possible. The area thus outlined was incised deep to adipose tissue with a #15 scalpel blade. The skin margins were undermined to an appropriate distance in all directions utilizing iris scissors. Following this, the designed flap was carried over into the primary defect and sutured into place.
Staged Advancement Flap Text: The defect edges were debeveled with a #15 scalpel blade. Given the location of the defect, shape of the defect and the proximity to free margins a staged advancement flap was deemed most appropriate. Using a sterile surgical marker, an appropriate advancement flap was drawn incorporating the defect and placing the expected incisions within the relaxed skin tension lines where possible. The area thus outlined was incised deep to adipose tissue with a #15 scalpel blade. The skin margins were undermined to an appropriate distance in all directions utilizing iris scissors. Following this, the designed flap was carried over into the primary defect and sutured into place.
Star Wedge Flap Text: The defect edges were debeveled with a #15 scalpel blade. Given the location of the defect, shape of the defect and the proximity to free margins a star wedge flap was deemed most appropriate. Using a sterile surgical marker, an appropriate rotation flap was drawn incorporating the defect and placing the expected incisions within the relaxed skin tension lines where possible. The area thus outlined was incised deep to adipose tissue with a #15 scalpel blade. The skin margins were undermined to an appropriate distance in all directions utilizing iris scissors. Following this, the designed flap was carried over into the primary defect and sutured into place.
Transposition Flap Text: The defect edges were debeveled with a #15 scalpel blade. Given the location of the defect and the proximity to free margins a transposition flap was deemed most appropriate. Using a sterile surgical marker, an appropriate transposition flap was drawn incorporating the defect. The area thus outlined was incised deep to adipose tissue with a #15 scalpel blade. The skin margins were undermined to an appropriate distance in all directions utilizing iris scissors. Following this, the designed flap was carried over into the primary defect and sutured into place.
Trilobed Flap Text: The defect edges were debeveled with a #15 scalpel blade. Given the location of the defect and the proximity to free margins a trilobed flap was deemed most appropriate. Using a sterile surgical marker, an appropriate trilobed flap was drawn around the defect. The area thus outlined was incised deep to adipose tissue with a #15 scalpel blade. The skin margins were undermined to an appropriate distance in all directions utilizing iris scissors. Following this, the designed flap was carried into the primary defect and sutured into place.
V-Y Flap Text: The defect edges were debeveled with a #15 scalpel blade. Given the location of the defect, shape of the defect and the proximity to free margins a V-Y flap was deemed most appropriate. Using a sterile surgical marker, an appropriate advancement flap was drawn incorporating the defect and placing the expected incisions within the relaxed skin tension lines where possible. The area thus outlined was incised deep to adipose tissue with a #15 scalpel blade. The skin margins were undermined to an appropriate distance in all directions utilizing iris scissors. Following this, the designed flap was advanced and carried over into the primary defect and sutured into place.
V-Y Plasty Text: The defect edges were debeveled with a #15 scalpel blade. Given the location of the defect, shape of the defect and the proximity to free margins an V-Y advancement flap was deemed most appropriate. Using a sterile surgical marker, an appropriate advancement flap was drawn incorporating the defect and placing the expected incisions within the relaxed skin tension lines where possible. The area thus outlined was incised deep to adipose tissue with a #15 scalpel blade. The skin margins were undermined to an appropriate distance in all directions utilizing iris scissors. Following this, the designed flap was advanced and carried over into the primary defect and sutured into place.
W Plasty Text: The lesion was extirpated to the level of the fat with a #15 scalpel blade. Given the location of the defect, shape of the defect and the proximity to free margins a W-plasty was deemed most appropriate for repair. Using a sterile surgical marker, the appropriate transposition arms of the W-plasty were drawn incorporating the defect and placing the expected incisions within the relaxed skin tension lines where possible. The area thus outlined was incised deep to adipose tissue with a #15 scalpel blade. The skin margins were undermined to an appropriate distance in all directions utilizing iris scissors. The opposing transposition arms were then transposed and carried over into place in opposite direction and anchored with interrupted buried subcutaneous sutures.
Z Plasty Text: The lesion was extirpated to the level of the fat with a #15 scalpel blade. Given the location of the defect, shape of the defect and the proximity to free margins a Z-plasty was deemed most appropriate for repair. Using a sterile surgical marker, the appropriate transposition arms of the Z-plasty were drawn incorporating the defect and placing the expected incisions within the relaxed skin tension lines where possible. The area thus outlined was incised deep to adipose tissue with a #15 scalpel blade. The skin margins were undermined to an appropriate distance in all directions utilizing iris scissors. The opposing transposition arms were then transposed and carried over into place in opposite direction and anchored with interrupted buried subcutaneous sutures.
Zygomaticofacial Flap Text: Given the location of the defect, shape of the defect and the proximity to free margins a zygomaticofacial flap was deemed most appropriate for repair. Using a sterile surgical marker, the appropriate flap was drawn incorporating the defect and placing the expected incisions within the relaxed skin tension lines where possible. The area thus outlined was incised deep to adipose tissue with a #15 scalpel blade with preservation of a vascular pedicle.  The skin margins were undermined to an appropriate distance in all directions utilizing iris scissors. The flap was then carried over into the defect and anchored with interrupted buried subcutaneous sutures.
Abbe Flap (Lower To Upper Lip) Text: The defect of the upper lip was assessed and measured.  Given the location and size of the defect, an Abbe flap was deemed most appropriate. Using a sterile surgical marker, an appropriate Abbe flap was measured and drawn on the lower lip. Local anesthesia was then infiltrated. A scalpel was then used to incise the upper lip through and through the skin, vermilion, muscle and mucosa, leaving the flap pedicled on the opposite side.  The flap was then rotated and transferred to the lower lip defect.  The flap was then sutured into place with a three layer technique, closing the orbicularis oris muscle layer with subcutaneous buried sutures, followed by a mucosal layer and an epidermal layer.
Abbe Flap (Upper To Lower Lip) Text: The defect of the lower lip was assessed and measured.  Given the location and size of the defect, an Abbe flap was deemed most appropriate. Using a sterile surgical marker, an appropriate Abbe flap was measured and drawn on the upper lip. Local anesthesia was then infiltrated.  A scalpel was then used to incise the upper lip through and through the skin, vermilion, muscle and mucosa, leaving the flap pedicled on the opposite side.  The flap was then rotated and transferred to the lower lip defect.  The flap was then sutured into place with a three layer technique, closing the orbicularis oris muscle layer with subcutaneous buried sutures, followed by a mucosal layer and an epidermal layer.
Cheek Interpolation Flap Text: A decision was made to reconstruct the defect utilizing an interpolation axial flap and a staged reconstruction.  A telfa template was made of the defect.  This telfa template was then used to outline the Cheek Interpolation flap.  The donor area for the pedicle flap was then injected with anesthesia.  The flap was excised through the skin and subcutaneous tissue down to the layer of the underlying musculature.  The interpolation flap was carefully excised within this deep plane to maintain its blood supply.  The edges of the donor site were undermined.   The donor site was closed in a primary fashion.  The pedicle was then rotated into position and sutured.  Once the tube was sutured into place, adequate blood supply was confirmed with blanching and refill.  The pedicle was then wrapped with xeroform gauze and dressed appropriately with a telfa and gauze bandage to ensure continued blood supply and protect the attached pedicle.
Cheek-To-Nose Interpolation Flap Text: A decision was made to reconstruct the defect utilizing an interpolation axial flap and a staged reconstruction.  A telfa template was made of the defect.  This telfa template was then used to outline the Cheek-To-Nose Interpolation flap.  The donor area for the pedicle flap was then injected with anesthesia.  The flap was excised through the skin and subcutaneous tissue down to the layer of the underlying musculature.  The interpolation flap was carefully excised within this deep plane to maintain its blood supply.  The edges of the donor site were undermined.   The donor site was closed in a primary fashion.  The pedicle was then rotated into position and sutured.  Once the tube was sutured into place, adequate blood supply was confirmed with blanching and refill.  The pedicle was then wrapped with xeroform gauze and dressed appropriately with a telfa and gauze bandage to ensure continued blood supply and protect the attached pedicle.
Estlander Flap (Lower To Upper Lip) Text: The defect of the lower lip was assessed and measured.  Given the location and size of the defect, an Estlander flap was deemed most appropriate. Using a sterile surgical marker, an appropriate Estlander flap was measured and drawn on the upper lip. Local anesthesia was then infiltrated. A scalpel was then used to incise the lateral aspect of the flap, through skin, muscle and mucosa, leaving the flap pedicled medially.  The flap was then rotated and positioned to fill the lower lip defect.  The flap was then sutured into place with a three layer technique, closing the orbicularis oris muscle layer with subcutaneous buried sutures, followed by a mucosal layer and an epidermal layer.
Interpolation Flap Text: A decision was made to reconstruct the defect utilizing an interpolation axial flap and a staged reconstruction.  A telfa template was made of the defect.  This telfa template was then used to outline the interpolation flap.  The donor area for the pedicle flap was then injected with anesthesia.  The flap was excised through the skin and subcutaneous tissue down to the layer of the underlying musculature.  The interpolation flap was carefully excised within this deep plane to maintain its blood supply.  The edges of the donor site were undermined.   The donor site was closed in a primary fashion.  The pedicle was then rotated into position and sutured.  Once the tube was sutured into place, adequate blood supply was confirmed with blanching and refill.  The pedicle was then wrapped with xeroform gauze and dressed appropriately with a telfa and gauze bandage to ensure continued blood supply and protect the attached pedicle.
Melolabial Interpolation Flap Text: A decision was made to reconstruct the defect utilizing an interpolation axial flap and a staged reconstruction.  A telfa template was made of the defect.  This telfa template was then used to outline the melolabial interpolation flap.  The donor area for the pedicle flap was then injected with anesthesia.  The flap was excised through the skin and subcutaneous tissue down to the layer of the underlying musculature.  The pedicle flap was carefully excised within this deep plane to maintain its blood supply.  The edges of the donor site were undermined.   The donor site was closed in a primary fashion.  The pedicle was then rotated into position and sutured.  Once the tube was sutured into place, adequate blood supply was confirmed with blanching and refill.  The pedicle was then wrapped with xeroform gauze and dressed appropriately with a telfa and gauze bandage to ensure continued blood supply and protect the attached pedicle.
Mastoid Interpolation Flap Text: A decision was made to reconstruct the defect utilizing an interpolation axial flap and a staged reconstruction.  A telfa template was made of the defect.  This telfa template was then used to outline the mastoid interpolation flap.  The donor area for the pedicle flap was then injected with anesthesia.  The flap was excised through the skin and subcutaneous tissue down to the layer of the underlying musculature.  The pedicle flap was carefully excised within this deep plane to maintain its blood supply.  The edges of the donor site were undermined.   The donor site was closed in a primary fashion.  The pedicle was then rotated into position and sutured.  Once the tube was sutured into place, adequate blood supply was confirmed with blanching and refill.  The pedicle was then wrapped with xeroform gauze and dressed appropriately with a telfa and gauze bandage to ensure continued blood supply and protect the attached pedicle.
Paramedian Forehead Flap Text: A decision was made to reconstruct the defect utilizing an interpolation axial flap and a staged reconstruction.  A telfa template was made of the defect.  This telfa template was then used to outline the paramedian forehead pedicle flap.  The donor area for the pedicle flap was then injected with anesthesia.  The flap was excised through the skin and subcutaneous tissue down to the layer of the underlying musculature.  The pedicle flap was carefully excised within this deep plane to maintain its blood supply.  The edges of the donor site were undermined.   The donor site was closed in a primary fashion.  The pedicle was then rotated into position and sutured.  Once the tube was sutured into place, adequate blood supply was confirmed with blanching and refill.  The pedicle was then wrapped with xeroform gauze and dressed appropriately with a telfa and gauze bandage to ensure continued blood supply and protect the attached pedicle.
Posterior Auricular Interpolation Flap Text: A decision was made to reconstruct the defect utilizing an interpolation axial flap and a staged reconstruction.  A telfa template was made of the defect.  This telfa template was then used to outline the posterior auricular interpolation flap.  The donor area for the pedicle flap was then injected with anesthesia.  The flap was excised through the skin and subcutaneous tissue down to the layer of the underlying musculature.  The pedicle flap was carefully excised within this deep plane to maintain its blood supply.  The edges of the donor site were undermined.   The donor site was closed in a primary fashion.  The pedicle was then rotated into position and sutured.  Once the tube was sutured into place, adequate blood supply was confirmed with blanching and refill.  The pedicle was then wrapped with xeroform gauze and dressed appropriately with a telfa and gauze bandage to ensure continued blood supply and protect the attached pedicle.
Cheiloplasty (Complex) Text: A decision was made to reconstruct the defect with a  cheiloplasty.  The defect was undermined extensively.  Additional orbicularis oris muscle was excised with a 15 blade scalpel.  The defect was converted into a full thickness wedge to facilite a better cosmetic result.  Small vessels were then tied off with 5-0 monocyrl. The orbicularis oris, superficial fascia, adipose and dermis were then reapproximated.  After the deeper layers were approximated the epidermis was reapproximated with particular care given to realign the vermilion border.
Cheiloplasty (Less Than 50%) Text: A decision was made to reconstruct the defect with a  cheiloplasty.  The defect was undermined extensively.  Additional orbicularis oris muscle was excised with a 15 blade scalpel.  The defect was converted into a full thickness wedge, of less than 50% of the vertical height of the lip, to facilite a better cosmetic result.  Small vessels were then tied off with 5-0 monocyrl. The orbicularis oris, superficial fascia, adipose and dermis were then reapproximated.  After the deeper layers were approximated the epidermis was reapproximated with particular care given to realign the vermilion border.
Ear Wedge Repair Text: A wedge excision was completed by carrying down an excision through the full thickness of the ear and cartilage with an inward facing Burow's triangle. The wound was then closed in a layered fashion.
Full Thickness Lip Wedge Repair (Flap) Text: Given the location of the defect and the proximity to free margins a full thickness wedge repair was deemed most appropriate. Using a sterile surgical marker, the appropriate repair was drawn incorporating the defect and placing the expected incisions perpendicular to the vermilion border.  The vermilion border was also meticulously outlined to ensure appropriate reapproximation during the repair.  The area thus outlined was incised through and through with a #15 scalpel blade.  The muscularis and dermis were reaproximated with deep sutures following hemostasis. Care was taken to realign the vermilion border before proceeding with the superficial closure.  Once the vermilion was realigned the superfical and mucosal closure was finished.
Burow's Graft Text: The defect edges were debeveled with a #15 scalpel blade. Given the location of the defect, shape of the defect, the proximity to free margins and the presence of a standing cone deformity a Burow's skin graft was deemed most appropriate. The standing cone was removed and this tissue was then trimmed to the shape of the primary defect. The adipose tissue was also removed until only dermis and epidermis were left.  The skin graft was then placed in the primary defect and oriented appropriately.
Cartilage Graft Text: The defect edges were debeveled with a #15 scalpel blade. Given the location of the defect, shape of the defect, the fact the defect involved a full thickness cartilage defect a cartilage graft was deemed most appropriate.  An appropriate donor site was identified, cleansed, and anesthetized. The cartilage graft was then harvested and transferred to the recipient site, oriented appropriately and then sutured into place.  The secondary defect was then repaired using a primary closure.
Composite Graft Text: The defect edges were debeveled with a #15 scalpel blade. Given the location of the defect, shape of the defect, the proximity to free margins and the fact the defect was full thickness a composite graft was deemed most appropriate.  The defect was outline and then transferred to the donor site.  A full thickness graft was then excised from the donor site. The graft was then placed in the primary defect, oriented appropriately and then sutured into place.  The secondary defect was then repaired using a primary closure.
Epidermal Autograft Text: The defect edges were debeveled with a #15 scalpel blade. Given the location of the defect, shape of the defect and the proximity to free margins an epidermal autograft was deemed most appropriate. Using a sterile surgical marker, the primary defect shape was transferred to the donor site. The epidermal graft was then harvested.  The skin graft was then placed in the primary defect and oriented appropriately.
Dermal Autograft Text: The defect edges were debeveled with a #15 scalpel blade. Given the location of the defect, shape of the defect and the proximity to free margins a dermal autograft was deemed most appropriate. Using a sterile surgical marker, the primary defect shape was transferred to the donor site. The area thus outlined was incised deep to adipose tissue with a #15 scalpel blade.  The harvested graft was then trimmed of adipose and epidermal tissue until only dermis was left.  The skin graft was then placed in the primary defect and oriented appropriately.
Ftsg Text: The defect edges were debeveled with a #15 scalpel blade. Given the location of the defect, shape of the defect and the proximity to free margins a full thickness skin graft was deemed most appropriate. Using a sterile surgical marker, the primary defect shape was transferred to the donor site. The area thus outlined was incised deep to adipose tissue with a #15 scalpel blade.  The harvested graft was then trimmed of adipose tissue until only dermis and epidermis was left.  The skin graft was then placed in the primary defect and oriented appropriately.
Pinch Graft Text: The defect edges were debeveled with a #15 scalpel blade. Given the location of the defect, shape of the defect and the proximity to free margins a pinch graft was deemed most appropriate. Using a sterile surgical marker, the primary defect shape was transferred to the donor site. The area thus outlined was incised deep to adipose tissue with a #15 scalpel blade.  The harvested graft was then trimmed of adipose tissue until only dermis and epidermis was left. The skin graft was then placed in the primary defect and oriented appropriately.
Skin Substitute Text: The defect edges were debeveled with a #15 scalpel blade. Given the location of the defect, shape of the defect and the proximity to free margins a skin substitute graft was deemed most appropriate.  The graft material was trimmed to fit the size of the defect. The graft was then placed in the primary defect and oriented appropriately.
Split-Thickness Skin Graft Text: The defect edges were debeveled with a #15 scalpel blade. Given the location of the defect, shape of the defect and the proximity to free margins a split thickness skin graft was deemed most appropriate. Using a sterile surgical marker, the primary defect shape was transferred to the donor site. The split thickness graft was then harvested.  The skin graft was then placed in the primary defect and oriented appropriately.
Tissue Cultured Epidermal Autograft Text: The defect edges were debeveled with a #15 scalpel blade. Given the location of the defect, shape of the defect and the proximity to free margins a tissue cultured epidermal autograft was deemed most appropriate.  The graft was then trimmed to fit the size of the defect.  The graft was then placed in the primary defect and oriented appropriately.
Xenograft Text: The defect edges were debeveled with a #15 scalpel blade. Given the location of the defect, shape of the defect and the proximity to free margins a xenograft was deemed most appropriate.  The graft was then trimmed to fit the size of the defect.  The graft was then placed in the primary defect and oriented appropriately.
Complex Repair And Flap Additional Text (Will Appearing After The Standard Complex Repair Text): The complex repair was not sufficient to completely close the primary defect. The remaining additional defect was repaired with the flap mentioned below.
Complex Repair And Graft Additional Text (Will Appearing After The Standard Complex Repair Text): The complex repair was not sufficient to completely close the primary defect. The remaining additional defect was repaired with the graft mentioned below.
Eyelid Full Thickness Repair - 64057: The eyelid defect was full thickness which required a wedge repair of the eyelid. Special care was taken to ensure that the eyelid margin was realligned when placing sutures.
Eyelid Partial Thickness Repair - 36900: The eyelid defect was partial thickness which required a wedge repair of the eyelid. Special care was taken to ensure that the eyelid margin was realligned when placing sutures.
Intermediate Repair And Flap Additional Text (Will Appearing After The Standard Complex Repair Text): The intermediate repair was not sufficient to completely close the primary defect. The remaining additional defect was repaired with the flap mentioned below.
Intermediate Repair And Graft Additional Text (Will Appearing After The Standard Complex Repair Text): The intermediate repair was not sufficient to completely close the primary defect. The remaining additional defect was repaired with the graft mentioned below.
Localized Dermabrasion With 15 Blade Text: The patient was draped in routine manner.  Localized dermabrasion using a 15 blade was performed in routine manner to papillary dermis. This spot dermabrasion is being performed to complete skin cancer reconstruction. It also will eliminate the other sun damaged precancerous cells that are known to be part of the regional effect of a lifetime's worth of sun exposure. This localized dermabrasion is therapeutic and should not be considered cosmetic in any regard.
Localized Dermabrasion With Sand Papertext: The patient was draped in routine manner.  Localized dermabrasion using sterile sand paper was performed in routine manner to papillary dermis. This spot dermabrasion is being performed to complete skin cancer reconstruction. It also will eliminate the other sun damaged precancerous cells that are known to be part of the regional effect of a lifetime's worth of sun exposure. This localized dermabrasion is therapeutic and should not be considered cosmetic in any regard.
Localized Dermabrasion With Wire Brush Text: The patient was draped in routine manner.  Localized dermabrasion using 3 x 17 mm wire brush was performed in routine manner to papillary dermis. This spot dermabrasion is being performed to complete skin cancer reconstruction. It also will eliminate the other sun damaged precancerous cells that are known to be part of the regional effect of a lifetime's worth of sun exposure. This localized dermabrasion is therapeutic and should not be considered cosmetic in any regard.
Purse String (Simple) Text: Given the location of the defect and the characteristics of the surrounding skin a purse string closure was deemed most appropriate.  Undermining was performed circumferentially around the surgical defect.  A purse string suture was then placed and tightened.
Purse String (Intermediate) Text: Given the location of the defect and the characteristics of the surrounding skin a purse string intermediate closure was deemed most appropriate.  Undermining was performed circumferentially around the surgical defect.  A purse string suture was then placed and tightened.
Partial Purse String (Simple) Text: Given the location of the defect and the characteristics of the surrounding skin a simple purse string closure was deemed most appropriate.  Undermining was performed circumferentially around the surgical defect.  A purse string suture was then placed and tightened. Wound tension only allowed a partial closure of the circular defect.
Partial Purse String (Intermediate) Text: Given the location of the defect and the characteristics of the surrounding skin an intermediate purse string closure was deemed most appropriate.  Undermining was performed circumferentially around the surgical defect.  A purse string suture was then placed and tightened. Wound tension only allowed a partial closure of the circular defect.
Tarsorrhaphy Text: A tarsorrhaphy was performed using Frost sutures.
Manual Repair Warning Statement: We plan on removing the manually selected variable below in favor of our much easier automatic structured text blocks found in the previous tab. We decided to do this to help make the flow better and give you the full power of structured data. Manual selection is never going to be ideal in our platform and I would encourage you to avoid using manual selection from this point on, especially since I will be sunsetting this feature. It is important that you do one of two things with the customized text below. First, you can save all of the text in a word file so you can have it for future reference. Second, transfer the text to the appropriate area in the Library tab. Lastly, if there is a flap or graft type which we do not have you need to let us know right away so I can add it in before the variable is hidden. No need to panic, we plan to give you roughly 6 months to make the change.
Same Histology In Subsequent Stages Text: The pattern and morphology of the tumor is as described in the first stage.
No Residual Tumor Seen Histology Text: There were no malignant cells seen in the sections examined.
Inflammation Suggestive Of Cancer Camouflage Histology Text: There was a dense lymphocytic infiltrate which prevented adequate histologic evaluation of adjacent structures.
Bcc Histology Text: There were numerous aggregates of basaloid cells.
Bcc Infiltrative Histology Text: There were numerous aggregates of basaloid cells demonstrating an infiltrative pattern.
Mart-1 - Positive Histology Text: MART-1 staining demonstrates areas of higher density and clustering of melanocytes with Pagetoid spread upwards within the epidermis. The surgical margins are positive for tumor cells.
Mart-1 - Negative Histology Text: MART-1 staining demonstrates a normal density and pattern of melanocytes along the dermal-epidermal junction. The surgical margins are negative for tumor cells.
Information: Selecting Yes will display possible errors in your note based on the variables you have selected. This validation is only offered as a suggestion for you. PLEASE NOTE THAT THE VALIDATION TEXT WILL BE REMOVED WHEN YOU FINALIZE YOUR NOTE. IF YOU WANT TO FAX A PRELIMINARY NOTE YOU WILL NEED TO TOGGLE THIS TO 'NO' IF YOU DO NOT WANT IT IN YOUR FAXED NOTE.
Bill 59 Modifier?: No - Continue to Bill 79 Modifier

## 2025-01-27 ENCOUNTER — APPOINTMENT (OUTPATIENT)
Dept: URBAN - METROPOLITAN AREA CLINIC 244 | Age: 85
Setting detail: DERMATOLOGY
End: 2025-02-01

## 2025-01-27 DIAGNOSIS — L81.4 OTHER MELANIN HYPERPIGMENTATION: ICD-10-CM

## 2025-01-27 DIAGNOSIS — L82.1 OTHER SEBORRHEIC KERATOSIS: ICD-10-CM

## 2025-01-27 DIAGNOSIS — Z85.828 PERSONAL HISTORY OF OTHER MALIGNANT NEOPLASM OF SKIN: ICD-10-CM

## 2025-01-27 DIAGNOSIS — Z48.02 ENCOUNTER FOR REMOVAL OF SUTURES: ICD-10-CM

## 2025-01-27 DIAGNOSIS — D22 MELANOCYTIC NEVI: ICD-10-CM

## 2025-01-27 DIAGNOSIS — Z12.83 ENCOUNTER FOR SCREENING FOR MALIGNANT NEOPLASM OF SKIN: ICD-10-CM

## 2025-01-27 DIAGNOSIS — L57.0 ACTINIC KERATOSIS: ICD-10-CM

## 2025-01-27 PROBLEM — D48.5 NEOPLASM OF UNCERTAIN BEHAVIOR OF SKIN: Status: ACTIVE | Noted: 2025-01-27

## 2025-01-27 PROBLEM — D22.9 MELANOCYTIC NEVI, UNSPECIFIED: Status: ACTIVE | Noted: 2025-01-27

## 2025-01-27 PROCEDURE — 17000 DESTRUCT PREMALG LESION: CPT | Mod: 59,79

## 2025-01-27 PROCEDURE — OTHER BIOPSY BY SHAVE METHOD: OTHER

## 2025-01-27 PROCEDURE — 11102 TANGNTL BX SKIN SINGLE LES: CPT | Mod: 79

## 2025-01-27 PROCEDURE — 17262 DSTRJ MAL LES T/A/L 1.1-2.0: CPT | Mod: 59,76,79

## 2025-01-27 PROCEDURE — 99213 OFFICE O/P EST LOW 20 MIN: CPT | Mod: 24,25

## 2025-01-27 PROCEDURE — 17003 DESTRUCT PREMALG LES 2-14: CPT | Mod: 79

## 2025-01-27 PROCEDURE — OTHER SUTURE REMOVAL (GLOBAL PERIOD): OTHER

## 2025-01-27 PROCEDURE — 17262 DSTRJ MAL LES T/A/L 1.1-2.0: CPT | Mod: 59,79

## 2025-01-27 PROCEDURE — OTHER DIAGNOSIS COMMENT: OTHER

## 2025-01-27 PROCEDURE — OTHER COUNSELING: OTHER

## 2025-01-27 PROCEDURE — OTHER LIQUID NITROGEN: OTHER

## 2025-01-27 PROCEDURE — OTHER CURETTAGE AND DESTRUCTION: OTHER

## 2025-01-27 ASSESSMENT — LOCATION SIMPLE DESCRIPTION DERM
LOCATION SIMPLE: LEFT UPPER ARM
LOCATION SIMPLE: RIGHT UPPER ARM
LOCATION SIMPLE: LEFT KNEE
LOCATION SIMPLE: LEFT FOREHEAD
LOCATION SIMPLE: LEFT FOREARM
LOCATION SIMPLE: SCALP
LOCATION SIMPLE: RIGHT CALF
LOCATION SIMPLE: RIGHT FOREARM

## 2025-01-27 ASSESSMENT — LOCATION DETAILED DESCRIPTION DERM
LOCATION DETAILED: LEFT MEDIAL FOREHEAD
LOCATION DETAILED: LEFT PROXIMAL POSTERIOR UPPER ARM
LOCATION DETAILED: RIGHT PROXIMAL POSTERIOR UPPER ARM
LOCATION DETAILED: RIGHT DISTAL DORSAL FOREARM
LOCATION DETAILED: LEFT PROXIMAL DORSAL FOREARM
LOCATION DETAILED: RIGHT CENTRAL POSTAURICULAR SKIN
LOCATION DETAILED: LEFT MEDIAL KNEE
LOCATION DETAILED: LEFT CENTRAL POSTAURICULAR SKIN
LOCATION DETAILED: RIGHT PROXIMAL CALF

## 2025-01-27 ASSESSMENT — LOCATION ZONE DERM
LOCATION ZONE: ARM
LOCATION ZONE: SCALP
LOCATION ZONE: LEG
LOCATION ZONE: FACE

## 2025-01-27 NOTE — PROCEDURE: BIOPSY BY SHAVE METHOD
Additional Anesthesia Volume In Cc (Will Not Render If 0): 0
Biopsy Type: H and E
Hide Additional Size Dimension?: No
Curettage Text: The wound bed was treated with curettage after the biopsy was performed.
Billing Type: Third-Party Bill
Anesthesia Volume In Cc: 0.5
Anesthesia Type: 0.5% lidocaine with 1:100,000 epinephrine and a 1:10 solution of 8.4% sodium bicarbonate
Notification Instructions: Patient will be notified of biopsy results. However, patient instructed to call the office if not contacted within 2 weeks.
Dressing: bandage
Consent: Written consent was obtained and risks were reviewed including but not limited to scarring, infection, bleeding, scabbing, incomplete removal, nerve damage and allergy to anesthesia.
Lab: -[id number]
Post-Care Instructions: I reviewed with the patient in detail post-care instructions. Patient is to keep the biopsy site dry overnight, and then apply bacitracin twice daily until healed. Patient may apply hydrogen peroxide soaks to remove any crusting.
Depth Of Biopsy: dermis
Biopsy Method: double edge Personna blade
Was A Bandage Applied: Yes
Electrodesiccation Text: The wound bed was treated with electrodesiccation after the biopsy was performed.
Silver Nitrate Text: The wound bed was treated with silver nitrate after the biopsy was performed.
Detail Level: Detailed
Wound Care: Petrolatum
Electrodesiccation And Curettage Text: The wound bed was treated with electrodesiccation and curettage after the biopsy was performed.
Hemostasis: Drysol
Information: Selecting Yes will display possible errors in your note based on the variables you have selected. This validation is only offered as a suggestion for you. PLEASE NOTE THAT THE VALIDATION TEXT WILL BE REMOVED WHEN YOU FINALIZE YOUR NOTE. IF YOU WANT TO FAX A PRELIMINARY NOTE YOU WILL NEED TO TOGGLE THIS TO 'NO' IF YOU DO NOT WANT IT IN YOUR FAXED NOTE.
Type Of Destruction Used: Curettage
Cryotherapy Text: The wound bed was treated with cryotherapy after the biopsy was performed.
Biopsy Method: Dermablade
Anesthesia Type: 1% lidocaine with epinephrine
Hemostasis: Electrocautery
Medical Necessity Information: It is in your best interest to select a reason for this procedure from the list below. All of these items fulfill various CMS LCD requirements except the new and changing color options.

## 2025-01-27 NOTE — PROCEDURE: DIAGNOSIS COMMENT
Render Risk Assessment In Note?: no
Detail Level: Simple
Comment: Although a limited exam was performed today (instead of a full exam per pt preference), I strongly encourage full-body skin exams. If limited exams are preferred, good self-examinations of all areas of the body are required (handout provided with information on how to perform self skin checks and what to look for as it relates to concerning lesions)

## 2025-01-27 NOTE — PROCEDURE: COUNSELING
Detail Level: Generalized
Detail Level: Simple
Detail Level: Detailed
Nicotinamide Supplementation Recommendations: All supplements should be USP (https://www.usp.org/verification-services/verified-sam).

## 2025-01-27 NOTE — PROCEDURE: CURETTAGE AND DESTRUCTION
Concentration (Mg/Ml Or Millions Of Plaque Forming Units/Cc): 0.01
Add Ability To Document Additional Intralesional Injection: No
Bill As A Line Item Or As Units: Line Item
Biopsy Photograph Reviewed: Yes
Anesthesia Volume In Cc: 1.5
Final Size Statement: The size of the lesion after curettage was
Detail Level: Detailed
Number Of Curettages: 3
Consent was obtained from the patient. The risks, benefits and alternatives to therapy were discussed in detail. Specifically, the risks of infection, scarring, bleeding, prolonged wound healing, nerve injury, incomplete removal, allergy to anesthesia and recurrence were addressed. Alternatives to ED&C, such as: surgical removal, topical tx, and XRT were also discussed. Risks/benefits of procedures discussed in detail and patient elected for ED&C. Prior to the procedure, the treatment site was clearly identified and confirmed by the patient.
Anesthesia Type: 0.5% lidocaine with 1:200,000 epinephrine
Size Of Lesion After Curettage: 0
Total Volume (Ccs): 1
Additional Information: (Optional): The wound was cleaned, and a pressure dressing was applied.  The patient received detailed post-op instructions.
Post-Care Instructions: Post care instructions reviewed with pt. Pt to apply vaseline daily with clean cotton tip to encourage healing and recommend waterproof (hydrocolloid) dressings if water exposure. Not to apply any other OTC topicals including neosporin. Pt to contact office for evaluation if non healing wound present, the area worsens, or if pt develops fevers, chills, bleeding, severe pain. Dilute vinegar soaks are recommended (1 tbs white distilled vinegar in 5-8 fluid oz of distilled water), done daily.
Size Of Lesion In Cm: 2
What Was Performed First?: Curettage
Cautery Type: electrodesiccation

## 2025-01-27 NOTE — PROCEDURE: LIQUID NITROGEN
Render In Bullet Format When Appropriate: Yes
Consent has been obtained after discussing/reviewing the risks to the procedure which may include but are not limited to pain, discomfort, redness, swelling, crusting/scabbing/blistering, discoloration, recurrence, persistence, and the risk of scarring. The patient has had the opportunity to ask questions and understand the purpose of the treatment, benefits, risks, and alternatives.
Detail Level: Simple
Number Of Freeze-Thaw Cycles: 1 freeze-thaw cycle
Post-care instructions were reviewed with patient. Patient is to wear sunprotection / sun protective clothing, avoid picking areas and Vaseline use daily is encouraged until healed. Rec re-evaluation in clinic if an AK does not resolve within 6 wks and/or sooner if worsening.
Total Number Of Aks Treated: 12

## 2025-01-29 ENCOUNTER — TELEPHONE (OUTPATIENT)
Dept: INTERNAL MEDICINE CLINIC | Facility: CLINIC | Age: 85
End: 2025-01-29

## 2025-01-29 NOTE — TELEPHONE ENCOUNTER
Patient called to request refill for PIOGLITAZONE be sent to [facility] as pharmacy informed him they do not have prescription on file.     Advised patient that doctor did send prescription for 1 year to [facility] on 12/3/24. Encouraged him to call [facility] and speak to a pharmacist.    Patient will call [facility] to let them know and call back if additional issue.

## 2025-02-03 RX ORDER — LANCETS 33 GAUGE
EACH MISCELLANEOUS
Qty: 100 EACH | Refills: 3 | Status: SHIPPED | OUTPATIENT
Start: 2025-02-03

## 2025-02-03 NOTE — TELEPHONE ENCOUNTER
Refill request is for a maintenance medication and has met the criteria specified in the Ambulatory Medication Refill Standing Order for eligibility, visits, laboratory, alerts and was sent to the requested pharmacy.    Requested Prescriptions     Signed Prescriptions Disp Refills    OneTouch Delica Lancets 33G Does not apply Misc 100 each 3     Sig: Use daily     Authorizing Provider: AMBER YORK     Ordering User: ENRIQUE KC

## 2025-02-12 ENCOUNTER — TELEPHONE (OUTPATIENT)
Dept: INTERNAL MEDICINE CLINIC | Facility: CLINIC | Age: 85
End: 2025-02-12

## 2025-02-12 NOTE — TELEPHONE ENCOUNTER
To ANUPAMA DELACRUZ...    I called and spoke with patients brother/Shashank.    He informed me that he knows his brother better than anyone and he feels he does not need to go to the ED as I advised    Shashank feels patient is \" not acting right for past couple of days due to an increase in his anxiety\"    He is not taking the patient to the ED as I strongly advised but will take him to the IC tomorrow    I reiterated several times that ED evaluation is warranted based on previous conversation I had with patient and Shashank. Shashank stated he appreciated my concern

## 2025-02-12 NOTE — TELEPHONE ENCOUNTER
Patient called to speak to nurse for input.     Patient states his glucose level now is at 226.     Patient also took it this morning and it was 230.     Patient #[phone]

## 2025-02-12 NOTE — TELEPHONE ENCOUNTER
To ANUPAMA Gottlieb...    I called and spoke with patient and he stated \" I just am not feeling right and  I am scared\"    Patient stated his blood sugar readings were elevated today at 230 this morning and currently is 226.    Took all of his diabetic medications as he usually does    Patients speech sounded slurred to me so I asked patient who was with him and he put his brother/omayra in the phone    Per brother, \"he has been slipping the past 2 days but it's hard for me to talk in front of him\". I asked what he meant by \"slipping\" and he replied he's not acting right    I voiced my concern for a CVA and offered to call 911 but brother stated he will take patient to [facility] ED now for evaluation now

## 2025-02-12 NOTE — TELEPHONE ENCOUNTER
Patient's brother called back to speak with the nurse (he spoke with earlier) to give an update    Not taking patient to the ER     Hernán will give details to nurse when she returns his call [phone]

## 2025-02-13 ENCOUNTER — TELEPHONE (OUTPATIENT)
Dept: INTERNAL MEDICINE CLINIC | Facility: CLINIC | Age: 85
End: 2025-02-13

## 2025-02-13 NOTE — TELEPHONE ENCOUNTER
Feeling better today. Has appt physical therapy for tomorrow.  No chest pain, shortness of breath.  No fevers, chills.  Feels he is able to manage at home    He is feeling well today.  He will keep us updated if there is any changes.

## 2025-02-18 ENCOUNTER — APPOINTMENT (OUTPATIENT)
Dept: URBAN - METROPOLITAN AREA CLINIC 244 | Age: 85
Setting detail: DERMATOLOGY
End: 2025-02-19

## 2025-02-18 DIAGNOSIS — Z48.817 ENCOUNTER FOR SURGICAL AFTERCARE FOLLOWING SURGERY ON THE SKIN AND SUBCUTANEOUS TISSUE: ICD-10-CM

## 2025-02-18 PROCEDURE — OTHER COUNSELING: OTHER

## 2025-02-18 PROCEDURE — OTHER PRESCRIPTION: OTHER

## 2025-02-18 PROCEDURE — 99212 OFFICE O/P EST SF 10 MIN: CPT

## 2025-02-18 RX ORDER — MUPIROCIN 20 MG/G
OINTMENT TOPICAL
Qty: 22 | Refills: 0 | Status: ERX | COMMUNITY
Start: 2025-02-18

## 2025-02-18 ASSESSMENT — LOCATION ZONE DERM: LOCATION ZONE: ARM

## 2025-02-18 ASSESSMENT — LOCATION SIMPLE DESCRIPTION DERM: LOCATION SIMPLE: LEFT FOREARM

## 2025-02-18 ASSESSMENT — LOCATION DETAILED DESCRIPTION DERM: LOCATION DETAILED: LEFT PROXIMAL DORSAL FOREARM

## 2025-02-24 ENCOUNTER — APPOINTMENT (OUTPATIENT)
Dept: URBAN - METROPOLITAN AREA CLINIC 244 | Age: 85
Setting detail: DERMATOLOGY
End: 2025-02-28

## 2025-02-24 DIAGNOSIS — Z85.828 PERSONAL HISTORY OF OTHER MALIGNANT NEOPLASM OF SKIN: ICD-10-CM

## 2025-02-24 DIAGNOSIS — Z48.817 ENCOUNTER FOR SURGICAL AFTERCARE FOLLOWING SURGERY ON THE SKIN AND SUBCUTANEOUS TISSUE: ICD-10-CM

## 2025-02-24 DIAGNOSIS — Z12.83 ENCOUNTER FOR SCREENING FOR MALIGNANT NEOPLASM OF SKIN: ICD-10-CM

## 2025-02-24 DIAGNOSIS — L82.1 OTHER SEBORRHEIC KERATOSIS: ICD-10-CM

## 2025-02-24 DIAGNOSIS — L81.4 OTHER MELANIN HYPERPIGMENTATION: ICD-10-CM

## 2025-02-24 DIAGNOSIS — F42.4 EXCORIATION (SKIN-PICKING) DISORDER: ICD-10-CM

## 2025-02-24 DIAGNOSIS — L57.0 ACTINIC KERATOSIS: ICD-10-CM

## 2025-02-24 DIAGNOSIS — D22 MELANOCYTIC NEVI: ICD-10-CM

## 2025-02-24 PROBLEM — D48.5 NEOPLASM OF UNCERTAIN BEHAVIOR OF SKIN: Status: ACTIVE | Noted: 2025-02-24

## 2025-02-24 PROBLEM — D22.9 MELANOCYTIC NEVI, UNSPECIFIED: Status: ACTIVE | Noted: 2025-02-24

## 2025-02-24 PROCEDURE — OTHER SHAVE REMOVAL: OTHER

## 2025-02-24 PROCEDURE — 99213 OFFICE O/P EST LOW 20 MIN: CPT | Mod: 25

## 2025-02-24 PROCEDURE — 17003 DESTRUCT PREMALG LES 2-14: CPT

## 2025-02-24 PROCEDURE — 17000 DESTRUCT PREMALG LESION: CPT | Mod: 59

## 2025-02-24 PROCEDURE — 11102 TANGNTL BX SKIN SINGLE LES: CPT

## 2025-02-24 PROCEDURE — OTHER COUNSELING: OTHER

## 2025-02-24 PROCEDURE — 17262 DSTRJ MAL LES T/A/L 1.1-2.0: CPT

## 2025-02-24 PROCEDURE — OTHER PRESCRIPTION MEDICATION MANAGEMENT: OTHER

## 2025-02-24 PROCEDURE — OTHER LIQUID NITROGEN: OTHER

## 2025-02-24 PROCEDURE — OTHER MIPS QUALITY: OTHER

## 2025-02-24 ASSESSMENT — LOCATION ZONE DERM
LOCATION ZONE: ARM
LOCATION ZONE: LEG
LOCATION ZONE: FACE
LOCATION ZONE: SCALP

## 2025-02-24 ASSESSMENT — LOCATION DETAILED DESCRIPTION DERM
LOCATION DETAILED: LEFT PROXIMAL PRETIBIAL REGION
LOCATION DETAILED: LEFT MEDIAL FOREHEAD
LOCATION DETAILED: RIGHT LATERAL PROXIMAL PRETIBIAL REGION
LOCATION DETAILED: RIGHT MEDIAL FRONTAL SCALP
LOCATION DETAILED: LEFT CENTRAL POSTAURICULAR SKIN
LOCATION DETAILED: LEFT MEDIAL KNEE
LOCATION DETAILED: RIGHT SUPERIOR LATERAL BUCCAL CHEEK
LOCATION DETAILED: LEFT DISTAL DORSAL FOREARM
LOCATION DETAILED: RIGHT CENTRAL POSTAURICULAR SKIN
LOCATION DETAILED: LEFT SUPERIOR LATERAL BUCCAL CHEEK
LOCATION DETAILED: LEFT PROXIMAL DORSAL FOREARM

## 2025-02-24 ASSESSMENT — LOCATION SIMPLE DESCRIPTION DERM
LOCATION SIMPLE: LEFT FOREHEAD
LOCATION SIMPLE: LEFT PRETIBIAL REGION
LOCATION SIMPLE: LEFT FOREARM
LOCATION SIMPLE: LEFT KNEE
LOCATION SIMPLE: RIGHT CHEEK
LOCATION SIMPLE: RIGHT SCALP
LOCATION SIMPLE: SCALP
LOCATION SIMPLE: RIGHT PRETIBIAL REGION
LOCATION SIMPLE: LEFT CHEEK

## 2025-02-24 NOTE — PROCEDURE: LIQUID NITROGEN
Detail Level: Simple
Render In Bullet Format When Appropriate: Yes
Consent has been obtained after discussing/reviewing the risks to the procedure which may include but are not limited to pain, discomfort, redness, swelling, crusting/scabbing/blistering, discoloration, recurrence, persistence, and the risk of scarring. The patient has had the opportunity to ask questions and understand the purpose of the treatment, benefits, risks, and alternatives.
Total Number Of Aks Treated: 9
Number Of Freeze-Thaw Cycles: 1 freeze-thaw cycle
Post-care instructions were reviewed with patient. Patient is to wear sunprotection / sun protective clothing, avoid picking areas and Vaseline use daily is encouraged until healed. Rec re-evaluation in clinic if an AK does not resolve within 6 wks and/or sooner if worsening.

## 2025-02-24 NOTE — PROCEDURE: SHAVE REMOVAL
Size Of Margin In Cm (Margins Are Not Added To Billing Dimensions): 0.2
Notification Instructions: Patient will be notified of pathology results. However, patient instructed to call the office if not contacted within 2 weeks.
Consent was obtained from the patient. The risks and benefits were discussed in detail. Specifically, the risks of infection, scarring, scabbing, bleeding, prolonged wound healing, incomplete removal, allergy to anesthesia, nerve injury and recurrence were addressed. Prior to the procedure, the treatment site was clearly identified and confirmed by the patient. The scar produced with a shave removal typically appears atrophic and white but can be raised / hypertrophic / keloidal.  Patient aware biopsy is done for medical purposes (not cosmetic).Patient aware that sometimes removal/biopsy results can be inconclusive and that another biopsy or procedure may be required. Patient aware that another procedure may be required pending the diagnosis.
X Size Of Lesion In Cm (Optional): 0
Depth Of Shave: dermis
Bill For Surgical Tray: no
Anesthesia Type: 0.5% lidocaine with 1:200,000 epinephrine and a 1:10 solution of 8.4% sodium bicarbonate
Hemostasis: Drysol
Was A Bandage Applied: Yes
Size Of Lesion In Cm (Required): 1
Medical Necessity Information: It is in your best interest to select a reason for this procedure from the list below. All of these items fulfill various CMS LCD requirements except the new and changing color options.
Wound Care: Petrolatum
Biopsy Method: double edge Personna blade
Billing Type: Third-Party Bill
Post-Care Instructions: I reviewed with the patient in detail post-care instructions.\\n\\n Following the removal petrolatum and a bandage were applied. Patient will be notified of biopsy results. However, patient instructed to call the office if not contacted within 2 weeks. Reviewed with the patient in detail post-care instructions. Patient is to keep the area dry for 48 hours, and not to engage in any swimming until the area is healed. If patient does have water exposure, recommend waterproof (hydrocolloid dressing) application. Should the patient develop any fevers, chills, bleeding, severe pain, then the patient will contact the office immediately.\\n\\n** The patient was explicitly instructed to NOT apply any other topicals to the area other than plain vaseline with a clean Q-tip **. No Neosporin or topical Ab unless prescribed/recommended by the Dr is to be used.\\n\\nThe patient is aware to have the area re-evaluated if it is not healed
Medical Necessity Clause: This procedure was medically necessary because the lesion that was treated was:
Detail Level: Detailed
Lab: -[id number]
Size Of Lesion In Cm (Required): 0.8
Biopsy Method: Dermablade
Anesthesia Type: 1% lidocaine with epinephrine
Hemostasis: Aluminum Chloride
Consent was obtained from the patient. The risks and benefits to therapy were discussed in detail. Specifically, the risks of infection, eye injury from sharp exposure, scarring, bleeding, prolonged wound healing, incomplete removal, allergy to anesthesia, nerve injury and recurrence were addressed. Prior to the procedure, the treatment site was clearly identified and confirmed by the patient. All components of Universal Protocol/PAUSE Rule completed.
Post-Care Instructions: I reviewed with the patient in detail post-care instructions. Patient is to keep the biopsy site dry overnight, and then apply bacitracin twice daily until healed. Patient may apply hydrogen peroxide soaks to remove any crusting.

## 2025-02-24 NOTE — PROCEDURE: COUNSELING
Detail Level: Simple
Detail Level: Detailed
Nicotinamide Supplementation Recommendations: All supplements should be USP (https://www.usp.org/verification-services/verified-sam).
Detail Level: Generalized

## 2025-02-24 NOTE — PROCEDURE: PRESCRIPTION MEDICATION MANAGEMENT
Detail Level: Zone
Discontinue Regimen: mupirocin 2 % topical ointment \\nQuantity: 22.0 g  Days Supply: 30\\nSig: Apply a thin layer to AA  TID
Render In Strict Bullet Format?: No

## 2025-03-06 ENCOUNTER — TELEPHONE (OUTPATIENT)
Dept: INTERNAL MEDICINE CLINIC | Facility: CLINIC | Age: 85
End: 2025-03-06

## 2025-03-06 DIAGNOSIS — I10 BENIGN ESSENTIAL HTN: ICD-10-CM

## 2025-03-06 DIAGNOSIS — E78.5 HYPERLIPIDEMIA, UNSPECIFIED HYPERLIPIDEMIA TYPE: ICD-10-CM

## 2025-03-06 DIAGNOSIS — I10 PRIMARY HYPERTENSION: ICD-10-CM

## 2025-03-06 DIAGNOSIS — E11.9 TYPE 2 DIABETES MELLITUS WITHOUT COMPLICATION, WITHOUT LONG-TERM CURRENT USE OF INSULIN (HCC): Primary | ICD-10-CM

## 2025-03-06 DIAGNOSIS — Z13.89 SCREENING FOR NEPHROPATHY: ICD-10-CM

## 2025-03-06 DIAGNOSIS — E55.9 VITAMIN D DEFICIENCY: ICD-10-CM

## 2025-03-06 DIAGNOSIS — Z13.0 SCREENING FOR DEFICIENCY ANEMIA: ICD-10-CM

## 2025-03-06 DIAGNOSIS — Z13.29 SCREENING FOR THYROID DISORDER: ICD-10-CM

## 2025-03-06 DIAGNOSIS — N18.30 STAGE 3 CHRONIC KIDNEY DISEASE, UNSPECIFIED WHETHER STAGE 3A OR 3B CKD (HCC): Chronic | ICD-10-CM

## 2025-03-06 NOTE — TELEPHONE ENCOUNTER
Thank you for pending orders. Please kindly notify patient should obtain 7-10 days prior to our scheduled clinic visit.  We will go over the results in detail in-person

## 2025-03-06 NOTE — TELEPHONE ENCOUNTER
Should patient have labs before his medicare annual on March 14 with Dr George     Please call to advise  [phone]

## 2025-03-06 NOTE — TELEPHONE ENCOUNTER
Patient had labs drawn in December. To  to please review and advise what labs are needed at this time

## 2025-03-06 NOTE — TELEPHONE ENCOUNTER
Patient contacted and notified that order for fasting labs has been placed by  to complete prior to the upcoming appointment.

## 2025-03-09 ENCOUNTER — HOSPITAL ENCOUNTER (OUTPATIENT)
Age: 85
Discharge: ANOTHER HOSPITAL | End: 2025-03-09
Payer: MEDICARE

## 2025-03-09 ENCOUNTER — APPOINTMENT (OUTPATIENT)
Dept: CT IMAGING | Facility: HOSPITAL | Age: 85
End: 2025-03-09
Attending: EMERGENCY MEDICINE
Payer: MEDICARE

## 2025-03-09 ENCOUNTER — HOSPITAL ENCOUNTER (EMERGENCY)
Facility: HOSPITAL | Age: 85
Discharge: HOME | End: 2025-03-09
Attending: EMERGENCY MEDICINE
Payer: MEDICARE

## 2025-03-09 ENCOUNTER — APPOINTMENT (OUTPATIENT)
Dept: GENERAL RADIOLOGY | Facility: HOSPITAL | Age: 85
End: 2025-03-09
Attending: EMERGENCY MEDICINE
Payer: MEDICARE

## 2025-03-09 VITALS
RESPIRATION RATE: 19 BRPM | WEIGHT: 205 LBS | HEIGHT: 74 IN | SYSTOLIC BLOOD PRESSURE: 171 MMHG | HEART RATE: 78 BPM | BODY MASS INDEX: 26.31 KG/M2 | OXYGEN SATURATION: 97 % | DIASTOLIC BLOOD PRESSURE: 85 MMHG | TEMPERATURE: 97 F

## 2025-03-09 VITALS
RESPIRATION RATE: 18 BRPM | HEART RATE: 93 BPM | SYSTOLIC BLOOD PRESSURE: 125 MMHG | TEMPERATURE: 97 F | DIASTOLIC BLOOD PRESSURE: 62 MMHG | OXYGEN SATURATION: 97 %

## 2025-03-09 DIAGNOSIS — R53.83 FATIGUE, UNSPECIFIED TYPE: ICD-10-CM

## 2025-03-09 DIAGNOSIS — R73.9 HYPERGLYCEMIA: ICD-10-CM

## 2025-03-09 DIAGNOSIS — R07.9 CHEST PAIN, UNSPECIFIED TYPE: Primary | ICD-10-CM

## 2025-03-09 DIAGNOSIS — R07.9 CHEST PAIN OF UNCERTAIN ETIOLOGY: Primary | ICD-10-CM

## 2025-03-09 DIAGNOSIS — R06.00 DYSPNEA, UNSPECIFIED TYPE: ICD-10-CM

## 2025-03-09 LAB
ALBUMIN SERPL-MCNC: 4 G/DL (ref 3.2–4.8)
ALBUMIN/GLOB SERPL: 1.8 {RATIO} (ref 1–2)
ALP LIVER SERPL-CCNC: 75 U/L
ALT SERPL-CCNC: 15 U/L
ANION GAP SERPL CALC-SCNC: 10 MMOL/L (ref 0–18)
AST SERPL-CCNC: 21 U/L (ref ?–34)
BASOPHILS # BLD AUTO: 0.02 X10(3) UL (ref 0–0.2)
BASOPHILS NFR BLD AUTO: 0.3 %
BILIRUB SERPL-MCNC: 0.3 MG/DL (ref 0.2–1.1)
BILIRUB UR QL: NEGATIVE
BUN BLD-MCNC: 18 MG/DL (ref 9–23)
BUN/CREAT SERPL: 12.8 (ref 10–20)
CALCIUM BLD-MCNC: 8.5 MG/DL (ref 8.7–10.4)
CHLORIDE SERPL-SCNC: 110 MMOL/L (ref 98–112)
CLARITY UR: CLEAR
CO2 SERPL-SCNC: 22 MMOL/L (ref 21–32)
COLOR UR: YELLOW
CREAT BLD-MCNC: 1.41 MG/DL
DEPRECATED RDW RBC AUTO: 42.7 FL (ref 35.1–46.3)
EGFRCR SERPLBLD CKD-EPI 2021: 48 ML/MIN/1.73M2 (ref 60–?)
EOSINOPHIL # BLD AUTO: 0.1 X10(3) UL (ref 0–0.7)
EOSINOPHIL NFR BLD AUTO: 1.3 %
ERYTHROCYTE [DISTWIDTH] IN BLOOD BY AUTOMATED COUNT: 12.4 % (ref 11–15)
GLOBULIN PLAS-MCNC: 2.2 G/DL (ref 2–3.5)
GLUCOSE BLD-MCNC: 306 MG/DL (ref 70–99)
GLUCOSE BLDC GLUCOMTR-MCNC: 364 MG/DL (ref 70–99)
GLUCOSE UR-MCNC: >1000 MG/DL
HCT VFR BLD AUTO: 40.4 %
HGB BLD-MCNC: 13.8 G/DL
HGB UR QL STRIP.AUTO: NEGATIVE
HYALINE CASTS #/AREA URNS AUTO: PRESENT /LPF
IMM GRANULOCYTES # BLD AUTO: 0.03 X10(3) UL (ref 0–1)
IMM GRANULOCYTES NFR BLD: 0.4 %
INR BLD: 0.9 (ref 0.8–1.2)
KETONES UR-MCNC: NEGATIVE MG/DL
LEUKOCYTE ESTERASE UR QL STRIP.AUTO: NEGATIVE
LYMPHOCYTES # BLD AUTO: 1.26 X10(3) UL (ref 1–4)
LYMPHOCYTES NFR BLD AUTO: 16.2 %
MCH RBC QN AUTO: 32.4 PG (ref 26–34)
MCHC RBC AUTO-ENTMCNC: 34.2 G/DL (ref 31–37)
MCV RBC AUTO: 94.8 FL
MONOCYTES # BLD AUTO: 0.57 X10(3) UL (ref 0.1–1)
MONOCYTES NFR BLD AUTO: 7.3 %
NEUTROPHILS # BLD AUTO: 5.78 X10 (3) UL (ref 1.5–7.7)
NEUTROPHILS # BLD AUTO: 5.78 X10(3) UL (ref 1.5–7.7)
NEUTROPHILS NFR BLD AUTO: 74.5 %
NITRITE UR QL STRIP.AUTO: NEGATIVE
NT-PROBNP SERPL-MCNC: 773 PG/ML (ref ?–450)
OSMOLALITY SERPL CALC.SUM OF ELEC: 307 MOSM/KG (ref 275–295)
PH UR: 6 [PH] (ref 5–8)
PLATELET # BLD AUTO: 180 10(3)UL (ref 150–450)
POTASSIUM SERPL-SCNC: 4.1 MMOL/L (ref 3.5–5.1)
PROT SERPL-MCNC: 6.2 G/DL (ref 5.7–8.2)
PROT UR-MCNC: 50 MG/DL
PROTHROMBIN TIME: 12.7 SECONDS (ref 11.6–14.8)
RBC # BLD AUTO: 4.26 X10(6)UL
SODIUM SERPL-SCNC: 142 MMOL/L (ref 136–145)
SP GR UR STRIP: 1.03 (ref 1–1.03)
TROPONIN I SERPL HS-MCNC: 7 NG/L
TROPONIN I SERPL HS-MCNC: 9 NG/L
UROBILINOGEN UR STRIP-ACNC: NORMAL
WBC # BLD AUTO: 7.8 X10(3) UL (ref 4–11)

## 2025-03-09 PROCEDURE — 84484 ASSAY OF TROPONIN QUANT: CPT | Performed by: EMERGENCY MEDICINE

## 2025-03-09 PROCEDURE — 82962 GLUCOSE BLOOD TEST: CPT | Performed by: PHYSICIAN ASSISTANT

## 2025-03-09 PROCEDURE — 85025 COMPLETE CBC W/AUTO DIFF WBC: CPT | Performed by: EMERGENCY MEDICINE

## 2025-03-09 PROCEDURE — 81001 URINALYSIS AUTO W/SCOPE: CPT | Performed by: EMERGENCY MEDICINE

## 2025-03-09 PROCEDURE — 99285 EMERGENCY DEPT VISIT HI MDM: CPT

## 2025-03-09 PROCEDURE — 93010 ELECTROCARDIOGRAM REPORT: CPT

## 2025-03-09 PROCEDURE — 85610 PROTHROMBIN TIME: CPT | Performed by: EMERGENCY MEDICINE

## 2025-03-09 PROCEDURE — 83880 ASSAY OF NATRIURETIC PEPTIDE: CPT | Performed by: EMERGENCY MEDICINE

## 2025-03-09 PROCEDURE — 71045 X-RAY EXAM CHEST 1 VIEW: CPT | Performed by: EMERGENCY MEDICINE

## 2025-03-09 PROCEDURE — 74174 CTA ABD&PLVS W/CONTRAST: CPT | Performed by: EMERGENCY MEDICINE

## 2025-03-09 PROCEDURE — 71275 CT ANGIOGRAPHY CHEST: CPT | Performed by: EMERGENCY MEDICINE

## 2025-03-09 PROCEDURE — 93005 ELECTROCARDIOGRAM TRACING: CPT

## 2025-03-09 PROCEDURE — 99215 OFFICE O/P EST HI 40 MIN: CPT | Performed by: PHYSICIAN ASSISTANT

## 2025-03-09 PROCEDURE — 80053 COMPREHEN METABOLIC PANEL: CPT | Performed by: EMERGENCY MEDICINE

## 2025-03-09 PROCEDURE — 36415 COLL VENOUS BLD VENIPUNCTURE: CPT

## 2025-03-09 NOTE — ED NOTES
Pt verbalized understanding of discharge and follow up instructions  Denies additional questioning  Stable upon leaving ed  piv dc'd, catheter intact, bleeding controlled

## 2025-03-09 NOTE — ED PROVIDER NOTES
Patient Seen in: [facility]    History     Chief Complaint   Patient presents with    Fatigue     Stated Complaint: Fatigue    HPI    85-year-old male presents with chief complaint of fatigue.  Patient reports associated bilateral anterior chest pain and dyspnea.  Patient states chest pain began this morning.  Patient denies fever, chills, injury or trauma, cough, wheeze, hemoptysis, abdominal pain, nausea, vomiting, diaphoresis, ecchymosis, erythema, rash, weakness, paraesthesias, extremity pain, extremity swelling.    Past Medical History:    Aorta aneurysm    Back problem    Benign essential HTN    Cataract    Chronic rhinitis    Diabetes (HCC)    DM (diabetes mellitus), type 2 (HCC)    Hearing impairment    High blood pressure    High cholesterol    Hyperlipidemia    POAG (primary open-angle glaucoma)    first visit with Dr. Ang patient has been taking Brimonidine 0.15% OU BID and Timolol 0.5% OU BID for 10 yrs per MD in [place].  10/28/23 patient to continue taking gtts per [facility] due to abnormal VF OS and abnormal OCT OU    PVD (peripheral vascular disease)    Skin cancer    Visual impairment       Past Surgical History:   Procedure Laterality Date    Appendectomy      Cataract extraction w/  intraocular lens implant Left 04/07/2014    Done by Dr. Ch[place] Park in [place]    Cataract extraction w/  intraocular lens implant Right 2014    Done by Dr. Chico Park in [place]    Other accessory      T and A    Other accessory      appendectomy    Other accessory      cholycystectomy    Other accessory      bilateral cataract    Other accessory      facial basal cell cancer    Tonsillectomy              Family History   Problem Relation Age of Onset    Macular degeneration Brother     Glaucoma Neg     Diabetes Neg        Social History     Socioeconomic History    Marital status:    Tobacco Use    Smoking status: Former     Types: Cigarettes     Passive exposure: Never     Smokeless tobacco: Never   Vaping Use    Vaping status: Never Used   Substance and Sexual Activity    Alcohol use: Yes     Alcohol/week: 2.0 standard drinks of alcohol     Types: 2 Cans of beer per week     Comment: 1 - 2 beers occasional    Drug use: Never     Social Drivers of Health     Food Insecurity: No Food Insecurity (7/12/2024)    Food Insecurity     Food Insecurity: Never true   Transportation Needs: No Transportation Needs (7/15/2024)    Transportation Needs     Lack of Transportation: No   Housing Stability: Low Risk  (7/12/2024)    Housing Stability     Housing Instability: No       Review of Systems    Positive for stated complaint: Fatigue  Other systems are as noted in HPI.  Constitutional and vital signs reviewed.      All other systems reviewed and negative except as noted above.    PSFH elements reviewed from today and agreed except as otherwise stated in HPI.    Physical Exam     ED Triage Vitals [03/09/25 1148]   /62   Pulse 93   Resp 18   Temp 97 °F (36.1 °C)   Temp src Axillary   SpO2 97 %   O2 Device None (Room air)       Current:/62   Pulse 93   Temp 97 °F (36.1 °C) (Axillary)   Resp 18   SpO2 97%      PULSE OX within normal limits on room air as interpreted by this provider.    Constitutional: The patient is cooperative. Appears well-developed and well-nourished.  No acute distress.  Psychological: Alert, No abnormalities of mood, affect.  Head: Normocephalic/atraumatic.  Eyes: Pupils are equal round reactive to light.  Conjunctiva are within normal limits.  ENT: Oropharynx is clear.  Neck: The neck is supple.  No meningeal signs.  Chest: There is no tenderness to the chest wall.  No CVA tenderness bilaterally.  Respiratory: Respiratory effort was normal.  Decreased breath sounds bilaterally.  There is no stridor.  Air entry is equal.  Cardiovascular: Regular rate and rhythm.  Capillary refill is brisk.   Genitourinary: Not examined.  Lymphatic: No gross lymphadenopathy  noted.  Musculoskeletal: Musculoskeletal system is grossly intact.  There is no obvious deformity.  Neurological: Gross motor movement is intact in all 4 extremities.  Patient exhibits normal speech.  Skin: Skin is normal to inspection.  Warm and dry.  No obvious bruising.  No obvious rash.    ED Course   Labs Reviewed - No data to display  Accu-Chek 364 per SUE Goldstein  MDM       Heart Score:    HEART Score      Title      Criteria Score   Age: 65 and older Age Score: 2   History: Slightly Suspicious Hx Score: 0     EKG: Normal EKG Score: 0   HTN: Yes   Hypercholesterolemia: Yes   Atherosclerosis/PVD: Yes     DM: Yes   BMI>30kg/m2: No   Smoking: No   Family History: No         Other Risk Factor Score: 4                     HEART Score: 4        Risk of adverse cardiac event is 12-16.6%              Physical exam remained stable as previously documented.  Physical exam findings discussed with patient.    85-year-old male presents with chief complaint of fatigue, dyspnea and bilateral anterior chest pain.  Patient states chest pain began this morning.  Physical exam as document above.  Discussed with patient need for further evaluation and possible workup in emergency department.  Patient is agreeable.    Patient case discussed with Dr. Blackmon.    Disposition and Plan     Clinical Impression:  1. Chest pain, unspecified type    2. Dyspnea, unspecified type    3. Fatigue, unspecified type    4. Hyperglycemia        Disposition:  Ic to ed    Follow-up:  No follow-up provider specified.    Medications Prescribed:  Discharge Medication List as of 3/9/2025 12:13 PM

## 2025-03-09 NOTE — ED INITIAL ASSESSMENT (HPI)
Pt c/o fatigue + discomfort of bilateral shoulder across his chest this morning + SOB with exesion, denies fever/cough/diarrhea/vomiting

## 2025-03-09 NOTE — ED PROVIDER NOTES
[facility]  Emergency Department Attending Note     Chief Complaint:   Chief Complaint   Patient presents with    Chest Pain     HISTORY OF PRESENT ILLNESS:   Darin Hernandez is a 85 year old male who presents to the ED with past medical history including hypertension, diabetes, hyperlipidemia, AAA status post endovascular repair  12/2023 presents with a complaint of chest pain earlier this morning associated with some generalized weakness and shortness of breath.  Pain is cramping to the mid and low chest and nonradiating.  Pain is actually essentially resolved at this time and the patient states he feels well and would like to go home.     MEDICAL & SOCIAL HISTORY:   Past Medical History:    Aorta aneurysm    Back problem    Benign essential HTN    Cataract    Chronic rhinitis    Diabetes (HCC)    DM (diabetes mellitus), type 2 (HCC)    Essential hypertension    Hearing impairment    High blood pressure    High cholesterol    Hyperlipidemia    POAG (primary open-angle glaucoma)    first visit with Dr. Ang patient has been taking Brimonidine 0.15% OU BID and Timolol 0.5% OU BID for 10 yrs per MD in [place].  10/28/23 patient to continue taking gtts per [facility] due to abnormal VF OS and abnormal OCT OU    PVD (peripheral vascular disease)    Skin cancer    Visual impairment      Past Surgical History:   Procedure Laterality Date    Appendectomy      Cataract extraction w/  intraocular lens implant Left 04/07/2014    Done by Dr. Chico Park in [place]    Cataract extraction w/  intraocular lens implant Right 2014    Done by Dr. Ch[place] Park in [place]    Other accessory      T and A    Other accessory      appendectomy    Other accessory      cholycystectomy    Other accessory      bilateral cataract    Other accessory      facial basal cell cancer    Tonsillectomy        Social History     Socioeconomic History    Marital status:    Tobacco Use    Smoking status: Former      Types: Cigarettes     Passive exposure: Never    Smokeless tobacco: Never   Vaping Use    Vaping status: Never Used   Substance and Sexual Activity    Alcohol use: Yes     Alcohol/week: 2.0 standard drinks of alcohol     Types: 2 Cans of beer per week     Comment: 1 - 2 beers occasional    Drug use: Never     Social Drivers of Health     Food Insecurity: No Food Insecurity (7/12/2024)    Food Insecurity     Food Insecurity: Never true   Transportation Needs: No Transportation Needs (7/15/2024)    Transportation Needs     Lack of Transportation: No   Housing Stability: Low Risk  (7/12/2024)    Housing Stability     Housing Instability: No    Allergies[1]   Current Outpatient Medications   Medication Sig Dispense Refill    OneTouch Delica Lancets 33G Does not apply Misc Check blood sugar once daily. 100 each 3    amLODIPine 5 MG Oral Tab Take 1 tablet (5 mg total) by mouth daily. 90 tablet 3    metFORMIN HCl 1000 MG Oral Tab TAKE 1 TABLET BY MOUTH EVERY MORNING AND EVERY NIGHT AT BEDTIME 180 tablet 3    famotidine 20 MG Oral Tab Take 1 tablet (20 mg total) by mouth 2 (two) times daily. 60 tablet 0    PIOGLITAZONE 45 MG Oral Tab TAKE 1 TABLET BY MOUTH DAILY 90 tablet 3    Potassium Chloride ER 10 MEQ Oral Tab CR Take 1 tablet (10 mEq total) by mouth daily. 90 tablet 3    Glucose Blood (ONETOUCH VERIO) In Vitro Strip Checl blood sugar once daily 100 strip 3    losartan 100 MG Oral Tab Take 1 tablet (100 mg total) by mouth daily. 90 tablet 3    tamsulosin 0.4 MG Oral Cap Take 1 capsule (0.4 mg total) by mouth daily. 90 capsule 3    atorvastatin 40 MG Oral Tab Take 1 tablet (40 mg total) by mouth daily. 90 tablet 3    glipiZIDE 10 MG Oral Tab Take 1 tablet (10 mg total) by mouth daily. Daily in the morning 90 tablet 3    atenolol 25 MG Oral Tab Take 1 tablet (25 mg total) by mouth daily with dinner.      BRIMONIDINE 0.15 % Ophthalmic Solution INSTILL 1 DROP INTO BOTH EYES IN THE MORNING AND 1 DROP BEFORE  BEDTIME 3 each 3     TIMOLOL 0.5 % Ophthalmic Solution INSTILL 1 DROP INTO BOTH EYES  TWICE DAILY 15 mL 3    Budesonide-Formoterol Fumarate 80-4.5 MCG/ACT Inhalation Aerosol Inhale 2 puffs into the lungs 2 (two) times daily. 1 each 3    alendronate 70 MG Oral Tab Take 1 tablet (70 mg total) by mouth once a week. 13 tablet 3    aspirin-acetaminophen-caffeine 250-250-65 MG Oral Tab Take 1 tablet by mouth every 6 (six) hours as needed for Pain.      Glucose Blood (ONETOUCH VERIO) In Vitro Strip Use daily 100 strip 3    aspirin 81 MG Oral Tab EC Take 1 tablet (81 mg total) by mouth daily.      Ascorbic Acid (VITAMIN C OR) Take 1 tablet by mouth daily.            REVIEW OF SYSTEMS   A 10 point review of systems was completed and is negative except as listed in history of present illness      PHYSICAL EXAM   Vitals: BP (!) 181/87   Pulse 83   Temp 97.1 °F (36.2 °C) (Oral)   Resp 16   Ht 188 cm (6' 2\")   Wt 93 kg   SpO2 96%   BMI 26.32 kg/m²   BP (!) 181/87   Pulse 83   Temp 97.1 °F (36.2 °C) (Oral)   Resp 16   Ht 188 cm (6' 2\")   Wt 93 kg   SpO2 96%   BMI 26.32 kg/m²     General: A&Ox3, NAD  Constitutional: Well developed, well nourished, nontoxic  Head: atraumatic, normocephalic   Eyes: conjuctiva clear, no icterus, PERRL, EOMI, vision grossly normal  Ears: normal external appearance, no drainage  Nose:  Atraumatic, no swelling, no drainage, nares patent  Throat:  Moist pink mucous membranes, airway is patent  Neck:  Soft supple, no masses, no tracheal deviation, no stridor  Chest:  No bruising or abrasions, no tenderness, no deformity  Cardiac:  Regular rate and rhythm  Lung:  No distress, no retractions. Clear to auscultation bilaterally, no w/r/r  Abdomen:  Soft, nontender, nondistended, normal BS  Back: No stepoff/deformity  Extremities: FROM all ext, no deformities, intact equal peripheral pulses, no cyanosis or edema peripheral pulses equal intact peripheral pulses equal intact  Neuro: No facial droop, no slurred  speech, moving all extremities freely, SILT to the bilateral upper and lower extremities  Psych: A&Ox3, normal affect, cooperative, calm  Skin: No rash, no petechiae/purpura, warm, dry      RESULTS  LABS:   Results for orders placed or performed during the hospital encounter of 03/09/25   EKG 12 Lead    Collection Time: 03/09/25 12:44 PM   Result Value Ref Range    Ventricular rate 70 BPM    Atrial rate 70 BPM    P-R Interval 308 ms    QRS Duration 144 ms    Q-T Interval 402 ms    QTC Calculation (Bezet) 434 ms    P Axis 76 degrees    R Axis -7 degrees    T Axis 12 degrees   Pro Beta Natriuretic Peptide    Collection Time: 03/09/25  2:18 PM   Result Value Ref Range    Pro-Beta Natriuretic Peptide 773 (H) <450 pg/mL   Troponin I (High Sensitivity)    Collection Time: 03/09/25  2:18 PM   Result Value Ref Range    Troponin I (High Sensitivity) 7 <=53 ng/L   Comp Metabolic Panel (14)    Collection Time: 03/09/25  2:18 PM   Result Value Ref Range    Glucose 306 (H) 70 - 99 mg/dL    Sodium 142 136 - 145 mmol/L    Potassium 4.1 3.5 - 5.1 mmol/L    Chloride 110 98 - 112 mmol/L    CO2 22.0 21.0 - 32.0 mmol/L    Anion Gap 10 0 - 18 mmol/L    BUN 18 9 - 23 mg/dL    Creatinine 1.41 (H) 0.70 - 1.30 mg/dL    BUN/CREA Ratio 12.8 10.0 - 20.0    Calcium, Total 8.5 (L) 8.7 - 10.4 mg/dL    Calculated Osmolality 307 (H) 275 - 295 mOsm/kg    eGFR-Cr 48 (L) >=60 mL/min/1.73m2    ALT 15 10 - 49 U/L    AST 21 <34 U/L    Alkaline Phosphatase 75 45 - 117 U/L    Bilirubin, Total 0.3 0.2 - 1.1 mg/dL    Total Protein 6.2 5.7 - 8.2 g/dL    Albumin 4.0 3.2 - 4.8 g/dL    Globulin  2.2 2.0 - 3.5 g/dL    A/G Ratio 1.8 1.0 - 2.0   CBC With Differential With Platelet    Collection Time: 03/09/25  2:18 PM   Result Value Ref Range    WBC 7.8 4.0 - 11.0 x10(3) uL    RBC 4.26 3.80 - 5.80 x10(6)uL    HGB 13.8 13.0 - 17.5 g/dL    HCT 40.4 39.0 - 53.0 %    MCV 94.8 80.0 - 100.0 fL    MCH 32.4 26.0 - 34.0 pg    MCHC 34.2 31.0 - 37.0 g/dL    RDW-SD 42.7 35.1 -  46.3 fL    RDW 12.4 11.0 - 15.0 %    .0 150.0 - 450.0 10(3)uL    Neutrophil Absolute Prelim 5.78 1.50 - 7.70 x10 (3) uL    Neutrophil Absolute 5.78 1.50 - 7.70 x10(3) uL    Lymphocyte Absolute 1.26 1.00 - 4.00 x10(3) uL    Monocyte Absolute 0.57 0.10 - 1.00 x10(3) uL    Eosinophil Absolute 0.10 0.00 - 0.70 x10(3) uL    Basophil Absolute 0.02 0.00 - 0.20 x10(3) uL    Immature Granulocyte Absolute 0.03 0.00 - 1.00 x10(3) uL    Neutrophil % 74.5 %    Lymphocyte % 16.2 %    Monocyte % 7.3 %    Eosinophil % 1.3 %    Basophil % 0.3 %    Immature Granulocyte % 0.4 %   Prothrombin Time (PT)    Collection Time: 03/09/25  2:18 PM   Result Value Ref Range    PT 12.7 11.6 - 14.8 seconds    INR 0.90 0.80 - 1.20   RAINBOW DRAW LAVENDER    Collection Time: 03/09/25  2:18 PM   Result Value Ref Range    Hold Lavender Auto Resulted    RAINBOW DRAW LIGHT GREEN    Collection Time: 03/09/25  2:18 PM   Result Value Ref Range    Hold Lt Green Auto Resulted    RAINBOW DRAW BLUE    Collection Time: 03/09/25  2:18 PM   Result Value Ref Range    Hold Blue Auto Resulted    Urinalysis, Routine    Collection Time: 03/09/25  2:56 PM   Result Value Ref Range    Urine Color Yellow Yellow    Clarity Urine Clear Clear    Spec Gravity 1.027 1.005 - 1.030    Glucose Urine >1000 (A) Normal mg/dL    Bilirubin Urine Negative Negative    Ketones Urine Negative Negative mg/dL    Blood Urine Negative Negative    pH Urine 6.0 5.0 - 8.0    Protein Urine 50 (A) Negative mg/dL    Urobilinogen Urine Normal Normal    Nitrite Urine Negative Negative    Leukocyte Esterase Urine Negative Negative    WBC Urine 1-5 0 - 5 /HPF    RBC Urine 0-2 0 - 2 /HPF    Bacteria Urine None Seen None Seen /HPF    Squamous Epi. 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  5.4 cm aneurysm the native abdominal aorta has increased in size since 9/22/2023.  Nonemergent interventional radiology or vascular surgery evaluation recommended.  Occluded proximal right superficial femoral artery has progressed since 9/22/2023.  The proximal aspect of the right profundus femoris artery demonstrates compensatory hypertrophy and is patent.  Occluded JESÚS with retrograde filling from collateral vessels.  No pulmonary embolus in the central, main, lobar, segmental pulmonary arteries. Nondiagnostic in the subsegmental pulmonary arteries due to respiratory motion artifact.  Nonobstructing left interpolar caliceal calculus.  Large amount of excessive stool seen throughout the colon suggestive of constipation. No obstruction.  Multiple other incidental findings as described in the body of the report.      Dictated by (CST): Timmy Sandoval MD on 3/09/2025 at 4:40 PM     Finalized by (CST): Timmy Sandoval MD on 3/09/2025 at 4:48 PM          XR CHEST AP PORTABLE  (CPT=71045)    Result Date: 3/9/2025  CONCLUSION:  Hyperexpanded lungs and basilar-predominant atelectasis without radiographic evidence of further acute intrathoracic process.    Dictated by (CST): Pepe Hodges MD on 3/09/2025 at 3:30 PM     Finalized by (CST): Pepe Hodges MD on 3/09/2025 at 3:31 PM           I personally reviewed the radiology study and my finding were aortic aneurysm noted      Procedures:   Procedures    EKG: Normal sinus rhythm with a normal rate of 70, normal intervals, right bundle, nonspecific ST-T wave changes without overt signs of acute ischemia, no old immediately available to compare     ED COURSE          Re-Evaluation: Improved      Disposition & Plan:   Clinical Impression/Final Diagnosis:   1. Chest pain of uncertain etiology        Medical Decision Making: The  patient reports he is entirely asymptomatic.  CT shows slightly enlarged aortic aneurysm with type II lumbar endoleak of the stent graft.  I discussed the case with the patient's vascular surgeon who feels that this can be managed as outpatient and will follow-up with the patient this week in clinic.  Lower suspicion for acute coronary syndrome with low troponin, will check delta troponin if negative I do anticipate discharge home as the patient is completely asymptomatic in the emergency department the patient verbalized understanding and agreement with the plan as does the family at the bedside. Discussed with oncoming ED physician who will follow up.    Medical Decision Making  Amount and/or Complexity of Data Reviewed  Independent Historian:      Details: Family at the bedside  External Data Reviewed: notes.  Labs: ordered. Decision-making details documented in ED Course.  Radiology: ordered and independent interpretation performed. Decision-making details documented in ED Course.  ECG/medicine tests: ordered and independent interpretation performed. Decision-making details documented in ED Course.  Discussion of management or test interpretation with external provider(s): Discussed with the vascular surgeon who feels that outpatient management is appropriate    Risk  OTC drugs.  Prescription drug management.  Decision regarding hospitalization.  Risk Details: Increased risk due to history of AAA, broad differential considered including but not limited to AAA rupture, acute coronary syndrome, pulmonary embolism        *Please note that in the presenting to the emergency department, illness/injury that poses a threat to life or function is considered during this patient's initial evaluation.    The complexity of this visit is therefore inherently more complex given the need to consider life threatening pathology prior to any other etiology for this patient's visit.    The medical decision above exemplifies this  rationale.     Disposition: Discharge       [facility]          Disposition and Plan     Clinical Impression:  1. Chest pain of uncertain etiology         Disposition:  There is no disposition on file for this visit.  3/9/2025  5:46 pm    Follow-up:  Dick George MD  [address] [place] [id number]  [phone]    Schedule an appointment as soon as possible for a visit in 2 day(s)  Please follow-up with your vascular surgeon as discussed and return immediately for any worsening symptoms any concerns    We recommend that you schedule follow up care with a primary care provider within the next three months to obtain basic health screening including reassessment of your blood pressure.      Medications Prescribed:  Current Discharge Medication List              Supplementary Documentation:                                                     This note was generated in part using voice recognition dictation technology. The report was reviewed by this physician but still may have unintentional errors due to inherent limitations of voice recognition technology. All times are estimates.         [1]   Allergies  Allergen Reactions    Lisinopril ANGIOEDEMA and OTHER (SEE COMMENTS)     Lip swelling

## 2025-03-09 NOTE — ED INITIAL ASSESSMENT (HPI)
Pt presents to the ED with c/o an episode of chest pain this morning w/ generalized weakness and shortness of breath. History of HTN and DM, on oral meds.

## 2025-03-10 LAB
ATRIAL RATE: 70 BPM
P AXIS: 76 DEGREES
P-R INTERVAL: 308 MS
Q-T INTERVAL: 402 MS
QRS DURATION: 144 MS
QTC CALCULATION (BEZET): 434 MS
R AXIS: -7 DEGREES
T AXIS: 12 DEGREES
VENTRICULAR RATE: 70 BPM

## 2025-03-11 ENCOUNTER — LAB ENCOUNTER (OUTPATIENT)
Dept: LAB | Age: 85
End: 2025-03-11
Attending: INTERNAL MEDICINE
Payer: MEDICARE

## 2025-03-11 DIAGNOSIS — E55.9 VITAMIN D DEFICIENCY: ICD-10-CM

## 2025-03-11 DIAGNOSIS — E78.5 HYPERLIPIDEMIA, UNSPECIFIED HYPERLIPIDEMIA TYPE: ICD-10-CM

## 2025-03-11 DIAGNOSIS — Z13.29 SCREENING FOR THYROID DISORDER: ICD-10-CM

## 2025-03-11 DIAGNOSIS — E11.9 TYPE 2 DIABETES MELLITUS WITHOUT COMPLICATION, WITHOUT LONG-TERM CURRENT USE OF INSULIN (HCC): ICD-10-CM

## 2025-03-11 DIAGNOSIS — Z13.0 SCREENING FOR DEFICIENCY ANEMIA: ICD-10-CM

## 2025-03-11 DIAGNOSIS — Z13.89 SCREENING FOR NEPHROPATHY: ICD-10-CM

## 2025-03-11 DIAGNOSIS — N18.30 STAGE 3 CHRONIC KIDNEY DISEASE, UNSPECIFIED WHETHER STAGE 3A OR 3B CKD (HCC): Chronic | ICD-10-CM

## 2025-03-11 LAB
ALBUMIN SERPL-MCNC: 4.1 G/DL (ref 3.2–4.8)
ALBUMIN/GLOB SERPL: 2 {RATIO} (ref 1–2)
ALP LIVER SERPL-CCNC: 77 U/L
ALT SERPL-CCNC: 15 U/L
ANION GAP SERPL CALC-SCNC: 11 MMOL/L (ref 0–18)
AST SERPL-CCNC: 17 U/L (ref ?–34)
BASOPHILS # BLD AUTO: 0.03 X10(3) UL (ref 0–0.2)
BASOPHILS NFR BLD AUTO: 0.4 %
BILIRUB SERPL-MCNC: 0.5 MG/DL (ref 0.2–1.1)
BUN BLD-MCNC: 27 MG/DL (ref 9–23)
BUN/CREAT SERPL: 16.9 (ref 10–20)
CALCIUM BLD-MCNC: 8.8 MG/DL (ref 8.7–10.4)
CHLORIDE SERPL-SCNC: 106 MMOL/L (ref 98–112)
CHOLEST SERPL-MCNC: 106 MG/DL (ref ?–200)
CO2 SERPL-SCNC: 24 MMOL/L (ref 21–32)
CREAT BLD-MCNC: 1.6 MG/DL
CREAT UR-SCNC: 251.4 MG/DL
DEPRECATED RDW RBC AUTO: 43.6 FL (ref 35.1–46.3)
EGFRCR SERPLBLD CKD-EPI 2021: 41 ML/MIN/1.73M2 (ref 60–?)
EOSINOPHIL # BLD AUTO: 0.18 X10(3) UL (ref 0–0.7)
EOSINOPHIL NFR BLD AUTO: 2.2 %
ERYTHROCYTE [DISTWIDTH] IN BLOOD BY AUTOMATED COUNT: 12.5 % (ref 11–15)
EST. AVERAGE GLUCOSE BLD GHB EST-MCNC: 206 MG/DL (ref 68–126)
FASTING PATIENT LIPID ANSWER: YES
FASTING STATUS PATIENT QL REPORTED: YES
GLOBULIN PLAS-MCNC: 2.1 G/DL (ref 2–3.5)
GLUCOSE BLD-MCNC: 216 MG/DL (ref 70–99)
HBA1C MFR BLD: 8.8 % (ref ?–5.7)
HCT VFR BLD AUTO: 40.4 %
HDLC SERPL-MCNC: 37 MG/DL (ref 40–59)
HGB BLD-MCNC: 13.7 G/DL
IMM GRANULOCYTES # BLD AUTO: 0.05 X10(3) UL (ref 0–1)
IMM GRANULOCYTES NFR BLD: 0.6 %
LDLC SERPL CALC-MCNC: 48 MG/DL (ref ?–100)
LYMPHOCYTES # BLD AUTO: 1.92 X10(3) UL (ref 1–4)
LYMPHOCYTES NFR BLD AUTO: 23.9 %
MCH RBC QN AUTO: 32.3 PG (ref 26–34)
MCHC RBC AUTO-ENTMCNC: 33.9 G/DL (ref 31–37)
MCV RBC AUTO: 95.3 FL
MICROALBUMIN UR-MCNC: 7.9 MG/DL
MICROALBUMIN/CREAT 24H UR-RTO: 31.4 UG/MG (ref ?–30)
MONOCYTES # BLD AUTO: 0.56 X10(3) UL (ref 0.1–1)
MONOCYTES NFR BLD AUTO: 7 %
NEUTROPHILS # BLD AUTO: 5.3 X10 (3) UL (ref 1.5–7.7)
NEUTROPHILS # BLD AUTO: 5.3 X10(3) UL (ref 1.5–7.7)
NEUTROPHILS NFR BLD AUTO: 65.9 %
NONHDLC SERPL-MCNC: 69 MG/DL (ref ?–130)
OSMOLALITY SERPL CALC.SUM OF ELEC: 304 MOSM/KG (ref 275–295)
PLATELET # BLD AUTO: 207 10(3)UL (ref 150–450)
POTASSIUM SERPL-SCNC: 3.8 MMOL/L (ref 3.5–5.1)
PROT SERPL-MCNC: 6.2 G/DL (ref 5.7–8.2)
RBC # BLD AUTO: 4.24 X10(6)UL
SODIUM SERPL-SCNC: 141 MMOL/L (ref 136–145)
TRIGL SERPL-MCNC: 115 MG/DL (ref 30–149)
TSI SER-ACNC: 3.87 UIU/ML (ref 0.55–4.78)
VIT D+METAB SERPL-MCNC: 39.4 NG/ML (ref 30–100)
VLDLC SERPL CALC-MCNC: 16 MG/DL (ref 0–30)
WBC # BLD AUTO: 8 X10(3) UL (ref 4–11)

## 2025-03-11 PROCEDURE — 36415 COLL VENOUS BLD VENIPUNCTURE: CPT

## 2025-03-11 PROCEDURE — 85025 COMPLETE CBC W/AUTO DIFF WBC: CPT

## 2025-03-11 PROCEDURE — 80053 COMPREHEN METABOLIC PANEL: CPT

## 2025-03-11 PROCEDURE — 80061 LIPID PANEL: CPT

## 2025-03-11 PROCEDURE — 82570 ASSAY OF URINE CREATININE: CPT

## 2025-03-11 PROCEDURE — 82043 UR ALBUMIN QUANTITATIVE: CPT

## 2025-03-11 PROCEDURE — 84443 ASSAY THYROID STIM HORMONE: CPT

## 2025-03-11 PROCEDURE — 82306 VITAMIN D 25 HYDROXY: CPT

## 2025-03-11 PROCEDURE — 83036 HEMOGLOBIN GLYCOSYLATED A1C: CPT

## 2025-03-14 ENCOUNTER — TELEPHONE (OUTPATIENT)
Dept: INTERNAL MEDICINE CLINIC | Facility: CLINIC | Age: 85
End: 2025-03-14

## 2025-03-14 ENCOUNTER — OFFICE VISIT (OUTPATIENT)
Dept: INTERNAL MEDICINE CLINIC | Facility: CLINIC | Age: 85
End: 2025-03-14

## 2025-03-14 VITALS
SYSTOLIC BLOOD PRESSURE: 116 MMHG | HEART RATE: 68 BPM | DIASTOLIC BLOOD PRESSURE: 70 MMHG | HEIGHT: 74 IN | BODY MASS INDEX: 26.05 KG/M2 | TEMPERATURE: 97 F | WEIGHT: 203 LBS

## 2025-03-14 DIAGNOSIS — E55.9 VITAMIN D DEFICIENCY: ICD-10-CM

## 2025-03-14 DIAGNOSIS — H91.13 PRESBYCUSIS OF BOTH EARS: ICD-10-CM

## 2025-03-14 DIAGNOSIS — E11.9 TYPE 2 DIABETES MELLITUS WITHOUT COMPLICATION, WITHOUT LONG-TERM CURRENT USE OF INSULIN (HCC): ICD-10-CM

## 2025-03-14 DIAGNOSIS — E78.5 HYPERLIPIDEMIA, UNSPECIFIED HYPERLIPIDEMIA TYPE: ICD-10-CM

## 2025-03-14 DIAGNOSIS — I10 PRIMARY HYPERTENSION: ICD-10-CM

## 2025-03-14 DIAGNOSIS — Z00.00 ENCOUNTER FOR ANNUAL HEALTH EXAMINATION: ICD-10-CM

## 2025-03-14 DIAGNOSIS — Z13.89 SCREENING FOR NEPHROPATHY: ICD-10-CM

## 2025-03-14 DIAGNOSIS — N18.30 STAGE 3 CHRONIC KIDNEY DISEASE, UNSPECIFIED WHETHER STAGE 3A OR 3B CKD (HCC): ICD-10-CM

## 2025-03-14 DIAGNOSIS — I73.9 PAD (PERIPHERAL ARTERY DISEASE): ICD-10-CM

## 2025-03-14 DIAGNOSIS — H61.21 IMPACTED CERUMEN OF RIGHT EAR: ICD-10-CM

## 2025-03-14 DIAGNOSIS — Z00.00 ANNUAL PHYSICAL EXAM: Primary | ICD-10-CM

## 2025-03-14 DIAGNOSIS — Z13.0 SCREENING FOR DEFICIENCY ANEMIA: ICD-10-CM

## 2025-03-14 DIAGNOSIS — Z13.29 SCREENING FOR THYROID DISORDER: ICD-10-CM

## 2025-03-14 DIAGNOSIS — I10 BENIGN ESSENTIAL HTN: ICD-10-CM

## 2025-03-14 DIAGNOSIS — I73.9 INTERMITTENT CLAUDICATION: ICD-10-CM

## 2025-03-14 RX ORDER — GLIPIZIDE 10 MG/1
10 TABLET ORAL
Qty: 180 TABLET | Refills: 3 | Status: SHIPPED | OUTPATIENT
Start: 2025-03-14

## 2025-03-14 NOTE — PATIENT INSTRUCTIONS
- You were seen in clinic for regular annual check-up.  We did review lab work which showed increase in sugar levels.    Diabetes can be better controlled with a goal less than 7.0  - Please continue trying to cut down on carbohydrates not just in sweets but also in bread/grain/rice/breads  - Targeting weight loss over time can help by optimizing nutrition, targeting exercise as tolerated  - Maintain annual eye examination  - If no improvement, may need to consider adjusting the medication regimen further    We reviewed medications and unfortunately we are maximized on the current oral regimen  - Metformin, Actos/pioglitazone, glipizide are all at the maximum dosages at this point  - We attempted to send for Jardiance/Farxiga but these were not cost effective  -Injectable medications such as Ozempic or insulin are likely the next option  - We could also consider endocrinology evaluation  - For now, lets try to cut down on carbohydrates Turner and sweets but also rice/grain/bread/pasta    Vascular status seems to be stable with Dr. Chu    Kidney function seems to be stable    -Please continue checking your blood pressures at home and notify us if they are increasing   - please continue checking your blood sugars at home and notify us if they are increasing  - Please continue following up with ophthalmology for usual eye exams  - Vaccines may be due for: We recommended checking with your insurance for coverage of Shingrix, a 2-dose shingles vaccine that can be obtained at the pharmacy if there is adequate coverage through your insurance plan.    - Please continue to eat a varied diet including recommended servings of vegetables, fruits, and low fat dairy. Minimize high saturated fats (such as fast foods) and high sugar intake (such as soda)  - We recommend 150 minutes of moderate intensity exercise (brisk walking, swimming) weekly to maintain your current weight.  Targeted weight loss will require more vigorous  exercise or more than 150 minutes/week.    Return to clinic in 6 months for follow-up

## 2025-03-14 NOTE — H&P
HPI:   Darin Hernandez is a 85 year old male who presents for a MA (Medicare Advantage) Supervisit (Once per calendar year).      ER visit 3/9/2025 for generalized weakness, shortness of breath.  Pain cramping to the mid and lower chest, nonradiating.  Pain resolved upon presentation to the ER.    The shoulder pains came and go. He has been doing the PT. NO falls, staggered a few times. Afternoon can nap.     Sleep is some and may not sleep for a few.     Ongoing memory loss. No anxiety and depression. He does his own showering.     I reviewed and updated the PMHx, FamHx, medications, allergies, and SocHx as below with the patient.       SocHX  - Home: Feels safe at home - live with brother and his wife  - Work: Retired - Worked at [id number], [employer]  - Hobbies: puzzle, reading. Used to golf.   - Nutrition: appetite is ok. Breakfast, light, lunch. Some candy. Sugarfree candies. Not as much water.   - Physical Activity: walking exercises, not as much walking. PT    No topic due editable text        Fall Risk Assessment:   He has been screened for Falls and is High Risk. Fall Prevention information provided to patient in After Visit Summary.    Do you feel unsteady when standing or walking?: Yes  Do you worry about falling?: Yes  Have you fallen in the past year?: No   Cognitive Assessment:   He had a completely normal cognitive assessment - see flowsheet entries       Functional Ability/Status:   Darin Hernandez has some abnormal functions as listed below:  He has Driving difficulties based on screening of functional status. He has Meal Preparation difficulties based on screening of functional status.He has difficulties Shopping for Groceries based on screening of functional status. He has Hearing problems based on screening of functional status.He has Walking problems based on screening of functional status. He has problems with Daily Activities based on screening of functional status.       Depression Screening  (PHQ-2/PHQ-9): Over the LAST 2 WEEKS   Little interest or pleasure in doing things: Not at all  Feeling down, depressed, or hopeless: Not at all  PHQ-2 SCORE: 0      Advanced Directive:  He does NOT have a Living Will. [Do you have a living will?: Yes]  He does NOT have a Power of  for Health Care. [Do you have a healthcare power of ?: Yes]    Tobacco:  He smoked tobacco in the past but quit greater than 12 months ago.  Social History     Tobacco Use   Smoking Status Former    Types: Cigarettes    Passive exposure: Never   Smokeless Tobacco Never          CAGE Alcohol Screen:   CAGE screening score of 0 on 3/14/2025, showing low risk of alcohol abuse.       Patient Care Team: Patient Care Team:  Dick George MD as PCP - General (Internal Medicine)    Patient Active Problem List   Diagnosis    Benign essential HTN    DM (diabetes mellitus), type 2 (HCC)    Hyperlipidemia    Aorta aneurysm    Glaucoma    Osteoporosis    Chronic rhinitis    Skin cancer    Peripheral vascular disease    Primary open angle glaucoma of both eyes, mild stage    Type 2 diabetes mellitus without retinopathy (HCC)    Pseudophakia of both eyes    [place]    Vitreous floaters of both eyes    CKD (chronic kidney disease) stage 3, GFR 30-59 ml/min ([facility])    Pre-op testing    Acute kidney injury    Lactic acidosis    Sepsis due to urinary tract infection ([facility])     Wt Readings from Last 3 Encounters:   03/14/25 203 lb (92.1 kg)   03/09/25 205 lb (93 kg)   12/09/24 203 lb (92.1 kg)      Last Cholesterol Labs:   Lab Results   Component Value Date    CHOLEST 106 03/11/2025    HDL 37 (L) 03/11/2025    LDL 48 03/11/2025    TRIG 115 03/11/2025          Last Chemistry Labs:   Lab Results   Component Value Date    AST 17 03/11/2025    ALT 15 03/11/2025    CA 8.8 03/11/2025    ALB 4.1 03/11/2025    TSH 3.867 03/11/2025    CREATSERUM 1.60 (H) 03/11/2025     (H) 03/11/2025        CBC  (most recent labs)   Lab Results   Component Value  Date    WBC 8.0 03/11/2025    HGB 13.7 03/11/2025    .0 03/11/2025        ALLERGIES:   He is allergic to lisinopril.    CURRENT MEDICATIONS:   Outpatient Medications Marked as Taking for the 3/14/25 encounter (Office Visit) with Dick George MD   Medication Sig    OneTouch Delica Lancets 33G Does not apply Misc Check blood sugar once daily.    amLODIPine 5 MG Oral Tab Take 1 tablet (5 mg total) by mouth daily.    metFORMIN HCl 1000 MG Oral Tab TAKE 1 TABLET BY MOUTH EVERY MORNING AND EVERY NIGHT AT BEDTIME    famotidine 20 MG Oral Tab Take 1 tablet (20 mg total) by mouth 2 (two) times daily.    PIOGLITAZONE 45 MG Oral Tab TAKE 1 TABLET BY MOUTH DAILY    Potassium Chloride ER 10 MEQ Oral Tab CR Take 1 tablet (10 mEq total) by mouth daily.    Glucose Blood (ONETOUCH VERIO) In Vitro Strip Checl blood sugar once daily    losartan 100 MG Oral Tab Take 1 tablet (100 mg total) by mouth daily.    tamsulosin 0.4 MG Oral Cap Take 1 capsule (0.4 mg total) by mouth daily.    atorvastatin 40 MG Oral Tab Take 1 tablet (40 mg total) by mouth daily.    glipiZIDE 10 MG Oral Tab Take 1 tablet (10 mg total) by mouth daily. Daily in the morning    atenolol 25 MG Oral Tab Take 1 tablet (25 mg total) by mouth daily with dinner.    BRIMONIDINE 0.15 % Ophthalmic Solution INSTILL 1 DROP INTO BOTH EYES IN THE MORNING AND 1 DROP BEFORE  BEDTIME    TIMOLOL 0.5 % Ophthalmic Solution INSTILL 1 DROP INTO BOTH EYES  TWICE DAILY    aspirin-acetaminophen-caffeine 250-250-65 MG Oral Tab Take 1 tablet by mouth every 6 (six) hours as needed for Pain.    Glucose Blood (ONETOUCH VERIO) In Vitro Strip Use daily    aspirin 81 MG Oral Tab EC Take 1 tablet (81 mg total) by mouth daily.    Ascorbic Acid (VITAMIN C OR) Take 1 tablet by mouth daily.      MEDICAL INFORMATION:   He  has a past medical history of Aorta aneurysm, Back problem, Benign essential HTN, Cataract, Chronic rhinitis, Diabetes (HCC), DM (diabetes mellitus), type 2 (HCC), Essential  hypertension, Hearing impairment, High blood pressure, High cholesterol, Hyperlipidemia, POAG (primary open-angle glaucoma) (10/17/2023), PVD (peripheral vascular disease), Skin cancer, and Visual impairment.    He  has a past surgical history that includes other accessory; other accessory; other accessory; other accessory; other accessory; Cataract extraction w/  intraocular lens implant (Left, 04/07/2014); Cataract extraction w/  intraocular lens implant (Right, 2014); appendectomy; and tonsillectomy.    His family history includes Macular degeneration in his brother.   SOCIAL HISTORY:   He  reports that he has quit smoking. His smoking use included cigarettes. He has never been exposed to tobacco smoke. He has never used smokeless tobacco. He reports current alcohol use of about 2.0 standard drinks of alcohol per week. He reports that he does not use drugs.     REVIEW OF SYSTEMS:   GENERAL: feels well otherwise  SKIN: denies any unusual skin lesions  EYES: denies blurred vision or double vision  HEENT: denies nasal congestion, sinus pain or ST  LUNGS: denies shortness of breath with exertion  CARDIOVASCULAR: denies chest pain on exertion  GI: denies abdominal pain, denies heartburn  : 1-3 per night nocturia, no complaint of urinary incontinence  MUSCULOSKELETAL: denies back pain  NEURO: denies headaches  PSYCHE: denies depression or anxiety  HEMATOLOGIC: denies hx of anemia  ENDOCRINE: denies thyroid history  ALL/ASTHMA: denies hx of allergy or asthma    EXAM:   /70   Pulse 68   Temp 97.4 °F (36.3 °C) (Oral)   Ht 6' 2\" (1.88 m)   Wt 203 lb (92.1 kg)   BMI 26.06 kg/m² Estimated body mass index is 26.06 kg/m² as calculated from the following:    Height as of this encounter: 6' 2\" (1.88 m).    Weight as of this encounter: 203 lb (92.1 kg).    Medicare Hearing Assessment  (Required for AWV/SWV)    Hearing Screening    Screening Method: Questionnaire  I have a problem hearing over the telephone: Yes I  have trouble following the conversations when two or more people are talking at the same time: Yes   I have trouble understanding things on the TV: Yes I have to strain to understand conversations: Yes   I have to worry about missing the telephone ring or doorbell: No I have trouble hearing conversations in a noisy background such as a crowded room or restaurant: Yes   I get confused about where sounds come from: Yes    I especially have trouble understanding the speech of women and children: Yes I have trouble understanding the speaker in a large room such as at a meeting or place of [employer]: Yes   Many people I talk to seem to mumble (or don't speak clearly): Yes People get annoyed because I misunderstand what they say: Yes   I misunderstand what others are saying and make inappropriate responses: No I avoid social activities because I cannot hear well and fear I will reply improperly: No   Family members and friends have told me they think I may have hearing loss: Yes                      Visual Acuity                           General Appearance:  Alert, cooperative, no distress, appears stated age   Head:  Normocephalic, without obvious abnormality, atraumatic   Eyes:  PERRL, conjunctiva/corneas clear, EOM's intact, both eyes   Ears:  Normal TM's and external ear canals, both ears   Nose: Nares normal, septum midline, mucosa normal, no drainage or sinus tenderness   Throat: Lips, mucosa, and tongue normal; teeth and gums normal   Neck: Supple, symmetrical, trachea midline, no adenopathy, thyroid: not enlarged, symmetric, no tenderness/mass/nodules, no carotid bruit or JVD   Back:   Symmetric, no curvature, ROM normal, no CVA tenderness   Lungs:   Clear to auscultation bilaterally, respirations unlabored   Chest Wall:  No tenderness or deformity   Heart:  Regular rate and rhythm, S1, S2 normal, no murmur, rub or gallop   Abdomen:   Soft, non-tender, bowel sounds active all four quadrants,  no masses, no  organomegaly   Genitalia: Deferred   Rectal: Deferred   Extremities: Extremities normal, atraumatic, no cyanosis or edema   Pulses: 2+ and symmetric   Skin: Skin color, texture, turgor normal, no rashes or lesions   Lymph nodes: Cervical, supraclavicular, and axillary nodes normal   Neurologic: Normal, CN II through XII intact, 5 out of 5 muscle strength throughout, 2+ DTRs patellar tendons, normal gait       Diabetic foot examination  - With podiatry     Vaccination History     Immunization History   Administered Date(s) Administered    FLU VAC High Dose 65 YRS & Older PRSV Free (99983) 09/25/2023    FLUAD High Dose 65 yr and older (46091) 11/08/2024    Pfizer Covid-19 Vaccine 30mcg/0.3ml 12yrs+ 11/08/2024    Pneumococcal Conjugate PCV20 02/09/2024    RSV, bivalent, diluent reconstituted PF (Abrysvo) 09/25/2023        ASSESSMENT AND OTHER RELEVANT CHRONIC CONDITIONS:   Darin Hernandez is a 85 year old male who presents for a Medicare Assessment.     CTA abdomen pelvis 9/21/2023  Impression   CONCLUSION:     Infrarenal abdominal aortic aneurysm measuring 50 x 52 millimeter     Right lower extremity:  Diffuse severe stenosis throughout the mid to distal SFA due to calcified plaque.  Severe three-vessel runoff disease with only the peroneal artery showing opacification throughout     Left lower extremity:  Multifocal severe stenosis in the CFA .  Severe stenosis at the origin of the SFA due to calcified plaque.  Multifocal areas of moderate to severe stenosis throughout the rest of the SFA.  Severe three-vessel runoff disease.    Opacification throughout the peroneal artery.  The posterior tibial artery is likely opacified near the ankle.        Chest x-ray 12/22/2023    Impression   CONCLUSION:  1. No acute airspace disease.  Asymmetric mild biapical pleural thickening left more than right.  If any old films are available they should be submitted for comparison.  If not, then follow-up studies are advised.  2.  Suggestion of mild hyperinflation which may suggest some degree of COPD.         IMAGING: CTA CHEST CTA ABDOMEN CTA PELVIS (CPT=71275/07520)     Result Date: 3/9/2025  CONCLUSION:              Type 2 lumbar endoleak of the abdominal aortic endovascular stent graft.  5.4 cm aneurysm the native abdominal aorta has increased in size since 9/22/2023.  Nonemergent interventional radiology or vascular surgery evaluation recommended.  Occluded proximal right superficial femoral artery has progressed since 9/22/2023.  The proximal aspect of the right profundus femoris artery demonstrates compensatory hypertrophy and is patent.  Occluded JESÚS with retrograde filling from collateral vessels.  No pulmonary embolus in the central, main, lobar, segmental pulmonary arteries. Nondiagnostic in the subsegmental pulmonary arteries due to respiratory motion artifact.  Nonobstructing left interpolar caliceal calculus.  Large amount of excessive stool seen throughout the colon suggestive of constipation. No obstruction.  Multiple other incidental findings as described in the body of the report.      Dictated by (CST): Timmy Sandoval MD on 3/09/2025 at 4:40 PM     Finalized by (CST): Timmy Sandoval MD on 3/09/2025 at 4:48 PM           XR CHEST AP PORTABLE  (CPT=71045)     Result Date: 3/9/2025  CONCLUSION:             Hyperexpanded lungs and basilar-predominant atelectasis without radiographic evidence of further acute intrathoracic process.    Dictated by (CST): Pepe Hodges MD on 3/09/2025 at 3:30 PM     Finalized by (CST): Pepe Hodges MD on 3/09/2025 at 3:31 PM           PLAN SUMMARY:   Diagnoses and all orders for this visit:    Annual physical exam    Type 2 diabetes mellitus without complication, without long-term current use of insulin (HCC)    Stage 3 chronic kidney disease, unspecified whether stage 3a or 3b CKD (HCC)    Benign essential HTN    Hyperlipidemia, unspecified hyperlipidemia type    Screening for nephropathy    PAD  (peripheral artery disease)    Screening for deficiency anemia    Screening for thyroid disorder    Primary hypertension    Intermittent claudication    Vitamin D deficiency    Encounter for annual health examination         Dizziness  Some wooziness, fatigue.  Blood pressure on the softer side, but seems to be recovered from his procedure  - Should check blood pressures at home, hold blood pressure readings for SBP /diastolic 40-50  - Continue drinking plenty of fluids  - Slight increase in salt intake  - This has improved overall.   -He does have some rhinorrhea that comes and goes.  We will try cetirizine/Zyrtec once a day for symptomatic support of rhinorrhea which may also help with her dizziness.     Peripheral arterial disease  Abdominal aortic aneurysm  Noted ongoing claudication.  Outpatient CT scan with AAA 50 x 52 mm as an outpatient.  Status post endovascular aortic repair, left femoral endarterectomy Dr. Shabazz of  and Dr. Chu cardiovascular surgery 12/22  - Seems to be recovering well, tolerating diet  - Pain well-controlled  - Resumed on aspirin 81 mg  -Seen in vascular surgery clinic Dr. Chu 1/20/2025 overall doing well, imaging with patent repair.  No lifestyle limiting claudication.       CKD stage IIIb  - Baseline seems to be 1.2-1.4  - Creatinine most recently 1.60, EGFR 41 and stable  - Should keep up with good water intake     Type 2 diabetes  Recent A1c:   HEMOGLOBIN A1C (%)   Date Value   09/19/2023 7.6 (A)     HgbA1C (%)   Date Value   03/11/2025 8.8 (H)     Recent urine microalbumin: 31.4  Current medications:  glipizide 10 mg daily, Jardiance 10 mg daily, pioglitazone 45 mg daily, metformin 1000 mg twice a day  Eye exam: Continue following with ophthalmology  Foot exam: Check feet daily.  Completed 1/2/2025 with Dr. Douglas  - His preference is to monitor sugars off jardiance given cost.  -Glipizide was increased to 10 mg twice a day, refill sent in to reflect new  prescription  - She is not comfortable with proceeding with GLP/insulin  - We will try to manage conservatively with current medications in place.  - Resources provided on carb reduction    Hypertension  -Blood pressure today at goal  - Check blood pressures at home  - Continue with home atenolol 25 mg daily, Cozaar 100 mg daily     Hyperlipidemia  -Cholesterol overall well-controlled LDL 48, total cholesterol 106  - Continue with atorvastatin 40 mg daily, aspirin    Allergic rhinitis  - Trial of flonase 1 spray each nostril until symptoms improve    HTN Screen: BP at goal  DM Screen: As above  HLD Screen: As above  HCV Screen: Considered low risk  HIV Screen: considered low risk  G/C/Syphilis: Considered low risk    Colon Cancer Screening (45-70): Reasonable to discontinue  Prostate Cancer Screening: (50-70): disContinue based on age  Lung Cancer Screening (55-79 with 30 p/year and active < 15 years): Not indicated  AAA Screening (65-75 Hx of smoking): Not indicated    Influenza: Annually   Td/Tdap: Last Tdap - unknown  Zoster (50+): Recommended 2 doses Shringrix  HPV (19-26): Not indicated  Pneumococcal: Up-to-date    Immunization History   Administered Date(s) Administered    FLU VAC High Dose 65 YRS & Older PRSV Free (46423) 09/25/2023    FLUAD High Dose 65 yr and older (63785) 11/08/2024    Pfizer Covid-19 Vaccine 30mcg/0.3ml 12yrs+ 11/08/2024    Pneumococcal Conjugate PCV20 02/09/2024    RSV, bivalent, diluent reconstituted PF (Abrysvo) 09/25/2023         Diet assessment: good     PLAN:  The patient indicates understanding of these issues and agrees to the plan.  Reinforced healthy diet, lifestyle, and exercise.    No follow-ups on file.     Dick George MD, 2/8/2024     General Health     In the past six months, have you lost more than 10 pounds without trying?: 2 - No  Has your appetite been poor?: No  How does the patient maintain a good energy level?: Other  How would you describe your daily physical  activity?: None  How would you describe your current health state?: Poor  How do you maintain positive mental well-being?: Visiting Family     Supplementary Documentation:   Darin Hernandez's SCREENING SCHEDULE   Tests on this list are recommended by your physician but may not be covered, or covered at this frequency, by your insurer.   Please check with your insurance carrier before scheduling to verify coverage.   PREVENTATIVE SERVICES FREQUENCY &  COVERAGE DETAILS LAST COMPLETION DATE   Diabetes Screening    Fasting Blood Sugar / Glucose    One screening every 12 months if never tested or if previously tested but not diagnosed with pre-diabetes   One screening every 6 months if diagnosed with pre-diabetes Lab Results   Component Value Date     (H) 03/11/2025        Cardiovascular Disease Screening    Lipid Panel  Cholesterol  Lipoprotein (HDL)  Triglycerides Covered every 5 years for all Medicare beneficiaries without apparent signs or symptoms of cardiovascular disease Lab Results   Component Value Date    CHOLEST 106 03/11/2025    HDL 37 (L) 03/11/2025    LDL 48 03/11/2025    TRIG 115 03/11/2025         Electrocardiogram (EKG)   Covered if needed at Welcome to Medicare, and non-screening if indicated for medical reasons 03/10/2025      Ultrasound Screening for Abdominal Aortic Aneurysm (AAA) Covered once in a lifetime for one of the following risk factors    Men who are 65-75 years old and have ever smoked    Anyone with a family history -     Colorectal Cancer Screening  Covered for ages 50-85; only need ONE of the following:    Colonoscopy   Covered every 10 years    Covered every 2 years if patient is at high risk or previous colonoscopy was abnormal -    No recommendations at this time    Flexible Sigmoidoscopy   Covered every 4 years -    Fecal Occult Blood Test Covered annually -   Prostate Cancer Screening    Prostate-Specific Antigen (PSA) Annually No results found for: \"PSA\"  There are no  preventive care reminders to display for this patient.   Immunizations    Influenza Covered once per flu season  Please get every year 11/08/2024  No recommendations at this time    Pneumococcal Each vaccine ([id number] & [id number]) covered once after 65 Prevnar 13: -    [id number]: -     No recommendations at this time    Hepatitis B One screening covered for patients with certain risk factors   -  No recommendations at this time    Tetanus Toxoid Not covered by Medicare Part B unless medically necessary (cut with metal); may be covered with your pharmacy prescription benefits -    Tetanus, Diptheria and Pertusis TD and TDaP Not covered by Medicare Part B -  No recommendations at this time    Zoster Not covered by Medicare Part B; may be covered with your pharmacy  prescription benefits -  Zoster Vaccines(1 of 2) Never done     Diabetes      Hemoglobin A1C Annually; if last result is elevated, may be asked to retest more frequently.    Medicare covers every 3 months Lab Results   Component Value Date     (H) 03/11/2025    A1C 8.8 (H) 03/11/2025       No recommendations at this time    Creat/alb ratio Annually Lab Results   Component Value Date    MICROALBCREA 31.4 (H) 03/11/2025       LDL Annually Lab Results   Component Value Date    LDL 48 03/11/2025       Dilated Eye Exam Annually Last Diabetic Eye Exam:  No data recorded  No data recorded       Annual Monitoring of Persistent Medications (ACE/ARB, digoxin diuretics, anticonvulsants)    Potassium Annually Lab Results   Component Value Date    K 3.8 03/11/2025         Creatinine   Annually Lab Results   Component Value Date    CREATSERUM 1.60 (H) 03/11/2025         BUN Annually Lab Results   Component Value Date    BUN 27 (H) 03/11/2025       Drug Serum Conc Annually No results found for: \"DIGOXIN\", \"DIG\", \"VALP\"

## 2025-03-14 NOTE — TELEPHONE ENCOUNTER
Received fax from [facility] needing clarification on Glipizide.    Message: Two sets of directions, says twice daily but also daily in morning. Please call to clarify.    Patient saw Dr. George today.    [facility] [phone]

## 2025-03-14 NOTE — TELEPHONE ENCOUNTER
Prescription has been updated to reflect the dose adjustment.  Should be glipizide 10 mg twice a day.  Please disregard the older prescription

## 2025-03-17 ENCOUNTER — OFFICE VISIT (OUTPATIENT)
Dept: OTOLARYNGOLOGY | Facility: CLINIC | Age: 85
End: 2025-03-17

## 2025-03-17 ENCOUNTER — OFFICE VISIT (OUTPATIENT)
Dept: AUDIOLOGY | Facility: CLINIC | Age: 85
End: 2025-03-17

## 2025-03-17 DIAGNOSIS — H61.21 IMPACTED CERUMEN OF RIGHT EAR: ICD-10-CM

## 2025-03-17 DIAGNOSIS — H90.3 SENSORINEURAL HEARING LOSS (SNHL) OF BOTH EARS: Primary | ICD-10-CM

## 2025-03-17 PROCEDURE — 92557 COMPREHENSIVE HEARING TEST: CPT | Performed by: AUDIOLOGIST

## 2025-03-17 PROCEDURE — 1159F MED LIST DOCD IN RCRD: CPT | Performed by: AUDIOLOGIST

## 2025-03-18 NOTE — PROGRESS NOTES
[facility]  OTOLARYNGOLOGY - HEAD & NECK SURGERY    3/17/2025     Reason for Consultation:   Cerumen impaction, right, hearing loss    History of Present Illness:   Patient is a pleasant 85 year old male who is being seen for problems with hearing.  The patient states that he had this problem for many years.  He states that he used to wear hearing aids but these no longer function and he states that he is unable to afford another pair.  He has not had a recent hearing test and is hoping to get 1 done today.  He denies any ear pain or drainage.  He was told by his primary care physician that he has a cerumen impaction on the right side.  Does note that his hearing is slightly worse on the right.  Has not had any previous ear surgery.  He has had a tonsillectomy and adenoidectomy in the past.  He would like my recommendation in terms of hearing aids.    Past Medical History  Past Medical History:    Aorta aneurysm    Back problem    Benign essential HTN    Cataract    Chronic rhinitis    Diabetes (HCC)    DM (diabetes mellitus), type 2 (HCC)    Essential hypertension    Hearing impairment    High blood pressure    High cholesterol    Hyperlipidemia    POAG (primary open-angle glaucoma)    first visit with Dr. Ang patient has been taking Brimonidine 0.15% OU BID and Timolol 0.5% OU BID for 10 yrs per MD in [place].  10/28/23 patient to continue taking gtts per [facility] due to abnormal VF OS and abnormal OCT OU    PVD (peripheral vascular disease)    Skin cancer    Visual impairment       Past Surgical History  Past Surgical History:   Procedure Laterality Date    Appendectomy      Cataract extraction w/  intraocular lens implant Left 04/07/2014    Done by Dr. [place] Park in [place]    Cataract extraction w/  intraocular lens implant Right 2014    Done by Dr. [place] Park in [place]    Other accessory      T and A    Other accessory      appendectomy    Other accessory      cholycystectomy    Other  accessory      bilateral cataract    Other accessory      facial basal cell cancer    Tonsillectomy         Family History  Family History   Problem Relation Age of Onset    Macular degeneration Brother     Glaucoma Neg     Diabetes Neg        Social History  Pediatric History   Patient Parents    Not on file     Other Topics Concern    Not on file   Social History Narrative    Not on file           Current Medications:  Current Outpatient Medications   Medication Sig Dispense Refill    glipiZIDE 10 MG Oral Tab Take 1 tablet (10 mg total) by mouth 2 (two) times daily before meals. Daily in the morning 180 tablet 3    OneTouch Delica Lancets 33G Does not apply Misc Check blood sugar once daily. 100 each 3    amLODIPine 5 MG Oral Tab Take 1 tablet (5 mg total) by mouth daily. 90 tablet 3    metFORMIN HCl 1000 MG Oral Tab TAKE 1 TABLET BY MOUTH EVERY MORNING AND EVERY NIGHT AT BEDTIME 180 tablet 3    famotidine 20 MG Oral Tab Take 1 tablet (20 mg total) by mouth 2 (two) times daily. 60 tablet 0    PIOGLITAZONE 45 MG Oral Tab TAKE 1 TABLET BY MOUTH DAILY 90 tablet 3    Potassium Chloride ER 10 MEQ Oral Tab CR Take 1 tablet (10 mEq total) by mouth daily. 90 tablet 3    Glucose Blood (ONETOUCH VERIO) In Vitro Strip Checl blood sugar once daily 100 strip 3    losartan 100 MG Oral Tab Take 1 tablet (100 mg total) by mouth daily. 90 tablet 3    tamsulosin 0.4 MG Oral Cap Take 1 capsule (0.4 mg total) by mouth daily. 90 capsule 3    atorvastatin 40 MG Oral Tab Take 1 tablet (40 mg total) by mouth daily. 90 tablet 3    atenolol 25 MG Oral Tab Take 1 tablet (25 mg total) by mouth daily with dinner.      BRIMONIDINE 0.15 % Ophthalmic Solution INSTILL 1 DROP INTO BOTH EYES IN THE MORNING AND 1 DROP BEFORE  BEDTIME 3 each 3    TIMOLOL 0.5 % Ophthalmic Solution INSTILL 1 DROP INTO BOTH EYES  TWICE DAILY 15 mL 3    aspirin-acetaminophen-caffeine 250-250-65 MG Oral Tab Take 1 tablet by mouth every 6 (six) hours as needed for Pain.       Glucose Blood (ONETOUCH VERIO) In Vitro Strip Use daily 100 strip 3    aspirin 81 MG Oral Tab EC Take 1 tablet (81 mg total) by mouth daily.      Ascorbic Acid (VITAMIN C OR) Take 1 tablet by mouth daily.      alendronate 70 MG Oral Tab Take 1 tablet (70 mg total) by mouth once a week. (Patient not taking: Reported on 3/17/2025) 13 tablet 3       Allergies  Allergies[1]    Review of Systems:   A comprehensive 10 point review of systems was completed.  Pertinent positives and negatives noted in the the HPI.    Physical Exam:   There were no vitals taken for this visit.    GENERAL: No acute distress, Comfortable appearing  FACE: HB 1/6, Normal Animation  HEAD: Normocephalic  EYES: EOMI, pupils equil  EARS: Bilateral Auricles Symmetric  NOSE: Nares patent bilaterally  ORAL CAVITY: Tongue mobile, Oropharynx clear, Floor of mouth clear, Posterior oropharynx normal  NECK: No palpable lymphadenopathy, thyroid not palpable, nontender    OTOMICROSCOPY WITH CERUMEN REMOVAL    Canals:  Right: Canal with cerumen preventing adequate view of TM, debrided with instrumentation as dictated below  Left: Canal clear    Tympanic Membranes:  Right: Normal tympanic membrane.   Left: Normal tympanic membrane.     TM Visualized Method:   Right TM examined via otomicroscopy.    Left TM examined via otomicroscopy.      PROCEDURE: REMOVAL OF CERUMEN IMPACTION  The cerumen impaction was completely removed from the right ear canal using microscopy as necessary.   Removal was completed by using a suction      Results:     Laboratory Data:  Lab Results   Component Value Date    WBC 8.0 03/11/2025    HGB 13.7 03/11/2025    HCT 40.4 03/11/2025    .0 03/11/2025    CREATSERUM 1.60 (H) 03/11/2025    BUN 27 (H) 03/11/2025     03/11/2025    K 3.8 03/11/2025     03/11/2025    CO2 24.0 03/11/2025     (H) 03/11/2025    CA 8.8 03/11/2025    ALB 4.1 03/11/2025    ALKPHO 77 03/11/2025    TP 6.2 03/11/2025    AST 17 03/11/2025     ALT 15 03/11/2025    PTT 24.7 12/19/2023    INR 0.90 03/09/2025    PTP 12.7 03/09/2025    TSH 3.867 03/11/2025    DDIMER 1.61 (H) 12/09/2024    CRP <0.40 12/09/2024         Imaging:  CTA CHEST CTA ABDOMEN CTA PELVIS (CPT=71275/36799)    Result Date: 3/9/2025  PROCEDURE: CTA CHEST CTA ABDOMEN CTA PELVIS (CPT=71275/89467)  COMPARISON: [facility], CT CHEST PE AORTA (IV ONLY) (CPT=71260), 12/09/2024, 8:35 PM.  [facility], CTA ABDOMEN/PELVIS LOWER EXT BILAT W RUNOFF (AQM=87571), 9/21/2023, 2:24 PM.  INDICATIONS: Chest pain and shortness of breath.  TECHNIQUE:   CT images of the chest, abdomen and pelvis were obtained without and with non-ionic intravenous contrast material.  Multi-planar reformatted and 3-D images were created with vessel analysis performed on an independent workstation.  Automated  exposure control for dose reduction was used. Adjustment of the mA and/or kV was done based on the patient's size. Use of iterative reconstruction technique for dose reduction was used. Dose information is transmitted to the ACR (American College of Radiology) NRDR (National Radiology Data Registry) which includes the Dose Index Registry.    FINDINGS:  AORTA: There is atherosclerotic calcification of the aortic arch and thoracic aorta.  No acute dissection.  Postoperative changes of an endovascular stent graft involving the infrarenal abdominal aorta extending into the bilateral common iliac arteries.  There is an aneurysm of the native abdominal aorta which measures 5.4 x 4.9 cm which has increased in size since 9/21/2023.  There is a blush of contrast seen along the superior posterior aspect of the stent graft seen best on series 7, image 93 and series 3, image 205. There appears to be prominence of a lumbar vessel seen on series 3, image 209 which feeds into the blush of contrast.  No acute occlusion. ILIAC ARTERIES: Postoperative changes of endovascular stent graft involving the bilateral  common iliac arteries.  Mild atherosclerosis of the bilateral internal and external iliac arteries without complete occlusion. FEMORAL ARTERIES:  Occluded right superficial femoral artery which has progressed since the prior exam.  Hypertrophied profundus femoris artery which is patent.  Moderate atherosclerotic calcification of the left superficial femoral artery and profundus femoris artery. OTHER VESSELS: Proximal aspect of the JESÚS is occluded with retrograde filling from collateral vessels.  50% narrowing at the origin of the SMA without complete occlusion.  Celiac axis is patent.  Bilateral renal arteries demonstrate atherosclerotic calcification but remain patent..   LINES AND TUBES: None.  MEDIASTINUM: There are multiple mildly prominent mediastinal lymph nodes which are nonspecific and measure less than 1 cm in short axis. There is atherosclerotic calcification of the aortic arch and thoracic aorta. The thoracic aorta is otherwise unremarkable and not dilated. Mediastinal fat planes are preserved. Cardiac chambers are unremarkable. Pericardium is normal. There are coronary artery calcifications. The main pulmonary artery has a normal diameter and is otherwise unremarkable. No pulmonary embolus in the central, main, lobar, segmental pulmonary arteries. Nondiagnostic in the subsegmental pulmonary arteries due to respiratory motion artifact.  LUNGS AND PLEURA: There is biapical scarring and pleural thickening. There is bibasilar and lingular atelectasis and scarring. No pneumothorax.  No consolidation.  No pleural effusion. The trachea and central airways are unremarkable.  CHEST WALL/BONES: There is degenerative disease of the thoracic spine. The chest wall and osseous structures are otherwise unremarkable.    LIVER: There are a few subcentimeter hypodense lesions seen in the liver which are too small to characterize. GALLBLADDER: The patient is post cholecystectomy. BILIARY: Mild prominence of the common bile  duct and intrahepatic ducts, an expected finding post cholecystectomy. No gross intra-or extrahepatic biliary ductal dilation. SPLEEN: Unremarkable PANCREAS: The pancreas enhances symmetrically. No ductal dilation. ADRENALS: Unremarkable KIDNEYS: The kidneys enhance symmetrically. There is no hydronephrosis.  7.7 cm cyst within the right lower pole.  6 mm nonobstructing left interpolar caliceal calculus. RETROPERITONEUM: No mass or enlarged adenopathy.  BOWEL:  Large amount of excessive stool seen throughout the colon. Although the appendix is not visualized, there are no inflammatory changes in the right lower quadrant to suggest acute appendicitis. Small hiatal hernia with wall thickening at the gastroesophageal junction. MESENTERY: No mass or loculated collection.  Calcified nodule seen within the right lower quadrant measures 0.7 cm and may be sequela of remote granulomatous disease or prior fat necrosis. PELVIS: No mass or fluid collection.  No enlarged lymph nodes. BONES:   There is degenerative disease of the thoracic and lumbar spine. Degenerative changes are seen within the sacroiliac joints and pubic symphysis. Degenerative changes are seen in the bilateral hips.         CONCLUSION:   Type 2 lumbar endoleak of the abdominal aortic endovascular stent graft.  5.4 cm aneurysm the native abdominal aorta has increased in size since 9/22/2023.  Nonemergent interventional radiology or vascular surgery evaluation recommended.  Occluded proximal right superficial femoral artery has progressed since 9/22/2023.  The proximal aspect of the right profundus femoris artery demonstrates compensatory hypertrophy and is patent.  Occluded JESÚS with retrograde filling from collateral vessels.  No pulmonary embolus in the central, main, lobar, segmental pulmonary arteries. Nondiagnostic in the subsegmental pulmonary arteries due to respiratory motion artifact.  Nonobstructing left interpolar caliceal calculus.  Large amount of  excessive stool seen throughout the colon suggestive of constipation. No obstruction.  Multiple other incidental findings as described in the body of the report.      Dictated by (CST): Timmy Sandoval MD on 3/09/2025 at 4:40 PM     Finalized by (CST): Timmy Sandoval MD on 3/09/2025 at 4:48 PM          XR CHEST AP PORTABLE  (CPT=71045)    Result Date: 3/9/2025  PROCEDURE: XR CHEST AP PORTABLE  (CPT=71045) TIME: 1501.   COMPARISON: [facility], CT CHEST PE AORTA (IV ONLY) (CPT=71260), 12/09/2024, 8:35 PM.  [facility], XR CHEST AP PORTABLE (CPT=71045), 8/07/2024, 1:23 PM.  [facility], XR CHEST PA + LAT CHEST (FSV=11108), 12/22/2023, 8:33 AM.  INDICATIONS: Chest wall pain and shortness of breath.  TECHNIQUE:   Single view.   FINDINGS:  CARDIAC/VASC: The cardiomediastinal silhouette is not enlarged. There is mild tortuosity of the thoracic aorta with peripheral atherosclerotic vascular calcification. The pulmonary vascularity is within normal limits. MEDIAST/ROLAND: No visible mass or adenopathy. LUNGS/PLEURA: The lungs are hyperexpanded. There are relative lucencies of the upper lobes bilaterally, and coarsened bronchovascular markings are apparent. No airspace consolidation, pleural effusion, or pneumothorax is evident.  BONES: Mild degenerative changes of the thoracic spine are apparent. There is no fracture or visible bony lesion. OTHER: Leads overlie the chest and obscure underlying detail.           CONCLUSION:  Hyperexpanded lungs and basilar-predominant atelectasis without radiographic evidence of further acute intrathoracic process.    Dictated by (CST): Pepe Hodges MD on 3/09/2025 at 3:30 PM     Finalized by (CST): Pepe Hodges MD on 3/09/2025 at 3:31 PM          Latest Audiogram Result (Hz) Exam performed: 3/17/2025 3:05 PM Last edited by Amrita Shah AUD on 3/17/2025 3:12 PM        125 250  1500 2000 3000 4000 6000 8000    Right air:  45 45  45   50  65  65    Left air:  35 35  35  50  70  65    Right mastoid bone:   35  30  45  55         Reliability:  Good    Transducer:  Inserts    Technique:  Conventional Audiometry    Comments:            Latest Speech Audiometry  Last edited by Amrita Shah AUD on 3/17/2025 3:12 PM       Ear Method PTA SAT SRT [facility] Test/list Score (%) Intensity Mask/noise Notes    right live voice   45   10 By Difficulty 92 75      left live voice   45   10 By Difficulty 92 75                    Latest Audiogram Result Text  Last edited by Amrita Shah AUD on 3/17/2025  3:50 PM      Addendum      Patient referred for hearing testing due hearing loss in both ears.     Mild sloping to a moderately-severe sensorineural hearing loss 250-8000 Hz in both ears.   Speech Reception Thresholds (SRTs) are in good agreement with pure tone findings.   Word recognition ability is excellent in both ears when presented at an elevated conversational level.        With this degree of hearing loss, patient would be expected to have significant difficulties hearing average level conversations. He/she will also have difficulty hearing in background noise, from a distance, or without the addition of visual cues. Patient will also have difficulty hearing consonant sounds, which provide much of the clarity of speech. Patients with similar hearing loss often comment that they can \"hear\" but not \"understand\". Some of the consequences of untreated hearing loss can include safety/sound awareness issues, social isolation, depression, memory issues, and cognitive decline.       Repeat hearing assessment with perceived changes in hearing sensitivity or tinnitus or with onset of vertigo.  Consider trial with amplification to address communication difficulties inherent with this degree of hearing loss. Contact the Audiology department to schedule a full hearing aid evaluation if desired.           Addended by Amrita Shah AUD on 3/17/2025  3:50 PM                  Impression:       ICD-10-CM    1. Sensorineural hearing loss (SNHL) of both ears  H90.3       2. Impacted cerumen of right ear  H61.21            Recommendations:  The patient's audiogram reveals sensorineural hearing loss on the left which is mild sloping to moderate and a slight air-bone gap on the right side in the low frequencies with sloping mild to moderate hearing loss.  I did recommend hearing amplification.  I was also able to completely disimpact the right ear canal.  He will return to see me annually for cerumen removal.    Thank you for allowing me to participate in the care of your patient.    Hernán Renee, DO   Otolaryngology/Rhinology, Sinus, and Endoscopic Skull Base Surgery  [address] [address]  Phone [phone]  Fax [phone]  3/17/2025  8:19 PM  3/17/2025          [1]   Allergies  Allergen Reactions    Lisinopril ANGIOEDEMA and OTHER (SEE COMMENTS)     Lip swelling

## 2025-03-19 ENCOUNTER — TELEPHONE (OUTPATIENT)
Dept: OTOLARYNGOLOGY | Facility: CLINIC | Age: 85
End: 2025-03-19

## 2025-03-25 ENCOUNTER — APPOINTMENT (OUTPATIENT)
Dept: URBAN - METROPOLITAN AREA CLINIC 244 | Age: 85
Setting detail: DERMATOLOGY
End: 2025-03-26

## 2025-03-25 PROBLEM — C44.41 BASAL CELL CARCINOMA OF SKIN OF SCALP AND NECK: Status: ACTIVE | Noted: 2025-03-25

## 2025-03-25 PROCEDURE — 17311 MOHS 1 STAGE H/N/HF/G: CPT

## 2025-03-25 PROCEDURE — OTHER MOHS SURGERY: OTHER

## 2025-03-25 PROCEDURE — 13121 CMPLX RPR S/A/L 2.6-7.5 CM: CPT

## 2025-03-25 NOTE — PROCEDURE: MOHS SURGERY
Mohs Case Number: [id number]
Biopsy Photograph Reviewed: Yes
Consent Type: Consent 1 (Standard)
Eye Shield Used: No
Surgeon Performing Repair (Optional): Dr Duarte
Initial Size Of Lesion: 1.2
X Size Of Lesion In Cm (Optional): 1.1
Number Of Stages: 1
Primary Defect Length In Cm (Final Defect Size - Required For Flaps/Grafts): 2.1
Primary Defect Width In Cm (Final Defect Size - Required For Flaps/Grafts): 1.8
Primary Defect Depth In Cm (Optional But Required For Some Insurers): 0
Repair Type: Complex Repair
Which Instrument Did You Use For Dermabrasion?: Wire Brush
Which Eyelid Repair Cpt Are You Using?: 05008
Oculoplastic Surgeon Procedure Text (A): After obtaining clear surgical margins the patient was sent to oculoplastics for surgical repair.  The patient understands they will receive post-surgical care and follow-up from the referring physician's office.
Otolaryngologist Procedure Text (A): After obtaining clear surgical margins the patient was sent to otolaryngology for surgical repair.  The patient understands they will receive post-surgical care and follow-up from the referring physician's office.
Plastic Surgeon Procedure Text (A): After obtaining clear surgical margins the patient was sent to plastics for surgical repair.  The patient understands they will receive post-surgical care and follow-up from the referring physician's office.
Mid-Level Procedure Text (A): After obtaining clear surgical margins the patient was sent to a mid-level provider for surgical repair.  The patient understands they will receive post-surgical care and follow-up from the mid-level provider.
Provider Procedure Text (A): After obtaining clear surgical margins the defect was repaired by another provider.
Asc Procedure Text (A): After obtaining clear surgical margins the patient was sent to an ASC for surgical repair.  The patient understands they will receive post-surgical care and follow-up from the ASC physician.
Simple / Intermediate / Complex Repair - Final Wound Length In Cm: 3.4
Deep Sutures: 4-0 Vicryl
Epidermal Sutures: 4-0 Nylon
Suture Removal: 7 days
Suturegard Retention Suture: 2-0 Nylon
Retention Suture Bite Size: 3 mm
Length To Time In Minutes Device Was In Place: 10
Undermining Type: One Wound Edge
Debridement Text: The wound edges were debrided prior to proceeding with the closure to facilitate wound healing.
Helical Rim Text: The closure involved the helical rim.
Vermilion Border Text: The closure involved the vermilion border.
Nostril Rim Text: The closure involved the nostril rim.
Retention Suture Text: Retention sutures were placed to support the closure and prevent dehiscence.
Area H Indication Text: Tumors in this location are included in Area H (eyelids, eyebrows, nose, lips, chin, ear, pre-auricular, post-auricular, temple, genitalia, hands, feet, ankles and areola).  Tissue conservation is critical in these anatomic locations.
Area M Indication Text: Tumors in this location are included in Area M (cheek, forehead, scalp, neck, jawline and pretibial skin).  Mohs surgery is indicated for tumors in these anatomic locations.
Area L Indication Text: Tumors in this location are included in Area L (trunk and extremities).  Mohs surgery is indicated for larger tumors, or tumors with aggressive histologic features, in these anatomic locations.
Depth Of Tumor Invasion (For Histology): tumor not visualized
Perineural Invasion (For Histology - Be Specific If Possible): absent
Special Stains Stage 1 - Results: Base On Clearance Noted Above
Stage 2: Additional Anesthesia Type: 1% lidocaine with epinephrine
Staging Info: By selecting yes to the question above you will include information on AJCC 8 tumor staging in your Mohs note. Information on tumor staging will be automatically added for SCCs on the head and neck. AJCC 8 includes tumor size, tumor depth, perineural involvement and bone invasion.
Tumor Depth: Less than 6mm from granular layer and no invasion beyond the subcutaneous fat
Why Was The Change Made?: Please Select the Appropriate Response
Medical Necessity Statement: Based on my medical judgement, Mohs surgery is the most appropriate treatment for this cancer compared to other treatments.
Alternatives Discussed Intro (Do Not Add Period): I discussed alternative treatments to Mohs surgery and specifically discussed the risks and benefits of
Consent 1/Introductory Paragraph: The rationale for Mohs was explained to the patient and consent was obtained. The risks, benefits and alternatives to therapy were discussed in detail. Specifically, the risks of infection, scarring, bleeding, prolonged wound healing, incomplete removal, allergy to anesthesia, nerve injury and recurrence were addressed. Prior to the procedure, the treatment site was clearly identified and confirmed by the patient. All components of Universal Protocol/PAUSE Rule completed.
Consent 2/Introductory Paragraph: Mohs surgery was explained to the patient and consent was obtained. The risks, benefits and alternatives to therapy were discussed in detail. Specifically, the risks of infection, scarring, bleeding, prolonged wound healing, incomplete removal, allergy to anesthesia, nerve injury and recurrence were addressed. Prior to the procedure, the treatment site was clearly identified and confirmed by the patient. All components of Universal Protocol/PAUSE Rule completed.
Consent 3/Introductory Paragraph: I gave the patient a chance to ask questions they had about the procedure.  Following this I explained the Mohs procedure and consent was obtained. The risks, benefits and alternatives to therapy were discussed in detail. Specifically, the risks of infection, scarring, bleeding, prolonged wound healing, incomplete removal, allergy to anesthesia, nerve injury and recurrence were addressed. Prior to the procedure, the treatment site was clearly identified and confirmed by the patient. All components of Universal Protocol/PAUSE Rule completed.
Consent (Temporal Branch)/Introductory Paragraph: The rationale for Mohs was explained to the patient and consent was obtained. The risks, benefits and alternatives to therapy were discussed in detail. Specifically, the risks of damage to the temporal branch of the facial nerve, infection, scarring, bleeding, prolonged wound healing, incomplete removal, allergy to anesthesia, and recurrence were addressed. Prior to the procedure, the treatment site was clearly identified and confirmed by the patient. All components of Universal Protocol/PAUSE Rule completed.
Consent (Marginal Mandibular)/Introductory Paragraph: The rationale for Mohs was explained to the patient and consent was obtained. The risks, benefits and alternatives to therapy were discussed in detail. Specifically, the risks of damage to the marginal mandibular branch of the facial nerve, infection, scarring, bleeding, prolonged wound healing, incomplete removal, allergy to anesthesia, and recurrence were addressed. Prior to the procedure, the treatment site was clearly identified and confirmed by the patient. All components of Universal Protocol/PAUSE Rule completed.
Consent (Spinal Accessory)/Introductory Paragraph: The rationale for Mohs was explained to the patient and consent was obtained. The risks, benefits and alternatives to therapy were discussed in detail. Specifically, the risks of damage to the spinal accessory nerve, infection, scarring, bleeding, prolonged wound healing, incomplete removal, allergy to anesthesia, and recurrence were addressed. Prior to the procedure, the treatment site was clearly identified and confirmed by the patient. All components of Universal Protocol/PAUSE Rule completed.
Consent (Near Eyelid Margin)/Introductory Paragraph: The rationale for Mohs was explained to the patient and consent was obtained. The risks, benefits and alternatives to therapy were discussed in detail. Specifically, the risks of ectropion or eyelid deformity, infection, scarring, bleeding, prolonged wound healing, incomplete removal, allergy to anesthesia, nerve injury and recurrence were addressed. Prior to the procedure, the treatment site was clearly identified and confirmed by the patient. All components of Universal Protocol/PAUSE Rule completed.
Consent (Ear)/Introductory Paragraph: The rationale for Mohs was explained to the patient and consent was obtained. The risks, benefits and alternatives to therapy were discussed in detail. Specifically, the risks of ear deformity, infection, scarring, bleeding, prolonged wound healing, incomplete removal, allergy to anesthesia, nerve injury and recurrence were addressed. Prior to the procedure, the treatment site was clearly identified and confirmed by the patient. All components of Universal Protocol/PAUSE Rule completed.
Consent (Nose)/Introductory Paragraph: The rationale for Mohs was explained to the patient and consent was obtained. The risks, benefits and alternatives to therapy were discussed in detail. Specifically, the risks of nasal deformity, changes in the flow of air through the nose, infection, scarring, bleeding, prolonged wound healing, incomplete removal, allergy to anesthesia, nerve injury and recurrence were addressed. Prior to the procedure, the treatment site was clearly identified and confirmed by the patient. All components of Universal Protocol/PAUSE Rule completed.
Consent (Lip)/Introductory Paragraph: The rationale for Mohs was explained to the patient and consent was obtained. The risks, benefits and alternatives to therapy were discussed in detail. Specifically, the risks of lip deformity, changes in the oral aperture, infection, scarring, bleeding, prolonged wound healing, incomplete removal, allergy to anesthesia, nerve injury and recurrence were addressed. Prior to the procedure, the treatment site was clearly identified and confirmed by the patient. All components of Universal Protocol/PAUSE Rule completed.
Consent (Scalp)/Introductory Paragraph: The rationale for Mohs was explained to the patient and consent was obtained. The risks, benefits and alternatives to therapy were discussed in detail. Specifically, the risks of changes in hair growth pattern secondary to repair, infection, scarring, bleeding, prolonged wound healing, incomplete removal, allergy to anesthesia, nerve injury and recurrence were addressed. Prior to the procedure, the treatment site was clearly identified and confirmed by the patient. All components of Universal Protocol/PAUSE Rule completed.
Detail Level: Detailed
Postop Diagnosis: same
Surgeon: Mati Duarte
Anesthesia Type: 0.5% lidocaine with 1:200,000 epinephrine and a 1:10 solution of 8.4% sodium bicarbonate
Anesthesia Volume In Cc: 5
Hemostasis: Electrodesiccation
Estimated Blood Loss (Cc): minimal
Repair Anesthesia Method: local infiltration
Anesthesia Volume In Cc: 2
Brow Lift Text: A midfrontal incision was made medially to the defect to allow access to the tissues just superior to the left eyebrow. Following careful dissection inferiorly in a supraperiosteal plane to the level of the left eyebrow, several 3-0 monocryl sutures were used to resuspend the eyebrow orbicularis oculi muscular unit to the superior frontal bone periosteum. This resulted in an appropriate reapproximation of static eyebrow symmetry and correction of the left brow ptosis.
Epidermal Closure: running and interrupted
Suturegard Intro: Intraoperative tissue expansion was performed, utilizing the SUTUREGARD device, in order to reduce wound tension.
Suturegard Body: The suture ends were repeatedly re-tightened and re-clamped to achieve the desired tissue expansion.
Hemigard Intro: Due to skin fragility and wound tension, it was decided to use HEMIGARD adhesive retention suture devices to permit a linear closure. The skin was cleaned and dried for a 6cm distance away from the wound. Excessive hair, if present, was removed to allow for adhesion.
Hemigard Postcare Instructions: The HEMIGARD strips are to remain completely dry for at least 5-7 days.
Donor Site Anesthesia Type: same as repair anesthesia
Epidermal Closure Graft Donor Site (Optional): simple interrupted
Graft Donor Site Bandage (Optional-Leave Blank If You Don't Want In Note): Steri-strips and a pressure bandage were applied to the donor site.
Closure 2 Information: This tab is for additional flaps and grafts, including complex repair and grafts and complex repair and flaps. You can also specify a different location for the additional defect, if the location is the same you do not need to select a new one. We will insert the automated text for the repair you select below just as we do for solitary flaps and grafts. Please note that at this time if you select a location with a different insurance zone you will need to override the ICD10 and CPT if appropriate.
Closure 3 Information: This tab is for additional flaps and grafts above and beyond our usual structured repairs.  Please note if you enter information here it will not currently bill and you will need to add the billing information manually.
Wound Care: Petrolatum
Dressing: dry sterile dressing
Unna Boot Text: An Unna boot was placed to help immobilize the limb and facilitate more rapid healing.
Home Suture Removal Text: Patient was provided instructions on removing sutures and will remove their sutures at home.  If they have any questions or difficulties they will call the office.
Post-Care Instructions: I reviewed with the patient in detail post-care instructions. Patient is not to engage in any heavy lifting, exercise, or swimming for the next 14 days. Should the patient develop any fevers, chills, bleeding, severe pain patient will contact the office immediately.
Pain Refusal Text: I offered to prescribe pain medication but the patient refused to take this medication.
Mauc Instructions: By selecting yes to the question below the MAUC number will be added into the note.  This will be calculated automatically based on the diagnosis chosen, the size entered, the body zone selected (H,M,L) and the specific indications you chose. You will also have the option to override the Mohs AUC if you disagree with the automatically calculated number and this option is found in the Case Summary tab.
Where Do You Want The Question To Include Opioid Counseling Located?: Case Summary Tab
Eye Protection Verbiage: Before proceeding with the stage, a plastic scleral shield was inserted. The globe was anesthetized with a few drops of 1% lidocaine with 1:100,000 epinephrine. Then, an appropriate sized scleral shield was chosen and coated with lacrilube ointment. The shield was gently inserted and left in place for the duration of each stage. After the stage was completed, the shield was gently removed.
Mohs Method Verbiage: An incision at a 45 degree angle following the standard Mohs approach was done and the specimen was harvested as a microscopic controlled layer.
Surgeon/Pathologist Verbiage (Will Incorporate Name Of Surgeon From Intro If Not Blank): operated in two distinct and integrated capacities as the surgeon and pathologist.
Mohs Histo Method Verbiage: Each section was then chromacoded and processed in the Mohs lab using the Mohs protocol and submitted for frozen section, utilizing hematoxylin and eosin histochemical stains.
Subsequent Stages Histo Method Verbiage: Using a similar technique to that described above, a thin layer of tissue was removed from all areas where tumor was visible on the previous stage.  The tissue was again oriented, mapped, dyed, and processed as above.
Mohs Rapid Report Verbiage: The area of clinically evident tumor was marked with skin marking ink and appropriately hatched.  The initial incision was made following the Mohs approach through the skin.  The specimen was taken to the lab, divided into the necessary number of pieces, chromacoded and processed according to the Mohs protocol.  This was repeated in successive stages until a tumor free defect was achieved.
Complex Repair Preamble Text (Leave Blank If You Do Not Want): Extensive wide undermining was performed.
Intermediate Repair Preamble Text (Leave Blank If You Do Not Want): Undermining was performed with blunt dissection.
Graft Cartilage Fenestration Text: The cartilage was fenestrated with a 2mm punch biopsy to help facilitate graft survival and healing.
Non-Graft Cartilage Fenestration Text: The cartilage was fenestrated with a 2mm punch biopsy to help facilitate healing.
Secondary Intention Text (Leave Blank If You Do Not Want): The defect will heal with secondary intention.
No Repair - Repaired With Adjacent Surgical Defect Text (Leave Blank If You Do Not Want): After obtaining clear surgical margins the defect was repaired concurrently with another surgical defect which was in close approximation.
Adjacent Tissue Transfer Text: The defect edges were debeveled with a #15 scalpel blade. Given the location of the defect and the proximity to free margins an adjacent tissue transfer was deemed most appropriate. Using a sterile surgical marker, an appropriate flap was drawn incorporating the defect and placing the expected incisions within the relaxed skin tension lines where possible. The area thus outlined was incised deep to adipose tissue with a #15 scalpel blade. The skin margins were undermined to an appropriate distance in all directions utilizing iris scissors and carried over to close the primary defect.
Advancement Flap (Single) Text: The defect edges were debeveled with a #15 scalpel blade. Given the location of the defect and the proximity to free margins a single advancement flap was deemed most appropriate. Using a sterile surgical marker, an appropriate advancement flap was drawn incorporating the defect and placing the expected incisions within the relaxed skin tension lines where possible. The area thus outlined was incised deep to adipose tissue with a #15 scalpel blade. The skin margins were undermined to an appropriate distance in all directions utilizing iris scissors. Following this, the designed flap was advanced and carried over into the primary defect and sutured into place.
Advancement Flap (Double) Text: The defect edges were debeveled with a #15 scalpel blade. Given the location of the defect and the proximity to free margins a double advancement flap was deemed most appropriate. Using a sterile surgical marker, the appropriate advancement flaps were drawn incorporating the defect and placing the expected incisions within the relaxed skin tension lines where possible. The area thus outlined was incised deep to adipose tissue with a #15 scalpel blade. The skin margins were undermined to an appropriate distance in all directions utilizing iris scissors. Following this, the designed flaps were advanced and carried over into the primary defect and sutured into place.
Advancement-Rotation Flap Text: The defect edges were debeveled with a #15 scalpel blade. Given the location of the defect, shape of the defect and the proximity to free margins an advancement-rotation flap was deemed most appropriate. Using a sterile surgical marker, an appropriate flap was drawn incorporating the defect and placing the expected incisions within the relaxed skin tension lines where possible. The area thus outlined was incised deep to adipose tissue with a #15 scalpel blade. The skin margins were undermined to an appropriate distance in all directions utilizing iris scissors. Following this, the designed flap was carried over into the primary defect and sutured into place.
Alar Island Pedicle Flap Text: The defect edges were debeveled with a #15 scalpel blade. Given the location of the defect, shape of the defect and the proximity to the alar rim an island pedicle advancement flap was deemed most appropriate. Using a sterile surgical marker, an appropriate advancement flap was drawn incorporating the defect, outlining the appropriate donor tissue and placing the expected incisions within the nasal ala running parallel to the alar rim. The area thus outlined was incised with a #15 scalpel blade. The skin margins were undermined minimally to an appropriate distance in all directions around the primary defect and laterally outward around the island pedicle utilizing iris scissors.  There was minimal undermining beneath the pedicle flap. Following this, the designed flap was carried over into the primary defect and sutured into place.
A-T Advancement Flap Text: The defect edges were debeveled with a #15 scalpel blade. Given the location of the defect, shape of the defect and the proximity to free margins an A-T advancement flap was deemed most appropriate. Using a sterile surgical marker, an appropriate advancement flap was drawn incorporating the defect and placing the expected incisions within the relaxed skin tension lines where possible. The area thus outlined was incised deep to adipose tissue with a #15 scalpel blade. The skin margins were undermined to an appropriate distance in all directions utilizing iris scissors. Following this, the designed flap was advanced and carried over into the primary defect and sutured into place.
Banner Transposition Flap Text: The defect edges were debeveled with a #15 scalpel blade. Given the location of the defect and the proximity to free margins a Banner transposition flap was deemed most appropriate. Using a sterile surgical marker, an appropriate flap was drawn around the defect. The area thus outlined was incised deep to adipose tissue with a #15 scalpel blade. The skin margins were undermined to an appropriate distance in all directions utilizing iris scissors. Following this, the designed flap was carried into the primary defect and sutured into place.
Bilateral Helical Rim Advancement Flap Text: The defect edges were debeveled with a #15 blade scalpel.  Given the location of the defect and the proximity to free margins (helical rim) a bilateral helical rim advancement flap was deemed most appropriate. Using a sterile surgical marker, the appropriate advancement flaps were drawn incorporating the defect and placing the expected incisions between the helical rim and antihelix where possible.  The area thus outlined was incised through and through with a #15 scalpel blade.  With a skin hook and iris scissors, the flaps were gently and sharply undermined and freed up. Following this, the designed flaps were placed into the primary defect and sutured into place.
Bilateral Rotation Flap Text: The defect edges were debeveled with a #15 scalpel blade. Given the location of the defect, shape of the defect and the proximity to free margins a bilateral rotation flap was deemed most appropriate. Using a sterile surgical marker, an appropriate rotation flap was drawn incorporating the defect and placing the expected incisions within the relaxed skin tension lines where possible. The area thus outlined was incised deep to adipose tissue with a #15 scalpel blade. The skin margins were undermined to an appropriate distance in all directions utilizing iris scissors. Following this, the designed flap was carried over into the primary defect and sutured into place.
Bilobed Flap Text: The defect edges were debeveled with a #15 scalpel blade. Given the location of the defect and the proximity to free margins a bilobe flap was deemed most appropriate. Using a sterile surgical marker, an appropriate bilobe flap drawn around the defect. The area thus outlined was incised deep to adipose tissue with a #15 scalpel blade. The skin margins were undermined to an appropriate distance in all directions utilizing iris scissors. Following this, the designed flap was carried over into the primary defect and sutured into place.
Bilobed Transposition Flap Text: The defect edges were debeveled with a #15 scalpel blade. Given the location of the defect and the proximity to free margins a bilobed transposition flap was deemed most appropriate. Using a sterile surgical marker, an appropriate bilobe flap drawn around the defect. The area thus outlined was incised deep to adipose tissue with a #15 scalpel blade. The skin margins were undermined to an appropriate distance in all directions utilizing iris scissors. Following this, the designed flap was carried over into the primary defect and sutured into place.
Bi-Rhombic Flap Text: The defect edges were debeveled with a #15 scalpel blade. Given the location of the defect and the proximity to free margins a bi-rhombic flap was deemed most appropriate. Using a sterile surgical marker, an appropriate rhombic flap was drawn incorporating the defect. The area thus outlined was incised deep to adipose tissue with a #15 scalpel blade. The skin margins were undermined to an appropriate distance in all directions utilizing iris scissors. Following this, the designed flap was carried over into the primary defect and sutured into place.
Burow's Advancement Flap Text: The defect edges were debeveled with a #15 scalpel blade. Given the location of the defect and the proximity to free margins a Burow's advancement flap was deemed most appropriate. Using a sterile surgical marker, the appropriate advancement flap was drawn incorporating the defect and placing the expected incisions within the relaxed skin tension lines where possible. The area thus outlined was incised deep to adipose tissue with a #15 scalpel blade. The skin margins were undermined to an appropriate distance in all directions utilizing iris scissors. Following this, the designed flap was advanced and carried over into the primary defect and sutured into place.
Chonodrocutaneous Helical Advancement Flap Text: The defect edges were debeveled with a #15 scalpel blade. Given the location of the defect and the proximity to free margins a chondrocutaneous helical advancement flap was deemed most appropriate. Using a sterile surgical marker, the appropriate advancement flap was drawn incorporating the defect and placing the expected incisions within the relaxed skin tension lines where possible. The area thus outlined was incised deep to adipose tissue with a #15 scalpel blade. The skin margins were undermined to an appropriate distance in all directions utilizing iris scissors. Following this, the designed flap was advanced and carried over into the primary defect and sutured into place.
Crescentic Advancement Flap Text: The defect edges were debeveled with a #15 scalpel blade. Given the location of the defect and the proximity to free margins a crescentic advancement flap was deemed most appropriate. Using a sterile surgical marker, the appropriate advancement flap was drawn incorporating the defect and placing the expected incisions within the relaxed skin tension lines where possible. The area thus outlined was incised deep to adipose tissue with a #15 scalpel blade. The skin margins were undermined to an appropriate distance in all directions utilizing iris scissors. Following this, the designed flap was advanced and carried over into the primary defect and sutured into place.
Dorsal Nasal Flap Text: The defect edges were debeveled with a #15 scalpel blade. Given the location of the defect and the proximity to free margins a dorsal nasal flap was deemed most appropriate. Using a sterile surgical marker, an appropriate dorsal nasal flap was drawn around the defect. The area thus outlined was incised deep to adipose tissue with a #15 scalpel blade. The skin margins were undermined to an appropriate distance in all directions utilizing iris scissors. Following this, the designed flap was carried into the primary defect and sutured into place.
Double Island Pedicle Flap Text: The defect edges were debeveled with a #15 scalpel blade. Given the location of the defect, shape of the defect and the proximity to free margins a double island pedicle advancement flap was deemed most appropriate. Using a sterile surgical marker, an appropriate advancement flap was drawn incorporating the defect, outlining the appropriate donor tissue and placing the expected incisions within the relaxed skin tension lines where possible. The area thus outlined was incised deep to adipose tissue with a #15 scalpel blade. The skin margins were undermined to an appropriate distance in all directions around the primary defect and laterally outward around the island pedicle utilizing iris scissors.  There was minimal undermining beneath the pedicle flap. Following this, the flap was carried over into the primary defect and sutured into place.
Double O-Z Flap Text: The defect edges were debeveled with a #15 scalpel blade. Given the location of the defect, shape of the defect and the proximity to free margins a Double O-Z flap was deemed most appropriate. Using a sterile surgical marker, an appropriate transposition flap was drawn incorporating the defect and placing the expected incisions within the relaxed skin tension lines where possible. The area thus outlined was incised deep to adipose tissue with a #15 scalpel blade. The skin margins were undermined to an appropriate distance in all directions utilizing iris scissors. Following this, the designed flap was carried over into the primary defect and sutured into place.
Double O-Z Plasty Text: The defect edges were debeveled with a #15 scalpel blade. Given the location of the defect, shape of the defect and the proximity to free margins a Double O-Z plasty (double transposition flap) was deemed most appropriate. Using a sterile surgical marker, the appropriate transposition flaps were drawn incorporating the defect and placing the expected incisions within the relaxed skin tension lines where possible. The area thus outlined was incised deep to adipose tissue with a #15 scalpel blade. The skin margins were undermined to an appropriate distance in all directions utilizing iris scissors. Hemostasis was achieved with electrocautery. The flaps were then transposed and carried over into place, one clockwise and the other counterclockwise, and anchored with interrupted buried subcutaneous sutures.
Double Z Plasty Text: The lesion was extirpated to the level of the fat with a #15 scalpel blade. Given the location of the defect, shape of the defect and the proximity to free margins a double Z-plasty was deemed most appropriate for repair. Using a sterile surgical marker, the appropriate transposition arms of the double Z-plasty were drawn incorporating the defect and placing the expected incisions within the relaxed skin tension lines where possible. The area thus outlined was incised deep to adipose tissue with a #15 scalpel blade. The skin margins were undermined to an appropriate distance in all directions utilizing iris scissors. The opposing transposition arms were then transposed and carried over into place in opposite direction and anchored with interrupted buried subcutaneous sutures.
Ear Star Wedge Flap Text: The defect edges were debeveled with a #15 blade scalpel.  Given the location of the defect and the proximity to free margins (helical rim) an ear star wedge flap was deemed most appropriate. Using a sterile surgical marker, the appropriate flap was drawn incorporating the defect and placing the expected incisions between the helical rim and antihelix where possible.  The area thus outlined was incised through and through with a #15 scalpel blade. Following this, the designed flap was carried over into the primary defect and sutured into place.
Flip-Flop Flap Text: The defect edges were debeveled with a #15 blade scalpel.  Given the location of the defect and the proximity to free margins a flip-flop flap was deemed most appropriate. Using a sterile surgical marker, the appropriate flap was drawn incorporating the defect and placing the expected incisions between the helical rim and antihelix where possible.  The area thus outlined was incised through and through with a #15 scalpel blade. Following this, the designed flap was carried over into the primary defect and sutured into place.
Hatchet Flap Text: The defect edges were debeveled with a #15 scalpel blade. Given the location of the defect, shape of the defect and the proximity to free margins a hatchet flap was deemed most appropriate. Using a sterile surgical marker, an appropriate hatchet flap was drawn incorporating the defect and placing the expected incisions within the relaxed skin tension lines where possible. The area thus outlined was incised deep to adipose tissue with a #15 scalpel blade. The skin margins were undermined to an appropriate distance in all directions utilizing iris scissors. Following this, the designed flap was carried over into the primary defect and sutured into place.
Helical Rim Advancement Flap Text: The defect edges were debeveled with a #15 blade scalpel.  Given the location of the defect and the proximity to free margins (helical rim) a double helical rim advancement flap was deemed most appropriate. Using a sterile surgical marker, the appropriate advancement flaps were drawn incorporating the defect and placing the expected incisions between the helical rim and antihelix where possible.  The area thus outlined was incised through and through with a #15 scalpel blade.  With a skin hook and iris scissors, the flaps were gently and sharply undermined and freed up. Folllowing this, the designed flaps were carried over into the primary defect and sutured into place.
H Plasty Text: Given the location of the defect, shape of the defect and the proximity to free margins a H-plasty was deemed most appropriate for repair. Using a sterile surgical marker, the appropriate advancement arms of the H-plasty were drawn incorporating the defect and placing the expected incisions within the relaxed skin tension lines where possible. The area thus outlined was incised deep to adipose tissue with a #15 scalpel blade. The skin margins were undermined to an appropriate distance in all directions utilizing iris scissors.  The opposing advancement arms were then advanced and carried over into place in opposite direction and anchored with interrupted buried subcutaneous sutures.
Island Pedicle Flap Text: The defect edges were debeveled with a #15 scalpel blade. Given the location of the defect, shape of the defect and the proximity to free margins an island pedicle advancement flap was deemed most appropriate. Using a sterile surgical marker, an appropriate advancement flap was drawn incorporating the defect, outlining the appropriate donor tissue and placing the expected incisions within the relaxed skin tension lines where possible. The area thus outlined was incised deep to adipose tissue with a #15 scalpel blade. The skin margins were undermined to an appropriate distance in all directions around the primary defect and laterally outward around the island pedicle utilizing iris scissors.  There was minimal undermining beneath the pedicle flap. Following this, the flap was carried over into the primary defect and sutured into place.
Island Pedicle Flap With Canthal Suspension Text: The defect edges were debeveled with a #15 scalpel blade. Given the location of the defect, shape of the defect and the proximity to free margins an island pedicle advancement flap was deemed most appropriate. Using a sterile surgical marker, an appropriate advancement flap was drawn incorporating the defect, outlining the appropriate donor tissue and placing the expected incisions within the relaxed skin tension lines where possible. The area thus outlined was incised deep to adipose tissue with a #15 scalpel blade. The skin margins were undermined to an appropriate distance in all directions around the primary defect and laterally outward around the island pedicle utilizing iris scissors.  There was minimal undermining beneath the pedicle flap. A suspension suture was placed in the canthal tendon to prevent tension and prevent ectropion. Following this, the designed flap was placed into the primary defect and sutured into place.
Island Pedicle Flap-Requiring Vessel Identification Text: The defect edges were debeveled with a #15 scalpel blade. Given the location of the defect, shape of the defect and the proximity to free margins an island pedicle advancement flap was deemed most appropriate. Using a sterile surgical marker, an appropriate advancement flap was drawn, based on the axial vessel mentioned above, incorporating the defect, outlining the appropriate donor tissue and placing the expected incisions within the relaxed skin tension lines where possible. The area thus outlined was incised deep to adipose tissue with a #15 scalpel blade. The skin margins were undermined to an appropriate distance in all directions around the primary defect and laterally outward around the island pedicle utilizing iris scissors.  There was minimal undermining beneath the pedicle flap. Following this, the designed flap was carried over into the primary defect and sutured into place.
Keystone Flap Text: The defect edges were debeveled with a #15 scalpel blade. Given the location of the defect, shape of the defect a keystone flap was deemed most appropriate. Using a sterile surgical marker, an appropriate keystone flap was drawn incorporating the defect, outlining the appropriate donor tissue and placing the expected incisions within the relaxed skin tension lines where possible. The area thus outlined was incised deep to adipose tissue with a #15 scalpel blade. The skin margins were undermined to an appropriate distance in all directions around the primary defect and laterally outward around the flap utilizing iris scissors. Following this, the designed flap was carried into the primary defect and sutured into place.
Melolabial Transposition Flap Text: The defect edges were debeveled with a #15 scalpel blade. Given the location of the defect and the proximity to free margins a melolabial flap was deemed most appropriate. Using a sterile surgical marker, an appropriate melolabial transposition flap was drawn incorporating the defect. The area thus outlined was incised deep to adipose tissue with a #15 scalpel blade. The skin margins were undermined to an appropriate distance in all directions utilizing iris scissors. Following this, the designed flap was carried over into the primary defect and sutured into place.
Mercedes Flap Text: The defect edges were debeveled with a #15 scalpel blade. Given the location of the defect, shape of the defect and the proximity to free margins a Mercedes flap was deemed most appropriate. Using a sterile surgical marker, an appropriate advancement flap was drawn incorporating the defect and placing the expected incisions within the relaxed skin tension lines where possible. The area thus outlined was incised deep to adipose tissue with a #15 scalpel blade. The skin margins were undermined to an appropriate distance in all directions utilizing iris scissors. Following this, the designed flap was advanced and carried over into the primary defect and sutured into place.
Modified Advancement Flap Text: The defect edges were debeveled with a #15 scalpel blade. Given the location of the defect, shape of the defect and the proximity to free margins a modified advancement flap was deemed most appropriate. Using a sterile surgical marker, an appropriate advancement flap was drawn incorporating the defect and placing the expected incisions within the relaxed skin tension lines where possible. The area thus outlined was incised deep to adipose tissue with a #15 scalpel blade. The skin margins were undermined to an appropriate distance in all directions utilizing iris scissors. Following this, the designed flap was advanced and carried over into the primary defect and sutured into place.
Mucosal Advancement Flap Text: Given the location of the defect, shape of the defect and the proximity to free margins a mucosal advancement flap was deemed most appropriate. Incisions were made with a 15 blade scalpel in the appropriate fashion along the cutaneous vermilion border and the mucosal lip. The remaining actinically damaged mucosal tissue was excised.  The mucosal advancement flap was then elevated to the gingival sulcus with care taken to preserve the neurovascular structures and advanced into the primary defect. Care was taken to ensure that precise realignment of the vermilion border was achieved.
Muscle Hinge Flap Text: The defect edges were debeveled with a #15 scalpel blade.  Given the size, depth and location of the defect and the proximity to free margins a muscle hinge flap was deemed most appropriate. Using a sterile surgical marker, an appropriate hinge flap was drawn incorporating the defect. The area thus outlined was incised with a #15 scalpel blade. The skin margins were undermined to an appropriate distance in all directions utilizing iris scissors. Following this, the designed flap was carried into the primary defect and sutured into place.
Mustarde Flap Text: The defect edges were debeveled with a #15 scalpel blade.  Given the size, depth and location of the defect and the proximity to free margins a Mustarde flap was deemed most appropriate. Using a sterile surgical marker, an appropriate flap was drawn incorporating the defect. The area thus outlined was incised with a #15 scalpel blade. The skin margins were undermined to an appropriate distance in all directions utilizing iris scissors. Following this, the designed flap was carried into the primary defect and sutured into place.
Nasal Turnover Hinge Flap Text: The defect edges were debeveled with a #15 scalpel blade.  Given the size, depth, location of the defect and the defect being full thickness a nasal turnover hinge flap was deemed most appropriate. Using a sterile surgical marker, an appropriate hinge flap was drawn incorporating the defect. The area thus outlined was incised with a #15 scalpel blade. The flap was designed to recreate the nasal mucosal lining and the alar rim. The skin margins were undermined to an appropriate distance in all directions utilizing iris scissors. Following this, the designed flap was carried over into the primary defect and sutured into place
Nasalis-Muscle-Based Myocutaneous Island Pedicle Flap Text: Using a #15 blade, an incision was made around the donor flap to the level of the nasalis muscle. Wide lateral undermining was then performed in both the subcutaneous plane above the nasalis muscle, and in a submuscular plane just above periosteum. This allowed the formation of a free nasalis muscle axial pedicle (based on the angular artery) which was still attached to the actual cutaneous flap, increasing its mobility and vascular viability. Hemostasis was obtained with pinpoint electrocoagulation. The flap was mobilized into position and the pivotal anchor points positioned and stabilized with buried interrupted sutures. Subcutaneous and dermal tissues were closed in a multilayered fashion with sutures. Tissue redundancies were excised, and the epidermal edges were apposed without significant tension and sutured with sutures.
Nasalis Myocutaneous Flap Text: Using a #15 blade, an incision was made around the donor flap to the level of the nasalis muscle. Wide lateral undermining was then performed in both the subcutaneous plane above the nasalis muscle, and in a submuscular plane just above periosteum. This allowed the formation of a free nasalis muscle axial pedicle which was still attached to the actual cutaneous flap, increasing its mobility and vascular viability. Hemostasis was obtained with pinpoint electrocoagulation. The flap was mobilized into position and the pivotal anchor points positioned and stabilized with buried interrupted sutures. Subcutaneous and dermal tissues were closed in a multilayered fashion with sutures. Tissue redundancies were excised, and the epidermal edges were apposed without significant tension and sutured with sutures.
Nasolabial Transposition Flap Text: The defect edges were debeveled with a #15 scalpel blade.  Given the size, depth and location of the defect and the proximity to free margins a nasolabial transposition flap was deemed most appropriate. Using a sterile surgical marker, an appropriate flap was drawn incorporating the defect. The area thus outlined was incised with a #15 scalpel blade. The skin margins were undermined to an appropriate distance in all directions utilizing iris scissors. Following this, the designed flap was carried into the primary defect and sutured into place.
Orbicularis Oris Muscle Flap Text: The defect edges were debeveled with a #15 scalpel blade.  Given that the defect affected the competency of the oral sphincter an orbicularis oris muscle flap was deemed most appropriate to restore this competency and normal muscle function.  Using a sterile surgical marker, an appropriate flap was drawn incorporating the defect. The area thus outlined was incised with a #15 scalpel blade. Following this, the designed flap was carried over into the primary defect and sutured into place.
O-T Advancement Flap Text: The defect edges were debeveled with a #15 scalpel blade. Given the location of the defect, shape of the defect and the proximity to free margins an O-T advancement flap was deemed most appropriate. Using a sterile surgical marker, an appropriate advancement flap was drawn incorporating the defect and placing the expected incisions within the relaxed skin tension lines where possible. The area thus outlined was incised deep to adipose tissue with a #15 scalpel blade. The skin margins were undermined to an appropriate distance in all directions utilizing iris scissors. Following this, the designed flap was advanced and carried over into the primary defect and sutured into place.
O-T Plasty Text: The defect edges were debeveled with a #15 scalpel blade. Given the location of the defect, shape of the defect and the proximity to free margins an O-T plasty was deemed most appropriate. Using a sterile surgical marker, an appropriate O-T plasty was drawn incorporating the defect and placing the expected incisions within the relaxed skin tension lines where possible. The area thus outlined was incised deep to adipose tissue with a #15 scalpel blade. The skin margins were undermined to an appropriate distance in all directions utilizing iris scissors. Following this, the designed flap was carried over into the primary defect and sutured into place.
O-L Flap Text: The defect edges were debeveled with a #15 scalpel blade. Given the location of the defect, shape of the defect and the proximity to free margins an O-L flap was deemed most appropriate. Using a sterile surgical marker, an appropriate advancement flap was drawn incorporating the defect and placing the expected incisions within the relaxed skin tension lines where possible. The area thus outlined was incised deep to adipose tissue with a #15 scalpel blade. The skin margins were undermined to an appropriate distance in all directions utilizing iris scissors. Following this, the designed flap was advanced and carried over into the primary defect and sutured into place.
O-Z Flap Text: The defect edges were debeveled with a #15 scalpel blade. Given the location of the defect, shape of the defect and the proximity to free margins an O-Z flap was deemed most appropriate. Using a sterile surgical marker, an appropriate transposition flap was drawn incorporating the defect and placing the expected incisions within the relaxed skin tension lines where possible. The area thus outlined was incised deep to adipose tissue with a #15 scalpel blade. The skin margins were undermined to an appropriate distance in all directions utilizing iris scissors. Following this, the designed flap was carried over into the primary defect and sutured into place.
O-Z Plasty Text: The defect edges were debeveled with a #15 scalpel blade. Given the location of the defect, shape of the defect and the proximity to free margins an O-Z plasty (double transposition flap) was deemed most appropriate. Using a sterile surgical marker, the appropriate transposition flaps were drawn incorporating the defect and placing the expected incisions within the relaxed skin tension lines where possible. The area thus outlined was incised deep to adipose tissue with a #15 scalpel blade. The skin margins were undermined to an appropriate distance in all directions utilizing iris scissors. Hemostasis was achieved with electrocautery. The flaps were then transposed and carried over into place, one clockwise and the other counterclockwise, and anchored with interrupted buried subcutaneous sutures.
Peng Advancement Flap Text: The defect edges were debeveled with a #15 scalpel blade. Given the location of the defect, shape of the defect and the proximity to free margins a Peng advancement flap was deemed most appropriate. Using a sterile surgical marker, an appropriate advancement flap was drawn incorporating the defect and placing the expected incisions within the relaxed skin tension lines where possible. The area thus outlined was incised deep to adipose tissue with a #15 scalpel blade. The skin margins were undermined to an appropriate distance in all directions utilizing iris scissors. Following this, the designed flap was advanced and carried over into the primary defect and sutured into place.
Rectangular Flap Text: The defect edges were debeveled with a #15 scalpel blade. Given the location of the defect and the proximity to free margins a rectangular flap was deemed most appropriate. Using a sterile surgical marker, an appropriate rectangular flap was drawn incorporating the defect. The area thus outlined was incised deep to adipose tissue with a #15 scalpel blade. The skin margins were undermined to an appropriate distance in all directions utilizing iris scissors. Following this, the designed flap was carried over into the primary defect and sutured into place.
Rhombic Flap Text: The defect edges were debeveled with a #15 scalpel blade. Given the location of the defect and the proximity to free margins a rhombic flap was deemed most appropriate. Using a sterile surgical marker, an appropriate rhombic flap was drawn incorporating the defect. The area thus outlined was incised deep to adipose tissue with a #15 scalpel blade. The skin margins were undermined to an appropriate distance in all directions utilizing iris scissors. Following this, the designed flap was carried over into the primary defect and sutured into place.
Rhomboid Transposition Flap Text: The defect edges were debeveled with a #15 scalpel blade. Given the location of the defect and the proximity to free margins a rhomboid transposition flap was deemed most appropriate. Using a sterile surgical marker, an appropriate rhomboid flap was drawn incorporating the defect. The area thus outlined was incised deep to adipose tissue with a #15 scalpel blade. The skin margins were undermined to an appropriate distance in all directions utilizing iris scissors. Following this, the designed flap was carried over into the primary defect and sutured into place.
Rotation Flap Text: The defect edges were debeveled with a #15 scalpel blade. Given the location of the defect, shape of the defect and the proximity to free margins a rotation flap was deemed most appropriate. Using a sterile surgical marker, an appropriate rotation flap was drawn incorporating the defect and placing the expected incisions within the relaxed skin tension lines where possible. The area thus outlined was incised deep to adipose tissue with a #15 scalpel blade. The skin margins were undermined to an appropriate distance in all directions utilizing iris scissors. Following this, the designed flap was carried over into the primary defect and sutured into place.
Spiral Flap Text: The defect edges were debeveled with a #15 scalpel blade. Given the location of the defect, shape of the defect and the proximity to free margins a spiral flap was deemed most appropriate. Using a sterile surgical marker, an appropriate rotation flap was drawn incorporating the defect and placing the expected incisions within the relaxed skin tension lines where possible. The area thus outlined was incised deep to adipose tissue with a #15 scalpel blade. The skin margins were undermined to an appropriate distance in all directions utilizing iris scissors. Following this, the designed flap was carried over into the primary defect and sutured into place.
Staged Advancement Flap Text: The defect edges were debeveled with a #15 scalpel blade. Given the location of the defect, shape of the defect and the proximity to free margins a staged advancement flap was deemed most appropriate. Using a sterile surgical marker, an appropriate advancement flap was drawn incorporating the defect and placing the expected incisions within the relaxed skin tension lines where possible. The area thus outlined was incised deep to adipose tissue with a #15 scalpel blade. The skin margins were undermined to an appropriate distance in all directions utilizing iris scissors. Following this, the designed flap was carried over into the primary defect and sutured into place.
Star Wedge Flap Text: The defect edges were debeveled with a #15 scalpel blade. Given the location of the defect, shape of the defect and the proximity to free margins a star wedge flap was deemed most appropriate. Using a sterile surgical marker, an appropriate rotation flap was drawn incorporating the defect and placing the expected incisions within the relaxed skin tension lines where possible. The area thus outlined was incised deep to adipose tissue with a #15 scalpel blade. The skin margins were undermined to an appropriate distance in all directions utilizing iris scissors. Following this, the designed flap was carried over into the primary defect and sutured into place.
Transposition Flap Text: The defect edges were debeveled with a #15 scalpel blade. Given the location of the defect and the proximity to free margins a transposition flap was deemed most appropriate. Using a sterile surgical marker, an appropriate transposition flap was drawn incorporating the defect. The area thus outlined was incised deep to adipose tissue with a #15 scalpel blade. The skin margins were undermined to an appropriate distance in all directions utilizing iris scissors. Following this, the designed flap was carried over into the primary defect and sutured into place.
Trilobed Flap Text: The defect edges were debeveled with a #15 scalpel blade. Given the location of the defect and the proximity to free margins a trilobed flap was deemed most appropriate. Using a sterile surgical marker, an appropriate trilobed flap was drawn around the defect. The area thus outlined was incised deep to adipose tissue with a #15 scalpel blade. The skin margins were undermined to an appropriate distance in all directions utilizing iris scissors. Following this, the designed flap was carried into the primary defect and sutured into place.
V-Y Flap Text: The defect edges were debeveled with a #15 scalpel blade. Given the location of the defect, shape of the defect and the proximity to free margins a V-Y flap was deemed most appropriate. Using a sterile surgical marker, an appropriate advancement flap was drawn incorporating the defect and placing the expected incisions within the relaxed skin tension lines where possible. The area thus outlined was incised deep to adipose tissue with a #15 scalpel blade. The skin margins were undermined to an appropriate distance in all directions utilizing iris scissors. Following this, the designed flap was advanced and carried over into the primary defect and sutured into place.
V-Y Plasty Text: The defect edges were debeveled with a #15 scalpel blade. Given the location of the defect, shape of the defect and the proximity to free margins an V-Y advancement flap was deemed most appropriate. Using a sterile surgical marker, an appropriate advancement flap was drawn incorporating the defect and placing the expected incisions within the relaxed skin tension lines where possible. The area thus outlined was incised deep to adipose tissue with a #15 scalpel blade. The skin margins were undermined to an appropriate distance in all directions utilizing iris scissors. Following this, the designed flap was advanced and carried over into the primary defect and sutured into place.
W Plasty Text: The lesion was extirpated to the level of the fat with a #15 scalpel blade. Given the location of the defect, shape of the defect and the proximity to free margins a W-plasty was deemed most appropriate for repair. Using a sterile surgical marker, the appropriate transposition arms of the W-plasty were drawn incorporating the defect and placing the expected incisions within the relaxed skin tension lines where possible. The area thus outlined was incised deep to adipose tissue with a #15 scalpel blade. The skin margins were undermined to an appropriate distance in all directions utilizing iris scissors. The opposing transposition arms were then transposed and carried over into place in opposite direction and anchored with interrupted buried subcutaneous sutures.
Z Plasty Text: The lesion was extirpated to the level of the fat with a #15 scalpel blade. Given the location of the defect, shape of the defect and the proximity to free margins a Z-plasty was deemed most appropriate for repair. Using a sterile surgical marker, the appropriate transposition arms of the Z-plasty were drawn incorporating the defect and placing the expected incisions within the relaxed skin tension lines where possible. The area thus outlined was incised deep to adipose tissue with a #15 scalpel blade. The skin margins were undermined to an appropriate distance in all directions utilizing iris scissors. The opposing transposition arms were then transposed and carried over into place in opposite direction and anchored with interrupted buried subcutaneous sutures.
Zygomaticofacial Flap Text: Given the location of the defect, shape of the defect and the proximity to free margins a zygomaticofacial flap was deemed most appropriate for repair. Using a sterile surgical marker, the appropriate flap was drawn incorporating the defect and placing the expected incisions within the relaxed skin tension lines where possible. The area thus outlined was incised deep to adipose tissue with a #15 scalpel blade with preservation of a vascular pedicle.  The skin margins were undermined to an appropriate distance in all directions utilizing iris scissors. The flap was then carried over into the defect and anchored with interrupted buried subcutaneous sutures.
Abbe Flap (Lower To Upper Lip) Text: The defect of the upper lip was assessed and measured.  Given the location and size of the defect, an Abbe flap was deemed most appropriate. Using a sterile surgical marker, an appropriate Abbe flap was measured and drawn on the lower lip. Local anesthesia was then infiltrated. A scalpel was then used to incise the upper lip through and through the skin, vermilion, muscle and mucosa, leaving the flap pedicled on the opposite side.  The flap was then rotated and transferred to the lower lip defect.  The flap was then sutured into place with a three layer technique, closing the orbicularis oris muscle layer with subcutaneous buried sutures, followed by a mucosal layer and an epidermal layer.
Abbe Flap (Upper To Lower Lip) Text: The defect of the lower lip was assessed and measured.  Given the location and size of the defect, an Abbe flap was deemed most appropriate. Using a sterile surgical marker, an appropriate Abbe flap was measured and drawn on the upper lip. Local anesthesia was then infiltrated.  A scalpel was then used to incise the upper lip through and through the skin, vermilion, muscle and mucosa, leaving the flap pedicled on the opposite side.  The flap was then rotated and transferred to the lower lip defect.  The flap was then sutured into place with a three layer technique, closing the orbicularis oris muscle layer with subcutaneous buried sutures, followed by a mucosal layer and an epidermal layer.
Cheek Interpolation Flap Text: A decision was made to reconstruct the defect utilizing an interpolation axial flap and a staged reconstruction.  A telfa template was made of the defect.  This telfa template was then used to outline the Cheek Interpolation flap.  The donor area for the pedicle flap was then injected with anesthesia.  The flap was excised through the skin and subcutaneous tissue down to the layer of the underlying musculature.  The interpolation flap was carefully excised within this deep plane to maintain its blood supply.  The edges of the donor site were undermined.   The donor site was closed in a primary fashion.  The pedicle was then rotated into position and sutured.  Once the tube was sutured into place, adequate blood supply was confirmed with blanching and refill.  The pedicle was then wrapped with xeroform gauze and dressed appropriately with a telfa and gauze bandage to ensure continued blood supply and protect the attached pedicle.
Cheek-To-Nose Interpolation Flap Text: A decision was made to reconstruct the defect utilizing an interpolation axial flap and a staged reconstruction.  A telfa template was made of the defect.  This telfa template was then used to outline the Cheek-To-Nose Interpolation flap.  The donor area for the pedicle flap was then injected with anesthesia.  The flap was excised through the skin and subcutaneous tissue down to the layer of the underlying musculature.  The interpolation flap was carefully excised within this deep plane to maintain its blood supply.  The edges of the donor site were undermined.   The donor site was closed in a primary fashion.  The pedicle was then rotated into position and sutured.  Once the tube was sutured into place, adequate blood supply was confirmed with blanching and refill.  The pedicle was then wrapped with xeroform gauze and dressed appropriately with a telfa and gauze bandage to ensure continued blood supply and protect the attached pedicle.
Estlander Flap (Lower To Upper Lip) Text: The defect of the lower lip was assessed and measured.  Given the location and size of the defect, an Estlander flap was deemed most appropriate. Using a sterile surgical marker, an appropriate Estlander flap was measured and drawn on the upper lip. Local anesthesia was then infiltrated. A scalpel was then used to incise the lateral aspect of the flap, through skin, muscle and mucosa, leaving the flap pedicled medially.  The flap was then rotated and positioned to fill the lower lip defect.  The flap was then sutured into place with a three layer technique, closing the orbicularis oris muscle layer with subcutaneous buried sutures, followed by a mucosal layer and an epidermal layer.
Interpolation Flap Text: A decision was made to reconstruct the defect utilizing an interpolation axial flap and a staged reconstruction.  A telfa template was made of the defect.  This telfa template was then used to outline the interpolation flap.  The donor area for the pedicle flap was then injected with anesthesia.  The flap was excised through the skin and subcutaneous tissue down to the layer of the underlying musculature.  The interpolation flap was carefully excised within this deep plane to maintain its blood supply.  The edges of the donor site were undermined.   The donor site was closed in a primary fashion.  The pedicle was then rotated into position and sutured.  Once the tube was sutured into place, adequate blood supply was confirmed with blanching and refill.  The pedicle was then wrapped with xeroform gauze and dressed appropriately with a telfa and gauze bandage to ensure continued blood supply and protect the attached pedicle.
Melolabial Interpolation Flap Text: A decision was made to reconstruct the defect utilizing an interpolation axial flap and a staged reconstruction.  A telfa template was made of the defect.  This telfa template was then used to outline the melolabial interpolation flap.  The donor area for the pedicle flap was then injected with anesthesia.  The flap was excised through the skin and subcutaneous tissue down to the layer of the underlying musculature.  The pedicle flap was carefully excised within this deep plane to maintain its blood supply.  The edges of the donor site were undermined.   The donor site was closed in a primary fashion.  The pedicle was then rotated into position and sutured.  Once the tube was sutured into place, adequate blood supply was confirmed with blanching and refill.  The pedicle was then wrapped with xeroform gauze and dressed appropriately with a telfa and gauze bandage to ensure continued blood supply and protect the attached pedicle.
Mastoid Interpolation Flap Text: A decision was made to reconstruct the defect utilizing an interpolation axial flap and a staged reconstruction.  A telfa template was made of the defect.  This telfa template was then used to outline the mastoid interpolation flap.  The donor area for the pedicle flap was then injected with anesthesia.  The flap was excised through the skin and subcutaneous tissue down to the layer of the underlying musculature.  The pedicle flap was carefully excised within this deep plane to maintain its blood supply.  The edges of the donor site were undermined.   The donor site was closed in a primary fashion.  The pedicle was then rotated into position and sutured.  Once the tube was sutured into place, adequate blood supply was confirmed with blanching and refill.  The pedicle was then wrapped with xeroform gauze and dressed appropriately with a telfa and gauze bandage to ensure continued blood supply and protect the attached pedicle.
Paramedian Forehead Flap Text: A decision was made to reconstruct the defect utilizing an interpolation axial flap and a staged reconstruction.  A telfa template was made of the defect.  This telfa template was then used to outline the paramedian forehead pedicle flap.  The donor area for the pedicle flap was then injected with anesthesia.  The flap was excised through the skin and subcutaneous tissue down to the layer of the underlying musculature.  The pedicle flap was carefully excised within this deep plane to maintain its blood supply.  The edges of the donor site were undermined.   The donor site was closed in a primary fashion.  The pedicle was then rotated into position and sutured.  Once the tube was sutured into place, adequate blood supply was confirmed with blanching and refill.  The pedicle was then wrapped with xeroform gauze and dressed appropriately with a telfa and gauze bandage to ensure continued blood supply and protect the attached pedicle.
Posterior Auricular Interpolation Flap Text: A decision was made to reconstruct the defect utilizing an interpolation axial flap and a staged reconstruction.  A telfa template was made of the defect.  This telfa template was then used to outline the posterior auricular interpolation flap.  The donor area for the pedicle flap was then injected with anesthesia.  The flap was excised through the skin and subcutaneous tissue down to the layer of the underlying musculature.  The pedicle flap was carefully excised within this deep plane to maintain its blood supply.  The edges of the donor site were undermined.   The donor site was closed in a primary fashion.  The pedicle was then rotated into position and sutured.  Once the tube was sutured into place, adequate blood supply was confirmed with blanching and refill.  The pedicle was then wrapped with xeroform gauze and dressed appropriately with a telfa and gauze bandage to ensure continued blood supply and protect the attached pedicle.
Cheiloplasty (Complex) Text: A decision was made to reconstruct the defect with a  cheiloplasty.  The defect was undermined extensively.  Additional orbicularis oris muscle was excised with a 15 blade scalpel.  The defect was converted into a full thickness wedge to facilite a better cosmetic result.  Small vessels were then tied off with 5-0 monocyrl. The orbicularis oris, superficial fascia, adipose and dermis were then reapproximated.  After the deeper layers were approximated the epidermis was reapproximated with particular care given to realign the vermilion border.
Cheiloplasty (Less Than 50%) Text: A decision was made to reconstruct the defect with a  cheiloplasty.  The defect was undermined extensively.  Additional orbicularis oris muscle was excised with a 15 blade scalpel.  The defect was converted into a full thickness wedge, of less than 50% of the vertical height of the lip, to facilite a better cosmetic result.  Small vessels were then tied off with 5-0 monocyrl. The orbicularis oris, superficial fascia, adipose and dermis were then reapproximated.  After the deeper layers were approximated the epidermis was reapproximated with particular care given to realign the vermilion border.
Ear Wedge Repair Text: A wedge excision was completed by carrying down an excision through the full thickness of the ear and cartilage with an inward facing Burow's triangle. The wound was then closed in a layered fashion.
Full Thickness Lip Wedge Repair (Flap) Text: Given the location of the defect and the proximity to free margins a full thickness wedge repair was deemed most appropriate. Using a sterile surgical marker, the appropriate repair was drawn incorporating the defect and placing the expected incisions perpendicular to the vermilion border.  The vermilion border was also meticulously outlined to ensure appropriate reapproximation during the repair.  The area thus outlined was incised through and through with a #15 scalpel blade.  The muscularis and dermis were reaproximated with deep sutures following hemostasis. Care was taken to realign the vermilion border before proceeding with the superficial closure.  Once the vermilion was realigned the superfical and mucosal closure was finished.
Burow's Graft Text: The defect edges were debeveled with a #15 scalpel blade. Given the location of the defect, shape of the defect, the proximity to free margins and the presence of a standing cone deformity a Burow's skin graft was deemed most appropriate. The standing cone was removed and this tissue was then trimmed to the shape of the primary defect. The adipose tissue was also removed until only dermis and epidermis were left.  The skin graft was then placed in the primary defect and oriented appropriately.
Cartilage Graft Text: The defect edges were debeveled with a #15 scalpel blade. Given the location of the defect, shape of the defect, the fact the defect involved a full thickness cartilage defect a cartilage graft was deemed most appropriate.  An appropriate donor site was identified, cleansed, and anesthetized. The cartilage graft was then harvested and transferred to the recipient site, oriented appropriately and then sutured into place.  The secondary defect was then repaired using a primary closure.
Composite Graft Text: The defect edges were debeveled with a #15 scalpel blade. Given the location of the defect, shape of the defect, the proximity to free margins and the fact the defect was full thickness a composite graft was deemed most appropriate.  The defect was outline and then transferred to the donor site.  A full thickness graft was then excised from the donor site. The graft was then placed in the primary defect, oriented appropriately and then sutured into place.  The secondary defect was then repaired using a primary closure.
Epidermal Autograft Text: The defect edges were debeveled with a #15 scalpel blade. Given the location of the defect, shape of the defect and the proximity to free margins an epidermal autograft was deemed most appropriate. Using a sterile surgical marker, the primary defect shape was transferred to the donor site. The epidermal graft was then harvested.  The skin graft was then placed in the primary defect and oriented appropriately.
Dermal Autograft Text: The defect edges were debeveled with a #15 scalpel blade. Given the location of the defect, shape of the defect and the proximity to free margins a dermal autograft was deemed most appropriate. Using a sterile surgical marker, the primary defect shape was transferred to the donor site. The area thus outlined was incised deep to adipose tissue with a #15 scalpel blade.  The harvested graft was then trimmed of adipose and epidermal tissue until only dermis was left.  The skin graft was then placed in the primary defect and oriented appropriately.
Ftsg Text: The defect edges were debeveled with a #15 scalpel blade. Given the location of the defect, shape of the defect and the proximity to free margins a full thickness skin graft was deemed most appropriate. Using a sterile surgical marker, the primary defect shape was transferred to the donor site. The area thus outlined was incised deep to adipose tissue with a #15 scalpel blade.  The harvested graft was then trimmed of adipose tissue until only dermis and epidermis was left.  The skin graft was then placed in the primary defect and oriented appropriately.
Pinch Graft Text: The defect edges were debeveled with a #15 scalpel blade. Given the location of the defect, shape of the defect and the proximity to free margins a pinch graft was deemed most appropriate. Using a sterile surgical marker, the primary defect shape was transferred to the donor site. The area thus outlined was incised deep to adipose tissue with a #15 scalpel blade.  The harvested graft was then trimmed of adipose tissue until only dermis and epidermis was left. The skin graft was then placed in the primary defect and oriented appropriately.
Skin Substitute Text: The defect edges were debeveled with a #15 scalpel blade. Given the location of the defect, shape of the defect and the proximity to free margins a skin substitute graft was deemed most appropriate.  The graft material was trimmed to fit the size of the defect. The graft was then placed in the primary defect and oriented appropriately.
Split-Thickness Skin Graft Text: The defect edges were debeveled with a #15 scalpel blade. Given the location of the defect, shape of the defect and the proximity to free margins a split thickness skin graft was deemed most appropriate. Using a sterile surgical marker, the primary defect shape was transferred to the donor site. The split thickness graft was then harvested.  The skin graft was then placed in the primary defect and oriented appropriately.
Tissue Cultured Epidermal Autograft Text: The defect edges were debeveled with a #15 scalpel blade. Given the location of the defect, shape of the defect and the proximity to free margins a tissue cultured epidermal autograft was deemed most appropriate.  The graft was then trimmed to fit the size of the defect.  The graft was then placed in the primary defect and oriented appropriately.
Xenograft Text: The defect edges were debeveled with a #15 scalpel blade. Given the location of the defect, shape of the defect and the proximity to free margins a xenograft was deemed most appropriate.  The graft was then trimmed to fit the size of the defect.  The graft was then placed in the primary defect and oriented appropriately.
Complex Repair And Flap Additional Text (Will Appearing After The Standard Complex Repair Text): The complex repair was not sufficient to completely close the primary defect. The remaining additional defect was repaired with the flap mentioned below.
Complex Repair And Graft Additional Text (Will Appearing After The Standard Complex Repair Text): The complex repair was not sufficient to completely close the primary defect. The remaining additional defect was repaired with the graft mentioned below.
Eyelid Full Thickness Repair - 35186: The eyelid defect was full thickness which required a wedge repair of the eyelid. Special care was taken to ensure that the eyelid margin was realligned when placing sutures.
Eyelid Partial Thickness Repair - 57663: The eyelid defect was partial thickness which required a wedge repair of the eyelid. Special care was taken to ensure that the eyelid margin was realligned when placing sutures.
Intermediate Repair And Flap Additional Text (Will Appearing After The Standard Complex Repair Text): The intermediate repair was not sufficient to completely close the primary defect. The remaining additional defect was repaired with the flap mentioned below.
Intermediate Repair And Graft Additional Text (Will Appearing After The Standard Complex Repair Text): The intermediate repair was not sufficient to completely close the primary defect. The remaining additional defect was repaired with the graft mentioned below.
Localized Dermabrasion With 15 Blade Text: The patient was draped in routine manner.  Localized dermabrasion using a 15 blade was performed in routine manner to papillary dermis. This spot dermabrasion is being performed to complete skin cancer reconstruction. It also will eliminate the other sun damaged precancerous cells that are known to be part of the regional effect of a lifetime's worth of sun exposure. This localized dermabrasion is therapeutic and should not be considered cosmetic in any regard.
Localized Dermabrasion With Sand Papertext: The patient was draped in routine manner.  Localized dermabrasion using sterile sand paper was performed in routine manner to papillary dermis. This spot dermabrasion is being performed to complete skin cancer reconstruction. It also will eliminate the other sun damaged precancerous cells that are known to be part of the regional effect of a lifetime's worth of sun exposure. This localized dermabrasion is therapeutic and should not be considered cosmetic in any regard.
Localized Dermabrasion With Wire Brush Text: The patient was draped in routine manner.  Localized dermabrasion using 3 x 17 mm wire brush was performed in routine manner to papillary dermis. This spot dermabrasion is being performed to complete skin cancer reconstruction. It also will eliminate the other sun damaged precancerous cells that are known to be part of the regional effect of a lifetime's worth of sun exposure. This localized dermabrasion is therapeutic and should not be considered cosmetic in any regard.
Purse String (Simple) Text: Given the location of the defect and the characteristics of the surrounding skin a purse string closure was deemed most appropriate.  Undermining was performed circumferentially around the surgical defect.  A purse string suture was then placed and tightened.
Purse String (Intermediate) Text: Given the location of the defect and the characteristics of the surrounding skin a purse string intermediate closure was deemed most appropriate.  Undermining was performed circumferentially around the surgical defect.  A purse string suture was then placed and tightened.
Partial Purse String (Simple) Text: Given the location of the defect and the characteristics of the surrounding skin a simple purse string closure was deemed most appropriate.  Undermining was performed circumferentially around the surgical defect.  A purse string suture was then placed and tightened. Wound tension only allowed a partial closure of the circular defect.
Partial Purse String (Intermediate) Text: Given the location of the defect and the characteristics of the surrounding skin an intermediate purse string closure was deemed most appropriate.  Undermining was performed circumferentially around the surgical defect.  A purse string suture was then placed and tightened. Wound tension only allowed a partial closure of the circular defect.
Tarsorrhaphy Text: A tarsorrhaphy was performed using Frost sutures.
Manual Repair Warning Statement: We plan on removing the manually selected variable below in favor of our much easier automatic structured text blocks found in the previous tab. We decided to do this to help make the flow better and give you the full power of structured data. Manual selection is never going to be ideal in our platform and I would encourage you to avoid using manual selection from this point on, especially since I will be sunsetting this feature. It is important that you do one of two things with the customized text below. First, you can save all of the text in a word file so you can have it for future reference. Second, transfer the text to the appropriate area in the Library tab. Lastly, if there is a flap or graft type which we do not have you need to let us know right away so I can add it in before the variable is hidden. No need to panic, we plan to give you roughly 6 months to make the change.
Same Histology In Subsequent Stages Text: The pattern and morphology of the tumor is as described in the first stage.
No Residual Tumor Seen Histology Text: There were no malignant cells seen in the sections examined.
Inflammation Suggestive Of Cancer Camouflage Histology Text: There was a dense lymphocytic infiltrate which prevented adequate histologic evaluation of adjacent structures.
Bcc Histology Text: There were numerous aggregates of basaloid cells.
Bcc Infiltrative Histology Text: There were numerous aggregates of basaloid cells demonstrating an infiltrative pattern.
Mart-1 - Positive Histology Text: MART-1 staining demonstrates areas of higher density and clustering of melanocytes with Pagetoid spread upwards within the epidermis. The surgical margins are positive for tumor cells.
Mart-1 - Negative Histology Text: MART-1 staining demonstrates a normal density and pattern of melanocytes along the dermal-epidermal junction. The surgical margins are negative for tumor cells.
Information: Selecting Yes will display possible errors in your note based on the variables you have selected. This validation is only offered as a suggestion for you. PLEASE NOTE THAT THE VALIDATION TEXT WILL BE REMOVED WHEN YOU FINALIZE YOUR NOTE. IF YOU WANT TO FAX A PRELIMINARY NOTE YOU WILL NEED TO TOGGLE THIS TO 'NO' IF YOU DO NOT WANT IT IN YOUR FAXED NOTE.
Bill 59 Modifier?: No - Continue to Bill 79 Modifier

## 2025-03-25 NOTE — HPI: SKIN LESION (BASAL CELL CARCINOMA)
Is This A New Presentation, Or A Follow-Up?: Basal Cell Carcinoma
Location From Outside Provider (Will Override Previously Chosen Location): Pt is here for Exc
When Was Basal Cell Biopsied? (Optional): 1/27/2025

## 2025-03-29 ENCOUNTER — TELEPHONE (OUTPATIENT)
Dept: INTERNAL MEDICINE CLINIC | Facility: CLINIC | Age: 85
End: 2025-03-29

## 2025-03-29 NOTE — TELEPHONE ENCOUNTER
[place] [facility] 3/26/2025  - Basal cell carcinoma right superior postauricular skin.  Follow-up in 7 days for skin check and suture removal.  - Tolerated Moh's surgery at that time.

## 2025-04-02 ENCOUNTER — APPOINTMENT (OUTPATIENT)
Dept: URBAN - METROPOLITAN AREA CLINIC 244 | Age: 85
Setting detail: DERMATOLOGY
End: 2025-04-02

## 2025-04-02 DIAGNOSIS — Z48.02 ENCOUNTER FOR REMOVAL OF SUTURES: ICD-10-CM

## 2025-04-02 PROCEDURE — OTHER SUTURE REMOVAL (GLOBAL PERIOD): OTHER

## 2025-04-02 ASSESSMENT — LOCATION ZONE DERM: LOCATION ZONE: SCALP

## 2025-04-02 ASSESSMENT — LOCATION SIMPLE DESCRIPTION DERM: LOCATION SIMPLE: POSTERIOR SCALP

## 2025-04-02 ASSESSMENT — LOCATION DETAILED DESCRIPTION DERM: LOCATION DETAILED: RIGHT SUPERIOR POSTAURICULAR SKIN

## 2025-04-02 NOTE — PROCEDURE: SUTURE REMOVAL (GLOBAL PERIOD)
Detail Level: Detailed
Add 22050 Cpt? (Important Note: In 2017 The Use Of 60205 Is Being Tracked By Cms To Determine Future Global Period Reimbursement For Global Periods): no
L/ calf/complains of pain/discomfort

## 2025-04-03 ENCOUNTER — OFFICE VISIT (OUTPATIENT)
Dept: PODIATRY CLINIC | Facility: CLINIC | Age: 85
End: 2025-04-03

## 2025-04-03 DIAGNOSIS — B35.1 ONYCHOMYCOSIS: ICD-10-CM

## 2025-04-03 DIAGNOSIS — R26.81 GAIT INSTABILITY: ICD-10-CM

## 2025-04-03 DIAGNOSIS — E11.42 TYPE 2 DIABETES MELLITUS WITH POLYNEUROPATHY (HCC): Primary | ICD-10-CM

## 2025-04-03 PROCEDURE — 1159F MED LIST DOCD IN RCRD: CPT | Performed by: STUDENT IN AN ORGANIZED HEALTH CARE EDUCATION/TRAINING PROGRAM

## 2025-04-03 PROCEDURE — 99213 OFFICE O/P EST LOW 20 MIN: CPT | Performed by: STUDENT IN AN ORGANIZED HEALTH CARE EDUCATION/TRAINING PROGRAM

## 2025-04-03 NOTE — PROGRESS NOTES
[facility]  Progress Note      Darin Hernandez is a 85 year old male.   Chief Complaint   Patient presents with    Diabetic Foot Care     F/u diabetic foot care, has intermittent pain 7/10 at night time. on 3/11/25 A1C= 8.8, on 3/14/2025 LOV with PCP Dick Storm MD.           HPI:     Patient is a pleasant 85-year-old diabetic male who presents to clinic for bilateral foot evaluation.  Admits to elongated toenails he is unable to trim himself.  Denies any pedal injuries or trauma.  Denies any signs of infection.    Allergies: Lisinopril    Current Outpatient Medications   Medication Sig Dispense Refill    glipiZIDE 10 MG Oral Tab Take 1 tablet (10 mg total) by mouth 2 (two) times daily before meals. Daily in the morning 180 tablet 3    OneTouch Delica Lancets 33G Does not apply Misc Check blood sugar once daily. 100 each 3    amLODIPine 5 MG Oral Tab Take 1 tablet (5 mg total) by mouth daily. 90 tablet 3    metFORMIN HCl 1000 MG Oral Tab TAKE 1 TABLET BY MOUTH EVERY MORNING AND EVERY NIGHT AT BEDTIME 180 tablet 3    famotidine 20 MG Oral Tab Take 1 tablet (20 mg total) by mouth 2 (two) times daily. 60 tablet 0    PIOGLITAZONE 45 MG Oral Tab TAKE 1 TABLET BY MOUTH DAILY 90 tablet 3    Potassium Chloride ER 10 MEQ Oral Tab CR Take 1 tablet (10 mEq total) by mouth daily. 90 tablet 3    Glucose Blood (ONETOUCH VERIO) In Vitro Strip Checl blood sugar once daily 100 strip 3    losartan 100 MG Oral Tab Take 1 tablet (100 mg total) by mouth daily. 90 tablet 3    tamsulosin 0.4 MG Oral Cap Take 1 capsule (0.4 mg total) by mouth daily. 90 capsule 3    atorvastatin 40 MG Oral Tab Take 1 tablet (40 mg total) by mouth daily. 90 tablet 3    atenolol 25 MG Oral Tab Take 1 tablet (25 mg total) by mouth daily with dinner.      BRIMONIDINE 0.15 % Ophthalmic Solution INSTILL 1 DROP INTO BOTH EYES IN THE MORNING AND 1 DROP BEFORE  BEDTIME 3 each 3    TIMOLOL 0.5 % Ophthalmic Solution INSTILL 1 DROP INTO BOTH  EYES  TWICE DAILY 15 mL 3    alendronate 70 MG Oral Tab Take 1 tablet (70 mg total) by mouth once a week. 13 tablet 3    aspirin-acetaminophen-caffeine 250-250-65 MG Oral Tab Take 1 tablet by mouth every 6 (six) hours as needed for Pain.      Glucose Blood (ONETOUCH VERIO) In Vitro Strip Use daily 100 strip 3    aspirin 81 MG Oral Tab EC Take 1 tablet (81 mg total) by mouth daily.      Ascorbic Acid (VITAMIN C OR) Take 1 tablet by mouth daily.        Past Medical History:    Aorta aneurysm    Back problem    Benign essential HTN    Cataract    Chronic rhinitis    Diabetes (HCC)    DM (diabetes mellitus), type 2 (HCC)    Essential hypertension    Hearing impairment    High blood pressure    High cholesterol    Hyperlipidemia    POAG (primary open-angle glaucoma)    first visit with Dr. Ang patient has been taking Brimonidine 0.15% OU BID and Timolol 0.5% OU BID for 10 yrs per MD in [place].  10/28/23 patient to continue taking gtts per [facility] due to abnormal VF OS and abnormal OCT OU    PVD (peripheral vascular disease)    Skin cancer    Visual impairment      Past Surgical History:   Procedure Laterality Date    Appendectomy      Cataract extraction w/  intraocular lens implant Left 04/07/2014    Done by Dr. [place] in [place]    Cataract extraction w/  intraocular lens implant Right 2014    Done by Dr. [place] Park in [place]    Other accessory      T and A    Other accessory      appendectomy    Other accessory      cholycystectomy    Other accessory      bilateral cataract    Other accessory      facial basal cell cancer    Tonsillectomy        Family History   Problem Relation Age of Onset    Macular degeneration Brother     Glaucoma Neg     Diabetes Neg       Social History     Socioeconomic History    Marital status:    Tobacco Use    Smoking status: Former     Types: Cigarettes     Passive exposure: Never    Smokeless tobacco: Never   Vaping Use    Vaping status: Never Used    Substance and Sexual Activity    Alcohol use: Yes     Alcohol/week: 2.0 standard drinks of alcohol     Types: 2 Cans of beer per week     Comment: 1 - 2 beers occasional    Drug use: Never           REVIEW OF SYSTEMS:     Denies nause, fever, chills  No calf pain  Denies chest pain or SOB      EXAM:   There were no vitals taken for this visit.  GENERAL: well developed, well nourished, in no apparent distress  EXTREMITIES:   1. Integument: Normal skin temperature and turgor. Toenails x10 are elongated, thickened and discolored with subungal derbi.     2. Vascular: Dorsalis pedis two out of four bilateral and posterior tibial pulses two out of   four bilateral, capillary refill normal.   3. Musculoskeletal: All muscle groups are graded 5 out of 5 in the foot and ankle.   4. Neurological: Normal sharp dull sensation; reflexes normal.             ASSESSMENT AND PLAN:   Diagnoses and all orders for this visit:    Type 2 diabetes mellitus with polyneuropathy (HCC)    Gait instability    Onychomycosis          Plan:       -Patient examined, chart history reviewed.  -Discussed importance of proper pedal hygiene, regular foot checks, and tight glucose control.  -Sharply debrided nails x10 with a sterile nail nipper achieving a 20% reduction in thickness and length, without incident.   -Ambulate with supportive shoes and inserts and avoid walking barefoot.  -Educated patient on acute signs of infection advised patient to seek immediate medical attention if symptoms arise.    RTC in 3 months      The patient indicates understanding of these issues and agrees to the plan.        Mellissa Douglas DPM

## 2025-05-06 ENCOUNTER — TELEPHONE (OUTPATIENT)
Dept: INTERNAL MEDICINE CLINIC | Facility: CLINIC | Age: 85
End: 2025-05-06

## 2025-05-06 DIAGNOSIS — Z13.5 DIABETIC RETINOPATHY SCREENING: Primary | ICD-10-CM

## 2025-05-06 NOTE — TELEPHONE ENCOUNTER
Patent left a voicemail message    Requests call back with ophthalmology referral since Dr Ang and the ophthalmology dept are no longer at [facility]     [phone]

## 2025-05-07 NOTE — TELEPHONE ENCOUNTER
Can try Dr. Dunn:    Provider Address Phone   Hua Dunn  [address] [facility] [address] [phone]

## 2025-05-09 NOTE — TELEPHONE ENCOUNTER
Called DR. Dunn office - referral was for [place] -no office there   New referral for Mariella Cat entered per protocol and faxed to DR Dunn at [phone]-confirmation received

## 2025-05-09 NOTE — TELEPHONE ENCOUNTER
Pt. Called back following up on his referral request for Ophthalmology.  Please let the phone ring a little longer.

## 2025-05-12 ENCOUNTER — APPOINTMENT (OUTPATIENT)
Dept: CT IMAGING | Facility: HOSPITAL | Age: 85
End: 2025-05-12
Attending: EMERGENCY MEDICINE
Payer: MEDICARE

## 2025-05-12 ENCOUNTER — APPOINTMENT (OUTPATIENT)
Dept: GENERAL RADIOLOGY | Facility: HOSPITAL | Age: 85
End: 2025-05-12
Payer: MEDICARE

## 2025-05-12 ENCOUNTER — HOSPITAL ENCOUNTER (OUTPATIENT)
Facility: HOSPITAL | Age: 85
Setting detail: OBSERVATION
Discharge: HOME | End: 2025-05-13
Attending: EMERGENCY MEDICINE | Admitting: HOSPITALIST
Payer: MEDICARE

## 2025-05-12 DIAGNOSIS — R07.9 CHEST PAIN OF UNCERTAIN ETIOLOGY: Primary | ICD-10-CM

## 2025-05-12 DIAGNOSIS — I15.9 SECONDARY HYPERTENSION: ICD-10-CM

## 2025-05-12 PROBLEM — R73.9 HYPERGLYCEMIA: Status: ACTIVE | Noted: 2025-05-12

## 2025-05-12 LAB
ANION GAP SERPL CALC-SCNC: 6 MMOL/L (ref 0–18)
ATRIAL RATE: 74 BPM
BASOPHILS # BLD AUTO: 0.02 X10(3) UL (ref 0–0.2)
BASOPHILS NFR BLD AUTO: 0.3 %
BUN BLD-MCNC: 24 MG/DL (ref 9–23)
BUN/CREAT SERPL: 16.8 (ref 10–20)
CALCIUM BLD-MCNC: 8.6 MG/DL (ref 8.7–10.4)
CHLORIDE SERPL-SCNC: 107 MMOL/L (ref 98–112)
CO2 SERPL-SCNC: 26 MMOL/L (ref 21–32)
CREAT BLD-MCNC: 1.43 MG/DL (ref 0.7–1.3)
DEPRECATED RDW RBC AUTO: 45.1 FL (ref 35.1–46.3)
EGFRCR SERPLBLD CKD-EPI 2021: 47 ML/MIN/1.73M2 (ref 60–?)
EOSINOPHIL # BLD AUTO: 0.18 X10(3) UL (ref 0–0.7)
EOSINOPHIL NFR BLD AUTO: 2.8 %
ERYTHROCYTE [DISTWIDTH] IN BLOOD BY AUTOMATED COUNT: 12.7 % (ref 11–15)
GLUCOSE BLD-MCNC: 339 MG/DL (ref 70–99)
GLUCOSE BLDC GLUCOMTR-MCNC: 196 MG/DL (ref 70–99)
GLUCOSE BLDC GLUCOMTR-MCNC: 197 MG/DL (ref 70–99)
GLUCOSE BLDC GLUCOMTR-MCNC: 219 MG/DL (ref 70–99)
HCT VFR BLD AUTO: 39.9 % (ref 39–53)
HGB BLD-MCNC: 13.1 G/DL (ref 13–17.5)
IMM GRANULOCYTES # BLD AUTO: 0.07 X10(3) UL (ref 0–1)
IMM GRANULOCYTES NFR BLD: 1.1 %
LYMPHOCYTES # BLD AUTO: 1.38 X10(3) UL (ref 1–4)
LYMPHOCYTES NFR BLD AUTO: 21.2 %
MCH RBC QN AUTO: 31.4 PG (ref 26–34)
MCHC RBC AUTO-ENTMCNC: 32.8 G/DL (ref 31–37)
MCV RBC AUTO: 95.7 FL (ref 80–100)
MONOCYTES # BLD AUTO: 0.35 X10(3) UL (ref 0.1–1)
MONOCYTES NFR BLD AUTO: 5.4 %
NEUTROPHILS # BLD AUTO: 4.5 X10 (3) UL (ref 1.5–7.7)
NEUTROPHILS # BLD AUTO: 4.5 X10(3) UL (ref 1.5–7.7)
NEUTROPHILS NFR BLD AUTO: 69.2 %
NT-PROBNP SERPL-MCNC: 860 PG/ML (ref ?–450)
OSMOLALITY SERPL CALC.SUM OF ELEC: 305 MOSM/KG (ref 275–295)
P AXIS: 79 DEGREES
P-R INTERVAL: 310 MS
PLATELET # BLD AUTO: 188 10(3)UL (ref 150–450)
POTASSIUM SERPL-SCNC: 4.4 MMOL/L (ref 3.5–5.1)
Q-T INTERVAL: 430 MS
QRS DURATION: 144 MS
QTC CALCULATION (BEZET): 477 MS
R AXIS: 10 DEGREES
RBC # BLD AUTO: 4.17 X10(6)UL (ref 3.8–5.8)
SODIUM SERPL-SCNC: 139 MMOL/L (ref 136–145)
T AXIS: 16 DEGREES
TROPONIN I SERPL HS-MCNC: 8 NG/L (ref ?–53)
TROPONIN I SERPL HS-MCNC: 8 NG/L (ref ?–53)
VENTRICULAR RATE: 74 BPM
WBC # BLD AUTO: 6.5 X10(3) UL (ref 4–11)

## 2025-05-12 PROCEDURE — 71045 X-RAY EXAM CHEST 1 VIEW: CPT | Performed by: EMERGENCY MEDICINE

## 2025-05-12 PROCEDURE — 71260 CT THORAX DX C+: CPT | Performed by: EMERGENCY MEDICINE

## 2025-05-12 PROCEDURE — 99223 1ST HOSP IP/OBS HIGH 75: CPT | Performed by: HOSPITALIST

## 2025-05-12 RX ORDER — METOPROLOL TARTRATE 100 MG/1
100 TABLET ORAL ONCE AS NEEDED
Status: ACTIVE | OUTPATIENT
Start: 2025-05-12 | End: 2025-05-12

## 2025-05-12 RX ORDER — METOPROLOL TARTRATE 50 MG
50 TABLET ORAL ONCE
Status: COMPLETED | OUTPATIENT
Start: 2025-05-13 | End: 2025-05-13

## 2025-05-12 RX ORDER — ONDANSETRON 2 MG/ML
4 INJECTION INTRAMUSCULAR; INTRAVENOUS EVERY 6 HOURS PRN
Status: DISCONTINUED | OUTPATIENT
Start: 2025-05-12 | End: 2025-05-13

## 2025-05-12 RX ORDER — DEXTROSE MONOHYDRATE 25 G/50ML
50 INJECTION, SOLUTION INTRAVENOUS
Status: DISCONTINUED | OUTPATIENT
Start: 2025-05-12 | End: 2025-05-13

## 2025-05-12 RX ORDER — METOPROLOL TARTRATE 100 MG/1
100 TABLET ORAL ONCE
Status: COMPLETED | OUTPATIENT
Start: 2025-05-13 | End: 2025-05-13

## 2025-05-12 RX ORDER — METOPROLOL TARTRATE 100 MG/1
100 TABLET ORAL ONCE AS NEEDED
Status: COMPLETED | OUTPATIENT
Start: 2025-05-12 | End: 2025-05-13

## 2025-05-12 RX ORDER — METOPROLOL TARTRATE 50 MG
50 TABLET ORAL ONCE AS NEEDED
Status: DISCONTINUED | OUTPATIENT
Start: 2025-05-13 | End: 2025-05-13

## 2025-05-12 RX ORDER — HYDRALAZINE HYDROCHLORIDE 20 MG/ML
10 INJECTION INTRAMUSCULAR; INTRAVENOUS ONCE
Status: COMPLETED | OUTPATIENT
Start: 2025-05-12 | End: 2025-05-12

## 2025-05-12 RX ORDER — ACETAMINOPHEN 500 MG
500 TABLET ORAL EVERY 4 HOURS PRN
Status: DISCONTINUED | OUTPATIENT
Start: 2025-05-12 | End: 2025-05-13

## 2025-05-12 RX ORDER — NITROGLYCERIN 0.4 MG/1
0.4 TABLET SUBLINGUAL ONCE
Status: COMPLETED | OUTPATIENT
Start: 2025-05-12 | End: 2025-05-13

## 2025-05-12 RX ORDER — METOCLOPRAMIDE HYDROCHLORIDE 5 MG/ML
5 INJECTION INTRAMUSCULAR; INTRAVENOUS EVERY 8 HOURS PRN
Status: DISCONTINUED | OUTPATIENT
Start: 2025-05-12 | End: 2025-05-13

## 2025-05-12 RX ORDER — PHENOL 1.4 %
600 AEROSOL, SPRAY (ML) MUCOUS MEMBRANE 2 TIMES DAILY
COMMUNITY

## 2025-05-12 RX ORDER — BRIMONIDINE TARTRATE 1.5 MG/ML
1 SOLUTION/ DROPS OPHTHALMIC 2 TIMES DAILY
COMMUNITY

## 2025-05-12 RX ORDER — METOPROLOL TARTRATE 50 MG
50 TABLET ORAL ONCE
Status: COMPLETED | OUTPATIENT
Start: 2025-05-12 | End: 2025-05-12

## 2025-05-12 RX ORDER — NICOTINE POLACRILEX 4 MG
15 LOZENGE BUCCAL
Status: DISCONTINUED | OUTPATIENT
Start: 2025-05-12 | End: 2025-05-13

## 2025-05-12 RX ORDER — ASPIRIN 325 MG
325 TABLET ORAL DAILY
Status: DISCONTINUED | OUTPATIENT
Start: 2025-05-12 | End: 2025-05-13

## 2025-05-12 RX ORDER — METOPROLOL TARTRATE 100 MG/1
100 TABLET ORAL ONCE AS NEEDED
Status: DISCONTINUED | OUTPATIENT
Start: 2025-05-13 | End: 2025-05-13

## 2025-05-12 RX ORDER — ASCORBIC ACID 500 MG
500 TABLET ORAL DAILY
COMMUNITY

## 2025-05-12 RX ORDER — AMLODIPINE BESYLATE 5 MG/1
5 TABLET ORAL ONCE
Status: COMPLETED | OUTPATIENT
Start: 2025-05-12 | End: 2025-05-12

## 2025-05-12 RX ORDER — HEPARIN SODIUM 5000 [USP'U]/ML
5000 INJECTION, SOLUTION INTRAVENOUS; SUBCUTANEOUS EVERY 12 HOURS SCHEDULED
Status: DISCONTINUED | OUTPATIENT
Start: 2025-05-12 | End: 2025-05-13

## 2025-05-12 RX ORDER — METOPROLOL TARTRATE 100 MG/1
100 TABLET ORAL ONCE
Status: COMPLETED | OUTPATIENT
Start: 2025-05-12 | End: 2025-05-12

## 2025-05-12 RX ORDER — DILTIAZEM HYDROCHLORIDE 5 MG/ML
5 INJECTION INTRAVENOUS SEE ADMIN INSTRUCTIONS
Status: DISCONTINUED | OUTPATIENT
Start: 2025-05-12 | End: 2025-05-13

## 2025-05-12 RX ORDER — METOPROLOL TARTRATE 1 MG/ML
5 INJECTION, SOLUTION INTRAVENOUS SEE ADMIN INSTRUCTIONS
Status: DISCONTINUED | OUTPATIENT
Start: 2025-05-12 | End: 2025-05-13

## 2025-05-12 RX ORDER — METOPROLOL TARTRATE 50 MG
50 TABLET ORAL ONCE AS NEEDED
Status: COMPLETED | OUTPATIENT
Start: 2025-05-12 | End: 2025-05-13

## 2025-05-12 RX ORDER — BRIMONIDINE TARTRATE 2 MG/ML
1 SOLUTION/ DROPS OPHTHALMIC 2 TIMES DAILY
Status: DISCONTINUED | OUTPATIENT
Start: 2025-05-13 | End: 2025-05-13

## 2025-05-12 RX ORDER — NICOTINE POLACRILEX 4 MG
30 LOZENGE BUCCAL
Status: DISCONTINUED | OUTPATIENT
Start: 2025-05-12 | End: 2025-05-13

## 2025-05-12 RX ORDER — METOPROLOL TARTRATE 50 MG
50 TABLET ORAL ONCE AS NEEDED
Status: ACTIVE | OUTPATIENT
Start: 2025-05-12 | End: 2025-05-12

## 2025-05-12 NOTE — ED INITIAL ASSESSMENT (HPI)
Pt to ED from home c/o CP and SOB starting this morning upon awakening. Pt received 325mg ASA and 0.4mg Nitroglycerin en route. Pt denies pain upon arrival. Mild SOB>

## 2025-05-12 NOTE — ED PROVIDER NOTES
Patient Seen in: [facility] Emergency Department      History     Chief Complaint   Patient presents with    Chest Pain Angina     Stated Complaint:     Subjective:   85-year-old male presents because he had some issues earlier with shortness of breath regardless of position and he had a little ache in his chest.  Overall he feels much better now that he is arrived but he says he feels a little woozy.  He was given aspirin and nitro by EMS.  He always has issues with his legs and uses a cane.  No cough or fever.  No vomiting or diarrhea.  No focal weakness or numbness that is new.  Does not believe he is on new medications          History of Present Illness               Objective:     Past Medical History:    Aorta aneurysm    Back problem    Benign essential HTN    Cataract    Chronic rhinitis    Diabetes (HCC)    DM (diabetes mellitus), type 2 (HCC)    Essential hypertension    Hearing impairment    High blood pressure    High cholesterol    Hyperlipidemia    POAG (primary open-angle glaucoma)    first visit with Dr. Ang patient has been taking Brimonidine 0.15% OU BID and Timolol 0.5% OU BID for 10 yrs per MD in [place].  10/28/23 patient to continue taking gtts per [facility] due to abnormal VF OS and abnormal OCT OU    PVD (peripheral vascular disease)    Skin cancer    Visual impairment              Past Surgical History:   Procedure Laterality Date    Appendectomy      Cataract extraction w/  intraocular lens implant Left 04/07/2014    Done by Dr. [place] in [place]    Cataract extraction w/  intraocular lens implant Right 2014    Done by Dr. [place] Park in [place]    Other accessory      T and A    Other accessory      appendectomy    Other accessory      cholycystectomy    Other accessory      bilateral cataract    Other accessory      facial basal cell cancer    Tonsillectomy                  Social History     Socioeconomic History    Marital status:    Tobacco  Use    Smoking status: Former     Types: Cigarettes     Passive exposure: Never    Smokeless tobacco: Never   Vaping Use    Vaping status: Never Used   Substance and Sexual Activity    Alcohol use: Yes     Alcohol/week: 2.0 standard drinks of alcohol     Types: 2 Cans of beer per week     Comment: 1 - 2 beers occasional    Drug use: Never     Social Drivers of Health     Food Insecurity: No Food Insecurity (7/12/2024)    Food Insecurity     Food Insecurity: Never true   Transportation Needs: No Transportation Needs (7/15/2024)    Transportation Needs     Lack of Transportation: No   Housing Stability: Low Risk  (7/12/2024)    Housing Stability     Housing Instability: No                                Physical Exam     ED Triage Vitals [05/12/25 1347]   /82   Pulse 79   Resp 16   Temp 96.9 °F (36.1 °C)   Temp src Temporal   SpO2 95 %   O2 Device None (Room air)       Current Vitals:   Vital Signs  BP: 147/86  Pulse: 75  Resp: 16  Temp: 97.9 °F (36.6 °C)  Temp src: Oral  MAP (mmHg): (!) 105    Oxygen Therapy  SpO2: 97 %  O2 Device: None (Room air)        Physical Exam  HENT:      Head: Normocephalic.   Eyes:      Pupils: Pupils are equal, round, and reactive to light.   Cardiovascular:      Rate and Rhythm: Normal rate and regular rhythm.      Heart sounds: Normal heart sounds.   Pulmonary:      Effort: Pulmonary effort is normal.      Breath sounds: Normal breath sounds.   Abdominal:      Palpations: Abdomen is soft.      Tenderness: There is no abdominal tenderness.   Musculoskeletal:         General: No swelling. Normal range of motion.      Cervical back: Normal range of motion.   Skin:     General: Skin is warm.      Capillary Refill: Capillary refill takes less than 2 seconds.   Neurological:      General: No focal deficit present.      Mental Status: He is alert.      Sensory: No sensory deficit.      Motor: No weakness.           Physical Exam                ED Course     Labs Reviewed   BASIC METABOLIC  PANEL (8) - Abnormal; Notable for the following components:       Result Value    Glucose 339 (*)     BUN 24 (*)     Creatinine 1.43 (*)     Calcium, Total 8.6 (*)     Calculated Osmolality 305 (*)     eGFR-Cr 47 (*)     All other components within normal limits   PRO BETA NATRIURETIC PEPTIDE - Abnormal; Notable for the following components:    Pro-Beta Natriuretic Peptide 860 (*)     All other components within normal limits   POCT GLUCOSE - Abnormal; Notable for the following components:    POC Glucose  197 (*)     All other components within normal limits   POCT GLUCOSE - Abnormal; Notable for the following components:    POC Glucose  219 (*)     All other components within normal limits   TROPONIN I HIGH SENSITIVITY - Normal   TROPONIN I HIGH SENSITIVITY - Normal   CBC WITH DIFFERENTIAL WITH PLATELET     EKG    Rate, intervals and axes as noted on EKG Report.  Rate: 74  Rhythm: Sinus Rhythm  Reading: First-degree AV block, right bundle branch block, no suspected ST elevation.  When compared to the most recent EKG in March 2025 there is no change in the right bundle branch block           ED Course as of 05/12/25 2210  ------------------------------------------------------------  Time: 05/12 1458  Comment: Labs independently interpreted by me.  CBC normal, troponin 8 which is normal.  proBNP 860.  Slightly higher than before.  Has chronic renal insufficiency.  Hyperglycemic.  ------------------------------------------------------------  Time: 05/12 1718  Comment: I was called in to see the patient because the nurse alerted me that he had some type of new chest complaint when I went in there he said his chest felt cold.  His second troponin was normal.  Repeat EKG was unchanged still right bundle branch at a rate of 64.  CT does not show acute process.  Blood pressure is increasing we will give some amlodipine.     Results                           MDM      XR CHEST AP PORTABLE  (CPT=71045)  Result Date:  5/12/2025  CONCLUSION:  1. No acute cardiopulmonary finding.    Dictated by (CST): Max Hurd MD on 5/12/2025 at 2:57 PM     Finalized by (CST): Max Hurd MD on 5/12/2025 at 2:57 PM              Admission disposition: 5/12/2025  7:24 PM           Medical Decision Making  Pt with chest pain.  Differential could include but is not limited to ACS, pneumothorax, CHF, PE, dissection, pneumonia many other possibilities.    Amount and/or Complexity of Data Reviewed  External Data Reviewed: labs and notes.     Details: Past visits and recent laboratory studies compared  Labs: ordered. Decision-making details documented in ED Course.  Radiology: ordered and independent interpretation performed. Decision-making details documented in ED Course.  ECG/medicine tests: ordered and independent interpretation performed. Decision-making details documented in ED Course.  Discussion of management or test interpretation with external provider(s): Patient continued to have intermittent chest discomfort.  Blood pressure was elevating and hydralazine was given.  Decision was made to admit the patient for further rule out.  Patient has multiple risk factors.    Risk  Decision regarding hospitalization.  Risk Details:  atherosclerosis with claudication of the lower extremities as well as abdominal aortic aneurysm status post left femoral endarterectomy with endovascular aortic repair        Disposition and Plan     Clinical Impression:  1. Chest pain of uncertain etiology    2. Secondary hypertension         Disposition:  Admit  5/12/2025  7:24 pm    Follow-up:  No follow-up provider specified.  We recommend that you schedule follow up care with a primary care provider within the next three months to obtain basic health screening including reassessment of your blood pressure.      Medications Prescribed:  Current Discharge Medication List          Supplementary Documentation:         Hospital Problems       Present on Admission   Date Reviewed: 4/3/2025          ICD-10-CM Noted POA    * (Principal) Chest pain of uncertain etiology R07.9 5/12/2025 Unknown    Chest pain R07.9 5/12/2025 Unknown    Hyperglycemia R73.9 5/12/2025 Yes    Secondary hypertension I15.9 5/12/2025 Unknown

## 2025-05-12 NOTE — ED QUICK NOTES
Report received from Rosanna ROGERS    Patient on cardiac,bp,spo2 monitoring  Plan of Care reviewed. Waiting for CT results.  Elimination needs assessed.  Provided a warm blanket.  No additional needs at this time.    Bed is locked and in lowest position. Call light within reach.

## 2025-05-12 NOTE — ED QUICK NOTES
Assisted patient to bathroom.  Patient now on cardiac,bp,spo2 monitoring  Bed is locked and in lowest position. Call light within reach.

## 2025-05-13 ENCOUNTER — APPOINTMENT (OUTPATIENT)
Dept: CT IMAGING | Facility: HOSPITAL | Age: 85
End: 2025-05-13
Attending: HOSPITALIST
Payer: MEDICARE

## 2025-05-13 ENCOUNTER — TELEPHONE (OUTPATIENT)
Dept: INTERNAL MEDICINE CLINIC | Facility: CLINIC | Age: 85
End: 2025-05-13

## 2025-05-13 VITALS
TEMPERATURE: 98 F | DIASTOLIC BLOOD PRESSURE: 70 MMHG | HEART RATE: 68 BPM | RESPIRATION RATE: 18 BRPM | SYSTOLIC BLOOD PRESSURE: 147 MMHG | OXYGEN SATURATION: 96 % | BODY MASS INDEX: 25.18 KG/M2 | HEIGHT: 74.02 IN | WEIGHT: 196.19 LBS

## 2025-05-13 LAB
ANION GAP SERPL CALC-SCNC: 8 MMOL/L (ref 0–18)
ATRIAL RATE: 64 BPM
ATRIAL RATE: 65 BPM
BASOPHILS # BLD AUTO: 0.04 X10(3) UL (ref 0–0.2)
BASOPHILS NFR BLD AUTO: 0.6 %
BUN BLD-MCNC: 17 MG/DL (ref 9–23)
BUN/CREAT SERPL: 14.7 (ref 10–20)
CALCIUM BLD-MCNC: 8.6 MG/DL (ref 8.7–10.4)
CHLORIDE SERPL-SCNC: 108 MMOL/L (ref 98–112)
CHOLEST SERPL-MCNC: 122 MG/DL (ref ?–200)
CO2 SERPL-SCNC: 25 MMOL/L (ref 21–32)
CREAT BLD-MCNC: 1.16 MG/DL (ref 0.7–1.3)
DEPRECATED RDW RBC AUTO: 43.8 FL (ref 35.1–46.3)
EGFRCR SERPLBLD CKD-EPI 2021: 61 ML/MIN/1.73M2 (ref 60–?)
EOSINOPHIL # BLD AUTO: 0.3 X10(3) UL (ref 0–0.7)
EOSINOPHIL NFR BLD AUTO: 4.2 %
ERYTHROCYTE [DISTWIDTH] IN BLOOD BY AUTOMATED COUNT: 12.8 % (ref 11–15)
GLUCOSE BLD-MCNC: 235 MG/DL (ref 70–99)
GLUCOSE BLDC GLUCOMTR-MCNC: 188 MG/DL (ref 70–99)
GLUCOSE BLDC GLUCOMTR-MCNC: 203 MG/DL (ref 70–99)
HCT VFR BLD AUTO: 38.9 % (ref 39–53)
HDLC SERPL-MCNC: 40 MG/DL (ref 40–59)
HGB BLD-MCNC: 13 G/DL (ref 13–17.5)
IMM GRANULOCYTES # BLD AUTO: 0.02 X10(3) UL (ref 0–1)
IMM GRANULOCYTES NFR BLD: 0.3 %
LDLC SERPL CALC-MCNC: 59 MG/DL (ref ?–100)
LYMPHOCYTES # BLD AUTO: 1.74 X10(3) UL (ref 1–4)
LYMPHOCYTES NFR BLD AUTO: 24.5 %
MCH RBC QN AUTO: 31.3 PG (ref 26–34)
MCHC RBC AUTO-ENTMCNC: 33.4 G/DL (ref 31–37)
MCV RBC AUTO: 93.5 FL (ref 80–100)
MONOCYTES # BLD AUTO: 0.52 X10(3) UL (ref 0.1–1)
MONOCYTES NFR BLD AUTO: 7.3 %
NEUTROPHILS # BLD AUTO: 4.48 X10 (3) UL (ref 1.5–7.7)
NEUTROPHILS # BLD AUTO: 4.48 X10(3) UL (ref 1.5–7.7)
NEUTROPHILS NFR BLD AUTO: 63.1 %
NONHDLC SERPL-MCNC: 82 MG/DL (ref ?–130)
OSMOLALITY SERPL CALC.SUM OF ELEC: 301 MOSM/KG (ref 275–295)
P AXIS: 51 DEGREES
P AXIS: 62 DEGREES
P-R INTERVAL: 306 MS
P-R INTERVAL: 322 MS
PLATELET # BLD AUTO: 201 10(3)UL (ref 150–450)
POTASSIUM SERPL-SCNC: 3.5 MMOL/L (ref 3.5–5.1)
Q-T INTERVAL: 440 MS
Q-T INTERVAL: 448 MS
QRS DURATION: 144 MS
QRS DURATION: 150 MS
QTC CALCULATION (BEZET): 457 MS
QTC CALCULATION (BEZET): 462 MS
R AXIS: 14 DEGREES
R AXIS: 22 DEGREES
RBC # BLD AUTO: 4.16 X10(6)UL (ref 3.8–5.8)
SODIUM SERPL-SCNC: 141 MMOL/L (ref 136–145)
T AXIS: 10 DEGREES
T AXIS: 12 DEGREES
TRIGL SERPL-MCNC: 131 MG/DL (ref 30–149)
TROPONIN I SERPL HS-MCNC: 13 NG/L (ref ?–53)
VENTRICULAR RATE: 64 BPM
VENTRICULAR RATE: 65 BPM
VLDLC SERPL CALC-MCNC: 19 MG/DL (ref 0–30)
WBC # BLD AUTO: 7.1 X10(3) UL (ref 4–11)

## 2025-05-13 PROCEDURE — 75580 N-INVAS EST C FFR SW ALY CTA: CPT | Performed by: HOSPITALIST

## 2025-05-13 PROCEDURE — 75574 CT ANGIO HRT W/3D IMAGE: CPT | Performed by: HOSPITALIST

## 2025-05-13 PROCEDURE — 99239 HOSP IP/OBS DSCHRG MGMT >30: CPT | Performed by: INTERNAL MEDICINE

## 2025-05-13 RX ORDER — DILTIAZEM HYDROCHLORIDE 5 MG/ML
INJECTION INTRAVENOUS
Status: COMPLETED
Start: 2025-05-13 | End: 2025-05-13

## 2025-05-13 RX ORDER — ATENOLOL 25 MG/1
25 TABLET ORAL
Status: DISCONTINUED | OUTPATIENT
Start: 2025-05-13 | End: 2025-05-13

## 2025-05-13 RX ORDER — LOSARTAN POTASSIUM 100 MG/1
100 TABLET ORAL DAILY
Status: DISCONTINUED | OUTPATIENT
Start: 2025-05-13 | End: 2025-05-13

## 2025-05-13 RX ORDER — AMLODIPINE BESYLATE 5 MG/1
5 TABLET ORAL DAILY
Status: DISCONTINUED | OUTPATIENT
Start: 2025-05-13 | End: 2025-05-13

## 2025-05-13 RX ORDER — GLIPIZIDE 10 MG/1
10 TABLET ORAL DAILY
Status: SHIPPED | COMMUNITY
Start: 2025-05-13

## 2025-05-13 RX ORDER — ATORVASTATIN CALCIUM 40 MG/1
40 TABLET, FILM COATED ORAL NIGHTLY
Status: DISCONTINUED | OUTPATIENT
Start: 2025-05-13 | End: 2025-05-13

## 2025-05-13 RX ORDER — METOPROLOL TARTRATE 1 MG/ML
INJECTION, SOLUTION INTRAVENOUS
Status: COMPLETED
Start: 2025-05-13 | End: 2025-05-13

## 2025-05-13 NOTE — DISCHARGE SUMMARY
[facility]  part of [facility]     DISCHARGE SUMMARY     Darin Hernandez Patient Status:  Observation    1940 MRN [id number]   Location [facility] 3W/SW Attending Prudence Melara MD   Hosp Day # 0 PCP Dick George MD     DATE OF ADMISSION: 2025  DATE OF DISCHARGE:  25  DISPOSITION: home  CONDITION ON DISCHARGE: good    DISCHARGE DIAGNOSES:    Chest pain of uncertain etiology      Hyperglycemia      Secondary hypertension        HISTORY OF PRESENT ILLNESS (COPIED FROM ADMISSION H&P)  85-year-old  male with history of peripheral arterial disease. Came in today to the emergency department for evaluation of intermittent chest pain since this morning. CBC and chemistry were unremarkable. GFR is 47, at baseline; glucose 339. First troponin was negative. Second troponin was negative. CT scan of the chest showed no PE. Chest x-ray showed no acute findings. EKG showed normal sinus rhythm with nonspecific ST-T changes. Patient will be admitted to the hospital for further management. He does have underlying chronic right bundle-branch block.     HOSPITAL COURSE:   Midsternal chest pain, intermittent with atypical features  CT coronary angiogram pending  Cardiology consult.   Continue to trend troponin levels.  Cont ASA 325mg       Essential hypertension.  Controlled  Cont amlodipine, atenolol, losartan       Diabetes mellitus type 2.  A1c of 8.8  Cont SSI  Accuchecks   Hypoglycemia protocol       Chronic kidney disease stage 3.  Stable  Avoid nephrotoxins       HLP  Cont atorvastatin    Patient understands return to the emergency room for increased pain, fever, discharge, shortness of breath, chest pain, new neurologic symptoms, other concerning symptoms.    PHYSICAL EXAM:     Gen: A+Ox3.  No distress.   HEENT: NCAT, neck supple, no carotid bruit.  CV: RRR, S1S2, and intact distal pulses. No gallop, rub, murmur.  Pulm: Effort and breath sounds normal. No distress,  wheezes, rales, rhonchi.  Abd: Soft, NTND, BS normal, no mass, no HSM, no rebound/guarding.   Neuro: Normal reflexes, CN. Sensory/motor exams grossly normal deficit. Coordination  and gait normal.   MS: No joint effusions.  No peripheral edema.  Skin: Skin is warm and dry. No rashes, erythema, diaphoresis.   Psych: Normal mood and affect. Behavior and judgment normal.     DISCHARGE MEDICATIONS     Discharge Medications        CONTINUE taking these medications        Instructions Prescription details   amLODIPine 5 MG Tabs  Commonly known as: Norvasc      Take 1 tablet (5 mg total) by mouth daily.   Quantity: 90 tablet  Refills: 3     aspirin 81 MG Tbec      Take 1 tablet (81 mg total) by mouth in the morning.   Refills: 0     atenolol 25 MG Tabs  Commonly known as: Tenormin      Take 1 tablet (25 mg total) by mouth daily with dinner.   Refills: 0     atorvastatin 40 MG Tabs  Commonly known as: Lipitor      Take 1 tablet (40 mg total) by mouth daily.   Quantity: 90 tablet  Refills: 3     brimonidine 0.15 % Soln  Commonly known as: Alphagan      Place 1 drop into both eyes 2 (two) times daily.   Refills: 0     Calcium Carbonate 600 MG Tabs      Take 1 tablet (600 mg total) by mouth 2 (two) times daily.   Refills: 0     glipiZIDE 10 MG Tabs  Commonly known as: Glucotrol      Take 1 tablet (10 mg total) by mouth daily. Daily in the morning   Refills: 0     losartan 100 MG Tabs  Commonly known as: Cozaar      Take 1 tablet (100 mg total) by mouth daily.   Quantity: 90 tablet  Refills: 3     metFORMIN HCl 1000 MG Tabs  Commonly known as: GLUCOPHAGE      TAKE 1 TABLET BY MOUTH EVERY MORNING AND EVERY NIGHT AT BEDTIME   Quantity: 180 tablet  Refills: 3     OneTouch Delica Lancets 33G Misc      Check blood sugar once daily.   Quantity: 100 each  Refills: 3     OneTouch Verio Strp      Checl blood sugar once daily   Quantity: 100 strip  Refills: 3     pioglitazone 45 MG Tabs  Commonly known as: Actos      TAKE 1 TABLET BY  MOUTH DAILY   Quantity: 90 tablet  Refills: 3     Potassium Chloride ER 10 MEQ Tbcr      Take 1 tablet (10 mEq total) by mouth daily.   Quantity: 90 tablet  Refills: 3     tamsulosin 0.4 MG Caps  Commonly known as: Flomax      Take 1 capsule (0.4 mg total) by mouth daily.   Quantity: 90 capsule  Refills: 3     timolol 0.5 % Soln  Commonly known as: Timoptic      INSTILL 1 DROP INTO BOTH EYES  TWICE DAILY   Quantity: 15 mL  Refills: 3     Vitamin C 500 MG Tabs  Commonly known as: VITAMIN C      Take 1 tablet (500 mg total) by mouth daily.   Refills: 0              CONSULTANTS  Consultants         Provider   Role Specialty     Luca Kahn MD      Consulting Physician Cardiovascular Diseases            FOLLOW UP:  Dick George MD  [address] [facility] [address]  [phone]    Schedule an appointment as soon as possible for a visit in 1 week(s)      Luca Kahn MD  [address] [address]  [phone]    Schedule an appointment as soon as possible for a visit in 2 week(s)      The above plan and follow-up instructions were reviewed with the patient and they verbalized understanding and agreement.  They understand to return to the emergency room for any concerning signs or symptoms.  Greater than 30 minutes spent on discharge.  -----------------------      Lace+ Score: 82  59-90 High Risk  29-58 Medium Risk  0-28   Low Risk.    TCM Follow-Up Recommendation:  LACE > 58: High Risk of readmission after discharge from the hospital.  Supplementary Documentation:     Prudence Melara MD  5/13/2025

## 2025-05-13 NOTE — H&P
[facility]    PATIENT'S NAME: HEMAL BROOKE   ATTENDING PHYSICIAN: Ricardo De Jesus MD   PATIENT ACCOUNT#:   [id number]    LOCATION:  [address]  MEDICAL RECORD #:   [id number]       YOB: 1940  ADMISSION DATE:       05/12/2025    HISTORY AND PHYSICAL EXAMINATION    CHIEF COMPLAINT:  Chest pain.    HISTORY OF PRESENT ILLNESS:  Patient is an 85-year-old  male with history of peripheral arterial disease.  Came in today to the emergency department for evaluation of intermittent chest pain since this morning.  CBC and chemistry were unremarkable.  GFR is 47, at baseline; glucose 339.  First troponin was negative.  Second troponin was negative.  CT scan of the chest showed no PE.  Chest x-ray showed no acute findings.  EKG showed normal sinus rhythm with nonspecific ST-T changes.  Patient will be admitted to the hospital for further management.  He does have underlying chronic right bundle-branch block.       PAST MEDICAL HISTORY:  Diabetes mellitus type 2, hypertension, peripheral arterial disease, abdominal aortic aneurysm, hyperlipidemia, degenerative joint disease of lumbar spine, generalized osteoarthritis.     PAST SURGICAL HISTORY:  Appendectomy, cholecystectomy, cataract procedure, abdominal aortic aneurysm endograft repair, left common femoral artery endarterectomy, tonsillectomy.    MEDICATIONS:  Please see medication reconciliation list.     ALLERGIES:  Lisinopril.    FAMILY HISTORY:  Positive for hypertension.    SOCIAL HISTORY:  Ex-tobacco user.  Social alcohol.  No drug use.      PHYSICAL EXAMINATION:    GENERAL:  Alert and oriented to time, place and person.  No acute distress.   VITAL SIGNS:  Temperature 96.9, pulse 77, respiratory rate 18, blood pressure 145/73, pulse ox 97% on room air.  HEENT:  Atraumatic.  Oropharynx clear.  Dry mucous membranes.  Ears and nose normal.  Eyes:  Anicteric sclerae.   NECK:  Supple.  No lymphadenopathy.  Trachea midline.     LUNGS:  Clear to auscultation bilaterally.  Normal respiratory effort.   HEART:  Regular rate and rhythm.  S1 and S2 auscultated.  No murmur.    ABDOMEN:  Soft, nondistended.  No tenderness.  Positive bowel sounds.   EXTREMITIES:  No peripheral edema, clubbing or cyanosis.   NEUROLOGIC:  Motor and sensory intact.    ASSESSMENT:    1.   Midsternal chest pain, intermittent with atypical features, in a patient with multiple risk factors for coronary artery disease.  2.   Essential hypertension.  3.   Diabetes mellitus type 2.  4.   Chronic kidney disease stage 3.    PLAN:  Patient will be admitted to telemetry floor.  Obtain CT coronary angiogram.  Monitor Accu-Cheks.  Obtain cardiology consult.  Continue to trend troponin levels.  Further recommendations to follow.     Dictated By Rosemarie Mcfarland MD  d: 05/12/2025 19:28:00  t: 05/12/2025 19:45:32  [facility] [id number]  FB/    cc: Ricardo De Jesus MD

## 2025-05-13 NOTE — IMAGING NOTE
TO RAD HOLDING AT  1030      HX TAKEN:  CHEST PAIN    PT CONSENTED ON FLOOR WITH INPT RN.     BMI 25    CTA ORDERED BY DR WALKER    WAS PT GIVEN CTA PREMEDS: YES-PT GIVEN THE FOLLOWING BY THE INPT RN:   METOPROLOL PO GIVEN 50 MILLIGRAMS  AT 0604  METOPROLOL PO GIVEN 50 MILLIGRAMS   AT 0654  METOPROLOL PO GIVEN 50 MILLIGRAMS   AT 0821    18 GAUGE IV STARTED BY INPT RN     GFR = 61   CREATINE = 1.16    1033: HR 64 /76 METOPROLOL 5 MILLIGRAMS GIVEN IV PUSH  SEE  PROTOCOL    1039: HR 63 /77 METOPROLOL 5 MILLIGRAMS GIVEN IV PUSH     1045: HR 62 /75 METOPROLOL 5 MILLIGRAMS  GIVEN IV PUSH    1052: HR 62 /72 METOPROLOL 5 MILLIGRAMS  GIVEN IV PUSH    1100: HR 63 /71 CARDIZEM 5 MILLIGRAMS  GIVEN IV PUSH     1106: HR 65 /70 CARDIZEM 5 MILLIGRAMS  GIVEN IV PUSH     1115: HR 63 /75 CARDIZEM 5 MILLIGRAMS  GIVEN IV PUSH     1125: HR 62 /74 CARDIZEM 5 MILLIGRAMS  GIVEN IV PUSH     1135:  HR 57    TO CT TABLE @ 1146    CONNECT TO MONITOR  HR 62 /65      1150: CARDIZEM 5 MILLIGRAMS  GIVEN IV PUSH     NITROGLYCERIN 0.4 MILLIGRAMS SUBLINGUAL GIVEN AT 1152     CALCIUM SCORE COMPLETED AT 1155     INJECTION STARTED AT 1201 HR 57 DURING SCAN PROCEDURE COMPLETE    POST SCAN HR 61 /65 AT 1208    1213:  PT LEFT ON BED WITH TELE BOX IN PLACE BY TRANSPORTER TO RETURN TO INPT ROOM.   REPORT GIVEN TO INMULU RN.

## 2025-05-13 NOTE — ED QUICK NOTES
Orders for admission, patient is aware of plan and ready to go upstairs. Any questions, please call ED RN Sandra at extension [phone].     Patient Covid vaccination status: Unvaccinated     COVID Test Ordered in ED: None    COVID Suspicion at Admission: N/A    Running Infusions: Medication Infusions[1] None    Mental Status/LOC at time of transport: aox3    Other pertinent information:   CIWA score: N/A   NIH score:  N/A             [1]

## 2025-05-13 NOTE — CM/SW NOTE
05/13/25 1300   CM/SW Referral Data   Referral Source Physician   Reason for Referral Discharge planning  (HH)   Patient Info   Patient's Home Environment House   Number of Levels in Home Other (comment)   Number of Stair in Home 13   Patient lives with Sibling;Other   Patient Status Prior to Admission   Independent with ADLs and Mobility Yes   Services in place prior to admission Home Health Care  (HH RN)     Darrion introduced self and role to pt. Pt confirmed address as current, lives with brother and sister in law. Pt states there are 13 stairs to get into the home but reports he is independent. Pt has a cane and a walker, but only uses the walker when he goes outside. He reports no additional help needed at this time but has the contact information of a care giver agency if needed.     Pt has a HH nurse that comes out once a month but is unable to recall agency. Darrion reached put to PCP office Dr. Dick George to inquire [phone]. Darrion was informed [facility] is active with [facility] .     2:50pm: Darrion was informed pt has a hx of HH services with [facility] , however he is not currently active. Darrion sent referrals and f2f via [facility].     Plan: Home w/ [facility].    Clari ALVAREZ

## 2025-05-13 NOTE — ED QUICK NOTES
Patient on cardiac,bp,spo2 monitoring  Plan of Care reviewed. Waiting for room/bed requested.  Elimination needs assessed.  Provided a warm blanket.    Bed is locked and in lowest position. Call light within reach.

## 2025-05-13 NOTE — ED QUICK NOTES
Patient continues to be on cardiac,spo2,bp monitoring  Bed is locked and in lowest position. Call light within reach.  No additional needs at this time

## 2025-05-13 NOTE — PLAN OF CARE
Pt A&O4. On RA. 18 G PIV in place. No caffeine overnight. Safety precautions in place.     Plan: CTA     Problem: Patient Centered Care  Goal: Patient preferences are identified and integrated in the patient's plan of care  Description: Interventions:- What would you like us to know as we care for you? Home w/ brother - Provide timely, complete, and accurate information to patient/family- Incorporate patient and family knowledge, values, beliefs, and cultural backgrounds into the planning and delivery of care- Encourage patient/family to participate in care and decision-making at the level they choose- Honor patient and family perspectives and choices  Outcome: Progressing     Problem: Diabetes/Glucose Control  Goal: Glucose maintained within prescribed range  Description: INTERVENTIONS:- Monitor Blood Glucose as ordered- Assess for signs and symptoms of hyperglycemia and hypoglycemia- Administer ordered medications to maintain glucose within target range- Assess barriers to adequate nutritional intake and initiate nutrition consult as needed- Instruct patient on self management of diabetes  Outcome: Progressing          Problem: CARDIOVASCULAR - ADULT  Goal: Maintains optimal cardiac output and hemodynamic stability  Description: INTERVENTIONS:- Monitor vital signs, rhythm, and trends- Monitor for bleeding, hypotension and signs of decreased cardiac output- Evaluate effectiveness of vasoactive medications to optimize hemodynamic stability- Monitor arterial and/or venous puncture sites for bleeding and/or hematoma- Assess quality of pulses, skin color and temperature- Assess for signs of decreased coronary artery perfusion - ex. Angina- Evaluate fluid balance, assess for edema, trend weights  Outcome: Progressing  Goal: Absence of cardiac arrhythmias or at baseline  Description: INTERVENTIONS:- Continuous cardiac monitoring, monitor vital signs, obtain 12 lead EKG if indicated- Evaluate effectiveness of  antiarrhythmic and heart rate control medications as ordered- Initiate emergency measures for life threatening arrhythmias- Monitor electrolytes and administer replacement therapy as ordered  Outcome: Progressing

## 2025-05-13 NOTE — PROGRESS NOTES
[facility]  part of [facility]    Hospitalist Progress Note     Darin Hernandez Patient Status:  Observation    1940  85 year old CSN [id number]   Location 311/311-A Attending Prudence Melara MD   Hosp Day # 0 PCP Dick George MD           Subjective:   ----------------------------------  Pt seen today, resting in bed in NAD. States he feels great. No chest pain, SOB, palpitations or dizziness.       Objective:   Chief Complaint:   Chief Complaint   Patient presents with    Chest Pain Angina     ----------------------------------  Temp:  [96.9 °F (36.1 °C)-98.5 °F (36.9 °C)] 97.8 °F (36.6 °C)  Pulse:  [63-82] 65  Resp:  [10-23] 20  BP: (139-185)/() 155/84  SpO2:  [94 %-98 %] 96 %  Gen: A+Ox3.  No distress.   HEENT: NCAT, neck supple, no carotid bruit.  CV: RRR, S1S2, and intact distal pulses. No gallop, rub, murmur.  Pulm: Effort and breath sounds normal. No distress, wheezes, rales, rhonchi.  Abd: Soft, NTND, BS normal, no mass, no HSM, no rebound/guarding.   Neuro: Normal reflexes, CN. Sensory/motor exams grossly normal deficit.   MS: No joint effusions.  No peripheral edema.  Skin: Skin is warm and dry. No rashes, erythema, diaphoresis.   Psych: Normal mood and affect. Calm, cooperative    Labs:  Lab Results   Component Value Date    HGB 13.0 2025    WBC 7.1 2025    .0 2025     2025    K 3.5 2025    CREATSERUM 1.16 2025    INR 0.90 2025    AST 17 2025    ALT 15 2025           Scheduled Medications[1]  PRN Medications[2]      Other problems    Supplementary Documentation:   DVT Mechanical Prophylaxis:        DVT Pharmacologic Prophylaxis   Medication    heparin (Porcine) 5000 UNIT/ML injection 5,000 Units    DVT Pharmacologic prophylaxis: Aspirin 325 mg           Code Status: Not on file  Segal: No urinary catheter in place  Segal Duration (in days):   Central line:    ROCCO: 2025         I personally  reviewed the available laboratories, imaging including. I discussed/will discuss the case with consultants. I ordered laboratories and/or radiographic studies. I adjusted medications as detailed above.  Medical decision making high, risk is high.    Assessment & Plan:   ----------------------------------    Midsternal chest pain, intermittent with atypical features  CT coronary angiogram pending  Cardiology consult.   Continue to trend troponin levels.  Cont ASA 325mg      Essential hypertension.  Controlled  Cont amlodipine, atenolol, losartan      Diabetes mellitus type 2.  A1c of 8.8  Cont SSI  Accuchecks   Hypoglycemia protocol      Chronic kidney disease stage 3.  Stable  Avoid nephrotoxins      HLP  Cont atorvastatin    FULL CODE  DVT px: subcutaneous heparin    Prudence Melara MD  5/13/2025         [1]    amLODIPine  5 mg Oral Daily    atenolol  25 mg Oral Daily with dinner    losartan  100 mg Oral Daily    atorvastatin  40 mg Oral Nightly    metoprolol  5 mg Intravenous See Admin Instructions    Or    dilTIAZem  5 mg Intravenous See Admin Instructions    heparin  5,000 Units Subcutaneous 2 times per day    aspirin  325 mg Oral Daily    insulin aspart  1-11 Units Subcutaneous TID CC and HS    brimonidine  1 drop Both Eyes BID   [2]   metoprolol tartrate **OR** metoprolol tartrate    acetaminophen    ondansetron    metoclopramide    glucose **OR** glucose **OR** glucose-vitamin C **OR** dextrose **OR** glucose **OR** glucose **OR** glucose-vitamin C

## 2025-05-13 NOTE — CONSULTS
Cardiology Consultation  [facility]    Darin Hernandez Patient Status:  Observation    1940 MRN [id number]   Location [facility] 3W/SW Attending Prudence Melara MD   Hosp Day # 0 PCP Dick George MD     Reason for Consultation:  Chest pain    History of Present Illness:  Darin Hernandez is a a(n) 85 year old male with pmh AAA s/p EVAR, PVD, CKD, HTN, HL, DM presenting with chest pain.  Patient states he was in usual state of health yesterday am when he began to feel pain across chest; unable to elaborate further.  States he felt overall unwell and like he couldn't move.  +Associated dyspnea, denies palpitations, orthopnea, PND or LE edema.  States when paramedics arrives all symptoms resolved.    On admission patient afeb, HR 79, /82.  Trop negative x 3, ECG with SR, 1st deg AVB, RBBB.  CT chest negative for PE.  Coronary CTA ordered by primary service and pending.  Cardiology consulted for further eval and management.    History:  Past Medical History[1]  Past Surgical History[2]  Family History[3]   reports that he has quit smoking. His smoking use included cigarettes. He has never been exposed to tobacco smoke. He has never used smokeless tobacco. He reports current alcohol use of about 2.0 standard drinks of alcohol per week. He reports that he does not use drugs.    Allergies:  Allergies[4]    Medications:  Medications Ordered Prior to Encounter[5]    Review of Systems:  Constitutional: denies fevers, chills, night sweats  HEENT: denies headache, vision changes, trouble or pain with swallowing  Cardiac: + chest pain as per hpi  Pulm: denies dyspnea, cough, wheeze  GI: denies n/v, abd pain, diarrhea or constipation  : denies hematuria, dysuria, incontinence  MSK: denies muscle or joint pains  Neuro: denies numbness, weakness, paresthesias  Psych: denies anxiety, depression  Integument: denies skin rashes or lesions  Heme: denies easy bruising or bleeding  Endo: denies  heat/cold intolerance, skin or nail changes      Physical Exam:  Blood pressure 155/84, pulse 65, temperature 97.8 °F (36.6 °C), temperature source Oral, resp. rate 20, height 6' 2.02\" (1.88 m), weight 196 lb 3.2 oz (89 kg), SpO2 96%.  Wt Readings from Last 3 Encounters:   05/12/25 196 lb 3.2 oz (89 kg)   03/14/25 203 lb (92.1 kg)   03/09/25 205 lb (93 kg)       General: awake, alert, oriented x 3, no acute distress  HEENT: at/nc, perrl, eomi  Neck: No JVD, carotids 2+ no bruits.  Cardiac: Regular rate and rhythm, S1, S2 normal, no murmur, rub or gallop.  Lungs: Clear without wheezes, rales, rhonchi or dullness.  Normal excursions and effort.  Abdomen: Soft, non-tender, non-distended, normal bowel sounds   Extremities: Without clubbing, cyanosis or edema.  Peripheral pulses are 2+.  Neurologic: Alert and oriented, normal affect.  Psych: normal mood and affect  Skin: Warm and dry.       Laboratories and Data:  Diagnostics:  EKG: SR, 1st deg AVB, RBBB    TTE 7/2024:  1. Left ventricle: The cavity size was normal. Wall thickness was normal.      Systolic function was normal. The estimated ejection fraction was 55-65%,      by biplane method of disks. Wall motion is normal; there are no regional      wall motion abnormalities. Left ventricular diastolic function parameters      were normal.   2. Mitral valve: There was mild regurgitation.     Labs:   CBC:    Lab Results   Component Value Date    WBC 7.1 05/13/2025    WBC 6.5 05/12/2025    WBC 8.0 03/11/2025     Lab Results   Component Value Date    HGB 13.0 05/13/2025    HGB 13.1 05/12/2025    HGB 13.7 03/11/2025      Lab Results   Component Value Date    .0 05/13/2025    .0 05/12/2025    .0 03/11/2025     BMP:   No results found for: \"GLUCOSE\"  Lab Results   Component Value Date    K 3.5 05/13/2025    K 4.4 05/12/2025    K 3.8 03/11/2025     Lab Results   Component Value Date    BUN 17 05/13/2025    BUN 24 (H) 05/12/2025    BUN 27 (H) 03/11/2025      Lab Results   Component Value Date    CREATSERUM 1.16 05/13/2025    CREATSERUM 1.43 (H) 05/12/2025    CREATSERUM 1.60 (H) 03/11/2025     Cholesterol:     Lab Results   Component Value Date    CHOLEST 122 05/13/2025    CHOLEST 106 03/11/2025    CHOLEST 101 12/05/2024     Lab Results   Component Value Date    HDL 40 05/13/2025    HDL 37 (L) 03/11/2025    HDL 46 12/05/2024     Lab Results   Component Value Date    TRIG 131 05/13/2025    TRIG 115 03/11/2025    TRIG 117 12/05/2024     Lab Results   Component Value Date    LDL 59 05/13/2025    LDL 48 03/11/2025    LDL 34 12/05/2024     Lab Results   Component Value Date    AST 17 03/11/2025    AST 21 03/09/2025    AST 18 12/09/2024     Lab Results   Component Value Date    ALT 15 03/11/2025    ALT 15 03/09/2025    ALT 16 12/09/2024       Assessment/Plan:  85 year old male presenting with:    1) Chest pain, resolved on admission  2) HTN  3) HL  4) DM  5) PVD  6) AAA s/p EVAR    - Trop negative x 3, ECG with SR with 1st deg AVB and RBBB; no ischemia or arrhythmia  - CT chest negative for PE  - Coronary CTA ordered by primary service; will f/u results  - Cont asa, statin, bb, arb, amlodipine  - Monitor on tele  - Will follow    Nikolai Fletcher MD  Interventional cardiologist, [facility]  5/13/2025  11:03 AM         [1]   Past Medical History:   Aorta aneurysm    Back problem    Benign essential HTN    Cataract    Chronic rhinitis    Diabetes (HCC)    DM (diabetes mellitus), type 2 (HCC)    Essential hypertension    Hearing impairment    High blood pressure    High cholesterol    Hyperlipidemia    POAG (primary open-angle glaucoma)    first visit with Dr. Ang patient has been taking Brimonidine 0.15% OU BID and Timolol 0.5% OU BID for 10 yrs per MD in [place].  10/28/23 patient to continue taking gtts per [facility] due to abnormal VF OS and abnormal OCT OU    PVD (peripheral vascular disease)    Skin cancer    Visual impairment   [2]   Past Surgical  History:  Procedure Laterality Date    Appendectomy      Cataract extraction w/  intraocular lens implant Left 04/07/2014    Done by Dr. [place] in [place]    Cataract extraction w/  intraocular lens implant Right 2014    Done by Dr. [place] in [place]    Other accessory      T and A    Other accessory      appendectomy    Other accessory      cholycystectomy    Other accessory      bilateral cataract    Other accessory      facial basal cell cancer    Tonsillectomy     [3]   Family History  Problem Relation Age of Onset    Macular degeneration Brother     Glaucoma Neg     Diabetes Neg    [4]   Allergies  Allergen Reactions    Lisinopril ANGIOEDEMA and OTHER (SEE COMMENTS)     Lip swelling   [5]   Current Facility-Administered Medications on File Prior to Encounter   Medication Dose Route Frequency Provider Last Rate Last Admin    [COMPLETED] iopamidol 76% (ISOVUE-370) injection for power injector  80 mL Intravenous ONCE PRN Ofelia Frey MD   80 mL at 03/09/25 1621     Current Outpatient Medications on File Prior to Encounter   Medication Sig Dispense Refill    Vitamin C 500 MG Oral Tab Take 1 tablet (500 mg total) by mouth daily.      Calcium Carbonate 600 MG Oral Tab Take 1 tablet (600 mg total) by mouth 2 (two) times daily.      brimonidine 0.15 % Ophthalmic Solution Place 1 drop into both eyes 2 (two) times daily.      glipiZIDE 10 MG Oral Tab Take 1 tablet (10 mg total) by mouth 2 (two) times daily before meals. Daily in the morning (Patient taking differently: Take 1 tablet (10 mg total) by mouth daily. Daily in the morning) 180 tablet 3    amLODIPine 5 MG Oral Tab Take 1 tablet (5 mg total) by mouth daily. 90 tablet 3    metFORMIN HCl 1000 MG Oral Tab TAKE 1 TABLET BY MOUTH EVERY MORNING AND EVERY NIGHT AT BEDTIME 180 tablet 3    PIOGLITAZONE 45 MG Oral Tab TAKE 1 TABLET BY MOUTH DAILY 90 tablet 3    Potassium Chloride ER 10 MEQ Oral Tab CR Take 1 tablet (10 mEq total) by  mouth daily. 90 tablet 3    losartan 100 MG Oral Tab Take 1 tablet (100 mg total) by mouth daily. 90 tablet 3    tamsulosin 0.4 MG Oral Cap Take 1 capsule (0.4 mg total) by mouth daily. 90 capsule 3    atorvastatin 40 MG Oral Tab Take 1 tablet (40 mg total) by mouth daily. 90 tablet 3    atenolol 25 MG Oral Tab Take 1 tablet (25 mg total) by mouth daily with dinner.      TIMOLOL 0.5 % Ophthalmic Solution INSTILL 1 DROP INTO BOTH EYES  TWICE DAILY 15 mL 3    aspirin 81 MG Oral Tab EC Take 1 tablet (81 mg total) by mouth in the morning.      OneTouch Delica Lancets 33G Does not apply Misc Check blood sugar once daily. 100 each 3    Glucose Blood (ONETOUCH VERIO) In Vitro Strip Checl blood sugar once daily 100 strip 3    [DISCONTINUED] Glucose Blood (ONETOUCH VERIO) In Vitro Strip Use daily 100 strip 3

## 2025-05-13 NOTE — PLAN OF CARE
From home with brother. CTA today. Sb to bathroom. Cleared for discharge.   Problem: Patient Centered Care  Goal: Patient preferences are identified and integrated in the patient's plan of care  Description: Interventions:- What would you like us to know as we care for you? - Provide timely, complete, and accurate information to patient/family- Incorporate patient and family knowledge, values, beliefs, and cultural backgrounds into the planning and delivery of care- Encourage patient/family to participate in care and decision-making at the level they choose- Honor patient and family perspectives and choices  Outcome: Progressing     Problem: Diabetes/Glucose Control  Goal: Glucose maintained within prescribed range  Description: INTERVENTIONS:- Monitor Blood Glucose as ordered- Assess for signs and symptoms of hyperglycemia and hypoglycemia- Administer ordered medications to maintain glucose within target range- Assess barriers to adequate nutritional intake and initiate nutrition consult as needed- Instruct patient on self management of diabetes  Outcome: Progressing     Problem: Patient/Family Goals  Goal: Patient/Family Long Term Goal  Description: Patient's Long Term Goal: Interventions:- - See additional Care Plan goals for specific interventions  Outcome: Progressing  Goal: Patient/Family Short Term Goal  Description: Patient's Short Term Goal: Interventions: - - See additional Care Plan goals for specific interventions  Outcome: Progressing     Problem: CARDIOVASCULAR - ADULT  Goal: Maintains optimal cardiac output and hemodynamic stability  Description: INTERVENTIONS:- Monitor vital signs, rhythm, and trends- Monitor for bleeding, hypotension and signs of decreased cardiac output- Evaluate effectiveness of vasoactive medications to optimize hemodynamic stability- Monitor arterial and/or venous puncture sites for bleeding and/or hematoma- Assess quality of pulses, skin color and temperature- Assess for signs  of decreased coronary artery perfusion - ex. Angina- Evaluate fluid balance, assess for edema, trend weights  Outcome: Progressing  Goal: Absence of cardiac arrhythmias or at baseline  Description: INTERVENTIONS:- Continuous cardiac monitoring, monitor vital signs, obtain 12 lead EKG if indicated- Evaluate effectiveness of antiarrhythmic and heart rate control medications as ordered- Initiate emergency measures for life threatening arrhythmias- Monitor electrolytes and administer replacement therapy as ordered  Outcome: Progressing

## 2025-05-13 NOTE — TELEPHONE ENCOUNTER
ANUPAMA Storm...See prior TE as well    Called [facility]  room 311-A and spoke with Aundrea ROGERS for patient and informed her that patient in the past used [facility]

## 2025-05-13 NOTE — TELEPHONE ENCOUNTER
[facility] called stating the pt. Is currently admitted there.  They are asking which  agency the pt. Uses.

## 2025-05-13 NOTE — CM/SW NOTE
05/13/25 1500   Discharge disposition   Expected discharge disposition Home or Self   Patient Declines Recommended Services Yes  (declined HH)   Discharge transportation Private car       Per chart discharge order in.  Darrion met with pt at bedside to offer arrangements for HH and provide list. Pt is declining services at this time.   Darrion instructed pt to follow up with PCP.     Plan: Home     Clari ALVAREZ

## 2025-05-13 NOTE — ED QUICK NOTES
Assisted patient to bathroom.     The patient is a 49y Female complaining of The patient is a 49y Female complaining of H/H low and fatigue

## 2025-05-14 ENCOUNTER — TELEPHONE (OUTPATIENT)
Dept: INTERNAL MEDICINE CLINIC | Facility: CLINIC | Age: 85
End: 2025-05-14

## 2025-05-14 ENCOUNTER — PATIENT OUTREACH (OUTPATIENT)
Dept: CASE MANAGEMENT | Age: 85
End: 2025-05-14

## 2025-05-14 NOTE — PROGRESS NOTES
Voicemail received; Patient requesting assistance with scheduling appointment. Patient can be reached at [phone].   Called patient back    DM appointment request (discharged 05/13)    Dr Dick George  Internal Medicine  [address] [place] [address] [phone]  Patient called office, waiting for office to call back with an appointment    Dr Luca Kahn  Cardiovascular Diseases, Cardiology  [address] [address] [phone]  Follow up 2 weeks  Apt made:  Wed 05/28 @10:30am w/SHELLEY Ramesh  Confirmed w/pt  Closing encounter

## 2025-05-14 NOTE — TELEPHONE ENCOUNTER
Patent was in the ER on 5/12     Needs follow up appointment with Dr George in one week - no openings - please advise

## 2025-05-16 NOTE — TELEPHONE ENCOUNTER
Lets get him in for next week:    Monday 5/19 - 15  Open: 7:30 am   Ok to double book: 11:30 am  Friday 5/23 - 9  Double book: 10 am    Let me know what he selects

## 2025-05-17 NOTE — PROGRESS NOTES
Chief Complaint:   Chief Complaint   Patient presents with    Checkup     Hospital follow up, mild chest pain since hospital visit     HPI:     Mr. BROOKE is a 85 year old male PMHX peripheral arterial disease, AAA, CKD stage III, type 2 diabetes, hypertension, hyperlipidemia coming in for posthospitalization follow-up.    Of note, patient presented to the emergency department 5/12 for chest pain, angina.  He had clinical improvement en route to the ER.  He had elevated proBNP, elevated glucose.  He was admitted for further evaluation.  Evaluated by cardiology.  Had CTA coronary and manage medically.  He had overall symptomatic improvement discharged with close follow-up.    He is feeling fine today. Chest discomfort but not pain, not to the extent of the initial presentation. He felt like it was a severe pain. He felt better after the ambulance ride even before going to the ER. No patterns of the chest discomfort. He can get shortness    The back and knees hurt and achy. Worse with prolonged walking. He did have some headaches, uses aspirin as needed. He does use the aspirin as needed 1-2. He has not had falls at home. He had a close call recently but uses a walker. He neeedd assistance    Past Medical History:    Aorta aneurysm    Back problem    Benign essential HTN    Cataract    Chronic rhinitis    Diabetes (HCC)    DM (diabetes mellitus), type 2 (HCC)    Essential hypertension    Hearing impairment    High blood pressure    High cholesterol    Hyperlipidemia    POAG (primary open-angle glaucoma)    first visit with Dr. Ang patient has been taking Brimonidine 0.15% OU BID and Timolol 0.5% OU BID for 10 yrs per MD in [place].  10/28/23 patient to continue taking gtts per [facility] due to abnormal VF OS and abnormal OCT OU    PVD (peripheral vascular disease)    Skin cancer    Visual impairment     Past Surgical History:   Procedure Laterality Date    Appendectomy      Cataract extraction w/  intraocular lens  implant Left 04/07/2014    Done by Dr. [place] Park in [place]    Cataract extraction w/  intraocular lens implant Right 2014    Done by Dr. [place] Park in [place]    Other accessory      T and A    Other accessory      appendectomy    Other accessory      cholycystectomy    Other accessory      bilateral cataract    Other accessory      facial basal cell cancer    Tonsillectomy       Social History:  Social History     Socioeconomic History    Marital status:    Tobacco Use    Smoking status: Former     Types: Cigarettes     Passive exposure: Never    Smokeless tobacco: Never   Vaping Use    Vaping status: Never Used   Substance and Sexual Activity    Alcohol use: Yes     Alcohol/week: 2.0 standard drinks of alcohol     Types: 2 Cans of beer per week     Comment: 1 - 2 beers occasional    Drug use: Never     Social Drivers of Health     Food Insecurity: No Food Insecurity (5/13/2025)    NCSS - Food Insecurity     Worried About Running Out of Food in the Last Year: No     Ran Out of Food in the Last Year: No   Transportation Needs: No Transportation Needs (5/13/2025)    NCSS - Transportation     Lack of Transportation: No   Housing Stability: Not At Risk (5/13/2025)    NCSS - Housing/Utilities     Has Housing: Yes     Worried About Losing Housing: No     Unable to Get Utilities: No     Family History:  Family History   Problem Relation Age of Onset    Macular degeneration Brother     Glaucoma Neg     Diabetes Neg      Allergies:  Allergies   Allergen Reactions    Lisinopril ANGIOEDEMA and OTHER (SEE COMMENTS)     Lip swelling     Current Meds:  Current Outpatient Medications   Medication Sig Dispense Refill    glipiZIDE 10 MG Oral Tab Take 1 tablet (10 mg total) by mouth daily. Daily in the morning      Vitamin C 500 MG Oral Tab Take 1 tablet (500 mg total) by mouth daily.      Calcium Carbonate 600 MG Oral Tab Take 1 tablet (600 mg total) by mouth 2 (two) times daily.      brimonidine 0.15  % Ophthalmic Solution Place 1 drop into both eyes 2 (two) times daily.      OneTouch Delica Lancets 33G Does not apply Misc Check blood sugar once daily. 100 each 3    amLODIPine 5 MG Oral Tab Take 1 tablet (5 mg total) by mouth daily. 90 tablet 3    metFORMIN HCl 1000 MG Oral Tab TAKE 1 TABLET BY MOUTH EVERY MORNING AND EVERY NIGHT AT BEDTIME 180 tablet 3    PIOGLITAZONE 45 MG Oral Tab TAKE 1 TABLET BY MOUTH DAILY 90 tablet 3    Potassium Chloride ER 10 MEQ Oral Tab CR Take 1 tablet (10 mEq total) by mouth daily. 90 tablet 3    Glucose Blood (ONETOUCH VERIO) In Vitro Strip Checl blood sugar once daily 100 strip 3    losartan 100 MG Oral Tab Take 1 tablet (100 mg total) by mouth daily. 90 tablet 3    tamsulosin 0.4 MG Oral Cap Take 1 capsule (0.4 mg total) by mouth daily. 90 capsule 3    atorvastatin 40 MG Oral Tab Take 1 tablet (40 mg total) by mouth daily. 90 tablet 3    atenolol 25 MG Oral Tab Take 1 tablet (25 mg total) by mouth daily with dinner.      TIMOLOL 0.5 % Ophthalmic Solution INSTILL 1 DROP INTO BOTH EYES  TWICE DAILY 15 mL 3    aspirin 81 MG Oral Tab EC Take 1 tablet (81 mg total) by mouth in the morning.        Counseling given: Not Answered       REVIEW OF SYSTEMS:   Positive Findings indicated in BOLD    Constitutional: Fever, Chills, Weight Gain, Weight Loss, Night Sweats, Fatigue, Malaise  ENT/Mouth:  Hearing Changes, Ear Pain, Nasal Congestion, Sinus Pain, Hoarseness, Sore throat, Rhinorrhea, Swallowing Difficulty  Eyes: Eye Pain, Swelling, Redness, Foreign Body, Discharge, Vision Changes  Cardiovascular: Chest Pain, SOB, PND, Dyspnea on Exertion, Orthopnea, Claudication, Edema, Palpitations  Respiratory: Cough, Sputum, Wheezing, Shortness of breath  Gastrointestinal: Nausea, Vomiting, Diarrhea, Constipation, Pain, Heartburn, Dysphagia, Bloody stools, Tarry stools  Genitourinary: Dysmenorrhea, Dysuria, Urinary Frequency, Hematuria, Urinary Incontinence, Urgency,  Flank Pain  Musculoskeletal:  Arthralgias, Myalgias, Joint Swelling, Joint Stiffness, Back Pain, Neck Pain  Integumentary: Skin Lesions, Pruritis, Hair Changes, Jaundice, Nail changes  Neuro: Weakness, Numbness, Paresthesias, Loss of Consciousness, Syncope, Dizziness, Headache, Falls  Psych: Anxiety, Depression, Insomnia, Suicidal Ideation, Homicidal ideation, Memory Changes  Heme/Lymph: Bruising, Bleeding, Lymphadenopathy  Endocrine: Polyuria, Polydipsia, Temperature Intolerance    EXAM:   Vital Signs:  Blood pressure 128/64, pulse 76, temperature 97.6 °F (36.4 °C), height 6' 2\" (1.88 m), weight 196 lb 6.4 oz (89.1 kg), SpO2 98%.     Constitutional: No acute distress. Alert and oriented x 3.  Eyes: EOMI, PERRLA, clear sclera b/l  HENT: NCAT, Moist mucous membranes, Oropharynx without erythema or exudates  Neck: No JVD, no thyromegaly  Cardiovascular: S1, S2, no S3, no S4, Regular rate and rhythm, No murmurs/gallops/rubs.   Respiratory: Clear to auscultation bilaterally.  No wheezes/rales/rhonchi  Gastrointestinal: Soft, nontender, nondistended. Positive bowel sounds x 4. No rebound tenderness. No hepatomegaly, No splenomegaly  Genitourinary: No CVA tenderness bilaterally  Neurologic: No focal neurological deficits, CN II-XII intact, light touch intact,   Musculoskeletal: Full range of motion of all extremities, no clubbing/swelling/edema  Skin: +ovoid skin tag  Psychiatric: Appropriate mood and affect  Heme/Lymph/Immune: No cervical LAD        DATA REVIEWED   Labs:  Recent Results (from the past 8760 hours)   Basic Metabolic Panel (8)    Collection Time: 05/13/25  3:58 AM   Result Value Ref Range    Glucose 235 (H) 70 - 99 mg/dL    Sodium 141 136 - 145 mmol/L    Potassium 3.5 3.5 - 5.1 mmol/L    Chloride 108 98 - 112 mmol/L    CO2 25.0 21.0 - 32.0 mmol/L    Anion Gap 8 0 - 18 mmol/L    BUN 17 9 - 23 mg/dL    Creatinine 1.16 0.70 - 1.30 mg/dL    BUN/CREA Ratio 14.7 10.0 - 20.0    Calcium, Total 8.6 (L) 8.7 - 10.4 mg/dL    Calculated Osmolality  301 (H) 275 - 295 mOsm/kg    eGFR-Cr 61 >=60 mL/min/1.73m2     Comment: eGFR calculated using the CKD-EPI 2021 calculation     *Note: Due to a large number of results and/or encounters for the requested time period, some results have not been displayed. A complete set of results can be found in Results Review.       Recent Results (from the past 8760 hours)   CBC With Differential With Platelet    Collection Time: 05/13/25  3:58 AM   Result Value Ref Range    WBC 7.1 4.0 - 11.0 x10(3) uL    RBC 4.16 3.80 - 5.80 x10(6)uL    HGB 13.0 13.0 - 17.5 g/dL    HCT 38.9 (L) 39.0 - 53.0 %    MCV 93.5 80.0 - 100.0 fL    MCH 31.3 26.0 - 34.0 pg    MCHC 33.4 31.0 - 37.0 g/dL    RDW-SD 43.8 35.1 - 46.3 fL    RDW 12.8 11.0 - 15.0 %    .0 150.0 - 450.0 10(3)uL    Neutrophil Absolute Prelim 4.48 1.50 - 7.70 x10 (3) uL    Neutrophil Absolute 4.48 1.50 - 7.70 x10(3) uL    Lymphocyte Absolute 1.74 1.00 - 4.00 x10(3) uL    Monocyte Absolute 0.52 0.10 - 1.00 x10(3) uL    Eosinophil Absolute 0.30 0.00 - 0.70 x10(3) uL    Basophil Absolute 0.04 0.00 - 0.20 x10(3) uL    Immature Granulocyte Absolute 0.02 0.00 - 1.00 x10(3) uL    Neutrophil % 63.1 %    Lymphocyte % 24.5 %    Monocyte % 7.3 %    Eosinophil % 4.2 %    Basophil % 0.6 %    Immature Granulocyte % 0.3 %     *Note: Due to a large number of results and/or encounters for the requested time period, some results have not been displayed. A complete set of results can be found in Results Review.     EKG 12/9/2024  Rate: 68 rhythm: Sinus  Axis: Normal   Intervals: NJ: 304  QRS: 114  QT: 408 QTc 433    ST Abnormalities: None    Impression: Sinus rhythm with first-degree AV block, incomplete right bundle branch block  Comparison: No significant changes from last EKG 8/7/2024      CTA abdomen pelvis 9/21/2023  Impression   CONCLUSION:     Infrarenal abdominal aortic aneurysm measuring 50 x 52 millimeter     Right lower extremity:  Diffuse severe stenosis throughout the mid to distal  SFA due to calcified plaque.  Severe three-vessel runoff disease with only the peroneal artery showing opacification throughout     Left lower extremity:  Multifocal severe stenosis in the CFA .  Severe stenosis at the origin of the SFA due to calcified plaque.  Multifocal areas of moderate to severe stenosis throughout the rest of the SFA.  Severe three-vessel runoff disease.    Opacification throughout the peroneal artery.  The posterior tibial artery is likely opacified near the ankle.        Chest x-ray 12/22/2023    Impression   CONCLUSION:  1. No acute airspace disease.  Asymmetric mild biapical pleural thickening left more than right.  If any old films are available they should be submitted for comparison.  If not, then follow-up studies are advised.  2. Suggestion of mild hyperinflation which may suggest some degree of COPD.     Ultrasound arterial duplex 11/8/2024    Impression   CONCLUSION:  1. Severe calcific multilevel peripheral arterial occlusive disease.    2. Previous site of surgical left femoral endarterectomy remains widely patent.    3. There is diffuse disease within both femoral popliteal segments, with a chronic total occlusion on the right and diffuse narrowing on the left.    4. Tibial vessels are calcified, though patent where visualized, with dampened, monophasic flow due to the more proximal occlusions..     CTA coronaries 5/13/2025    DETAILED FINDINGS:            1. LAD:            Normal FFRct, 0.87  2. LCX:            Normal FFRct  2. OM1:           Normal FFRct, 0.86  4. RCA:           Normal FFRct     CONCLUSION: Normal FFRct analysis with no evidence for hemodynamically significant lesions.          Impression   CONCLUSION:  1.  Superficial to the sternum there is a 13 mm diameter soft tissue focus, located along the skin surface.  Is grossly stable since 12/9/24 CT.  Suspect dermatologic focus such as skin tag however correlate with physical exam.  2.  Coronary atherosclerosis.   Please refer to dedicated cardiac CTA report for additional details.         ASSESSMENT AND PLAN:     Chest pain  Hospitalization reviewed 5/12 - 5/13, chest pain presenting symptom, , troponin normal x 3  - CTA gated coronary evaluation normal FFR CT analysis with no evidence of hemodynamically significant lesions.  - Continued on medical management per cardiology, Dr. Fletcher  - Has cardiology follow-up 5/28/2025 with APN Bessy Fink    We did explore noncardiac chest pain, has had acid reflux in the past.  Uses Tums periodically.  We will try to find any food triggers in the meantime  - Amenable to trial of omeprazole 40 mg once a day for 2 weeks even if symptoms improve  - Will monitor for any worsening symptoms for further evaluation if needed.    Abnormal skin lesion  - Incidental finding on CT cardiac over read.  13 mm soft tissue focus along the sternum, stable since 12/2024  - Possible skin tag, may need dermatology evaluation if enlarges or symptomatic.  This has remained stable otherwise.  - Reports that it has been stable ever since childhood (reports  Had it since 13, but has been stable more recently.)    Dyspnea on exertion  Likely multifactorial in setting of allergic rhinitis, there does seem to be some degree of COPD/bibasilar scarring right greater than left on chest x-ray 12/2023  - Did have a normal Lexiscan stress test 11/2023  - Actively short of breath for which we will proceed with blood test, seems to be euvolemic on examination  -Will repeat chest x-ray  - Started Symbicort 2 puffs twice a day  - Continue with management of allergic rhinitis as below  - Discussed red flag signs and symptoms that warrant immediate ER evaluation including chest pain, worsening shortness of breath which will require more urgent evaluation      Low back pain  Chronic, recurring issue.  Has not worsened.  However constant 5/10 pain.  May be limiting his usual physical activities  -Would recommend initial  trial of conservative therapy:    -Acetaminophen 500-650 mg every 4-6 hours as needed for pain relief  - Did complete physical therapy, continue with home stretches and exercises  - Referral provided for Dr. Garcia in the event he is a candidate for epidural steroid injection.  - Completed physical therapy       Peripheral arterial disease  Abdominal aortic aneurysm  Noted ongoing claudication.  Outpatient CT scan with AAA 50 x 52 mm as an outpatient.  Status post endovascular aortic repair, left femoral endarterectomy Dr. Shabazz of  and Dr. Chu cardiovascular surgery 12/22/2023  - Aspirin 81 mg daily  - Ultrasound arterial duplex severe calcific multilevel peripheral arterial occlusive disease.  Left femoral endarterectomy site widely patent.  Diffuse disease within both femoral-popliteal segments with a chronic total occlusion on the right and diffuse narrowing on the left  -Seen in vascular surgery clinic 1/9/2024, due for repeat CTA of the abdomen pelvis for positioning and endoleak.  Repeat ultrasound in 6 months.  Advised to follow-up    CKD stage III  - Baseline seems to be 1.2-1.4  - Creatinine most recently 1.36, EGFR 51 and stable     Type 2 diabetes  Recent A1c:   HEMOGLOBIN A1C (%)   Date Value   09/19/2023 7.6 (A)     HgbA1C (%)   Date Value   03/11/2025 8.8 (H)     Recent urine microalbumin: No components found for: \"MICROALB/CREAT RATIO\"  Current medications:  glipizide 10 mg daily,pioglitazone 45 mg daily, metformin 1000 mg twice a day  Eye exam: Continue following with ophthalmology - UTD with Dr. Ang  Foot exam: Check feet daily  -Increase in A1c, needs to decrease carbohydrate intake.  - Will increase glipizide to 10 mg twice a day  -We did discuss various other options for management of sugars.  May be a candidate for GLP-1 agents, resuming Jardiance     Hypertension  -Blood pressure today at goal  - Check blood pressures at home  - Continue with home amlodipine 5 mg daily, losartan 100  mg daily, atenolol 25 mg daily  - Able demonstrate good blood pressure checks.  His blood pressure is good x 2 readings here.  At home, he has elevated blood pressures  - Would benefit from home health with blood pressure checks periodically     Hyperlipidemia  -Cholesterol overall well-controlled on last check  - Continue with atorvastatin 40 mg daily, aspirin  - Plan to repeat lipid panel     Allergic rhinitis  - Trial of flonase 1 spray each nostril until symptoms improve    Sensorineural hearing loss  - Seen by ENT, Dr. Renee 3/17/2025.  Sensorineural hearing loss more so on the left, right ear disimpaction       Orders This Visit:  No orders of the defined types were placed in this encounter.      Meds This Visit:  Requested Prescriptions      No prescriptions requested or ordered in this encounter       Imaging & Referrals:  None     Health Maintenance  Due for shingles vaccine series, COVID series    Spent 30 minutes obtaining history, evaluating patient, discussing treatment options, diet, exercise, review of available labs and radiology reports, and completing documentation.     Return to clinic in 3 months for follow-up    Dick George MD, 05/19/25, 12:22 PM

## 2025-05-17 NOTE — PATIENT INSTRUCTIONS
- You were seen in clinic for regular annual check-up.  We did review your most recent hospitalization.  Happy to hear that you are feeling better and that there has been no significant abnormalities on your cardiac workup.    Follow-up with cardiology  - Periodically check your blood pressures at home, notify us if increasing  - Try to find any other triggering factors for the chest pain, shortness of breath that has been intermittent    Let's try omeprazole 40 mg once a day in the morning with food.  - Common cause of noncardiac chest pain is acid reflux and gastritis.  This may help reduce the chest discomfort  - Try to find any other triggering factors in the meantime    Diabetes can be better controlled with a goal less than 7.0  - Please continue trying to cut down on carbohydrates not just in sweets but also in bread/grain/rice/breads  - Targeting weight loss over time can help by optimizing nutrition, targeting exercise as tolerated  - Maintain annual eye examination  - If no improvement, may need to consider adjusting the medication regimen further  - May need to consider endocrinology evaluation    We reviewed medications and unfortunately we are maximized on the current oral regimen  - Metformin, Actos/pioglitazone, glipizide are all at the maximum dosages at this point  - We attempted to send for Jardiance/Farxiga but these were not cost effective  -Injectable medications such as Ozempic or insulin are likely the next option  - For now, lets try to cut down on carbohydrates not just in sweets but also rice/grain/bread/pasta    Vascular status seems to be stable with Dr. Chu    Kidney function seems to be stable    Please complete your blood tests fasting before the next visit    Return to clinic in 1 month as scheduled for follow-up

## 2025-05-19 ENCOUNTER — OFFICE VISIT (OUTPATIENT)
Dept: INTERNAL MEDICINE CLINIC | Facility: CLINIC | Age: 85
End: 2025-05-19

## 2025-05-19 VITALS
HEART RATE: 76 BPM | HEIGHT: 74 IN | BODY MASS INDEX: 25.2 KG/M2 | OXYGEN SATURATION: 98 % | WEIGHT: 196.38 LBS | DIASTOLIC BLOOD PRESSURE: 64 MMHG | TEMPERATURE: 98 F | SYSTOLIC BLOOD PRESSURE: 128 MMHG

## 2025-05-19 DIAGNOSIS — E11.9 TYPE 2 DIABETES MELLITUS WITHOUT COMPLICATION, WITHOUT LONG-TERM CURRENT USE OF INSULIN (HCC): ICD-10-CM

## 2025-05-19 DIAGNOSIS — Z09 HOSPITAL DISCHARGE FOLLOW-UP: Primary | ICD-10-CM

## 2025-05-19 DIAGNOSIS — E78.5 HYPERLIPIDEMIA, UNSPECIFIED HYPERLIPIDEMIA TYPE: ICD-10-CM

## 2025-05-19 DIAGNOSIS — I73.9 PERIPHERAL VASCULAR DISEASE: ICD-10-CM

## 2025-05-19 DIAGNOSIS — G89.29 CHRONIC LOW BACK PAIN, UNSPECIFIED BACK PAIN LATERALITY, UNSPECIFIED WHETHER SCIATICA PRESENT: ICD-10-CM

## 2025-05-19 DIAGNOSIS — M54.50 CHRONIC LOW BACK PAIN, UNSPECIFIED BACK PAIN LATERALITY, UNSPECIFIED WHETHER SCIATICA PRESENT: ICD-10-CM

## 2025-05-19 DIAGNOSIS — N18.30 STAGE 3 CHRONIC KIDNEY DISEASE, UNSPECIFIED WHETHER STAGE 3A OR 3B CKD (HCC): ICD-10-CM

## 2025-05-19 DIAGNOSIS — I10 BENIGN ESSENTIAL HTN: ICD-10-CM

## 2025-05-19 DIAGNOSIS — I10 PRIMARY HYPERTENSION: ICD-10-CM

## 2025-05-19 PROCEDURE — 1126F AMNT PAIN NOTED NONE PRSNT: CPT | Performed by: INTERNAL MEDICINE

## 2025-05-19 PROCEDURE — 1111F DSCHRG MED/CURRENT MED MERGE: CPT | Performed by: INTERNAL MEDICINE

## 2025-05-19 PROCEDURE — 1159F MED LIST DOCD IN RCRD: CPT | Performed by: INTERNAL MEDICINE

## 2025-05-19 PROCEDURE — 3008F BODY MASS INDEX DOCD: CPT | Performed by: INTERNAL MEDICINE

## 2025-05-19 PROCEDURE — 1160F RVW MEDS BY RX/DR IN RCRD: CPT | Performed by: INTERNAL MEDICINE

## 2025-05-19 PROCEDURE — 3074F SYST BP LT 130 MM HG: CPT | Performed by: INTERNAL MEDICINE

## 2025-05-19 PROCEDURE — 99214 OFFICE O/P EST MOD 30 MIN: CPT | Performed by: INTERNAL MEDICINE

## 2025-05-19 PROCEDURE — 3078F DIAST BP <80 MM HG: CPT | Performed by: INTERNAL MEDICINE

## 2025-05-19 RX ORDER — OMEPRAZOLE 40 MG/1
40 CAPSULE, DELAYED RELEASE ORAL DAILY
Qty: 90 CAPSULE | Refills: 0 | OUTPATIENT
Start: 2025-05-19

## 2025-05-19 RX ORDER — OMEPRAZOLE 40 MG/1
40 CAPSULE, DELAYED RELEASE ORAL DAILY
Qty: 30 CAPSULE | Refills: 0 | Status: SHIPPED | OUTPATIENT
Start: 2025-05-19 | End: 2026-05-14

## 2025-05-19 NOTE — TELEPHONE ENCOUNTER
Sent today     Current refill request refused due to refill is either a duplicate request or has active refills at the pharmacy.  Check previous templates.    Requested Prescriptions     Pending Prescriptions Disp Refills    OMEPRAZOLE 40 MG Oral Capsule Delayed Release [Pharmacy Med Name: OMEPRAZOLE 40MG CAPSULES] 90 capsule 0     Sig: TAKE 1 CAPSULE(40 MG) BY MOUTH ONCE DAILY

## 2025-05-28 RX ORDER — ATENOLOL 25 MG/1
25 TABLET ORAL DAILY
Qty: 90 TABLET | Refills: 3 | Status: SHIPPED | OUTPATIENT
Start: 2025-05-28

## 2025-05-28 NOTE — TELEPHONE ENCOUNTER
Per recent notes, pt still on Atenolol 25mg daily     Refill request is for a maintenance medication and has met the criteria specified in the Ambulatory Medication Refill Standing Order for eligibility, visits, laboratory, alerts and was sent to the requested pharmacy.    Requested Prescriptions     Signed Prescriptions Disp Refills    ATENOLOL 25 MG Oral Tab 90 tablet 3     Sig: TAKE 1 TABLET BY MOUTH DAILY     Authorizing Provider: AMBER YORK     Ordering User: ENRIQUE KC

## 2025-06-01 DIAGNOSIS — H90.3 SENSORINEURAL HEARING LOSS, BILATERAL: Primary | ICD-10-CM

## 2025-06-05 ENCOUNTER — LAB ENCOUNTER (OUTPATIENT)
Dept: LAB | Age: 85
End: 2025-06-05
Attending: INTERNAL MEDICINE
Payer: MEDICARE

## 2025-06-05 ENCOUNTER — APPOINTMENT (OUTPATIENT)
Dept: URBAN - METROPOLITAN AREA CLINIC 244 | Age: 85
Setting detail: DERMATOLOGY
End: 2025-06-05

## 2025-06-05 DIAGNOSIS — Z85.828 PERSONAL HISTORY OF OTHER MALIGNANT NEOPLASM OF SKIN: ICD-10-CM

## 2025-06-05 DIAGNOSIS — L81.4 OTHER MELANIN HYPERPIGMENTATION: ICD-10-CM

## 2025-06-05 DIAGNOSIS — Z12.83 ENCOUNTER FOR SCREENING FOR MALIGNANT NEOPLASM OF SKIN: ICD-10-CM

## 2025-06-05 DIAGNOSIS — L82.1 OTHER SEBORRHEIC KERATOSIS: ICD-10-CM

## 2025-06-05 DIAGNOSIS — E78.5 HYPERLIPIDEMIA, UNSPECIFIED HYPERLIPIDEMIA TYPE: ICD-10-CM

## 2025-06-05 DIAGNOSIS — L57.0 ACTINIC KERATOSIS: ICD-10-CM

## 2025-06-05 DIAGNOSIS — D22 MELANOCYTIC NEVI: ICD-10-CM

## 2025-06-05 DIAGNOSIS — E11.9 TYPE 2 DIABETES MELLITUS WITHOUT COMPLICATION, WITHOUT LONG-TERM CURRENT USE OF INSULIN (HCC): ICD-10-CM

## 2025-06-05 DIAGNOSIS — N18.30 STAGE 3 CHRONIC KIDNEY DISEASE, UNSPECIFIED WHETHER STAGE 3A OR 3B CKD (HCC): ICD-10-CM

## 2025-06-05 DIAGNOSIS — Z13.0 SCREENING FOR DEFICIENCY ANEMIA: ICD-10-CM

## 2025-06-05 PROBLEM — D22.9 MELANOCYTIC NEVI, UNSPECIFIED: Status: ACTIVE | Noted: 2025-06-05

## 2025-06-05 LAB
ALBUMIN SERPL-MCNC: 4.3 G/DL (ref 3.2–4.8)
ALBUMIN/GLOB SERPL: 2.2 {RATIO} (ref 1–2)
ALP LIVER SERPL-CCNC: 70 U/L (ref 45–117)
ALT SERPL-CCNC: 13 U/L (ref 10–49)
ANION GAP SERPL CALC-SCNC: 8 MMOL/L (ref 0–18)
AST SERPL-CCNC: 18 U/L (ref ?–34)
BASOPHILS # BLD AUTO: 0.03 X10(3) UL (ref 0–0.2)
BASOPHILS NFR BLD AUTO: 0.5 %
BILIRUB SERPL-MCNC: 0.5 MG/DL (ref 0.2–1.1)
BUN BLD-MCNC: 30 MG/DL (ref 9–23)
BUN/CREAT SERPL: 20.5 (ref 10–20)
CALCIUM BLD-MCNC: 9.3 MG/DL (ref 8.7–10.4)
CHLORIDE SERPL-SCNC: 107 MMOL/L (ref 98–112)
CHOLEST SERPL-MCNC: 114 MG/DL (ref ?–200)
CO2 SERPL-SCNC: 27 MMOL/L (ref 21–32)
CREAT BLD-MCNC: 1.46 MG/DL (ref 0.7–1.3)
DEPRECATED RDW RBC AUTO: 44.5 FL (ref 35.1–46.3)
EGFRCR SERPLBLD CKD-EPI 2021: 46 ML/MIN/1.73M2 (ref 60–?)
EOSINOPHIL # BLD AUTO: 0.28 X10(3) UL (ref 0–0.7)
EOSINOPHIL NFR BLD AUTO: 4.3 %
ERYTHROCYTE [DISTWIDTH] IN BLOOD BY AUTOMATED COUNT: 12.6 % (ref 11–15)
EST. AVERAGE GLUCOSE BLD GHB EST-MCNC: 203 MG/DL (ref 68–126)
FASTING PATIENT LIPID ANSWER: YES
FASTING STATUS PATIENT QL REPORTED: YES
GLOBULIN PLAS-MCNC: 2 G/DL (ref 2–3.5)
GLUCOSE BLD-MCNC: 193 MG/DL (ref 70–99)
HBA1C MFR BLD: 8.7 % (ref ?–5.7)
HCT VFR BLD AUTO: 42.2 % (ref 39–53)
HDLC SERPL-MCNC: 36 MG/DL (ref 40–59)
HGB BLD-MCNC: 13.9 G/DL (ref 13–17.5)
IMM GRANULOCYTES # BLD AUTO: 0.02 X10(3) UL (ref 0–1)
IMM GRANULOCYTES NFR BLD: 0.3 %
LDLC SERPL CALC-MCNC: 55 MG/DL (ref ?–100)
LYMPHOCYTES # BLD AUTO: 1.9 X10(3) UL (ref 1–4)
LYMPHOCYTES NFR BLD AUTO: 29 %
MCH RBC QN AUTO: 31.8 PG (ref 26–34)
MCHC RBC AUTO-ENTMCNC: 32.9 G/DL (ref 31–37)
MCV RBC AUTO: 96.6 FL (ref 80–100)
MONOCYTES # BLD AUTO: 0.48 X10(3) UL (ref 0.1–1)
MONOCYTES NFR BLD AUTO: 7.3 %
NEUTROPHILS # BLD AUTO: 3.85 X10 (3) UL (ref 1.5–7.7)
NEUTROPHILS # BLD AUTO: 3.85 X10(3) UL (ref 1.5–7.7)
NEUTROPHILS NFR BLD AUTO: 58.6 %
NONHDLC SERPL-MCNC: 78 MG/DL (ref ?–130)
OSMOLALITY SERPL CALC.SUM OF ELEC: 305 MOSM/KG (ref 275–295)
PLATELET # BLD AUTO: 175 10(3)UL (ref 150–450)
POTASSIUM SERPL-SCNC: 3.9 MMOL/L (ref 3.5–5.1)
PROT SERPL-MCNC: 6.3 G/DL (ref 5.7–8.2)
RBC # BLD AUTO: 4.37 X10(6)UL (ref 3.8–5.8)
SODIUM SERPL-SCNC: 142 MMOL/L (ref 136–145)
TRIGL SERPL-MCNC: 129 MG/DL (ref 30–149)
VLDLC SERPL CALC-MCNC: 19 MG/DL (ref 0–30)
WBC # BLD AUTO: 6.6 X10(3) UL (ref 4–11)

## 2025-06-05 PROCEDURE — 17000 DESTRUCT PREMALG LESION: CPT

## 2025-06-05 PROCEDURE — 80061 LIPID PANEL: CPT

## 2025-06-05 PROCEDURE — OTHER LIQUID NITROGEN: OTHER

## 2025-06-05 PROCEDURE — 85025 COMPLETE CBC W/AUTO DIFF WBC: CPT

## 2025-06-05 PROCEDURE — 83036 HEMOGLOBIN GLYCOSYLATED A1C: CPT

## 2025-06-05 PROCEDURE — 80053 COMPREHEN METABOLIC PANEL: CPT

## 2025-06-05 PROCEDURE — 36415 COLL VENOUS BLD VENIPUNCTURE: CPT

## 2025-06-05 PROCEDURE — 99213 OFFICE O/P EST LOW 20 MIN: CPT | Mod: 25

## 2025-06-05 PROCEDURE — OTHER COUNSELING: OTHER

## 2025-06-05 PROCEDURE — 17003 DESTRUCT PREMALG LES 2-14: CPT

## 2025-06-05 PROCEDURE — OTHER MIPS QUALITY: OTHER

## 2025-06-05 ASSESSMENT — LOCATION SIMPLE DESCRIPTION DERM
LOCATION SIMPLE: LEFT PRETIBIAL REGION
LOCATION SIMPLE: LEFT KNEE
LOCATION SIMPLE: LEFT CHEEK
LOCATION SIMPLE: RIGHT PRETIBIAL REGION
LOCATION SIMPLE: SUPERIOR FOREHEAD
LOCATION SIMPLE: SCALP
LOCATION SIMPLE: LEFT FOREHEAD

## 2025-06-05 ASSESSMENT — LOCATION DETAILED DESCRIPTION DERM
LOCATION DETAILED: LEFT INFERIOR CENTRAL MALAR CHEEK
LOCATION DETAILED: RIGHT PROXIMAL PRETIBIAL REGION
LOCATION DETAILED: LEFT MEDIAL FOREHEAD
LOCATION DETAILED: RIGHT CENTRAL POSTAURICULAR SKIN
LOCATION DETAILED: LEFT DISTAL PRETIBIAL REGION
LOCATION DETAILED: LEFT PROXIMAL PRETIBIAL REGION
LOCATION DETAILED: RIGHT DISTAL PRETIBIAL REGION
LOCATION DETAILED: LEFT CENTRAL POSTAURICULAR SKIN
LOCATION DETAILED: LEFT MEDIAL KNEE
LOCATION DETAILED: SUPERIOR MID FOREHEAD

## 2025-06-05 ASSESSMENT — LOCATION ZONE DERM
LOCATION ZONE: SCALP
LOCATION ZONE: LEG
LOCATION ZONE: FACE

## 2025-06-09 ENCOUNTER — OFFICE VISIT (OUTPATIENT)
Dept: AUDIOLOGY | Facility: CLINIC | Age: 85
End: 2025-06-09

## 2025-06-09 DIAGNOSIS — H90.3 SENSORINEURAL HEARING LOSS (SNHL) OF BOTH EARS: Primary | ICD-10-CM

## 2025-06-09 NOTE — PROGRESS NOTES
HEARING AID EVALUATION    Darin Hernandez is a 85 year old male     Referring Provider: Hernán Renee   YOB: 1940  Medical Record: [id number]    Date of hearing assessment: 03/17/2025 (Hearing test valid for 365 days for the purpose of purchasing a hearing aid)    Patient History of Hearing Loss: Pt reported history of hearing aid use; has worn BTEs & ITEs. Interested in pursuing trial with rechargeable ITEs.     Test Results:   See audiogram for air and bone conduction thresholds and recorded speech in quiet.    Quick SIN Speech in Noise Test presented binaurally through insert earphones yielded score of  12.5 SNR loss  The following table shows scoring for this test and gives an indication of how well this person would be expected to hear in a background of noise.    0 - 3 dB SNR-Loss              Normal  4 - 7 dB SNR-Loss              Mild  8 - 15 dB SNR Loss           Moderate  >15 dB SNR Loss               Severe    Hearing Handicap Inventory Screening Version (HHIE-S)  Total Score= 24  (0-8 no handicap, 10-24 mild-mod handicap, 26-40 severe handicap)    Hearing Aid Selection:    Based on the audiometric test results, patient perception of hearing difficulty, and patient lifestyle the following hearing aid(s) and features are recommended as medically necessary to improve the patient's ability to hear speech sounds in a variety of listening environments.   The use of hearing aids will enhance the patient's quality of life and improve the ability to maintain social contacts with work, family or friends.      Type/Style of hearing aid recommended:   ITE (In the ear)    Recommended Features of hearing aid:    Rechargeable      Bluetooth Connectivity  Noise reduction to improve speech perception in noisy environments  Dual Microphones to improve hearing speech from different directions  Active Feedback management  Frequency Lowering Capabilities to improve speech perception with severe  high-frequency hearing loss  Echoblock/Windblock technology to reduce mariam noise and reverberation in certain environments  Tinnitus Masking capabilities  Automatic steering between listening environments.       Hearing aid charges and policies were discussed with patient including warranty information, trial period with amplification, benefits/limitations of current technology.  Realistic expectations and acclimating to new sounds was also discussed.        Hearing Aid Order:   : CaseRev   Model: Felicia  12 ITC-R  Color Choice: pink   power right: M   power left: M  Technology Level Choice: 4    Note: wants to be able to change volume using HAids, not adan.    Benefit verification team confirmed hearing aid benefit of $1000 (total). Unable to take impressions due to completely occluding cerumen. Scheduled wax removal (Víctor) & CANDACE on 7/7/2025. Will order devices & schedule HAF at that time.     Audiologist:  Zaida Carrera  Audiology [place] License Number: [id number]

## 2025-06-10 NOTE — H&P
Received outside hospital records from [place] dermatology Dr. Loco 6/6/2025  - History of basal cell carcinoma under going surveillance 3 months for skin check.  - Status post AK x 8 cryotherapy

## 2025-06-12 ENCOUNTER — OFFICE VISIT (OUTPATIENT)
Dept: INTERNAL MEDICINE CLINIC | Facility: CLINIC | Age: 85
End: 2025-06-12

## 2025-06-12 VITALS
TEMPERATURE: 98 F | SYSTOLIC BLOOD PRESSURE: 136 MMHG | HEIGHT: 74 IN | WEIGHT: 194 LBS | BODY MASS INDEX: 24.9 KG/M2 | HEART RATE: 72 BPM | DIASTOLIC BLOOD PRESSURE: 60 MMHG

## 2025-06-12 DIAGNOSIS — N18.30 STAGE 3 CHRONIC KIDNEY DISEASE, UNSPECIFIED WHETHER STAGE 3A OR 3B CKD (HCC): ICD-10-CM

## 2025-06-12 DIAGNOSIS — M54.50 CHRONIC LOW BACK PAIN, UNSPECIFIED BACK PAIN LATERALITY, UNSPECIFIED WHETHER SCIATICA PRESENT: ICD-10-CM

## 2025-06-12 DIAGNOSIS — R42 DIZZINESS: ICD-10-CM

## 2025-06-12 DIAGNOSIS — I10 PRIMARY HYPERTENSION: ICD-10-CM

## 2025-06-12 DIAGNOSIS — E11.9 TYPE 2 DIABETES MELLITUS WITHOUT COMPLICATION, WITHOUT LONG-TERM CURRENT USE OF INSULIN (HCC): Primary | ICD-10-CM

## 2025-06-12 DIAGNOSIS — G89.29 CHRONIC LOW BACK PAIN, UNSPECIFIED BACK PAIN LATERALITY, UNSPECIFIED WHETHER SCIATICA PRESENT: ICD-10-CM

## 2025-06-12 DIAGNOSIS — I10 BENIGN ESSENTIAL HTN: ICD-10-CM

## 2025-06-12 DIAGNOSIS — E78.5 HYPERLIPIDEMIA, UNSPECIFIED HYPERLIPIDEMIA TYPE: ICD-10-CM

## 2025-06-12 PROCEDURE — 1160F RVW MEDS BY RX/DR IN RCRD: CPT | Performed by: INTERNAL MEDICINE

## 2025-06-12 PROCEDURE — 1126F AMNT PAIN NOTED NONE PRSNT: CPT | Performed by: INTERNAL MEDICINE

## 2025-06-12 PROCEDURE — 99214 OFFICE O/P EST MOD 30 MIN: CPT | Performed by: INTERNAL MEDICINE

## 2025-06-12 PROCEDURE — 3075F SYST BP GE 130 - 139MM HG: CPT | Performed by: INTERNAL MEDICINE

## 2025-06-12 PROCEDURE — 1159F MED LIST DOCD IN RCRD: CPT | Performed by: INTERNAL MEDICINE

## 2025-06-12 PROCEDURE — 3078F DIAST BP <80 MM HG: CPT | Performed by: INTERNAL MEDICINE

## 2025-06-12 PROCEDURE — 1111F DSCHRG MED/CURRENT MED MERGE: CPT | Performed by: INTERNAL MEDICINE

## 2025-06-12 PROCEDURE — 3008F BODY MASS INDEX DOCD: CPT | Performed by: INTERNAL MEDICINE

## 2025-06-12 NOTE — PROGRESS NOTES
Chief Complaint:   Chief Complaint   Patient presents with    Checkup     HPI:     Mr. BROOKE is a 85 year old male PMHX peripheral arterial disease, AAA, CKD stage III, type 2 diabetes, hypertension, hyperlipidemia coming in for follow-up.    Still with dizziness off and on for years. More frequent dizziness episodes. He had bad dreams recently. No falls at home. He has some staggering episodes.    He does eat pancakes, waffles, cereal in the morning.  Cherries and bananas, other fruits    Chicken soup, cream of chicken. Sandwiches.     Hamburgers, steaks. Mixed veggies, green beans. Brocolli.     No chips nor sweets during the day.    Exercise can improve.  He has not had any recent falls.    Past Medical History:    Aorta aneurysm    Back problem    Benign essential HTN    Cataract    Chronic rhinitis    Diabetes (HCC)    DM (diabetes mellitus), type 2 (HCC)    Essential hypertension    Hearing impairment    High blood pressure    High cholesterol    Hyperlipidemia    POAG (primary open-angle glaucoma)    first visit with Dr. Ang patient has been taking Brimonidine 0.15% OU BID and Timolol 0.5% OU BID for 10 yrs per MD in [place].  10/28/23 patient to continue taking gtts per [facility] due to abnormal VF OS and abnormal OCT OU    PVD (peripheral vascular disease)    Skin cancer    Visual impairment     Past Surgical History:   Procedure Laterality Date    Appendectomy      Cataract extraction w/  intraocular lens implant Left 04/07/2014    Done by Dr. [place] in [place]    Cataract extraction w/  intraocular lens implant Right 2014    Done by Dr. [place] Park in [place]    Other accessory      T and A    Other accessory      appendectomy    Other accessory      cholycystectomy    Other accessory      bilateral cataract    Other accessory      facial basal cell cancer    Tonsillectomy       Social History:  Social History     Socioeconomic History    Marital status:    Tobacco Use     Smoking status: Former     Types: Cigarettes     Passive exposure: Never    Smokeless tobacco: Never   Vaping Use    Vaping status: Never Used   Substance and Sexual Activity    Alcohol use: Yes     Alcohol/week: 2.0 standard drinks of alcohol     Types: 2 Cans of beer per week     Comment: 1 - 2 beers occasional    Drug use: Never     Social Drivers of Health     Food Insecurity: No Food Insecurity (5/13/2025)    NCSS - Food Insecurity     Worried About Running Out of Food in the Last Year: No     Ran Out of Food in the Last Year: No   Transportation Needs: No Transportation Needs (5/13/2025)    NCSS - Transportation     Lack of Transportation: No   Housing Stability: Not At Risk (5/13/2025)    NCSS - Housing/Utilities     Has Housing: Yes     Worried About Losing Housing: No     Unable to Get Utilities: No     Family History:  Family History   Problem Relation Age of Onset    Macular degeneration Brother     Glaucoma Neg     Diabetes Neg      Allergies:  Allergies   Allergen Reactions    Lisinopril ANGIOEDEMA and OTHER (SEE COMMENTS)     Lip swelling     Current Meds:  Current Outpatient Medications   Medication Sig Dispense Refill    ATENOLOL 25 MG Oral Tab TAKE 1 TABLET BY MOUTH DAILY 90 tablet 3    Omeprazole 40 MG Oral Capsule Delayed Release Take 1 capsule (40 mg total) by mouth daily. 30 capsule 0    glipiZIDE 10 MG Oral Tab Take 1 tablet (10 mg total) by mouth daily. Daily in the morning      Vitamin C 500 MG Oral Tab Take 1 tablet (500 mg total) by mouth daily.      Calcium Carbonate 600 MG Oral Tab Take 1 tablet (600 mg total) by mouth 2 (two) times daily.      brimonidine 0.15 % Ophthalmic Solution Place 1 drop into both eyes 2 (two) times daily.      OneTouch Delica Lancets 33G Does not apply Misc Check blood sugar once daily. 100 each 3    amLODIPine 5 MG Oral Tab Take 1 tablet (5 mg total) by mouth daily. 90 tablet 3    metFORMIN HCl 1000 MG Oral Tab TAKE 1 TABLET BY MOUTH EVERY MORNING AND EVERY  NIGHT AT BEDTIME 180 tablet 3    PIOGLITAZONE 45 MG Oral Tab TAKE 1 TABLET BY MOUTH DAILY 90 tablet 3    Potassium Chloride ER 10 MEQ Oral Tab CR Take 1 tablet (10 mEq total) by mouth daily. 90 tablet 3    Glucose Blood (ONETOUCH VERIO) In Vitro Strip Checl blood sugar once daily 100 strip 3    losartan 100 MG Oral Tab Take 1 tablet (100 mg total) by mouth daily. 90 tablet 3    tamsulosin 0.4 MG Oral Cap Take 1 capsule (0.4 mg total) by mouth daily. 90 capsule 3    atorvastatin 40 MG Oral Tab Take 1 tablet (40 mg total) by mouth daily. 90 tablet 3    TIMOLOL 0.5 % Ophthalmic Solution INSTILL 1 DROP INTO BOTH EYES  TWICE DAILY 15 mL 3    aspirin 81 MG Oral Tab EC Take 1 tablet (81 mg total) by mouth in the morning.        Counseling given: Not Answered       REVIEW OF SYSTEMS:   Positive Findings indicated in BOLD    Constitutional: Fever, Chills, Weight Gain, Weight Loss, Night Sweats, Fatigue, Malaise  ENT/Mouth:  Hearing Changes, Ear Pain, Nasal Congestion, Sinus Pain, Hoarseness, Sore throat, Rhinorrhea, Swallowing Difficulty  Eyes: Eye Pain, Swelling, Redness, Foreign Body, Discharge, Vision Changes  Cardiovascular: Chest Pain, SOB, PND, Dyspnea on Exertion, Orthopnea, Claudication, Edema, Palpitations  Respiratory: Cough, Sputum, Wheezing, Shortness of breath  Gastrointestinal: Nausea, Vomiting, Diarrhea, Constipation, Pain, Heartburn, Dysphagia, Bloody stools, Tarry stools  Genitourinary: Dysmenorrhea, Dysuria, Urinary Frequency, Hematuria, Urinary Incontinence, Urgency,  Flank Pain  Musculoskeletal: Arthralgias, Myalgias, Joint Swelling, Joint Stiffness, Back Pain, Neck Pain  Integumentary: Skin Lesions, Pruritis, Hair Changes, Jaundice, Nail changes  Neuro: Weakness, Numbness, Paresthesias, Loss of Consciousness, Syncope, Dizziness, Headache, Falls  Psych: Anxiety, Depression, Insomnia, Suicidal Ideation, Homicidal ideation, Memory Changes  Heme/Lymph: Bruising, Bleeding, Lymphadenopathy  Endocrine:  Polyuria, Polydipsia, Temperature Intolerance    EXAM:   Vital Signs:  Blood pressure 136/60, pulse 72, temperature 97.8 °F (36.6 °C), temperature source Oral, height 6' 2\" (1.88 m), weight 194 lb (88 kg).     Constitutional: No acute distress. Alert and oriented x 3.  Eyes: EOMI, PERRLA, clear sclera b/l  HENT: NCAT, Moist mucous membranes, Oropharynx without erythema or exudates  Neck: No JVD, no thyromegaly  Cardiovascular: S1, S2, no S3, no S4, Regular rate and rhythm, No murmurs/gallops/rubs.   Respiratory: Clear to auscultation bilaterally.  No wheezes/rales/rhonchi  Gastrointestinal: Soft, nontender, nondistended. Positive bowel sounds x 4. No rebound tenderness. No hepatomegaly, No splenomegaly  Genitourinary: No CVA tenderness bilaterally  Neurologic: No focal neurological deficits, CN II-XII intact, light touch intact,   Musculoskeletal: Full range of motion of all extremities, no clubbing/swelling/edema  Skin: Well-healed postoperative surgical excision sites.  Psychiatric: Appropriate mood and affect  Heme/Lymph/Immune: No cervical LAD        DATA REVIEWED   Labs:  Recent Results (from the past 8760 hours)   Comp Metabolic Panel (14)    Collection Time: 06/05/25  9:41 AM   Result Value Ref Range    Glucose 193 (H) 70 - 99 mg/dL    Sodium 142 136 - 145 mmol/L    Potassium 3.9 3.5 - 5.1 mmol/L    Chloride 107 98 - 112 mmol/L    CO2 27.0 21.0 - 32.0 mmol/L    Anion Gap 8 0 - 18 mmol/L    BUN 30 (H) 9 - 23 mg/dL    Creatinine 1.46 (H) 0.70 - 1.30 mg/dL    BUN/CREA Ratio 20.5 (H) 10.0 - 20.0    Calcium, Total 9.3 8.7 - 10.4 mg/dL    Calculated Osmolality 305 (H) 275 - 295 mOsm/kg    eGFR-Cr 46 (L) >=60 mL/min/1.73m2     Comment: eGFR calculated using the CKD-EPI 2021 calculation    ALT 13 10 - 49 U/L    AST 18 <34 U/L    Alkaline Phosphatase 70 45 - 117 U/L    Bilirubin, Total 0.5 0.2 - 1.1 mg/dL    Total Protein 6.3 5.7 - 8.2 g/dL    Albumin 4.3 3.2 - 4.8 g/dL    Globulin  2.0 2.0 - 3.5 g/dL    A/G Ratio 2.2  (H) 1.0 - 2.0    Patient Fasting for CMP? Yes      *Note: Due to a large number of results and/or encounters for the requested time period, some results have not been displayed. A complete set of results can be found in Results Review.       Recent Results (from the past 8760 hours)   CBC With Differential With Platelet    Collection Time: 06/05/25  9:41 AM   Result Value Ref Range    WBC 6.6 4.0 - 11.0 x10(3) uL    RBC 4.37 3.80 - 5.80 x10(6)uL    HGB 13.9 13.0 - 17.5 g/dL    HCT 42.2 39.0 - 53.0 %    MCV 96.6 80.0 - 100.0 fL    MCH 31.8 26.0 - 34.0 pg    MCHC 32.9 31.0 - 37.0 g/dL    RDW-SD 44.5 35.1 - 46.3 fL    RDW 12.6 11.0 - 15.0 %    .0 150.0 - 450.0 10(3)uL    Neutrophil Absolute Prelim 3.85 1.50 - 7.70 x10 (3) uL    Neutrophil Absolute 3.85 1.50 - 7.70 x10(3) uL    Lymphocyte Absolute 1.90 1.00 - 4.00 x10(3) uL    Monocyte Absolute 0.48 0.10 - 1.00 x10(3) uL    Eosinophil Absolute 0.28 0.00 - 0.70 x10(3) uL    Basophil Absolute 0.03 0.00 - 0.20 x10(3) uL    Immature Granulocyte Absolute 0.02 0.00 - 1.00 x10(3) uL    Neutrophil % 58.6 %    Lymphocyte % 29.0 %    Monocyte % 7.3 %    Eosinophil % 4.3 %    Basophil % 0.5 %    Immature Granulocyte % 0.3 %     *Note: Due to a large number of results and/or encounters for the requested time period, some results have not been displayed. A complete set of results can be found in Results Review.     EKG 12/9/2024  Rate: 68 rhythm: Sinus  Axis: Normal   Intervals: WY: 304  QRS: 114  QT: 408 QTc 433    ST Abnormalities: None    Impression: Sinus rhythm with first-degree AV block, incomplete right bundle branch block  Comparison: No significant changes from last EKG 8/7/2024      CTA abdomen pelvis 9/21/2023  Impression   CONCLUSION:     Infrarenal abdominal aortic aneurysm measuring 50 x 52 millimeter     Right lower extremity:  Diffuse severe stenosis throughout the mid to distal SFA due to calcified plaque.  Severe three-vessel runoff disease with only the  peroneal artery showing opacification throughout     Left lower extremity:  Multifocal severe stenosis in the CFA .  Severe stenosis at the origin of the SFA due to calcified plaque.  Multifocal areas of moderate to severe stenosis throughout the rest of the SFA.  Severe three-vessel runoff disease.    Opacification throughout the peroneal artery.  The posterior tibial artery is likely opacified near the ankle.        Chest x-ray 12/22/2023    Impression   CONCLUSION:  1. No acute airspace disease.  Asymmetric mild biapical pleural thickening left more than right.  If any old films are available they should be submitted for comparison.  If not, then follow-up studies are advised.  2. Suggestion of mild hyperinflation which may suggest some degree of COPD.     Ultrasound arterial duplex 11/8/2024    Impression   CONCLUSION:  1. Severe calcific multilevel peripheral arterial occlusive disease.    2. Previous site of surgical left femoral endarterectomy remains widely patent.    3. There is diffuse disease within both femoral popliteal segments, with a chronic total occlusion on the right and diffuse narrowing on the left.    4. Tibial vessels are calcified, though patent where visualized, with dampened, monophasic flow due to the more proximal occlusions..     CTA coronaries 5/13/2025    DETAILED FINDINGS:            1. LAD:            Normal FFRct, 0.87  2. LCX:            Normal FFRct  2. OM1:           Normal FFRct, 0.86  4. RCA:           Normal FFRct     CONCLUSION: Normal FFRct analysis with no evidence for hemodynamically significant lesions.          Impression   CONCLUSION:  1.  Superficial to the sternum there is a 13 mm diameter soft tissue focus, located along the skin surface.  Is grossly stable since 12/9/24 CT.  Suspect dermatologic focus such as skin tag however correlate with physical exam.  2.  Coronary atherosclerosis.  Please refer to dedicated cardiac CTA report for additional details.          ASSESSMENT AND PLAN:     Dizziness  Chronic recurring issue where he feels dizzy, more so of lightheadedness.  No specific triggers that he can find  - Try to find any triggering factors that could cause episodes of disequilibrium, dizziness  - We did discuss conservative measures:    -Slow to stand technique, with chin to chest before coming up slowly  - Take a graduated approach of getting up rather than getting up too quickly  - Keeping close eye blood pressure, making sure it is not too high or too low.  Notify us if this occurs  -Vestibular therapy is an option in the future  - Has had cardiac workup as below  - May benefit from neurology evaluation if the dizziness persists.  Can have him see Dr. Porter      Chest pain  Hospitalization reviewed 5/12 - 5/13, chest pain presenting symptom, , troponin normal x 3  - CTA gated coronary evaluation normal FFR CT analysis with no evidence of hemodynamically significant lesions.  - Continued on medical management per cardiology, Dr. Fletcher  - Had cardiology follow-up 5/28/2025 with SHELLEY Fink    We did explore noncardiac chest pain, has had acid reflux in the past.  Uses Tums periodically.  We will try to find any food triggers in the meantime  - Amenable to trial of omeprazole 40 mg once a day for 2 weeks even if symptoms improve  - Will monitor for any worsening symptoms for further evaluation if needed.    HFpEF  - Preserved ejection fraction on echocardiogram.  - Last seen by SHELLEY Ramesh.  Diuresis for mild lower extremity edema, Lasix 40 mg daily for 3 days then as needed.  Sodium restriction.    Abnormal skin lesion  - Incidental finding on CT cardiac over read.  13 mm soft tissue focus along the sternum, stable since 12/2024  - Possible skin tag, may need dermatology evaluation if enlarges or symptomatic.  This has remained stable otherwise.  - Reports that it has been stable ever since childhood (reports  Had it since 13, but has been  stable more recently.)    Received outside hospital records from [facility] dermatology Dr. Loco 6/6/2025  - History of basal cell carcinoma under going surveillance 3 months for skin check.  - Status post AK x 8 cryotherapy       Dyspnea on exertion  Likely multifactorial in setting of allergic rhinitis, there does seem to be some degree of COPD/bibasilar scarring right greater than left on chest x-ray 12/2023  - Did have a normal Lexiscan stress test 11/2023  - Actively short of breath for which we will proceed with blood test, seems to be euvolemic on examination  -Will repeat chest x-ray  - Started Symbicort 2 puffs twice a day  - Continue with management of allergic rhinitis as below  - Discussed red flag signs and symptoms that warrant immediate ER evaluation including chest pain, worsening shortness of breath which will require more urgent evaluation      Low back pain  Chronic, recurring issue.  Has not worsened.  However constant 5/10 pain.  May be limiting his usual physical activities  -Would recommend initial trial of conservative therapy:    -Acetaminophen 500-650 mg every 4-6 hours as needed for pain relief  - Did complete physical therapy, continue with home stretches and exercises  - Referral provided for Dr. Garcia in the event he is a candidate for epidural steroid injection.  - Completed physical therapy       Peripheral arterial disease  Abdominal aortic aneurysm  Noted ongoing claudication.  Outpatient CT scan with AAA 50 x 52 mm as an outpatient.  Status post endovascular aortic repair, left femoral endarterectomy Dr. Shabazz of  and Dr. Chu cardiovascular surgery 12/22/2023  - Aspirin 81 mg daily  - Ultrasound arterial duplex severe calcific multilevel peripheral arterial occlusive disease.  Left femoral endarterectomy site widely patent.  Diffuse disease within both femoral-popliteal segments with a chronic total occlusion on the right and diffuse narrowing on the left  -Seen in vascular  surgery clinic 1/9/2024, due for repeat CTA of the abdomen pelvis for positioning and endoleak.  Repeat ultrasound in 6 months.  Advised to follow-up    CKD stage III  - Baseline seems to be 1.2-1.4  - Creatinine most recently 1.46 creatinine, EGFR 46     Type 2 diabetes  Recent A1c:   HEMOGLOBIN A1C (%)   Date Value   09/19/2023 7.6 (A)     HgbA1C (%)   Date Value   06/05/2025 8.7 (H)     Recent urine microalbumin: No components found for: \"MICROALB/CREAT RATIO\"  Current medications:  glipizide 10 mg twice daily,pioglitazone 45 mg daily, metformin 1000 mg twice a day  Eye exam: Continue following with ophthalmology - UTD with Dr. Ang  Foot exam: Check feet daily  -Increase in A1c, needs to decrease carbohydrate intake.  -We did discuss various other options for management of sugars.  May be a candidate for GLP-1 agents, resuming Jardiance     Hypertension  -Blood pressure today at goal  - Check blood pressures at home  - Continue with home amlodipine 5 mg daily, losartan 100 mg daily, atenolol 25 mg daily  - Able demonstrate good blood pressure checks.  His blood pressure is good x 2 readings here.  At home, he has elevated blood pressures  - Would benefit from home health with blood pressure checks periodically     Hyperlipidemia  -Cholesterol overall well-controlled on last check  - Continue with atorvastatin 40 mg daily, aspirin  - Plan to repeat lipid panel     Allergic rhinitis  - Trial of flonase 1 spray each nostril until symptoms improve    Sensorineural hearing loss  - Seen by ENT, Dr. Renee 3/17/2025.  Sensorineural hearing loss more so on the left, right ear disimpaction       Orders This Visit:  No orders of the defined types were placed in this encounter.      Meds This Visit:  Requested Prescriptions      No prescriptions requested or ordered in this encounter       Imaging & Referrals:  None     Health Maintenance  Due for shingles vaccine series, COVID series    Spent 30 minutes obtaining  history, evaluating patient, discussing treatment options, diet, exercise, review of available labs and radiology reports, and completing documentation.     Return to clinic in 3 months for follow-up    Dick George MD, 06/12/25, 4:16 PM

## 2025-06-12 NOTE — PATIENT INSTRUCTIONS
- You were seen in clinic for regular annual check-up.  We did review your most recent set of blood work with ongoing management of sugar levels    - Try to find any triggering factors that could cause episodes of disequilibrium, dizziness      Diabetes can be better controlled with a goal less than 7.0  - Please continue trying to cut down on carbohydrates not just in sweets but also in bread/grain/rice/breads  - Targeting weight loss over time can help by optimizing nutrition, targeting exercise as tolerated  - Maintain annual eye examination  - If no improvement, may need to consider adjusting the medication regimen further  - May need to consider endocrinology evaluation    For dizziness, I recommended:  - We did discuss conservative measures:    -Slow to stand technique, with chin to chest before coming up slowly  - Take a graduated approach of getting up rather than getting up too quickly  - Keeping close eye blood pressure, making sure it is not too high or too low.  Notify us if this occurs  -Vestibular therapy is an option in the future  - May benefit from neurology evaluation if the dizziness persists.  Can have him see Dr. Porter    Follow-up with cardiology  - Periodically check your blood pressures at home, notify us if increasing  - Try to find any other triggering factors for the chest pain, shortness of breath that has been intermittent  - Hopefully the Lasix helps reduce down the leg swelling.    Let's try omeprazole 40 mg once a day in the morning with food.  - Common cause of noncardiac chest pain is acid reflux and gastritis.  This may help reduce the chest discomfort  - Try to find any other triggering factors in the meantime    We reviewed medications and unfortunately we are maximized on the current oral regimen  - Metformin, Actos/pioglitazone, glipizide are all at the maximum dosages at this point  - We attempted to send for Jardiance/Farxiga but these were not cost effective  -Injectable  medications such as Ozempic or insulin are likely the next option  - For now, lets try to cut down on carbohydrates not just in sweets but also rice/grain/bread/pasta    Vascular status seems to be stable with Dr. Chu    Kidney function seems to be stable    Please complete your blood tests fasting before the next visit    Return to clinic in 3 months for follow-up

## 2025-07-07 ENCOUNTER — OFFICE VISIT (OUTPATIENT)
Dept: AUDIOLOGY | Facility: CLINIC | Age: 85
End: 2025-07-07

## 2025-07-07 ENCOUNTER — OFFICE VISIT (OUTPATIENT)
Dept: OTOLARYNGOLOGY | Facility: CLINIC | Age: 85
End: 2025-07-07

## 2025-07-07 VITALS — BODY MASS INDEX: 24.9 KG/M2 | HEIGHT: 74 IN | WEIGHT: 194 LBS

## 2025-07-07 DIAGNOSIS — H90.3 SENSORINEURAL HEARING LOSS (SNHL) OF BOTH EARS: ICD-10-CM

## 2025-07-07 DIAGNOSIS — H61.23 BILATERAL IMPACTED CERUMEN: ICD-10-CM

## 2025-07-07 DIAGNOSIS — H90.3 SENSORINEURAL HEARING LOSS (SNHL) OF BOTH EARS: Primary | ICD-10-CM

## 2025-07-07 DIAGNOSIS — H91.90 HEARING LOSS, UNSPECIFIED HEARING LOSS TYPE, UNSPECIFIED LATERALITY: Primary | ICD-10-CM

## 2025-07-07 PROCEDURE — 92593 HEARING AID CHECK, BOTH EARS: CPT | Performed by: AUDIOLOGIST

## 2025-07-07 NOTE — PROGRESS NOTES
The following individual(s) verbally consented to be recorded using ambient AI listening technology and understand that they can each withdraw their consent to this listening technology at any point by asking the clinician to turn off or pause the recording: patient verbalized consent    Patient name: Darin Hernandez

## 2025-07-07 NOTE — PROGRESS NOTES
Darin Hernandez is a 85 year old male     YOB: 1940  Medical Record: [id number]    Earmold impressions were taken without difficulty. Reviewed HAid order & insurance benefit. Scheduled HAF on 07/29/2025.     Hearing Aid Order:   : Miguel   Model: Felicia  12 ITC-R  Color Choice: pink   power right: M   power left: M  Technology Level Choice: 4     Note: wants to be able to change volume using HAids, not adan.     Benefit verification team confirmed hearing aid benefit of $1000 (total).

## 2025-07-08 NOTE — PROGRESS NOTES
[facility]  OTOLARYNGOLOGY - HEAD & NECK SURGERY    7/7/2025     Reason for Consultation:   Cerumen impaction, right, hearing loss    History of Present Illness:   Patient is a pleasant 85 year old male who is being seen for problems with hearing.  The patient states that he had this problem for many years.  He states that he used to wear hearing aids but these no longer function and he states that he is unable to afford another pair.  He has not had a recent hearing test and is hoping to get 1 done today.  He denies any ear pain or drainage.  He was told by his primary care physician that he has a cerumen impaction on the right side.  Does note that his hearing is slightly worse on the right.  Has not had any previous ear surgery.  He has had a tonsillectomy and adenoidectomy in the past.  He would like my recommendation in terms of hearing aids.    INTERVAL HISTORY  7/7/2025: Patient is here today for cerumen removal bilaterally so that he can have his molds done for his hearing aids.  Patient has not had any ear pain or drainage.    Past Medical History  Past Medical History:    Aorta aneurysm    Back problem    Benign essential HTN    Cataract    Chronic rhinitis    Diabetes (HCC)    DM (diabetes mellitus), type 2 (HCC)    Essential hypertension    Hearing impairment    High blood pressure    High cholesterol    Hyperlipidemia    POAG (primary open-angle glaucoma)    first visit with Dr. Ang patient has been taking Brimonidine 0.15% OU BID and Timolol 0.5% OU BID for 10 yrs per MD in [place].  10/28/23 patient to continue taking gtts per [facility] due to abnormal VF OS and abnormal OCT OU    PVD (peripheral vascular disease)    Skin cancer    Visual impairment       Past Surgical History  Past Surgical History:   Procedure Laterality Date    Appendectomy      Cataract extraction w/  intraocular lens implant Left 04/07/2014    Done by Dr. Chico Park in [place]    Cataract extraction w/  intraocular  lens implant Right 2014    Done by Dr. Ch[place] Park in [place]    Other accessory      T and A    Other accessory      appendectomy    Other accessory      cholycystectomy    Other accessory      bilateral cataract    Other accessory      facial basal cell cancer    Tonsillectomy         Family History  Family History   Problem Relation Age of Onset    Macular degeneration Brother     Glaucoma Neg     Diabetes Neg        Social History  Pediatric History   Patient Parents    Not on file     Other Topics Concern    Not on file   Social History Narrative    Not on file           Current Medications:  Current Outpatient Medications   Medication Sig Dispense Refill    ATENOLOL 25 MG Oral Tab TAKE 1 TABLET BY MOUTH DAILY 90 tablet 3    Omeprazole 40 MG Oral Capsule Delayed Release Take 1 capsule (40 mg total) by mouth daily. 30 capsule 0    glipiZIDE 10 MG Oral Tab Take 1 tablet (10 mg total) by mouth daily. Daily in the morning      Vitamin C 500 MG Oral Tab Take 1 tablet (500 mg total) by mouth daily.      Calcium Carbonate 600 MG Oral Tab Take 1 tablet (600 mg total) by mouth 2 (two) times daily.      brimonidine 0.15 % Ophthalmic Solution Place 1 drop into both eyes 2 (two) times daily.      OneTouch Delica Lancets 33G Does not apply Misc Check blood sugar once daily. 100 each 3    amLODIPine 5 MG Oral Tab Take 1 tablet (5 mg total) by mouth daily. 90 tablet 3    metFORMIN HCl 1000 MG Oral Tab TAKE 1 TABLET BY MOUTH EVERY MORNING AND EVERY NIGHT AT BEDTIME 180 tablet 3    PIOGLITAZONE 45 MG Oral Tab TAKE 1 TABLET BY MOUTH DAILY 90 tablet 3    Potassium Chloride ER 10 MEQ Oral Tab CR Take 1 tablet (10 mEq total) by mouth daily. 90 tablet 3    Glucose Blood (ONETOUCH VERIO) In Vitro Strip Checl blood sugar once daily 100 strip 3    losartan 100 MG Oral Tab Take 1 tablet (100 mg total) by mouth daily. 90 tablet 3    tamsulosin 0.4 MG Oral Cap Take 1 capsule (0.4 mg total) by mouth daily. 90 capsule 3     atorvastatin 40 MG Oral Tab Take 1 tablet (40 mg total) by mouth daily. 90 tablet 3    TIMOLOL 0.5 % Ophthalmic Solution INSTILL 1 DROP INTO BOTH EYES  TWICE DAILY 15 mL 3    aspirin 81 MG Oral Tab EC Take 1 tablet (81 mg total) by mouth in the morning.         Allergies  Allergies[1]    Review of Systems:   A comprehensive 10 point review of systems was completed.  Pertinent positives and negatives noted in the the HPI.    Physical Exam:   Height 6' 2\" (1.88 m), weight 194 lb (88 kg).    GENERAL: No acute distress, Comfortable appearing  FACE: HB 1/6, Normal Animation  HEAD: Normocephalic  EYES: EOMI, pupils equil  EARS: Bilateral Auricles Symmetric  NOSE: Nares patent bilaterally  ORAL CAVITY: Tongue mobile, Oropharynx clear, Floor of mouth clear, Posterior oropharynx normal  NECK: No palpable lymphadenopathy, thyroid not palpable, nontender    OTOMICROSCOPY WITH CERUMEN REMOVAL -as performed on 7/7/2025    Canals:  Right: Canal with cerumen preventing adequate view of TM, debrided with instrumentation as dictated below  Left: Canal with cerumen preventing adequate view of the tympanic membrane, debrided with instrumentation as dictated below    Tympanic Membranes:  Right: Normal tympanic membrane.   Left: Normal tympanic membrane.     TM Visualized Method:   Right TM examined via otomicroscopy.    Left TM examined via otomicroscopy.      PROCEDURE: REMOVAL OF CERUMEN IMPACTION  The cerumen impaction was completely removed from the bilateral ear canals using microscopy as necessary.   Removal was completed by using a suction      Results:     Laboratory Data:  Lab Results   Component Value Date    WBC 6.6 06/05/2025    HGB 13.9 06/05/2025    HCT 42.2 06/05/2025    .0 06/05/2025    CREATSERUM 1.46 (H) 06/05/2025    BUN 30 (H) 06/05/2025     06/05/2025    K 3.9 06/05/2025     06/05/2025    CO2 27.0 06/05/2025     (H) 06/05/2025    CA 9.3 06/05/2025    ALB 4.3 06/05/2025    ALKPHO 70  06/05/2025    TP 6.3 06/05/2025    AST 18 06/05/2025    ALT 13 06/05/2025    PTT 24.7 12/19/2023    INR 0.90 03/09/2025    PTP 12.7 03/09/2025    TSH 3.867 03/11/2025    DDIMER 1.61 (H) 12/09/2024    CRP <0.40 12/09/2024         Imaging:  CTA CHEST CTA ABDOMEN CTA PELVIS (CPT=71275/52474)    Result Date: 3/9/2025  PROCEDURE: CTA CHEST CTA ABDOMEN CTA PELVIS (CPT=71275/55291)  COMPARISON: [facility], CT CHEST PE AORTA (IV ONLY) (CPT=71260), 12/09/2024, 8:35 PM.  [facility], CTA ABDOMEN/PELVIS LOWER EXT BILAT W RUNOFF (MWG=[id number]), 9/21/2023, 2:24 PM.  INDICATIONS: Chest pain and shortness of breath.  TECHNIQUE:   CT images of the chest, abdomen and pelvis were obtained without and with non-ionic intravenous contrast material.  Multi-planar reformatted and 3-D images were created with vessel analysis performed on an independent workstation.  Automated  exposure control for dose reduction was used. Adjustment of the mA and/or kV was done based on the patient's size. Use of iterative reconstruction technique for dose reduction was used. Dose information is transmitted to the ACR (American College of Radiology) NRDR (National Radiology Data Registry) which includes the Dose Index Registry.    FINDINGS:  AORTA: There is atherosclerotic calcification of the aortic arch and thoracic aorta.  No acute dissection.  Postoperative changes of an endovascular stent graft involving the infrarenal abdominal aorta extending into the bilateral common iliac arteries.  There is an aneurysm of the native abdominal aorta which measures 5.4 x 4.9 cm which has increased in size since 9/21/2023.  There is a blush of contrast seen along the superior posterior aspect of the stent graft seen best on series 7, image 93 and series 3, image 205. There appears to be prominence of a lumbar vessel seen on series 3, image 209 which feeds into the blush of contrast.  No acute occlusion. ILIAC ARTERIES: Postoperative  changes of endovascular stent graft involving the bilateral common iliac arteries.  Mild atherosclerosis of the bilateral internal and external iliac arteries without complete occlusion. FEMORAL ARTERIES:  Occluded right superficial femoral artery which has progressed since the prior exam.  Hypertrophied profundus femoris artery which is patent.  Moderate atherosclerotic calcification of the left superficial femoral artery and profundus femoris artery. OTHER VESSELS: Proximal aspect of the JESÚS is occluded with retrograde filling from collateral vessels.  50% narrowing at the origin of the SMA without complete occlusion.  Celiac axis is patent.  Bilateral renal arteries demonstrate atherosclerotic calcification but remain patent..   LINES AND TUBES: None.  MEDIASTINUM: There are multiple mildly prominent mediastinal lymph nodes which are nonspecific and measure less than 1 cm in short axis. There is atherosclerotic calcification of the aortic arch and thoracic aorta. The thoracic aorta is otherwise unremarkable and not dilated. Mediastinal fat planes are preserved. Cardiac chambers are unremarkable. Pericardium is normal. There are coronary artery calcifications. The main pulmonary artery has a normal diameter and is otherwise unremarkable. No pulmonary embolus in the central, main, lobar, segmental pulmonary arteries. Nondiagnostic in the subsegmental pulmonary arteries due to respiratory motion artifact.  LUNGS AND PLEURA: There is biapical scarring and pleural thickening. There is bibasilar and lingular atelectasis and scarring. No pneumothorax.  No consolidation.  No pleural effusion. The trachea and central airways are unremarkable.  CHEST WALL/BONES: There is degenerative disease of the thoracic spine. The chest wall and osseous structures are otherwise unremarkable.    LIVER: There are a few subcentimeter hypodense lesions seen in the liver which are too small to characterize. GALLBLADDER: The patient is post  cholecystectomy. BILIARY: Mild prominence of the common bile duct and intrahepatic ducts, an expected finding post cholecystectomy. No gross intra-or extrahepatic biliary ductal dilation. SPLEEN: Unremarkable PANCREAS: The pancreas enhances symmetrically. No ductal dilation. ADRENALS: Unremarkable KIDNEYS: The kidneys enhance symmetrically. There is no hydronephrosis.  7.7 cm cyst within the right lower pole.  6 mm nonobstructing left interpolar caliceal calculus. RETROPERITONEUM: No mass or enlarged adenopathy.  BOWEL:  Large amount of excessive stool seen throughout the colon. Although the appendix is not visualized, there are no inflammatory changes in the right lower quadrant to suggest acute appendicitis. Small hiatal hernia with wall thickening at the gastroesophageal junction. MESENTERY: No mass or loculated collection.  Calcified nodule seen within the right lower quadrant measures 0.7 cm and may be sequela of remote granulomatous disease or prior fat necrosis. PELVIS: No mass or fluid collection.  No enlarged lymph nodes. BONES:   There is degenerative disease of the thoracic and lumbar spine. Degenerative changes are seen within the sacroiliac joints and pubic symphysis. Degenerative changes are seen in the bilateral hips.         CONCLUSION:   Type 2 lumbar endoleak of the abdominal aortic endovascular stent graft.  5.4 cm aneurysm the native abdominal aorta has increased in size since 9/22/2023.  Nonemergent interventional radiology or vascular surgery evaluation recommended.  Occluded proximal right superficial femoral artery has progressed since 9/22/2023.  The proximal aspect of the right profundus femoris artery demonstrates compensatory hypertrophy and is patent.  Occluded JESÚS with retrograde filling from collateral vessels.  No pulmonary embolus in the central, main, lobar, segmental pulmonary arteries. Nondiagnostic in the subsegmental pulmonary arteries due to respiratory motion artifact.   Nonobstructing left interpolar caliceal calculus.  Large amount of excessive stool seen throughout the colon suggestive of constipation. No obstruction.  Multiple other incidental findings as described in the body of the report.      Dictated by (CST): Timmy Sandoval MD on 3/09/2025 at 4:40 PM     Finalized by (CST): Timmy Sandoval MD on 3/09/2025 at 4:48 PM          XR CHEST AP PORTABLE  (CPT=71045)    Result Date: 3/9/2025  PROCEDURE: XR CHEST AP PORTABLE  (CPT=71045) TIME: 1501.   COMPARISON: [facility], CT CHEST PE AORTA (IV ONLY) (CPT=71260), 12/09/2024, 8:35 PM.  [facility], XR CHEST AP PORTABLE (CPT=71045), 8/07/2024, 1:23 PM.  [facility], XR CHEST PA + LAT CHEST (ESI=[id number]), 12/22/2023, 8:33 AM.  INDICATIONS: Chest wall pain and shortness of breath.  TECHNIQUE:   Single view.   FINDINGS:  CARDIAC/VASC: The cardiomediastinal silhouette is not enlarged. There is mild tortuosity of the thoracic aorta with peripheral atherosclerotic vascular calcification. The pulmonary vascularity is within normal limits. MEDIAST/ROLAND: No visible mass or adenopathy. LUNGS/PLEURA: The lungs are hyperexpanded. There are relative lucencies of the upper lobes bilaterally, and coarsened bronchovascular markings are apparent. No airspace consolidation, pleural effusion, or pneumothorax is evident.  BONES: Mild degenerative changes of the thoracic spine are apparent. There is no fracture or visible bony lesion. OTHER: Leads overlie the chest and obscure underlying detail.           CONCLUSION:  Hyperexpanded lungs and basilar-predominant atelectasis without radiographic evidence of further acute intrathoracic process.    Dictated by (CST): Pepe Hodges MD on 3/09/2025 at 3:30 PM     Finalized by (CST): Pepe Hodges MD on 3/09/2025 at 3:31 PM          Latest Audiogram Result (Hz) Exam performed: 3/17/2025 3:05 PM Last edited by Amrita Shah AUD on 3/17/2025 3:12 PM        125 250   1500 2000 3000 4000 6000 8000    Right air:  45 45  45  50  65  65    Left air:  35 35  35  50  70  65    Right mastoid bone:   35  30  45  55         Reliability:  Good    Transducer:  Inserts    Technique:  Conventional Audiometry    Comments:            Latest Speech Audiometry  Last edited by Amrita Shah AUD on 3/17/2025 3:12 PM       Ear Method PTA SAT SRT [facility] Test/list Score (%) Intensity Mask/noise Notes    right live voice   45   10 By Difficulty 92 75      left live voice   45   10 By Difficulty 92 75                    Latest Audiogram Result Text  Last edited by Amrita Shah AUD on 3/17/2025  3:50 PM      Addendum      Patient referred for hearing testing due hearing loss in both ears.     Mild sloping to a moderately-severe sensorineural hearing loss 250-8000 Hz in both ears.   Speech Reception Thresholds (SRTs) are in good agreement with pure tone findings.   Word recognition ability is excellent in both ears when presented at an elevated conversational level.        With this degree of hearing loss, patient would be expected to have significant difficulties hearing average level conversations. He/she will also have difficulty hearing in background noise, from a distance, or without the addition of visual cues. Patient will also have difficulty hearing consonant sounds, which provide much of the clarity of speech. Patients with similar hearing loss often comment that they can \"hear\" but not \"understand\". Some of the consequences of untreated hearing loss can include safety/sound awareness issues, social isolation, depression, memory issues, and cognitive decline.       Repeat hearing assessment with perceived changes in hearing sensitivity or tinnitus or with onset of vertigo.  Consider trial with amplification to address communication difficulties inherent with this degree of hearing loss. Contact the Audiology department to schedule a full hearing aid evaluation if  desired.           Addended by Amrita Shah AUD on 3/17/2025  3:50 PM                 Impression:       ICD-10-CM    1. Sensorineural hearing loss (SNHL) of both ears  H90.3       2. Impacted cerumen of right ear  H61.21            Recommendations:  I was able to disimpact the bilateral ear canals today.  The patient will return to see me annually for cerumen removal.  I also cleared him to obtain hearing aids and will have his molds done today.    Thank you for allowing me to participate in the care of your patient.    Hernán Renee, DO   Otolaryngology/Rhinology, Sinus, and Endoscopic Skull Base Surgery  [address] [place] [id number]  Phone [phone]  Fax [phone]  3/17/2025  8:19 PM  7/7/2025          [1]   Allergies  Allergen Reactions    Lisinopril ANGIOEDEMA and OTHER (SEE COMMENTS)     Lip swelling

## 2025-07-10 ENCOUNTER — OFFICE VISIT (OUTPATIENT)
Dept: PODIATRY CLINIC | Facility: CLINIC | Age: 85
End: 2025-07-10

## 2025-07-10 DIAGNOSIS — I73.9 PERIPHERAL VASCULAR DISEASE: ICD-10-CM

## 2025-07-10 DIAGNOSIS — R26.81 GAIT INSTABILITY: ICD-10-CM

## 2025-07-10 DIAGNOSIS — M79.671 BILATERAL FOOT PAIN: ICD-10-CM

## 2025-07-10 DIAGNOSIS — M79.672 BILATERAL FOOT PAIN: ICD-10-CM

## 2025-07-10 DIAGNOSIS — E11.42 TYPE 2 DIABETES MELLITUS WITH POLYNEUROPATHY (HCC): Primary | ICD-10-CM

## 2025-07-10 DIAGNOSIS — B35.1 ONYCHOMYCOSIS: ICD-10-CM

## 2025-07-10 PROCEDURE — 1125F AMNT PAIN NOTED PAIN PRSNT: CPT | Performed by: STUDENT IN AN ORGANIZED HEALTH CARE EDUCATION/TRAINING PROGRAM

## 2025-07-10 PROCEDURE — 1159F MED LIST DOCD IN RCRD: CPT | Performed by: STUDENT IN AN ORGANIZED HEALTH CARE EDUCATION/TRAINING PROGRAM

## 2025-07-10 PROCEDURE — 99213 OFFICE O/P EST LOW 20 MIN: CPT | Performed by: STUDENT IN AN ORGANIZED HEALTH CARE EDUCATION/TRAINING PROGRAM

## 2025-07-10 RX ORDER — PREDNISOLONE ACETATE 10 MG/ML
SUSPENSION/ DROPS OPHTHALMIC
COMMUNITY
Start: 2025-05-28

## 2025-07-10 RX ORDER — FUROSEMIDE 40 MG/1
TABLET ORAL
COMMUNITY
Start: 2025-05-28

## 2025-07-10 NOTE — PROGRESS NOTES
[facility] Podiatry  Progress Note      Darin Hernandez is a 85 year old male.   Chief Complaint   Patient presents with    Diabetic Foot Care     Toenail care LOV 6/12/25 with Dr. George - Last A1C 6/5/25 8.7 - pain rated 3/10         HPI:     Patient is a pleasant 85-year-old diabetic male who presents to clinic for bilateral foot evaluation.  Admits to elongated toenails he is unable to trim himself.  Denies any pedal injuries or trauma.  Denies any signs of infection.    Allergies: Lisinopril    Current Outpatient Medications   Medication Sig Dispense Refill    prednisoLONE 1 % Ophthalmic Suspension       furosemide 40 MG Oral Tab       ATENOLOL 25 MG Oral Tab TAKE 1 TABLET BY MOUTH DAILY 90 tablet 3    Omeprazole 40 MG Oral Capsule Delayed Release Take 1 capsule (40 mg total) by mouth daily. 30 capsule 0    glipiZIDE 10 MG Oral Tab Take 1 tablet (10 mg total) by mouth daily. Daily in the morning      Vitamin C 500 MG Oral Tab Take 1 tablet (500 mg total) by mouth daily.      Calcium Carbonate 600 MG Oral Tab Take 1 tablet (600 mg total) by mouth 2 (two) times daily.      brimonidine 0.15 % Ophthalmic Solution Place 1 drop into both eyes 2 (two) times daily.      OneTouch Delica Lancets 33G Does not apply Misc Check blood sugar once daily. 100 each 3    amLODIPine 5 MG Oral Tab Take 1 tablet (5 mg total) by mouth daily. 90 tablet 3    metFORMIN HCl 1000 MG Oral Tab TAKE 1 TABLET BY MOUTH EVERY MORNING AND EVERY NIGHT AT BEDTIME 180 tablet 3    PIOGLITAZONE 45 MG Oral Tab TAKE 1 TABLET BY MOUTH DAILY 90 tablet 3    Potassium Chloride ER 10 MEQ Oral Tab CR Take 1 tablet (10 mEq total) by mouth daily. 90 tablet 3    Glucose Blood (ONETOUCH VERIO) In Vitro Strip Checl blood sugar once daily 100 strip 3    losartan 100 MG Oral Tab Take 1 tablet (100 mg total) by mouth daily. 90 tablet 3    tamsulosin 0.4 MG Oral Cap Take 1 capsule (0.4 mg total) by mouth daily. 90 capsule 3    atorvastatin 40 MG Oral Tab  Take 1 tablet (40 mg total) by mouth daily. 90 tablet 3    TIMOLOL 0.5 % Ophthalmic Solution INSTILL 1 DROP INTO BOTH EYES  TWICE DAILY 15 mL 3    aspirin 81 MG Oral Tab EC Take 1 tablet (81 mg total) by mouth in the morning.        Past Medical History:    Aorta aneurysm    Back problem    Benign essential HTN    Cataract    Chronic rhinitis    Diabetes (HCC)    DM (diabetes mellitus), type 2 (HCC)    Essential hypertension    Hearing impairment    High blood pressure    High cholesterol    Hyperlipidemia    POAG (primary open-angle glaucoma)    first visit with Dr. Ang patient has been taking Brimonidine 0.15% OU BID and Timolol 0.5% OU BID for 10 yrs per MD in [place].  10/28/23 patient to continue taking gtts per [facility] due to abnormal VF OS and abnormal OCT OU    PVD (peripheral vascular disease)    Skin cancer    Visual impairment      Past Surgical History:   Procedure Laterality Date    Appendectomy      Cataract extraction w/  intraocular lens implant Left 04/07/2014    Done by Dr. Chico Park in [place]    Cataract extraction w/  intraocular lens implant Right 2014    Done by Dr. Ch[place] Park in [place]    Other accessory      T and A    Other accessory      appendectomy    Other accessory      cholycystectomy    Other accessory      bilateral cataract    Other accessory      facial basal cell cancer    Tonsillectomy        Family History   Problem Relation Age of Onset    Macular degeneration Brother     Glaucoma Neg     Diabetes Neg       Social History     Socioeconomic History    Marital status:    Tobacco Use    Smoking status: Former     Types: Cigarettes     Passive exposure: Never    Smokeless tobacco: Never   Vaping Use    Vaping status: Never Used   Substance and Sexual Activity    Alcohol use: Yes     Alcohol/week: 2.0 standard drinks of alcohol     Types: 2 Cans of beer per week     Comment: 1 - 2 beers occasional    Drug use: Never           REVIEW OF SYSTEMS:      Denies nause, fever, chills  No calf pain  Denies chest pain or SOB      EXAM:   There were no vitals taken for this visit.  GENERAL: well developed, well nourished, in no apparent distress  EXTREMITIES:   1. Integument: Normal skin temperature and turgor. Toenails x10 are elongated, thickened and discolored with subungal derbi.     2. Vascular: Dorsalis pedis two out of four bilateral and posterior tibial pulses two out of   four bilateral, capillary refill normal.   3. Musculoskeletal: All muscle groups are graded 5 out of 5 in the foot and ankle.   4. Neurological: Normal sharp dull sensation; reflexes normal.             ASSESSMENT AND PLAN:   Diagnoses and all orders for this visit:    Type 2 diabetes mellitus with polyneuropathy (HCC)    Gait instability    Onychomycosis    Peripheral vascular disease    Bilateral foot pain          Plan:       -Patient examined, chart history reviewed.  -Discussed importance of proper pedal hygiene, regular foot checks, and tight glucose control.  -Sharply debrided nails x10 with a sterile nail nipper achieving a 20% reduction in thickness and length, without incident.   -Ambulate with supportive shoes and inserts and avoid walking barefoot.  -Educated patient on acute signs of infection advised patient to seek immediate medical attention if symptoms arise.    RTC in 3 months      The patient indicates understanding of these issues and agrees to the plan.        Mellissa Douglas DPM

## 2025-07-15 NOTE — TELEPHONE ENCOUNTER
Received faxed refill request for below Medication from [facility]   Glipizide Tab 10mg  Qty# 90   Take 1 tablet by mouth daily in the morning    Patient has been reassigned to Dr.Arlinda Drake  due to Dr. George's practice closing

## 2025-07-21 NOTE — TELEPHONE ENCOUNTER
Need to verify if patient's new primary care provider is Dr. Amrita Francois.     Chart shows Dr. Francois as current primary care provider as of 7/7/25

## 2025-07-23 RX ORDER — GLIPIZIDE 10 MG/1
10 TABLET ORAL DAILY
Qty: 90 TABLET | Refills: 3 | OUTPATIENT
Start: 2025-07-23

## 2025-07-29 ENCOUNTER — TELEPHONE (OUTPATIENT)
Dept: INTERNAL MEDICINE CLINIC | Facility: CLINIC | Age: 85
End: 2025-07-29

## 2025-07-29 ENCOUNTER — OFFICE VISIT (OUTPATIENT)
Dept: AUDIOLOGY | Facility: CLINIC | Age: 85
End: 2025-07-29

## 2025-07-29 DIAGNOSIS — H90.3 SENSORINEURAL HEARING LOSS (SNHL) OF BOTH EARS: Primary | ICD-10-CM

## 2025-07-29 PROCEDURE — V5249 HEARING AID, BINAURAL, ITC: HCPCS | Performed by: AUDIOLOGIST

## 2025-07-29 PROCEDURE — 1159F MED LIST DOCD IN RCRD: CPT | Performed by: AUDIOLOGIST

## 2025-07-31 RX ORDER — GLIPIZIDE 10 MG/1
10 TABLET ORAL
Qty: 180 TABLET | Refills: 0 | Status: SHIPPED | OUTPATIENT
Start: 2025-07-31

## 2025-07-31 RX ORDER — ATORVASTATIN CALCIUM 40 MG/1
40 TABLET, FILM COATED ORAL DAILY
Qty: 90 TABLET | Refills: 0 | Status: SHIPPED | OUTPATIENT
Start: 2025-07-31

## 2025-08-21 ENCOUNTER — HOSPITAL ENCOUNTER (OUTPATIENT)
Dept: BONE DENSITY | Facility: HOSPITAL | Age: 85
Discharge: HOME | End: 2025-08-21
Attending: INTERNAL MEDICINE

## 2025-08-21 DIAGNOSIS — M81.0 AGE RELATED OSTEOPOROSIS: ICD-10-CM

## 2025-08-21 PROCEDURE — 77080 DXA BONE DENSITY AXIAL: CPT | Performed by: INTERNAL MEDICINE

## 2025-08-25 ENCOUNTER — LAB ENCOUNTER (OUTPATIENT)
Dept: LAB | Age: 85
End: 2025-08-25
Attending: INTERNAL MEDICINE

## 2025-08-25 DIAGNOSIS — N18.30 CHRONIC RENAL DISEASE, STAGE III (HCC): ICD-10-CM

## 2025-08-25 DIAGNOSIS — E11.65 TYPE 2 DIABETES MELLITUS WITH HYPERGLYCEMIA (HCC): Primary | ICD-10-CM

## 2025-08-25 DIAGNOSIS — Z79.899 DRUG THERAPY: ICD-10-CM

## 2025-08-25 LAB
ALBUMIN SERPL-MCNC: 4.6 G/DL (ref 3.2–4.8)
ALBUMIN/GLOB SERPL: 1.9 (ref 1–2)
ALP LIVER SERPL-CCNC: 82 U/L (ref 45–117)
ALT SERPL-CCNC: 15 U/L (ref 10–49)
ANION GAP SERPL CALC-SCNC: 8 MMOL/L (ref 0–18)
AST SERPL-CCNC: 18 U/L (ref ?–34)
BILIRUB SERPL-MCNC: 0.5 MG/DL (ref 0.2–1.1)
BUN BLD-MCNC: 26 MG/DL (ref 9–23)
BUN/CREAT SERPL: 17.2 (ref 10–20)
CALCIUM BLD-MCNC: 9.4 MG/DL (ref 8.7–10.4)
CHLORIDE SERPL-SCNC: 106 MMOL/L (ref 98–112)
CO2 SERPL-SCNC: 27 MMOL/L (ref 21–32)
CREAT BLD-MCNC: 1.51 MG/DL (ref 0.7–1.3)
EGFRCR SERPLBLD CKD-EPI 2021: 44 ML/MIN/1.73M2 (ref 60–?)
EST. AVERAGE GLUCOSE BLD GHB EST-MCNC: 189 MG/DL (ref 68–126)
FASTING STATUS PATIENT QL REPORTED: YES
GLOBULIN PLAS-MCNC: 2.4 G/DL (ref 2–3.5)
GLUCOSE BLD-MCNC: 230 MG/DL (ref 70–99)
HBA1C MFR BLD: 8.2 % (ref ?–5.7)
OSMOLALITY SERPL CALC.SUM OF ELEC: 304 MOSM/KG (ref 275–295)
POTASSIUM SERPL-SCNC: 4 MMOL/L (ref 3.5–5.1)
PROT SERPL-MCNC: 7 G/DL (ref 5.7–8.2)
SODIUM SERPL-SCNC: 141 MMOL/L (ref 136–145)

## 2025-08-25 PROCEDURE — 83036 HEMOGLOBIN GLYCOSYLATED A1C: CPT

## 2025-08-25 PROCEDURE — 80053 COMPREHEN METABOLIC PANEL: CPT

## 2025-08-25 PROCEDURE — 36415 COLL VENOUS BLD VENIPUNCTURE: CPT

## (undated) NOTE — Clinical Note
Transitional Care Management call completed. A Transitional Care Management appointment is scheduled for 7/19/2024. Thank you.

## (undated) NOTE — LETTER
October 17, 2023      No Recipients     Patient: Lauren Carcamo   YOB: 1940   Date of Visit: 10/17/2023       Dear Dr. Nancy Mckay Recipients: Thank you for referring Josh Gillespie to me for evaluation. Here is my assessment and plan of care: Lauren Carcamo is a 78year old male. HPI:     HPI    NP. Pt in today for a diabetic eye exam and glaucoma evaluation. Pt's last eye exam was about 7 months ago in [place]. Pt was under the care of Dr. Nina Hernandez in [place] and is taking Brimonidine and Timolol BID in both eyes and states he was taking a drop at bedtime but doesn't recall the name of it. Pt states he has been taking the drops for almost 10 years and mentioned he had cataract surgery in both eyes by Dr. Darrion Ragsdale in 2014. Pt denies any family history of glaucoma, trauma to the head or eyes or use steroids. Pt has been a diabetic for 20 years       Pt's diabetes is currently controlled by pills   Pt checks BS once in a while    Pt's last blood sugar was 157 about a week ago   Last HA1C was 7.6 on 9/19/23  Endocrinologist: none     Consult: per Dr. Ayla Iglesias   Last edited by Troy Castellon OT on 10/17/2023  3:40 PM.        Patient History:  Past Medical History:   Diagnosis Date    Aorta aneurysm ([address]     Benign essential HTN     Chronic rhinitis     Diabetes ([address]     DM (diabetes mellitus), type 2 ([address]     Hyperlipidemia     PVD (peripheral vascular disease) ([address]     Skin cancer        Surgical History: Lauren Carcamo has a past surgical history that includes other accessory (T and A); other accessory (appendectomy); other accessory (cholycystectomy); other accessory (bilateral cataract); other accessory (facial basal cell cancer);  Cataract extraction w/  intraocular lens implant (Left, 04/07/2014) (Done by Dr. Nina Hernandez in [place]); and Cataract extraction w/  intraocular lens implant (Right, 2014) (Done by Dr. Nina Hernandez in [place]). Family History   Problem Relation Age of Onset    Macular degeneration Brother     Glaucoma Neg     Diabetes Neg        Social History:   Social History     Socioeconomic History    Marital status:    Tobacco Use    Smoking status: Former     Types: Cigarettes    Smokeless tobacco: Never   Vaping Use    Vaping Use: Never used   Substance and Sexual Activity    Alcohol use: Yes     Comment: occasional    Drug use: Not Currently       Medications:  Current Outpatient Medications   Medication Sig Dispense Refill    timolol 0.5 % Ophthalmic Solution Place 1 drop into both eyes 2 (two) times daily. 15 mL 3    brimonidine 0.15 % Ophthalmic Solution Place 1 drop into both eyes in the morning and 1 drop before bedtime. 3 each 3    atenolol 25 MG Oral Tab Take 1 tablet (25 mg total) by mouth daily. 90 tablet 3    metFORMIN HCl 1000 MG Oral Tab Take 1 tablet (1,000 mg total) by mouth in the morning and 1 tablet (1,000 mg total) before bedtime. 180 tablet 3    Omeprazole 20 MG Oral Tab EC Take 20 mg by mouth daily. 90 tablet 3    amLODIPine 10 MG Oral Tab Take 1 tablet (10 mg total) by mouth daily. 90 tablet 3    Potassium Chloride ER 10 MEQ Oral Tab CR Take 1 tablet (10 mEq total) by mouth daily. 90 tablet 3    pioglitazone 45 MG Oral Tab Take 1 tablet (45 mg total) by mouth daily. 90 tablet 3    aspirin 81 MG Oral Tab EC Take 1 tablet (81 mg total) by mouth daily. glipiZIDE 10 MG Oral Tab Take 2 tablets (20 mg total) by mouth every evening. losartan 100 MG Oral Tab Take 1 tablet (100 mg total) by mouth daily. Multiple Vitamins-Minerals (CENTRUM SILVER ADULT 50+ OR) Take by mouth daily. Ascorbic Acid (VITAMIN C OR) Take by mouth. ATORVASTATIN CALCIUM OR Take 40 mg by mouth daily. alendronate 70 MG Oral Tab Take 1 tablet (70 mg total) by mouth once a week.          Allergies:    Lisinopril              ANGIOEDEMA, OTHER (SEE COMMENTS)    Comment:Lip swelling    ROS:     ROS Positive for: Endocrine, Eyes  Negative for: Constitutional, Gastrointestinal, Neurological, Skin, Genitourinary, Musculoskeletal, HENT, Cardiovascular, Respiratory, Psychiatric, Allergic/Imm, Heme/Lymph  Last edited by Erik Cao OT on 10/17/2023  3:16 PM.          PHYSICAL EXAM:     Base Eye Exam       Visual Acuity (Snellen - Linear)         Right Left    Dist cc 20/50 -2 20/40 +2    Dist ph cc 20/40 -1 NI    Near cc 20/30 20/25      Correction: Glasses              Tonometry (Applanation, 3:44 PM)         Right Left    Pressure 17 16              Pupils         Pupils    Right PERRL    Left PERRL              Visual Fields         Left Right     Full Full              Extraocular Movement         Right Left     Full, Ortho Full, Ortho              Dilation       Both eyes: 1.0% Mydriacyl and 2.5% Kermit Synephrine @ 3:45 PM                  Slit Lamp and Fundus Exam       Slit Lamp Exam         Right Left    Lids/Lashes Dermatochalasis, Meibomian gland dysfunction Dermatochalasis, Meibomian gland dysfunction    Conjunctiva/Sclera Nasal pinguecula Nasal pinguecula    Cornea no krukenberg's spindle no krukenberg's spindle    Anterior Chamber Deep and quiet Deep and quiet    Iris No transillumination defects No transillumination defects    Lens PC IOL with YAG PC IOL with YAG    Vitreous Vitreous floaters Vitreous floaters              Fundus Exam         Right Left    Disc Sloping margin, Temporal crescent Sloping margin, Temporal crescent    C/D Ratio 0.9 0.9    Macula few soft drusen-no BDR few soft drusen-no BDR    Vessels Normal Normal    Periphery Normal Normal                  Refraction       Wearing Rx         Sphere Cylinder Axis Add    Right -1.75 +2.25 005 +2.75    Left -2.00 +2.50 165 +2.75      Age: 6m    Type: Progressive bifocal              Manifest Refraction (Auto)         Sphere Cylinder [place] Dist [place] Add [place]    Right -1.00 +1.00 175       Left -1.50 +2.75 155                 Manifest Refraction #2         Sphere Cylinder [place] Dist VA Add [place]    Right -1.75 +2.00 005 20/40- +3.00 20/20-    Left -1.75 +2.50 165 20/40+ +3.00 20/20              Final Rx         Sphere Cylinder [place] Dist VA Add [place]    Right -1.75 +2.00 005 20/40- +3.00 20/20-    Left -1.75 +2.50 165 20/40+ +3.00 20/20      Type: Progressive bifocal                     ASSESSMENT/PLAN:     Diagnoses and Plan:     Primary open angle glaucoma of both eyes, mild stage  IOP is stable  Continue Alphagan OU BID and Timoptic 0.5% OU BID as directed  Fundus photos taken today  Will see patient for next available visual field, OCT and pachymetry with no MD, then 4 month pressure check    Type 2 diabetes mellitus without retinopathy ([facility]  Diabetes type II: no background of retinopathy, no signs of neovascularization noted. Discussed ocular and systemic benefits of blood sugar control. Diagnosis and treatment discussed in detail with patient. Pseudophakia of both eyes  No treatment    Drusen  No treatment    Vitreous floaters of both eyes   There is no evidence of retinal pathology. All signs and symptoms of retinal detachment/tears explained in detail. Patient instructed to call the office if they experience increase in floaters, increase in flashes of light, loss of vision or curtain or veil effect. Orders Placed This Encounter      Fundus Photos - OU - Both Eyes      Freeman Visual Field - OU - Both Eyes      OCT, Optic Nerve - OU - Both Eyes      Meds This Visit:  Requested Prescriptions     Signed Prescriptions Disp Refills    timolol 0.5 % Ophthalmic Solution 15 mL 3     Sig: Place 1 drop into both eyes 2 (two) times daily. brimonidine 0.15 % Ophthalmic Solution 3 each 3     Sig: Place 1 drop into both eyes in the morning and 1 drop before bedtime. Follow up instructions:  No follow-ups on file.     10/17/2023  Scribed by: Kev Rosales MD        If you have questions, please do not hesitate to call me. I look forward to following Lashon Avendano along with you.     Sincerely,        Rodriguez New MD        CC:   No Recipients    Document electronically generated by: Rodriguez New MD

## (undated) NOTE — LETTER
Consent for Coronary CT Angiography    Date of Procedure: 05/13/25     on Darin Hernandez  Your doctor has recommended that you have a Coronary CT Angiography procedure. Coronary CT Angiography is a diagnostic procedure using computed tomography to scan the chest and coronary arteries. It is a non-invasive technique that allows clear visualization of narrowed and blocked arteries. Blockage in these arteries may lead to heart attack or stroke.    You will lie on a table that slides slowly into a large circular opening called the CT gantry. The procedure will be performed by the CT Staff, including a nurse, a technologist, and a patient assistant. The staff will be with you throughout the entire procedure to explain each step, make you as comfortable as possible, and answer all of your questions. The staff will take a series of images of your heart and chest to help define the area to be imaged. You will be asked to hold your breath for a short time as each series of images is performed and acquired.     You may receive medication called a beta blocker that will slow your hear rate down. This is needed so we can obtain better images of your heart. You may also receive a nitroglycerine spray under your tongue. This medication is given to dilate the arteries for better picture quality. The nitroglycerine may cause a drop in blood pressure. During the procedure you will receive an injection of a contrast material, which assists the physician in highlighting the anatomy of your heart. You may experience a warm sensation through your body and a metallic taste in your mouth. In rare circumstances, a rash and/or hives may occur. It's very important to inform your care giver of any changes you may feel or experience.     The medication and the contrast material used as part of your procedure are all deemed very safe, however there is always an element of risk to a patient when taking medication. Allergic reactions  to medication can range from very minor to very serious, leading to a life threatening situation or even death. Please be sure to communicate any allergy you may have to your caregiver immediately.    The information that is obtained during your testing will be treated as privileged and confidential. The information obtained will be given to your doctor and may be used for insurance purposes or to track and trend data as part of  with your privacy retained.     I, Darin Hernandez, have read and understand the above information. I voluntarily agree and consent to have the Coronary CT Angiography (CTA) Exam. Furthermore, no guarantees or assurances have been given by anyone as to the results that may be obtained from this procedure. Any questions that may have occurred to me have been answered to my satisfaction.    Signature of Patient: __________________________Date: ___________Time: _______      Patient Name: Darin Hernandez    : 1940      Printed: May 12, 2025      Medical Record #: [id number]

## (undated) NOTE — Clinical Note
TCM assessment completed with patient. The patient is scheduled for a TCM/HFU with you on 01/05/2024.   Thank you

## (undated) NOTE — LETTER
Darcie Avila [address], [place], [place]  [address]  INFORMED CONSENT FOR TRANSFUSION OF BLOOD OR BLOOD PRODUCTS  My physician has informed me of the nature, purpose, benefits and risks of transfusion for blood and blood components that he/she may deem necessary during my treatment or hospitalization. He/she has also discussed alternatives to receiving blood from the voluntary blood supply with me, such as self-donation (autologous) and directed donation (blood donated by family or friends to be used specifically for me). I further understand that while the [address] [place] [address] will attempt to supply any autologous or directed donor blood prior to transfusion of blood from the routine blood supply, medical circumstances may require that other or additional blood components may be required for my care. In giving consent, I acknowledge that my physician has also informed me that despite careful screening and testing in accordance with national and regional regulations, there is still a small risk of transmission of infectious agents including hepatitis, HIV-1/2, cytomegalovirus and other viruses or diseases as yet unknown for which licensed definitive screening tests do not currently exist. Additionally, my physician has informed me of the potential for transfusion reactions not related to an infectious agent. [  ]  Check here for Recurring Outpatient Transfusion Therapy (valid for 1 year) In addition to the above, my physician has informed me that I shall receive numerous transfusions over a period of time and that these can lead to other increased risks. I hereby authorize the transfusion of blood and/or blood products to me as deemed necessary and ordered by physicians participating in my care.  My physician has given me the opportunity to ask questions and any questions asked have been answered to my satisfaction  __________________________________________ ______________________________________________  (Signature of Patient)                                                            (Responsible party in case of Minor,                                                                                                 Incompetent, or unconscious Patient)  ___________________________________________       ________ ______________________________________  (Relationship to Patient)                                                       (Signature of Witness)  ______________________________________________________________________________________________   (Date)                                                                           (Time)  REFUSAL OF CONSENT FOR BLOOD TRANSFUSIONS   Sign only if Refusing   [  ] I understand refusal of blood or blood products as deemed necessary by my physician may have serious consequences to my condition to include possible death.  I hereby assume responsibility for my refusal and release the hospital, its personnel, and my physicians from any responsibility for the consequences of my refusal.    ________________________________________________________________________________  (Signature of Patient)                                                         (Responsible Party/Relationship to Patient)    ________________________________________________________________________________  (Signature of Witness)                                                       (Date/Time)     Patient Name: Shelby Kilgore     : 1940                 Printed: 2023      Medical Record #: [id number]                                 Page 1 of 1